# Patient Record
Sex: MALE | Race: WHITE | NOT HISPANIC OR LATINO | Employment: OTHER | URBAN - METROPOLITAN AREA
[De-identification: names, ages, dates, MRNs, and addresses within clinical notes are randomized per-mention and may not be internally consistent; named-entity substitution may affect disease eponyms.]

---

## 2023-01-25 ENCOUNTER — HOSPITAL ENCOUNTER (EMERGENCY)
Dept: DATA CONVERSION | Facility: HOSPITAL | Age: 83
Discharge: HOME | End: 2023-01-25
Attending: EMERGENCY MEDICINE

## 2023-01-25 DIAGNOSIS — S61.411A LACERATION WITHOUT FOREIGN BODY OF RIGHT HAND, INITIAL ENCOUNTER: ICD-10-CM

## 2023-01-25 DIAGNOSIS — I48.91 UNSPECIFIED ATRIAL FIBRILLATION (MULTI): ICD-10-CM

## 2023-01-25 DIAGNOSIS — Z88.0 ALLERGY STATUS TO PENICILLIN: ICD-10-CM

## 2023-01-25 DIAGNOSIS — Z95.0 PRESENCE OF CARDIAC PACEMAKER: ICD-10-CM

## 2023-01-25 DIAGNOSIS — S61.401A UNSPECIFIED OPEN WOUND OF RIGHT HAND, INITIAL ENCOUNTER: ICD-10-CM

## 2023-01-25 DIAGNOSIS — M19.90 UNSPECIFIED OSTEOARTHRITIS, UNSPECIFIED SITE: ICD-10-CM

## 2023-01-25 DIAGNOSIS — E11.9 TYPE 2 DIABETES MELLITUS WITHOUT COMPLICATIONS (MULTI): ICD-10-CM

## 2023-01-25 DIAGNOSIS — Z79.84 LONG TERM (CURRENT) USE OF ORAL HYPOGLYCEMIC DRUGS: ICD-10-CM

## 2023-01-25 DIAGNOSIS — S00.83XA CONTUSION OF OTHER PART OF HEAD, INITIAL ENCOUNTER: ICD-10-CM

## 2023-01-25 DIAGNOSIS — I10 ESSENTIAL (PRIMARY) HYPERTENSION: ICD-10-CM

## 2023-01-25 DIAGNOSIS — Z79.899 OTHER LONG TERM (CURRENT) DRUG THERAPY: ICD-10-CM

## 2023-01-25 DIAGNOSIS — Z79.01 LONG TERM (CURRENT) USE OF ANTICOAGULANTS: ICD-10-CM

## 2023-01-25 DIAGNOSIS — S09.90XA UNSPECIFIED INJURY OF HEAD, INITIAL ENCOUNTER: ICD-10-CM

## 2023-01-25 DIAGNOSIS — S00.31XA ABRASION OF NOSE, INITIAL ENCOUNTER: ICD-10-CM

## 2023-01-25 DIAGNOSIS — M54.2 CERVICALGIA: ICD-10-CM
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
## 2023-01-25 DIAGNOSIS — Z79.4 LONG TERM (CURRENT) USE OF INSULIN (MULTI): ICD-10-CM

## 2023-01-25 DIAGNOSIS — Z79.82 LONG TERM (CURRENT) USE OF ASPIRIN: ICD-10-CM

## 2023-03-24 LAB
ANION GAP IN SER/PLAS: 17 MMOL/L (ref 10–20)
CALCIUM (MG/DL) IN SER/PLAS: 8.8 MG/DL (ref 8.6–10.3)
CARBON DIOXIDE, TOTAL (MMOL/L) IN SER/PLAS: 22 MMOL/L (ref 21–32)
CHLORIDE (MMOL/L) IN SER/PLAS: 102 MMOL/L (ref 98–107)
CREATININE (MG/DL) IN SER/PLAS: 1.03 MG/DL (ref 0.5–1.3)
GFR MALE: 72 ML/MIN/1.73M2
GLUCOSE (MG/DL) IN SER/PLAS: 326 MG/DL (ref 74–99)
NATRIURETIC PEPTIDE B (PG/ML) IN SER/PLAS: 203 PG/ML (ref 0–99)
POTASSIUM (MMOL/L) IN SER/PLAS: 4 MMOL/L (ref 3.5–5.3)
SODIUM (MMOL/L) IN SER/PLAS: 137 MMOL/L (ref 136–145)
UREA NITROGEN (MG/DL) IN SER/PLAS: 26 MG/DL (ref 6–23)

## 2023-04-04 LAB
ERYTHROCYTE DISTRIBUTION WIDTH (RATIO) BY AUTOMATED COUNT: 12.8 % (ref 11.5–14.5)
ERYTHROCYTE MEAN CORPUSCULAR HEMOGLOBIN CONCENTRATION (G/DL) BY AUTOMATED: 32.7 G/DL (ref 32–36)
ERYTHROCYTE MEAN CORPUSCULAR VOLUME (FL) BY AUTOMATED COUNT: 102 FL (ref 80–100)
ERYTHROCYTES (10*6/UL) IN BLOOD BY AUTOMATED COUNT: 3.91 X10E12/L (ref 4.5–5.9)
HEMATOCRIT (%) IN BLOOD BY AUTOMATED COUNT: 39.7 % (ref 41–52)
HEMOGLOBIN (G/DL) IN BLOOD: 13 G/DL (ref 13.5–17.5)
INR IN PPP BY COAGULATION ASSAY: 1.3 (ref 0.9–1.1)
LEUKOCYTES (10*3/UL) IN BLOOD BY AUTOMATED COUNT: 12 X10E9/L (ref 4.4–11.3)
NRBC (PER 100 WBCS) BY AUTOMATED COUNT: 0 /100 WBC (ref 0–0)
PLATELETS (10*3/UL) IN BLOOD AUTOMATED COUNT: 257 X10E9/L (ref 150–450)
PROTHROMBIN TIME (PT) IN PPP BY COAGULATION ASSAY: 14.9 SEC (ref 9.8–13.4)

## 2023-04-11 ENCOUNTER — HOSPITAL ENCOUNTER (OUTPATIENT)
Dept: DATA CONVERSION | Facility: HOSPITAL | Age: 83
End: 2023-04-11
Attending: INTERNAL MEDICINE | Admitting: INTERNAL MEDICINE
Payer: MEDICARE

## 2023-04-11 DIAGNOSIS — Z88.0 ALLERGY STATUS TO PENICILLIN: ICD-10-CM

## 2023-04-11 DIAGNOSIS — R29.898 OTHER SYMPTOMS AND SIGNS INVOLVING THE MUSCULOSKELETAL SYSTEM: ICD-10-CM

## 2023-04-11 DIAGNOSIS — M54.16 RADICULOPATHY, LUMBAR REGION: ICD-10-CM

## 2023-05-23 LAB
ANION GAP IN SER/PLAS: 14 MMOL/L (ref 10–20)
CALCIUM (MG/DL) IN SER/PLAS: 8.8 MG/DL (ref 8.6–10.3)
CARBON DIOXIDE, TOTAL (MMOL/L) IN SER/PLAS: 26 MMOL/L (ref 21–32)
CHLORIDE (MMOL/L) IN SER/PLAS: 106 MMOL/L (ref 98–107)
CREATININE (MG/DL) IN SER/PLAS: 1.11 MG/DL (ref 0.5–1.3)
GFR MALE: 66 ML/MIN/1.73M2
GLUCOSE (MG/DL) IN SER/PLAS: 218 MG/DL (ref 74–99)
POTASSIUM (MMOL/L) IN SER/PLAS: 3.9 MMOL/L (ref 3.5–5.3)
SODIUM (MMOL/L) IN SER/PLAS: 142 MMOL/L (ref 136–145)
UREA NITROGEN (MG/DL) IN SER/PLAS: 22 MG/DL (ref 6–23)

## 2023-05-31 ENCOUNTER — HOSPITAL ENCOUNTER (OUTPATIENT)
Dept: DATA CONVERSION | Facility: HOSPITAL | Age: 83
End: 2023-05-31
Attending: ANESTHESIOLOGY | Admitting: ANESTHESIOLOGY
Payer: MEDICARE

## 2023-05-31 DIAGNOSIS — Z88.0 ALLERGY STATUS TO PENICILLIN: ICD-10-CM

## 2023-05-31 DIAGNOSIS — M54.16 RADICULOPATHY, LUMBAR REGION: ICD-10-CM

## 2023-05-31 DIAGNOSIS — M48.062 SPINAL STENOSIS, LUMBAR REGION WITH NEUROGENIC CLAUDICATION: ICD-10-CM

## 2023-06-20 LAB
ANION GAP IN SER/PLAS: 13 MMOL/L (ref 10–20)
CALCIUM (MG/DL) IN SER/PLAS: 8.4 MG/DL (ref 8.6–10.3)
CARBON DIOXIDE, TOTAL (MMOL/L) IN SER/PLAS: 27 MMOL/L (ref 21–32)
CHLORIDE (MMOL/L) IN SER/PLAS: 102 MMOL/L (ref 98–107)
CREATININE (MG/DL) IN SER/PLAS: 1.18 MG/DL (ref 0.5–1.3)
GFR MALE: 61 ML/MIN/1.73M2
GLUCOSE (MG/DL) IN SER/PLAS: 320 MG/DL (ref 74–99)
POTASSIUM (MMOL/L) IN SER/PLAS: 4 MMOL/L (ref 3.5–5.3)
SODIUM (MMOL/L) IN SER/PLAS: 138 MMOL/L (ref 136–145)
UREA NITROGEN (MG/DL) IN SER/PLAS: 34 MG/DL (ref 6–23)

## 2023-08-28 ENCOUNTER — OFFICE VISIT (OUTPATIENT)
Dept: PRIMARY CARE | Facility: CLINIC | Age: 83
End: 2023-08-28
Payer: MEDICARE

## 2023-08-28 VITALS
WEIGHT: 212 LBS | DIASTOLIC BLOOD PRESSURE: 70 MMHG | HEIGHT: 68 IN | OXYGEN SATURATION: 98 % | BODY MASS INDEX: 32.13 KG/M2 | SYSTOLIC BLOOD PRESSURE: 114 MMHG | HEART RATE: 88 BPM

## 2023-08-28 DIAGNOSIS — D64.9 ANEMIA, UNSPECIFIED TYPE: ICD-10-CM

## 2023-08-28 DIAGNOSIS — E78.5 HYPERLIPIDEMIA, UNSPECIFIED HYPERLIPIDEMIA TYPE: ICD-10-CM

## 2023-08-28 DIAGNOSIS — Z00.00 ROUTINE GENERAL MEDICAL EXAMINATION AT HEALTH CARE FACILITY: Primary | ICD-10-CM

## 2023-08-28 DIAGNOSIS — Z79.4 TYPE 2 DIABETES MELLITUS WITHOUT COMPLICATION, WITH LONG-TERM CURRENT USE OF INSULIN (MULTI): ICD-10-CM

## 2023-08-28 DIAGNOSIS — E11.9 TYPE 2 DIABETES MELLITUS WITHOUT COMPLICATION, WITH LONG-TERM CURRENT USE OF INSULIN (MULTI): ICD-10-CM

## 2023-08-28 DIAGNOSIS — I48.0 PAROXYSMAL ATRIAL FIBRILLATION (MULTI): ICD-10-CM

## 2023-08-28 LAB
ALANINE AMINOTRANSFERASE (SGPT) (U/L) IN SER/PLAS: 14 U/L (ref 10–52)
ALBUMIN (G/DL) IN SER/PLAS: 4.1 G/DL (ref 3.4–5)
ALKALINE PHOSPHATASE (U/L) IN SER/PLAS: 90 U/L (ref 33–136)
ANION GAP IN SER/PLAS: 12 MMOL/L (ref 10–20)
ASPARTATE AMINOTRANSFERASE (SGOT) (U/L) IN SER/PLAS: 17 U/L (ref 9–39)
BASOPHILS (10*3/UL) IN BLOOD BY AUTOMATED COUNT: 0.08 X10E9/L (ref 0–0.1)
BASOPHILS/100 LEUKOCYTES IN BLOOD BY AUTOMATED COUNT: 0.8 % (ref 0–2)
BILIRUBIN TOTAL (MG/DL) IN SER/PLAS: 0.9 MG/DL (ref 0–1.2)
CALCIUM (MG/DL) IN SER/PLAS: 9.3 MG/DL (ref 8.6–10.6)
CARBON DIOXIDE, TOTAL (MMOL/L) IN SER/PLAS: 28 MMOL/L (ref 21–32)
CHLORIDE (MMOL/L) IN SER/PLAS: 103 MMOL/L (ref 98–107)
CHOLESTEROL (MG/DL) IN SER/PLAS: 114 MG/DL (ref 0–199)
CHOLESTEROL IN HDL (MG/DL) IN SER/PLAS: 39.8 MG/DL
CHOLESTEROL/HDL RATIO: 2.9
CREATININE (MG/DL) IN SER/PLAS: 1.1 MG/DL (ref 0.5–1.3)
EOSINOPHILS (10*3/UL) IN BLOOD BY AUTOMATED COUNT: 0.13 X10E9/L (ref 0–0.4)
EOSINOPHILS/100 LEUKOCYTES IN BLOOD BY AUTOMATED COUNT: 1.3 % (ref 0–6)
ERYTHROCYTE DISTRIBUTION WIDTH (RATIO) BY AUTOMATED COUNT: 13.2 % (ref 11.5–14.5)
ERYTHROCYTE MEAN CORPUSCULAR HEMOGLOBIN CONCENTRATION (G/DL) BY AUTOMATED: 33.2 G/DL (ref 32–36)
ERYTHROCYTE MEAN CORPUSCULAR VOLUME (FL) BY AUTOMATED COUNT: 106 FL (ref 80–100)
ERYTHROCYTES (10*6/UL) IN BLOOD BY AUTOMATED COUNT: 3.45 X10E12/L (ref 4.5–5.9)
GFR MALE: 67 ML/MIN/1.73M2
GLUCOSE (MG/DL) IN SER/PLAS: 236 MG/DL (ref 74–99)
HEMATOCRIT (%) IN BLOOD BY AUTOMATED COUNT: 36.4 % (ref 41–52)
HEMOGLOBIN (G/DL) IN BLOOD: 12.1 G/DL (ref 13.5–17.5)
IMMATURE GRANULOCYTES/100 LEUKOCYTES IN BLOOD BY AUTOMATED COUNT: 0.4 % (ref 0–0.9)
LDL: 43 MG/DL (ref 0–99)
LEUKOCYTES (10*3/UL) IN BLOOD BY AUTOMATED COUNT: 10 X10E9/L (ref 4.4–11.3)
LYMPHOCYTES (10*3/UL) IN BLOOD BY AUTOMATED COUNT: 1.68 X10E9/L (ref 0.8–3)
LYMPHOCYTES/100 LEUKOCYTES IN BLOOD BY AUTOMATED COUNT: 16.7 % (ref 13–44)
MONOCYTES (10*3/UL) IN BLOOD BY AUTOMATED COUNT: 0.62 X10E9/L (ref 0.05–0.8)
MONOCYTES/100 LEUKOCYTES IN BLOOD BY AUTOMATED COUNT: 6.2 % (ref 2–10)
NEUTROPHILS (10*3/UL) IN BLOOD BY AUTOMATED COUNT: 7.49 X10E9/L (ref 1.6–5.5)
NEUTROPHILS/100 LEUKOCYTES IN BLOOD BY AUTOMATED COUNT: 74.6 % (ref 40–80)
NRBC (PER 100 WBCS) BY AUTOMATED COUNT: 0 /100 WBC (ref 0–0)
PLATELETS (10*3/UL) IN BLOOD AUTOMATED COUNT: 185 X10E9/L (ref 150–450)
POTASSIUM (MMOL/L) IN SER/PLAS: 3.9 MMOL/L (ref 3.5–5.3)
PROTEIN TOTAL: 6.6 G/DL (ref 6.4–8.2)
SODIUM (MMOL/L) IN SER/PLAS: 139 MMOL/L (ref 136–145)
TRIGLYCERIDE (MG/DL) IN SER/PLAS: 157 MG/DL (ref 0–149)
UREA NITROGEN (MG/DL) IN SER/PLAS: 26 MG/DL (ref 6–23)
VLDL: 31 MG/DL (ref 0–40)

## 2023-08-28 PROCEDURE — 1125F AMNT PAIN NOTED PAIN PRSNT: CPT | Performed by: STUDENT IN AN ORGANIZED HEALTH CARE EDUCATION/TRAINING PROGRAM

## 2023-08-28 PROCEDURE — 85025 COMPLETE CBC W/AUTO DIFF WBC: CPT

## 2023-08-28 PROCEDURE — 84443 ASSAY THYROID STIM HORMONE: CPT

## 2023-08-28 PROCEDURE — 84153 ASSAY OF PSA TOTAL: CPT

## 2023-08-28 PROCEDURE — G0439 PPPS, SUBSEQ VISIT: HCPCS | Performed by: STUDENT IN AN ORGANIZED HEALTH CARE EDUCATION/TRAINING PROGRAM

## 2023-08-28 PROCEDURE — 83036 HEMOGLOBIN GLYCOSYLATED A1C: CPT

## 2023-08-28 PROCEDURE — 1170F FXNL STATUS ASSESSED: CPT | Performed by: STUDENT IN AN ORGANIZED HEALTH CARE EDUCATION/TRAINING PROGRAM

## 2023-08-28 PROCEDURE — 80061 LIPID PANEL: CPT

## 2023-08-28 PROCEDURE — 1159F MED LIST DOCD IN RCRD: CPT | Performed by: STUDENT IN AN ORGANIZED HEALTH CARE EDUCATION/TRAINING PROGRAM

## 2023-08-28 PROCEDURE — 99213 OFFICE O/P EST LOW 20 MIN: CPT | Performed by: STUDENT IN AN ORGANIZED HEALTH CARE EDUCATION/TRAINING PROGRAM

## 2023-08-28 PROCEDURE — 80053 COMPREHEN METABOLIC PANEL: CPT

## 2023-08-28 RX ORDER — LANOLIN ALCOHOL/MO/W.PET/CERES
1 CREAM (GRAM) TOPICAL DAILY
COMMUNITY

## 2023-08-28 RX ORDER — GLIPIZIDE 2.5 MG/1
2.5 TABLET, EXTENDED RELEASE ORAL 3 TIMES DAILY
COMMUNITY
Start: 2013-05-22 | End: 2023-12-04 | Stop reason: SDUPTHER

## 2023-08-28 RX ORDER — RABEPRAZOLE SODIUM 20 MG/1
20 TABLET, DELAYED RELEASE ORAL DAILY
COMMUNITY
Start: 2011-09-14 | End: 2023-09-19 | Stop reason: SDUPTHER

## 2023-08-28 RX ORDER — GABAPENTIN 100 MG/1
100 CAPSULE ORAL NIGHTLY
COMMUNITY
Start: 2023-04-20 | End: 2024-05-08 | Stop reason: ALTCHOICE

## 2023-08-28 RX ORDER — FOSINOPIRL SODIUM 10 MG/1
1 TABLET ORAL DAILY
COMMUNITY
End: 2023-11-29 | Stop reason: SDUPTHER

## 2023-08-28 RX ORDER — DEXTROMETHORPHAN HYDROBROMIDE, GUAIFENESIN 5; 100 MG/5ML; MG/5ML
650 LIQUID ORAL 4 TIMES DAILY PRN
COMMUNITY
End: 2023-10-18 | Stop reason: WASHOUT

## 2023-08-28 RX ORDER — ACETAMINOPHEN 500 MG
500 TABLET ORAL 2 TIMES DAILY PRN
COMMUNITY
Start: 2016-10-06 | End: 2023-10-31 | Stop reason: ENTERED-IN-ERROR

## 2023-08-28 RX ORDER — FUROSEMIDE 20 MG/1
20 TABLET ORAL DAILY
COMMUNITY
Start: 2019-02-19 | End: 2023-10-18

## 2023-08-28 RX ORDER — MULTIVITAMIN
1 TABLET ORAL DAILY
COMMUNITY
Start: 2011-09-14

## 2023-08-28 RX ORDER — ROSUVASTATIN CALCIUM 5 MG/1
1 TABLET, COATED ORAL DAILY
COMMUNITY
Start: 2020-06-19 | End: 2023-09-19 | Stop reason: SDUPTHER

## 2023-08-28 RX ORDER — INSULIN ASPART 100 [IU]/ML
INJECTION, SOLUTION INTRAVENOUS; SUBCUTANEOUS
COMMUNITY
Start: 2023-05-23 | End: 2023-10-18 | Stop reason: WASHOUT

## 2023-08-28 RX ORDER — METOPROLOL TARTRATE 25 MG/1
1 TABLET, FILM COATED ORAL 2 TIMES DAILY
COMMUNITY
Start: 2020-02-06 | End: 2024-05-29 | Stop reason: SDUPTHER

## 2023-08-28 RX ORDER — INSULIN DEGLUDEC 100 U/ML
26 INJECTION, SOLUTION SUBCUTANEOUS DAILY
COMMUNITY
Start: 2020-10-09 | End: 2023-09-19 | Stop reason: SDUPTHER

## 2023-08-28 RX ORDER — TORSEMIDE 20 MG/1
20 TABLET ORAL 2 TIMES DAILY
COMMUNITY
Start: 2023-06-16 | End: 2024-03-28 | Stop reason: SDUPTHER

## 2023-08-28 ASSESSMENT — ENCOUNTER SYMPTOMS
OCCASIONAL FEELINGS OF UNSTEADINESS: 0
DEPRESSION: 0
LOSS OF SENSATION IN FEET: 0

## 2023-08-28 ASSESSMENT — ACTIVITIES OF DAILY LIVING (ADL)
GROCERY_SHOPPING: INDEPENDENT
BATHING: INDEPENDENT
MANAGING_FINANCES: INDEPENDENT
DOING_HOUSEWORK: NEEDS ASSISTANCE
TAKING_MEDICATION: INDEPENDENT
DRESSING: INDEPENDENT

## 2023-08-28 ASSESSMENT — PATIENT HEALTH QUESTIONNAIRE - PHQ9
2. FEELING DOWN, DEPRESSED OR HOPELESS: NOT AT ALL
1. LITTLE INTEREST OR PLEASURE IN DOING THINGS: NOT AT ALL
SUM OF ALL RESPONSES TO PHQ9 QUESTIONS 1 AND 2: 0

## 2023-08-28 NOTE — PROGRESS NOTES
"Subjective   Reason for Visit: Dragan Calvin is an 82 y.o. male here for a Medicare Wellness visit.          Reviewed all medications by prescribing practitioner or clinical pharmacist (such as prescriptions, OTCs, herbal therapies and supplements) and documented in the medical record.    HPI    Patient Care Team:  Emmanuel Brown DO as PCP - General  Lucas Zee MD as PCP - [facility] Attributed Provider     Review of Systems    Objective   Vitals:  /70 (BP Location: Right arm, Patient Position: Sitting)   Pulse 88   Ht 1.727 m (5' 8\")   Wt 96.2 kg (212 lb)   SpO2 98%   BMI 32.23 kg/m²       Physical Exam    Assessment/Plan   Problem List Items Addressed This Visit    None         "

## 2023-08-28 NOTE — PROGRESS NOTES
"Subjective   Reason for Visit: Dragan Calvin is an 82 y.o. male here for a Medicare Wellness visit.          Reviewed all medications by prescribing practitioner or clinical pharmacist (such as prescriptions, OTCs, herbal therapies and supplements) and documented in the medical record.    HPI    DM: on insulin and meds, checks daily , readings in low 100s, wants off insulin. Only takes about 4 units at night for meals    A fib: on eliquis sees lyndsay Funez, unable to feel it when he is in afib    Hld: statin thearpy stable, no muscle aches    Patient Care Team:  Emmanuel Brown DO as PCP - General  Lucas Zee MD as PCP - [facility] Attributed Provider     Review of Systems   All other systems reviewed and are negative.      Objective   Vitals:  /70 (BP Location: Right arm, Patient Position: Sitting)   Pulse 88   Ht 1.727 m (5' 8\")   Wt 96.2 kg (212 lb)   SpO2 98%   BMI 32.23 kg/m²       Physical Exam  Constitutional:       Appearance: Normal appearance.   HENT:      Head: Normocephalic and atraumatic.      Right Ear: Tympanic membrane and ear canal normal.      Left Ear: Tympanic membrane and ear canal normal.      Mouth/Throat:      Mouth: Mucous membranes are moist.      Pharynx: Oropharynx is clear.   Eyes:      Extraocular Movements: Extraocular movements intact.      Conjunctiva/sclera: Conjunctivae normal.      Pupils: Pupils are equal, round, and reactive to light.   Cardiovascular:      Rate and Rhythm: Normal rate and regular rhythm.      Pulses: Normal pulses.      Heart sounds: Normal heart sounds.   Pulmonary:      Effort: Pulmonary effort is normal.      Breath sounds: Normal breath sounds.   Abdominal:      General: Abdomen is flat. Bowel sounds are normal.      Palpations: Abdomen is soft.   Musculoskeletal:         General: Normal range of motion.      Cervical back: Normal range of motion and neck supple.   Skin:     General: Skin is warm and dry.      Capillary Refill: Capillary " refill takes 2 to 3 seconds.   Neurological:      General: No focal deficit present.      Mental Status: He is alert and oriented to person, place, and time. Mental status is at baseline.   Psychiatric:         Mood and Affect: Mood normal.         Behavior: Behavior normal.         Thought Content: Thought content normal.         Judgment: Judgment normal.         Assessment/Plan   Problem List Items Addressed This Visit    None  Visit Diagnoses       Routine general medical examination at health care facility    -  Primary    Relevant Orders    1 Year Follow Up In Primary Care - Wellness Exam            1. Routine general medical examination at health care facility  - 1 Year Follow Up In Primary Care - Wellness Exam; Future  - CBC and Auto Differential  - Comprehensive Metabolic Panel  - Lipid Panel  - TSH with reflex to Free T4 if abnormal  - Prostate Specific Antigen, Screen  - Hemoglobin A1C    2. Type 2 diabetes mellitus without complication, with long-term current use of insulin (CMS/[facility])  Check  A1c  Stop novolog  - continue other meds  - follow up 3 months    3. Hyperlipidemia, unspecified hyperlipidemia type  Stable on statin    4. Paroxysmal atrial fibrillation (CMS/[facility])  Stable on ac  Hr controlled    5. Anemia, unspecified type    - CBC and Auto Differential

## 2023-08-29 LAB
ESTIMATED AVERAGE GLUCOSE FOR HBA1C: 160 MG/DL
HEMOGLOBIN A1C/HEMOGLOBIN TOTAL IN BLOOD: 7.2 %
PROSTATE SPECIFIC ANTIGEN,SCREEN: 3.02 NG/ML (ref 0–4)
THYROTROPIN (MIU/L) IN SER/PLAS BY DETECTION LIMIT <= 0.05 MIU/L: 1.1 MIU/L (ref 0.44–3.98)

## 2023-09-05 PROBLEM — M54.42 CHRONIC LEFT-SIDED LOW BACK PAIN WITH LEFT-SIDED SCIATICA: Status: ACTIVE | Noted: 2018-06-11

## 2023-09-05 PROBLEM — B02.29 POSTHERPETIC NEURALGIA: Status: ACTIVE | Noted: 2023-09-05

## 2023-09-05 PROBLEM — R60.0 LEG EDEMA: Status: ACTIVE | Noted: 2023-09-05

## 2023-09-05 PROBLEM — G89.29 CHRONIC LEFT-SIDED LOW BACK PAIN WITH LEFT-SIDED SCIATICA: Status: ACTIVE | Noted: 2018-06-11

## 2023-09-05 PROBLEM — M15.9 GENERALIZED OA: Status: ACTIVE | Noted: 2017-08-25

## 2023-09-05 PROBLEM — I25.10 CAD (CORONARY ARTERY DISEASE): Status: ACTIVE | Noted: 2017-08-23

## 2023-09-05 PROBLEM — Z95.0 PRESENCE OF CARDIAC PACEMAKER: Status: ACTIVE | Noted: 2017-08-25

## 2023-09-05 PROBLEM — I10 HYPERTENSION, BENIGN: Status: ACTIVE | Noted: 2017-08-23

## 2023-09-05 PROBLEM — M47.816 FACET DEGENERATION OF LUMBAR REGION: Status: ACTIVE | Noted: 2023-09-05

## 2023-09-05 PROBLEM — K21.9 GASTROESOPHAGEAL REFLUX DISEASE: Status: ACTIVE | Noted: 2017-08-25

## 2023-09-07 VITALS — HEIGHT: 69 IN | BODY MASS INDEX: 32.65 KG/M2 | WEIGHT: 220.46 LBS

## 2023-09-17 PROBLEM — I49.5 SICK SINUS SYNDROME (MULTI): Status: ACTIVE | Noted: 2023-09-17

## 2023-09-17 PROBLEM — E78.00 HYPERCHOLESTEROLEMIA: Status: ACTIVE | Noted: 2023-09-17

## 2023-09-17 PROBLEM — I82.409 DEEP VEIN THROMBOSIS (DVT) OF LOWER EXTREMITY (MULTI): Status: ACTIVE | Noted: 2023-09-17

## 2023-09-17 PROBLEM — M46.1 BILATERAL SACROILIITIS (CMS-HCC): Status: ACTIVE | Noted: 2023-09-17

## 2023-09-19 DIAGNOSIS — E11.9 TYPE 2 DIABETES MELLITUS WITHOUT COMPLICATION, WITH LONG-TERM CURRENT USE OF INSULIN (MULTI): ICD-10-CM

## 2023-09-19 DIAGNOSIS — K21.9 GASTROESOPHAGEAL REFLUX DISEASE WITHOUT ESOPHAGITIS: ICD-10-CM

## 2023-09-19 DIAGNOSIS — Z79.4 TYPE 2 DIABETES MELLITUS WITHOUT COMPLICATION, WITH LONG-TERM CURRENT USE OF INSULIN (MULTI): Primary | ICD-10-CM

## 2023-09-19 DIAGNOSIS — Z79.4 TYPE 2 DIABETES MELLITUS WITHOUT COMPLICATION, WITH LONG-TERM CURRENT USE OF INSULIN (MULTI): ICD-10-CM

## 2023-09-19 DIAGNOSIS — E11.9 TYPE 2 DIABETES MELLITUS WITHOUT COMPLICATION, WITH LONG-TERM CURRENT USE OF INSULIN (MULTI): Primary | ICD-10-CM

## 2023-09-19 DIAGNOSIS — E78.5 HYPERLIPIDEMIA, UNSPECIFIED HYPERLIPIDEMIA TYPE: ICD-10-CM

## 2023-09-19 RX ORDER — PEN NEEDLE, DIABETIC 30 GX3/16"
1 NEEDLE, DISPOSABLE MISCELLANEOUS 4 TIMES DAILY
Qty: 100 EACH | Refills: 3 | Status: SHIPPED | OUTPATIENT
Start: 2023-09-19 | End: 2024-04-09 | Stop reason: SDUPTHER

## 2023-09-19 RX ORDER — PEN NEEDLE, DIABETIC 30 GX3/16"
NEEDLE, DISPOSABLE MISCELLANEOUS
COMMUNITY
End: 2023-09-19 | Stop reason: SDUPTHER

## 2023-09-19 RX ORDER — RABEPRAZOLE SODIUM 20 MG/1
20 TABLET, DELAYED RELEASE ORAL DAILY
Qty: 90 TABLET | Refills: 1 | Status: SHIPPED | OUTPATIENT
Start: 2023-09-19 | End: 2024-01-10 | Stop reason: SDUPTHER

## 2023-09-19 RX ORDER — INSULIN DEGLUDEC 100 U/ML
26 INJECTION, SOLUTION SUBCUTANEOUS DAILY
Qty: 3 ML | Refills: 6 | Status: SHIPPED | OUTPATIENT
Start: 2023-09-19 | End: 2023-09-20

## 2023-09-19 RX ORDER — ROSUVASTATIN CALCIUM 5 MG/1
5 TABLET, COATED ORAL DAILY
Qty: 90 TABLET | Refills: 1 | Status: SHIPPED | OUTPATIENT
Start: 2023-09-19 | End: 2024-01-10 | Stop reason: SDUPTHER

## 2023-09-20 RX ORDER — INSULIN DEGLUDEC 100 U/ML
26 INJECTION, SOLUTION SUBCUTANEOUS DAILY
Qty: 3 ML | Refills: 6 | Status: SHIPPED | OUTPATIENT
Start: 2023-09-20 | End: 2023-10-18 | Stop reason: WASHOUT

## 2023-09-27 ENCOUNTER — TRANSCRIBE ORDERS (OUTPATIENT)
Dept: PAIN MEDICINE | Facility: CLINIC | Age: 83
End: 2023-09-27
Payer: MEDICARE

## 2023-09-27 DIAGNOSIS — M54.16 LUMBAR RADICULOPATHY: ICD-10-CM

## 2023-09-27 DIAGNOSIS — M48.062 SPINAL STENOSIS, LUMBAR REGION WITH NEUROGENIC CLAUDICATION: ICD-10-CM

## 2023-09-27 DIAGNOSIS — M43.06 LUMBAR SPONDYLOLYSIS: ICD-10-CM

## 2023-10-05 ENCOUNTER — TRANSCRIBE ORDERS (OUTPATIENT)
Dept: PAIN MEDICINE | Facility: CLINIC | Age: 83
End: 2023-10-05
Payer: MEDICARE

## 2023-10-05 DIAGNOSIS — M54.16 LUMBAR RADICULOPATHY: ICD-10-CM

## 2023-10-06 ENCOUNTER — ANCILLARY PROCEDURE (OUTPATIENT)
Dept: RADIOLOGY | Facility: CLINIC | Age: 83
End: 2023-10-06
Payer: MEDICARE

## 2023-10-06 ENCOUNTER — TRANSCRIBE ORDERS (OUTPATIENT)
Dept: PAIN MEDICINE | Facility: CLINIC | Age: 83
End: 2023-10-06

## 2023-10-06 ENCOUNTER — OFFICE VISIT (OUTPATIENT)
Dept: PAIN MEDICINE | Facility: CLINIC | Age: 83
End: 2023-10-06
Payer: MEDICARE

## 2023-10-06 VITALS — TEMPERATURE: 96.1 F | HEART RATE: 74 BPM | OXYGEN SATURATION: 97 % | RESPIRATION RATE: 18 BRPM

## 2023-10-06 DIAGNOSIS — M54.16 LUMBAR RADICULOPATHY: ICD-10-CM

## 2023-10-06 DIAGNOSIS — M48.062 SPINAL STENOSIS, LUMBAR REGION WITH NEUROGENIC CLAUDICATION: ICD-10-CM

## 2023-10-06 PROCEDURE — 1125F AMNT PAIN NOTED PAIN PRSNT: CPT | Performed by: ANESTHESIOLOGY

## 2023-10-06 PROCEDURE — 1160F RVW MEDS BY RX/DR IN RCRD: CPT | Performed by: ANESTHESIOLOGY

## 2023-10-06 PROCEDURE — 62323 NJX INTERLAMINAR LMBR/SAC: CPT | Performed by: ANESTHESIOLOGY

## 2023-10-06 PROCEDURE — 77003 FLUOROGUIDE FOR SPINE INJECT: CPT

## 2023-10-06 PROCEDURE — 1159F MED LIST DOCD IN RCRD: CPT | Performed by: ANESTHESIOLOGY

## 2023-10-06 PROCEDURE — 2500000005 HC RX 250 GENERAL PHARMACY W/O HCPCS

## 2023-10-06 PROCEDURE — 1036F TOBACCO NON-USER: CPT | Performed by: ANESTHESIOLOGY

## 2023-10-06 RX ORDER — METOLAZONE 2.5 MG/1
2.5 TABLET ORAL 2 TIMES WEEKLY
COMMUNITY
Start: 2023-09-25 | End: 2024-05-08 | Stop reason: ALTCHOICE

## 2023-10-06 RX ORDER — LIDOCAINE HYDROCHLORIDE 10 MG/ML
INJECTION, SOLUTION EPIDURAL; INFILTRATION; INTRACAUDAL; PERINEURAL
Status: COMPLETED
Start: 2023-10-06 | End: 2023-10-06

## 2023-10-06 RX ORDER — SODIUM CHLORIDE 9 MG/ML
INJECTION, SOLUTION INTRAMUSCULAR; INTRAVENOUS; SUBCUTANEOUS
Status: COMPLETED
Start: 2023-10-06 | End: 2023-10-06

## 2023-10-06 RX ORDER — DILTIAZEM HYDROCHLORIDE 240 MG/1
240 CAPSULE, COATED, EXTENDED RELEASE ORAL 2 TIMES DAILY
COMMUNITY
Start: 2023-10-02 | End: 2024-03-20 | Stop reason: SDUPTHER

## 2023-10-06 RX ORDER — METHYLPREDNISOLONE ACETATE 40 MG/ML
INJECTION, SUSPENSION INTRA-ARTICULAR; INTRALESIONAL; INTRAMUSCULAR; SOFT TISSUE
Status: COMPLETED
Start: 2023-10-06 | End: 2023-10-06

## 2023-10-06 RX ORDER — ROPIVACAINE HYDROCHLORIDE 5 MG/ML
INJECTION, SOLUTION EPIDURAL; INFILTRATION; PERINEURAL
Status: COMPLETED
Start: 2023-10-06 | End: 2023-10-06

## 2023-10-06 RX ADMIN — METHYLPREDNISOLONE ACETATE 40 MG: 40 INJECTION, SUSPENSION INTRA-ARTICULAR; INTRALESIONAL; INTRAMUSCULAR; INTRASYNOVIAL; SOFT TISSUE at 09:40

## 2023-10-06 RX ADMIN — ROPIVACAINE HYDROCHLORIDE 100 MG: 5 INJECTION, SOLUTION EPIDURAL; INFILTRATION; PERINEURAL at 09:38

## 2023-10-06 RX ADMIN — SODIUM CHLORIDE 10 ML: 9 INJECTION, SOLUTION INTRAMUSCULAR; INTRAVENOUS; SUBCUTANEOUS at 09:39

## 2023-10-06 RX ADMIN — LIDOCAINE HYDROCHLORIDE 50 MG: 10 INJECTION, SOLUTION EPIDURAL; INFILTRATION; INTRACAUDAL; PERINEURAL at 09:39

## 2023-10-06 ASSESSMENT — COLUMBIA-SUICIDE SEVERITY RATING SCALE - C-SSRS: 1. IN THE PAST MONTH, HAVE YOU WISHED YOU WERE DEAD OR WISHED YOU COULD GO TO SLEEP AND NOT WAKE UP?: NO

## 2023-10-06 ASSESSMENT — PAIN SCALES - GENERAL: PAINLEVEL_OUTOF10: 4

## 2023-10-06 ASSESSMENT — ENCOUNTER SYMPTOMS
SHORTNESS OF BREATH: 0
FATIGUE: 0
OCCASIONAL FEELINGS OF UNSTEADINESS: 1
DEPRESSION: 0
LOSS OF SENSATION IN FEET: 1

## 2023-10-06 ASSESSMENT — PATIENT HEALTH QUESTIONNAIRE - PHQ9
SUM OF ALL RESPONSES TO PHQ9 QUESTIONS 1 AND 2: 0
2. FEELING DOWN, DEPRESSED OR HOPELESS: NOT AT ALL
1. LITTLE INTEREST OR PLEASURE IN DOING THINGS: NOT AT ALL

## 2023-10-06 ASSESSMENT — PAIN - FUNCTIONAL ASSESSMENT: PAIN_FUNCTIONAL_ASSESSMENT: 0-10

## 2023-10-06 NOTE — H&P
History Of Present Illness  Dragan Calvin is a 83 y.o. male presenting with neurogenic claudicatory low back pain.     Past Medical History  Past Medical History:   Diagnosis Date    History of falling     History of fall    Overweight     Overweight    Personal history of other diseases of the digestive system     History of small bowel obstruction    Personal history of other infectious and parasitic diseases     History of herpes zoster    Personal history of other specified conditions     History of bradycardia    Personal history of other specified conditions     History of edema    Personal history of peptic ulcer disease     History of peptic ulcer    Personal history of pneumonia (recurrent)     History of pneumonia    Personal history of urinary calculi     History of kidney stones       Surgical History  Past Surgical History:   Procedure Laterality Date    APPENDECTOMY  07/25/2018    Appendectomy    CARDIAC PACEMAKER PLACEMENT  04/05/2018    Pacemaker Placement    CHOLECYSTECTOMY  04/05/2018    Cholecystectomy        Social History  He reports that he has never smoked. He has never used smokeless tobacco. He reports that he does not drink alcohol and does not use drugs.    Family History  Family History   Problem Relation Name Age of Onset    Coronary artery disease Father      Heart failure Father      Diabetes Father          Allergies  Patient has no known allergies.    Review of Systems   Constitutional:  Negative for fatigue.   Respiratory:  Negative for shortness of breath.    Cardiovascular:  Negative for chest pain.        Physical Exam  Cardiovascular:      Rate and Rhythm: Normal rate.   Pulmonary:      Effort: Pulmonary effort is normal.   Neurological:      Mental Status: He is alert.   Psychiatric:         Mood and Affect: Mood normal.          Last Recorded Vitals  Pulse 86, temperature 35.6 °C (96.1 °F), resp. rate 18, SpO2 95 %.     Assessment/Plan   Problem List Items Addressed This  Visit    None  Visit Diagnoses         Codes    Lumbar radiculopathy     M54.16    Spinal stenosis, lumbar region with neurogenic claudication     M48.062        Continue with planned L4-5 KESHAV       Chuck Gonzalez MD

## 2023-10-06 NOTE — PROGRESS NOTES
Epidural    Date/Time: 10/6/2023 9:21 AM    Performed by: Chuck Gonzalez MD  Authorized by: Chuck Gonzalez MD    Consent:     Consent obtained:  Written    Consent given by:  Patient    Risks, benefits, and alternatives were discussed: yes      Risks discussed:  Bleeding, infection, pain and nerve damage  Universal protocol:     Procedure explained and questions answered to patient or proxy's satisfaction: yes      Relevant documents present and verified: yes      Test results available: yes      Imaging studies available: yes      Required blood products, implants, devices, and special equipment available: yes      Site/side marked: yes      Immediately prior to procedure, a time out was called: yes      Patient identity confirmed:  Arm band and verbally with patient  Pre-procedure details:     Skin preparation:  Chlorhexidine  Sedation:     Sedation type:  None  Anesthesia:     Anesthesia method:  Local infiltration  Post-procedure details:     Procedure completion:  Tolerated      Lumbar Epidural Steroid Injection Procedure Note      Procedure: The risks and benefits of treatment options and alternatives were discussed with the patient, and consent was obtained for a Lumbar epidural steroid injection. He wishes to proceed. He was placed in a prone position. Area overlying the L4-5space was cleaned with ChloraPrep solution and draped using standard sterile precautions. Skin was anesthetized with 1% lidocaine. A 3.5 inch 20 G Touhy needle was advanced with AP, lateral, oblique fluoroscopy to the L4-5epidural space using loss-of-resistance technique. No paresthesias were induced. 2 ml of Omnipaque was injected demonstrating spread of the dye  in the epidural space. No intravascular spread was noticed. After negative aspiration for blood and CSF, a solution containing Depomedrol 40mg, 1 mL of 0.5% ropivacaine and 3 ml of normal saline was injected without inducing paresthesia or pain. Patient was transferred to  recovery in stable condition and subsequently discharged home.

## 2023-10-06 NOTE — PROGRESS NOTES
Patient signed electronic consent  or paper consent yes.  In room: 0941  Patient placed Prone position.  Time out : 0945  Prep and drapped 0945  Procedure start:0948  Patient tolerating procedure well .  Procedure end: 0959  Debriefing :0959  Band aid applied .  Out of room: 1002  OR Staff: DR Chuck Gonzalez  Monitor RN: Aristeo Marie  Circulator : Zuly Butterfield  X-Ray Tech: Alis Robert

## 2023-10-17 PROBLEM — Z95.0 PRESENCE OF CARDIAC PACEMAKER: Chronic | Status: ACTIVE | Noted: 2017-08-25

## 2023-10-17 PROBLEM — M54.42 CHRONIC LEFT-SIDED LOW BACK PAIN WITH LEFT-SIDED SCIATICA: Status: RESOLVED | Noted: 2018-06-11 | Resolved: 2023-10-17

## 2023-10-17 PROBLEM — M15.9 GENERALIZED OA: Status: RESOLVED | Noted: 2017-08-25 | Resolved: 2023-10-17

## 2023-10-17 PROBLEM — I48.0 PAROXYSMAL ATRIAL FIBRILLATION (MULTI): Chronic | Status: ACTIVE | Noted: 2023-08-28

## 2023-10-17 PROBLEM — E78.5 HYPERLIPIDEMIA: Chronic | Status: ACTIVE | Noted: 2023-08-28

## 2023-10-17 PROBLEM — Z00.00 ROUTINE GENERAL MEDICAL EXAMINATION AT HEALTH CARE FACILITY: Status: RESOLVED | Noted: 2023-08-28 | Resolved: 2023-10-17

## 2023-10-17 PROBLEM — I82.409 DEEP VEIN THROMBOSIS (DVT) OF LOWER EXTREMITY (MULTI): Chronic | Status: ACTIVE | Noted: 2023-09-17

## 2023-10-17 PROBLEM — G89.29 CHRONIC LEFT-SIDED LOW BACK PAIN WITH LEFT-SIDED SCIATICA: Status: RESOLVED | Noted: 2018-06-11 | Resolved: 2023-10-17

## 2023-10-17 PROBLEM — I48.0 PAROXYSMAL ATRIAL FIBRILLATION (MULTI): Chronic | Status: RESOLVED | Noted: 2023-08-28 | Resolved: 2023-10-17

## 2023-10-17 PROBLEM — E78.00 HYPERCHOLESTEROLEMIA: Status: RESOLVED | Noted: 2023-09-17 | Resolved: 2023-10-17

## 2023-10-17 PROBLEM — I48.21 PERMANENT ATRIAL FIBRILLATION (MULTI): Chronic | Status: ACTIVE | Noted: 2023-10-17

## 2023-10-17 PROBLEM — R60.0 LEG EDEMA: Chronic | Status: ACTIVE | Noted: 2023-09-05

## 2023-10-17 PROBLEM — I10 HYPERTENSION, BENIGN: Chronic | Status: ACTIVE | Noted: 2017-08-23

## 2023-10-17 PROBLEM — I49.5 SICK SINUS SYNDROME (MULTI): Chronic | Status: ACTIVE | Noted: 2023-09-17

## 2023-10-18 ENCOUNTER — OFFICE VISIT (OUTPATIENT)
Dept: CARDIOLOGY | Facility: CLINIC | Age: 83
End: 2023-10-18
Payer: MEDICARE

## 2023-10-18 VITALS
OXYGEN SATURATION: 97 % | BODY MASS INDEX: 31.47 KG/M2 | HEART RATE: 89 BPM | DIASTOLIC BLOOD PRESSURE: 68 MMHG | WEIGHT: 207 LBS | SYSTOLIC BLOOD PRESSURE: 110 MMHG

## 2023-10-18 DIAGNOSIS — E78.2 MIXED HYPERLIPIDEMIA: Primary | Chronic | ICD-10-CM

## 2023-10-18 DIAGNOSIS — I82.5Z9 CHRONIC DEEP VEIN THROMBOSIS (DVT) OF DISTAL VEIN OF LOWER EXTREMITY, UNSPECIFIED LATERALITY (MULTI): Chronic | ICD-10-CM

## 2023-10-18 DIAGNOSIS — I10 HYPERTENSION, BENIGN: Chronic | ICD-10-CM

## 2023-10-18 DIAGNOSIS — R60.0 LEG EDEMA: Chronic | ICD-10-CM

## 2023-10-18 DIAGNOSIS — I48.21 PERMANENT ATRIAL FIBRILLATION (MULTI): Chronic | ICD-10-CM

## 2023-10-18 DIAGNOSIS — Z95.0 PRESENCE OF CARDIAC PACEMAKER: Chronic | ICD-10-CM

## 2023-10-18 PROCEDURE — 3078F DIAST BP <80 MM HG: CPT | Performed by: INTERNAL MEDICINE

## 2023-10-18 PROCEDURE — 1159F MED LIST DOCD IN RCRD: CPT | Performed by: INTERNAL MEDICINE

## 2023-10-18 PROCEDURE — 3074F SYST BP LT 130 MM HG: CPT | Performed by: INTERNAL MEDICINE

## 2023-10-18 PROCEDURE — 99214 OFFICE O/P EST MOD 30 MIN: CPT | Performed by: INTERNAL MEDICINE

## 2023-10-18 PROCEDURE — 1125F AMNT PAIN NOTED PAIN PRSNT: CPT | Performed by: INTERNAL MEDICINE

## 2023-10-18 PROCEDURE — 1160F RVW MEDS BY RX/DR IN RCRD: CPT | Performed by: INTERNAL MEDICINE

## 2023-10-18 PROCEDURE — 1036F TOBACCO NON-USER: CPT | Performed by: INTERNAL MEDICINE

## 2023-10-18 NOTE — PROGRESS NOTES
Referred by No ref. provider found    HPI I am seeing Carols in follow-up.  Feeling better.  Lower extremity edema improved with metolazone.  Shared to decrease metolazone to 2.5 weekly.  He had no shortness of breath.  A lot of this is because he has to sleep in a recliner due to back issues and has dependent edema.    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 Zee follows    Overweight     Overweight    Paroxysmal atrial fibrillation (CMS/[facility]) 08/28/2023    PAF. On Eliquis. 7 second pauses therefore PPM placed. Freq Pafib on PPM. Chads 2 vasc 4    Personal history of other diseases of the digestive system     History of small bowel obstruction    Personal history of other infectious and parasitic diseases     History of herpes zoster    Personal history of other specified conditions     History of bradycardia    Personal history of other specified conditions     History of edema    Personal history of peptic ulcer disease     History of peptic ulcer    Personal history of pneumonia (recurrent)     History of pneumonia    Personal history of urinary calculi     History of kidney stones    Presence of cardiac pacemaker 08/25/2017    10/24/2016: Med[employer] Advisa MRI  model #[id number]    Sick sinus syndrome (CMS/[facility]) 09/17/2023    s/p PPM 10/2016             Past Surgical History:  He has a past surgical history that includes Cardiac pacemaker placement (04/05/2018); Cholecystectomy (04/05/2018); and Appendectomy (07/25/2018).      Social History:  He reports that he has never smoked. He has never used smokeless tobacco. He reports that he does not drink alcohol and does not use drugs.    Family History:  Family History   Problem Relation Name Age of  "Onset    Coronary artery disease Father      Heart failure Father      Diabetes Father          Allergies:  Patient has no known allergies.    Outpatient Medications:  Current Outpatient Medications   Medication Instructions    acetaminophen (TYLENOL ARTHRITIS PAIN) 650 mg, oral, 4 times daily PRN    acetaminophen (TYLENOL) 500 mg, oral, 2 times daily PRN    apixaban (ELIQUIS) 5 mg, oral, 2 times daily, Stopped for pain injection per DR. Gonzalez,  will restart tomorrow     cyanocobalamin (Vitamin B-12) 1,000 mcg tablet 1 tablet, oral, Daily    dilTIAZem CD (CARDIZEM CD) 240 mg, oral, Daily    fosinopril (Monopril) 10 mg tablet 1 tablet, oral, Daily    furosemide (LASIX) 20 mg, oral, Daily    gabapentin (NEURONTIN) 100 mg, oral, Daily    glipiZIDE XL (GLUCOTROL XL) 2.5 mg, oral, Daily    metOLazone (ZAROXOLYN) 2.5 mg, oral, Daily    metoprolol tartrate (Lopressor) 25 mg tablet 1 tablet, oral, 2 times daily    multivitamin tablet 1 tablet, oral, Daily    NovoLOG FlexPen U-100 Insulin 100 unit/mL (3 mL) pen 4 units before supper or as directed    pen needle, diabetic (BD Ultra-Fine Tami Pen Needle) 32 gauge x 5/32\" needle 1 Needle, miscellaneous, 4 times daily    RABEprazole (ACIPHEX) 20 mg, oral, Daily    rosuvastatin (CRESTOR) 5 mg, oral, Daily    torsemide (DEMADEX) 20 mg, oral, 2 times daily    Tresiba FlexTouch U-100 26 Units, subcutaneous, Daily        Last Recorded Vitals:  There were no vitals filed for this visit.    Physical Exam    Physical  Patient is alert and oriented x3.  HEENT is unremarkable mucous members are moist  Neck no JVP no bruits upstrokes are full no thyromegaly  Lungs are clear bilaterally.  No wheezing crackles or rales  Heart irregular rhythm normal S1-S2 there is no S3 no murmurs are heard.  Abdomen is soft vessels are positive nontender nondistended no organomegaly no pulsatile masses  Extremities have1+ hard edema  Distal pulses present palpable.  Neuro is grossly nonfocal  Skin has no " kristel     Last Labs:  CBC -  Lab Results   Component Value Date    WBC 10.0 08/28/2023    HGB 12.1 (L) 08/28/2023    HCT 36.4 (L) 08/28/2023     (H) 08/28/2023     08/28/2023       CMP -  Lab Results   Component Value Date    CALCIUM 9.3 08/28/2023    PROT 6.6 08/28/2023    ALBUMIN 4.1 08/28/2023    AST 17 08/28/2023    ALT 14 08/28/2023    ALKPHOS 90 08/28/2023    BILITOT 0.9 08/28/2023       LIPID PANEL -   Lab Results   Component Value Date    CHOL 114 08/28/2023    HDL 39.8 (A) 08/28/2023    CHHDL 2.9 08/28/2023    VLDL 31 08/28/2023    TRIG 157 (H) 08/28/2023    NHDL 142 10/24/2019       RENAL FUNCTION PANEL -   Lab Results   Component Value Date    K 3.9 08/28/2023       Lab Results   Component Value Date     (H) 03/24/2023    HGBA1C 7.2 (A) 08/28/2023     Procedure  ECHO [03/09/2022]: Est EF 50%. RVSP mildly elev (37.3 mmHg). Mod TR.      ECHO [01/30/2016]: Nrml LVF w/ est EF 60%, nrml pattern LV diastolic filling, concentric LVH, mild LVH, mildly elev PAP.     PACER INSERT (10/24/2016): [employer] Advisa MRI  model #[id number]     EVENT [03/30/2015]: SR, rate 90. One ep/of A-fib documented on 4/10/15. The vent response was between 120 and 130 beats per minute. No ev/of high-grade AV block. Isolated PVCs w/no VT. No arrhythmia symptoms documented.     EX NST [02/12/2014]: 4 min 28 sec (6.3 METs) ... Normal, EF 56%          Assessment/Plan   1. Atrial fibrillation. CHADS vasc 4. Perm. Con't Eliquis, dilt, BB.     2. Sick sinus syndrome. He had a 7-second pause. He has as Medtronic pacemaker followed by Dr. Ramicone.     3. Lower extremity edema.  This started approximately 6 months ago when he started sleeping in a recliner due to his spinal stenosis.  Minimal resolved with Lasix and torsemide.  When I started metolazone he had a very brisk diuresis.  When he saw Jarrett the metolazone was decreased to 2.5 twice weekly.  He still has mild lower extremity DIA.  I will change it so that he  takes metolazone 5 mg 1 day out of the week and 2.5 mg the other day.      3.  Renal function is stable.      4. Hypertension well-controlled.     5. Hyperlipidemia. LDL 43 HDL 40. This will be followed by his primary care doctor.     6. Previous DVT. He has an IVC filter in place.     RTC 6 months    Benoit Carlson MD     Instructions and follow up

## 2023-10-18 NOTE — PATIENT INSTRUCTIONS
1. Atrial fibrillation. CHADS vasc 4. Perm. Con't Eliquis, makenna, BB.     2. Sick sinus syndrome. He had a 7-second pause. He has as Medtronic pacemaker followed by Dr. Ramicone.     3. Lower extremity edema.  This started approximately 6 months ago when he started sleeping in a recliner due to his spinal stenosis.  Minimal resolved with Lasix and torsemide.  When I started metolazone he had a very brisk diuresis.  When he saw Jarrett the metolazone was decreased to 2.5 twice weekly.  He still has mild lower extremity DIA.  I will change it so that he takes metolazone 5 mg 1 day out of the week and 2.5 mg the other day.      3.  Renal function is stable.      4. Hypertension well-controlled.     5. Hyperlipidemia. LDL 43 HDL 40. This will be followed by his primary care doctor.     6. Previous DVT. He has an IVC filter in place.

## 2023-10-19 ENCOUNTER — TELEPHONE (OUTPATIENT)
Dept: PAIN MEDICINE | Facility: CLINIC | Age: 83
End: 2023-10-19
Payer: MEDICARE

## 2023-10-19 DIAGNOSIS — M54.16 LUMBAR RADICULOPATHY: Primary | ICD-10-CM

## 2023-10-31 ENCOUNTER — HOSPITAL ENCOUNTER (INPATIENT)
Facility: HOSPITAL | Age: 83
LOS: 2 days | Discharge: HOME | DRG: 389 | End: 2023-11-02
Attending: STUDENT IN AN ORGANIZED HEALTH CARE EDUCATION/TRAINING PROGRAM | Admitting: SURGERY
Payer: MEDICARE

## 2023-10-31 ENCOUNTER — APPOINTMENT (OUTPATIENT)
Dept: RADIOLOGY | Facility: HOSPITAL | Age: 83
DRG: 389 | End: 2023-10-31
Payer: MEDICARE

## 2023-10-31 ENCOUNTER — HOSPITAL ENCOUNTER (OUTPATIENT)
Dept: CARDIOLOGY | Facility: HOSPITAL | Age: 83
Discharge: HOME | DRG: 389 | End: 2023-10-31
Payer: MEDICARE

## 2023-10-31 DIAGNOSIS — K56.609 SBO (SMALL BOWEL OBSTRUCTION) (MULTI): Primary | ICD-10-CM

## 2023-10-31 DIAGNOSIS — I24.9 ACS (ACUTE CORONARY SYNDROME) (MULTI): ICD-10-CM

## 2023-10-31 LAB
ALBUMIN SERPL BCP-MCNC: 4.2 G/DL (ref 3.4–5)
ALP SERPL-CCNC: 99 U/L (ref 33–136)
ALT SERPL W P-5'-P-CCNC: 17 U/L (ref 10–52)
ANION GAP SERPL CALC-SCNC: 16 MMOL/L (ref 10–20)
APPEARANCE UR: ABNORMAL
AST SERPL W P-5'-P-CCNC: 18 U/L (ref 9–39)
BASOPHILS # BLD AUTO: 0.03 X10*3/UL (ref 0–0.1)
BASOPHILS NFR BLD AUTO: 0.2 %
BILIRUB SERPL-MCNC: 1.4 MG/DL (ref 0–1.2)
BILIRUB UR STRIP.AUTO-MCNC: NEGATIVE MG/DL
BUN SERPL-MCNC: 43 MG/DL (ref 6–23)
CALCIUM SERPL-MCNC: 9.8 MG/DL (ref 8.6–10.3)
CHLORIDE SERPL-SCNC: 96 MMOL/L (ref 98–107)
CO2 SERPL-SCNC: 29 MMOL/L (ref 21–32)
COLOR UR: YELLOW
CREAT SERPL-MCNC: 1.53 MG/DL (ref 0.5–1.3)
EOSINOPHIL # BLD AUTO: 0.04 X10*3/UL (ref 0–0.4)
EOSINOPHIL NFR BLD AUTO: 0.3 %
ERYTHROCYTE [DISTWIDTH] IN BLOOD BY AUTOMATED COUNT: 12.5 % (ref 11.5–14.5)
GFR SERPL CREATININE-BSD FRML MDRD: 45 ML/MIN/1.73M*2
GLUCOSE BLD MANUAL STRIP-MCNC: 106 MG/DL (ref 74–99)
GLUCOSE BLD MANUAL STRIP-MCNC: 99 MG/DL (ref 74–99)
GLUCOSE SERPL-MCNC: 211 MG/DL (ref 74–99)
GLUCOSE UR STRIP.AUTO-MCNC: NEGATIVE MG/DL
HCT VFR BLD AUTO: 39.4 % (ref 41–52)
HGB BLD-MCNC: 13.5 G/DL (ref 13.5–17.5)
IMM GRANULOCYTES # BLD AUTO: 0.1 X10*3/UL (ref 0–0.5)
IMM GRANULOCYTES NFR BLD AUTO: 0.7 % (ref 0–0.9)
KETONES UR STRIP.AUTO-MCNC: NEGATIVE MG/DL
LEUKOCYTE ESTERASE UR QL STRIP.AUTO: NEGATIVE
LIPASE SERPL-CCNC: 44 U/L (ref 9–82)
LYMPHOCYTES # BLD AUTO: 1.31 X10*3/UL (ref 0.8–3)
LYMPHOCYTES NFR BLD AUTO: 9.6 %
MCH RBC QN AUTO: 32.8 PG (ref 26–34)
MCHC RBC AUTO-ENTMCNC: 34.3 G/DL (ref 32–36)
MCV RBC AUTO: 96 FL (ref 80–100)
MONOCYTES # BLD AUTO: 0.8 X10*3/UL (ref 0.05–0.8)
MONOCYTES NFR BLD AUTO: 5.8 %
NEUTROPHILS # BLD AUTO: 11.42 X10*3/UL (ref 1.6–5.5)
NEUTROPHILS NFR BLD AUTO: 83.4 %
NITRITE UR QL STRIP.AUTO: NEGATIVE
NRBC BLD-RTO: 0 /100 WBCS (ref 0–0)
PH UR STRIP.AUTO: 5 [PH]
PLATELET # BLD AUTO: 178 X10*3/UL (ref 150–450)
PMV BLD AUTO: 9.2 FL (ref 7.5–11.5)
POTASSIUM SERPL-SCNC: 3.5 MMOL/L (ref 3.5–5.3)
PROT SERPL-MCNC: 7.5 G/DL (ref 6.4–8.2)
PROT UR STRIP.AUTO-MCNC: NEGATIVE MG/DL
RBC # BLD AUTO: 4.12 X10*6/UL (ref 4.5–5.9)
RBC # UR STRIP.AUTO: NEGATIVE /UL
SODIUM SERPL-SCNC: 137 MMOL/L (ref 136–145)
SP GR UR STRIP.AUTO: 1.01
UROBILINOGEN UR STRIP.AUTO-MCNC: <2 MG/DL
WBC # BLD AUTO: 13.7 X10*3/UL (ref 4.4–11.3)

## 2023-10-31 PROCEDURE — 93005 ELECTROCARDIOGRAM TRACING: CPT

## 2023-10-31 PROCEDURE — 96374 THER/PROPH/DIAG INJ IV PUSH: CPT | Mod: 59

## 2023-10-31 PROCEDURE — 0D9670Z DRAINAGE OF STOMACH WITH DRAINAGE DEVICE, VIA NATURAL OR ARTIFICIAL OPENING: ICD-10-PCS | Performed by: SURGERY

## 2023-10-31 PROCEDURE — 74021 RADEX ABDOMEN 3+ VIEWS: CPT

## 2023-10-31 PROCEDURE — 2500000005 HC RX 250 GENERAL PHARMACY W/O HCPCS

## 2023-10-31 PROCEDURE — 74177 CT ABD & PELVIS W/CONTRAST: CPT | Performed by: RADIOLOGY

## 2023-10-31 PROCEDURE — 36415 COLL VENOUS BLD VENIPUNCTURE: CPT | Performed by: STUDENT IN AN ORGANIZED HEALTH CARE EDUCATION/TRAINING PROGRAM

## 2023-10-31 PROCEDURE — 81003 URINALYSIS AUTO W/O SCOPE: CPT | Performed by: STUDENT IN AN ORGANIZED HEALTH CARE EDUCATION/TRAINING PROGRAM

## 2023-10-31 PROCEDURE — 2500000001 HC RX 250 WO HCPCS SELF ADMINISTERED DRUGS (ALT 637 FOR MEDICARE OP): Performed by: STUDENT IN AN ORGANIZED HEALTH CARE EDUCATION/TRAINING PROGRAM

## 2023-10-31 PROCEDURE — 99285 EMERGENCY DEPT VISIT HI MDM: CPT | Mod: 25 | Performed by: STUDENT IN AN ORGANIZED HEALTH CARE EDUCATION/TRAINING PROGRAM

## 2023-10-31 PROCEDURE — 74018 RADEX ABDOMEN 1 VIEW: CPT | Performed by: RADIOLOGY

## 2023-10-31 PROCEDURE — C9113 INJ PANTOPRAZOLE SODIUM, VIA: HCPCS

## 2023-10-31 PROCEDURE — 2550000001 HC RX 255 CONTRASTS: Performed by: STUDENT IN AN ORGANIZED HEALTH CARE EDUCATION/TRAINING PROGRAM

## 2023-10-31 PROCEDURE — 82947 ASSAY GLUCOSE BLOOD QUANT: CPT

## 2023-10-31 PROCEDURE — 2500000004 HC RX 250 GENERAL PHARMACY W/ HCPCS (ALT 636 FOR OP/ED)

## 2023-10-31 PROCEDURE — 83690 ASSAY OF LIPASE: CPT | Performed by: STUDENT IN AN ORGANIZED HEALTH CARE EDUCATION/TRAINING PROGRAM

## 2023-10-31 PROCEDURE — 2500000004 HC RX 250 GENERAL PHARMACY W/ HCPCS (ALT 636 FOR OP/ED): Performed by: SURGERY

## 2023-10-31 PROCEDURE — 99222 1ST HOSP IP/OBS MODERATE 55: CPT | Performed by: SURGERY

## 2023-10-31 PROCEDURE — 74018 RADEX ABDOMEN 1 VIEW: CPT

## 2023-10-31 PROCEDURE — 2500000004 HC RX 250 GENERAL PHARMACY W/ HCPCS (ALT 636 FOR OP/ED): Performed by: STUDENT IN AN ORGANIZED HEALTH CARE EDUCATION/TRAINING PROGRAM

## 2023-10-31 PROCEDURE — 99222 1ST HOSP IP/OBS MODERATE 55: CPT

## 2023-10-31 PROCEDURE — 85025 COMPLETE CBC W/AUTO DIFF WBC: CPT | Performed by: STUDENT IN AN ORGANIZED HEALTH CARE EDUCATION/TRAINING PROGRAM

## 2023-10-31 PROCEDURE — 74177 CT ABD & PELVIS W/CONTRAST: CPT | Mod: ME

## 2023-10-31 PROCEDURE — 80053 COMPREHEN METABOLIC PANEL: CPT | Performed by: STUDENT IN AN ORGANIZED HEALTH CARE EDUCATION/TRAINING PROGRAM

## 2023-10-31 PROCEDURE — 1100000001 HC PRIVATE ROOM DAILY

## 2023-10-31 PROCEDURE — 96372 THER/PROPH/DIAG INJ SC/IM: CPT

## 2023-10-31 RX ORDER — PANTOPRAZOLE SODIUM 40 MG/10ML
40 INJECTION, POWDER, LYOPHILIZED, FOR SOLUTION INTRAVENOUS DAILY
Status: DISCONTINUED | OUTPATIENT
Start: 2023-10-31 | End: 2023-11-02 | Stop reason: HOSPADM

## 2023-10-31 RX ORDER — LISINOPRIL 10 MG/1
10 TABLET ORAL DAILY
Status: DISCONTINUED | OUTPATIENT
Start: 2023-10-31 | End: 2023-11-02 | Stop reason: HOSPADM

## 2023-10-31 RX ORDER — METOPROLOL TARTRATE 1 MG/ML
5 INJECTION, SOLUTION INTRAVENOUS EVERY 6 HOURS
Status: DISCONTINUED | OUTPATIENT
Start: 2023-10-31 | End: 2023-11-02 | Stop reason: HOSPADM

## 2023-10-31 RX ORDER — METOLAZONE 2.5 MG/1
5 TABLET ORAL
COMMUNITY
End: 2023-12-26 | Stop reason: ENTERED-IN-ERROR

## 2023-10-31 RX ORDER — HYDRALAZINE HYDROCHLORIDE 20 MG/ML
5 INJECTION INTRAMUSCULAR; INTRAVENOUS EVERY 6 HOURS PRN
Status: DISCONTINUED | OUTPATIENT
Start: 2023-10-31 | End: 2023-11-02 | Stop reason: HOSPADM

## 2023-10-31 RX ORDER — ONDANSETRON HYDROCHLORIDE 2 MG/ML
4 INJECTION, SOLUTION INTRAVENOUS EVERY 8 HOURS PRN
Status: DISCONTINUED | OUTPATIENT
Start: 2023-10-31 | End: 2023-11-02 | Stop reason: HOSPADM

## 2023-10-31 RX ORDER — SODIUM CHLORIDE, SODIUM LACTATE, POTASSIUM CHLORIDE, CALCIUM CHLORIDE 600; 310; 30; 20 MG/100ML; MG/100ML; MG/100ML; MG/100ML
75 INJECTION, SOLUTION INTRAVENOUS CONTINUOUS
Status: DISCONTINUED | OUTPATIENT
Start: 2023-10-31 | End: 2023-10-31

## 2023-10-31 RX ORDER — DILTIAZEM HYDROCHLORIDE 240 MG/1
240 CAPSULE, COATED, EXTENDED RELEASE ORAL 2 TIMES DAILY
Status: DISCONTINUED | OUTPATIENT
Start: 2023-10-31 | End: 2023-11-02 | Stop reason: HOSPADM

## 2023-10-31 RX ORDER — DEXTROSE, SODIUM CHLORIDE, SODIUM LACTATE, POTASSIUM CHLORIDE, AND CALCIUM CHLORIDE 5; .6; .31; .03; .02 G/100ML; G/100ML; G/100ML; G/100ML; G/100ML
75 INJECTION, SOLUTION INTRAVENOUS CONTINUOUS
Status: DISCONTINUED | OUTPATIENT
Start: 2023-10-31 | End: 2023-10-31

## 2023-10-31 RX ORDER — ONDANSETRON HYDROCHLORIDE 2 MG/ML
4 INJECTION, SOLUTION INTRAVENOUS ONCE
Status: COMPLETED | OUTPATIENT
Start: 2023-10-31 | End: 2023-10-31

## 2023-10-31 RX ORDER — METOLAZONE 2.5 MG/1
2.5 TABLET ORAL DAILY
Status: DISCONTINUED | OUTPATIENT
Start: 2023-10-31 | End: 2023-11-02 | Stop reason: HOSPADM

## 2023-10-31 RX ORDER — INSULIN LISPRO 100 [IU]/ML
0-10 INJECTION, SOLUTION INTRAVENOUS; SUBCUTANEOUS EVERY 4 HOURS
Status: DISCONTINUED | OUTPATIENT
Start: 2023-10-31 | End: 2023-11-02 | Stop reason: HOSPADM

## 2023-10-31 RX ORDER — MORPHINE SULFATE 4 MG/ML
2 INJECTION, SOLUTION INTRAMUSCULAR; INTRAVENOUS EVERY 4 HOURS PRN
Status: DISCONTINUED | OUTPATIENT
Start: 2023-10-31 | End: 2023-11-02 | Stop reason: HOSPADM

## 2023-10-31 RX ORDER — GABAPENTIN 100 MG/1
100 CAPSULE ORAL DAILY
Status: DISCONTINUED | OUTPATIENT
Start: 2023-10-31 | End: 2023-11-02 | Stop reason: HOSPADM

## 2023-10-31 RX ORDER — MORPHINE SULFATE 2 MG/ML
1 INJECTION, SOLUTION INTRAMUSCULAR; INTRAVENOUS EVERY 4 HOURS PRN
Status: DISCONTINUED | OUTPATIENT
Start: 2023-10-31 | End: 2023-11-02 | Stop reason: HOSPADM

## 2023-10-31 RX ORDER — DEXTROMETHORPHAN HYDROBROMIDE, GUAIFENESIN 5; 100 MG/5ML; MG/5ML
650 LIQUID ORAL EVERY 8 HOURS PRN
COMMUNITY

## 2023-10-31 RX ORDER — TORSEMIDE 20 MG/1
20 TABLET ORAL 2 TIMES DAILY
Status: DISCONTINUED | OUTPATIENT
Start: 2023-10-31 | End: 2023-11-02 | Stop reason: HOSPADM

## 2023-10-31 RX ORDER — DEXTROSE, SODIUM CHLORIDE, SODIUM LACTATE, POTASSIUM CHLORIDE, AND CALCIUM CHLORIDE 5; .6; .31; .03; .02 G/100ML; G/100ML; G/100ML; G/100ML; G/100ML
50 INJECTION, SOLUTION INTRAVENOUS CONTINUOUS
Status: DISCONTINUED | OUTPATIENT
Start: 2023-10-31 | End: 2023-11-02 | Stop reason: HOSPADM

## 2023-10-31 RX ORDER — DEXTROSE 50 % IN WATER (D50W) INTRAVENOUS SYRINGE
25
Status: DISCONTINUED | OUTPATIENT
Start: 2023-10-31 | End: 2023-11-02 | Stop reason: HOSPADM

## 2023-10-31 RX ORDER — ROSUVASTATIN CALCIUM 10 MG/1
5 TABLET, COATED ORAL DAILY
Status: DISCONTINUED | OUTPATIENT
Start: 2023-10-31 | End: 2023-11-02 | Stop reason: HOSPADM

## 2023-10-31 RX ORDER — DEXTROSE MONOHYDRATE 100 MG/ML
0.3 INJECTION, SOLUTION INTRAVENOUS ONCE AS NEEDED
Status: DISCONTINUED | OUTPATIENT
Start: 2023-10-31 | End: 2023-11-02 | Stop reason: HOSPADM

## 2023-10-31 RX ORDER — ACETAMINOPHEN 325 MG/1
975 TABLET ORAL ONCE
Status: COMPLETED | OUTPATIENT
Start: 2023-10-31 | End: 2023-10-31

## 2023-10-31 RX ORDER — DICYCLOMINE HYDROCHLORIDE 10 MG/1
10 CAPSULE ORAL ONCE
Status: COMPLETED | OUTPATIENT
Start: 2023-10-31 | End: 2023-10-31

## 2023-10-31 RX ORDER — METOPROLOL TARTRATE 25 MG/1
25 TABLET, FILM COATED ORAL 2 TIMES DAILY
Status: DISCONTINUED | OUTPATIENT
Start: 2023-10-31 | End: 2023-11-02 | Stop reason: HOSPADM

## 2023-10-31 RX ORDER — HEPARIN SODIUM 5000 [USP'U]/ML
5000 INJECTION, SOLUTION INTRAVENOUS; SUBCUTANEOUS EVERY 8 HOURS
Status: DISCONTINUED | OUTPATIENT
Start: 2023-10-31 | End: 2023-11-02 | Stop reason: HOSPADM

## 2023-10-31 RX ADMIN — PANTOPRAZOLE SODIUM 40 MG: 40 INJECTION, POWDER, FOR SOLUTION INTRAVENOUS at 16:27

## 2023-10-31 RX ADMIN — IOHEXOL 75 ML: 350 INJECTION, SOLUTION INTRAVENOUS at 05:20

## 2023-10-31 RX ADMIN — METOPROLOL TARTRATE 5 MG: 5 INJECTION INTRAVENOUS at 18:24

## 2023-10-31 RX ADMIN — HEPARIN SODIUM 5000 UNITS: 5000 INJECTION INTRAVENOUS; SUBCUTANEOUS at 16:31

## 2023-10-31 RX ADMIN — ACETAMINOPHEN 975 MG: 325 TABLET ORAL at 04:38

## 2023-10-31 RX ADMIN — SODIUM CHLORIDE, SODIUM LACTATE, POTASSIUM CHLORIDE, CALCIUM CHLORIDE AND DEXTROSE MONOHYDRATE 100 ML/HR: 5; 600; 310; 30; 20 INJECTION, SOLUTION INTRAVENOUS at 16:28

## 2023-10-31 RX ADMIN — ONDANSETRON 4 MG: 2 INJECTION INTRAMUSCULAR; INTRAVENOUS at 04:38

## 2023-10-31 RX ADMIN — DICYCLOMINE HYDROCHLORIDE 10 MG: 10 CAPSULE ORAL at 04:38

## 2023-10-31 RX ADMIN — SODIUM CHLORIDE, SODIUM LACTATE, POTASSIUM CHLORIDE, CALCIUM CHLORIDE AND DEXTROSE MONOHYDRATE 75 ML/HR: 5; 600; 310; 30; 20 INJECTION, SOLUTION INTRAVENOUS at 09:14

## 2023-10-31 SDOH — SOCIAL STABILITY: SOCIAL INSECURITY: ABUSE: ADULT

## 2023-10-31 SDOH — SOCIAL STABILITY: SOCIAL INSECURITY: ARE YOU OR HAVE YOU BEEN THREATENED OR ABUSED PHYSICALLY, EMOTIONALLY, OR SEXUALLY BY ANYONE?: NO

## 2023-10-31 SDOH — SOCIAL STABILITY: SOCIAL INSECURITY: HAS ANYONE EVER THREATENED TO HURT YOUR FAMILY OR YOUR PETS?: NO

## 2023-10-31 SDOH — SOCIAL STABILITY: SOCIAL INSECURITY: DO YOU FEEL UNSAFE GOING BACK TO THE PLACE WHERE YOU ARE LIVING?: NO

## 2023-10-31 SDOH — SOCIAL STABILITY: SOCIAL INSECURITY: DO YOU FEEL ANYONE HAS EXPLOITED OR TAKEN ADVANTAGE OF YOU FINANCIALLY OR OF YOUR PERSONAL PROPERTY?: NO

## 2023-10-31 SDOH — SOCIAL STABILITY: SOCIAL INSECURITY: DOES ANYONE TRY TO KEEP YOU FROM HAVING/CONTACTING OTHER FRIENDS OR DOING THINGS OUTSIDE YOUR HOME?: NO

## 2023-10-31 SDOH — HEALTH STABILITY: MENTAL HEALTH: HOW OFTEN DO YOU HAVE 6 OR MORE DRINKS ON ONE OCCASION?: NEVER

## 2023-10-31 SDOH — HEALTH STABILITY: MENTAL HEALTH: HOW OFTEN DO YOU HAVE A DRINK CONTAINING ALCOHOL?: NEVER

## 2023-10-31 SDOH — SOCIAL STABILITY: SOCIAL INSECURITY: WERE YOU ABLE TO COMPLETE ALL THE BEHAVIORAL HEALTH SCREENINGS?: YES

## 2023-10-31 SDOH — SOCIAL STABILITY: SOCIAL INSECURITY: ARE THERE ANY APPARENT SIGNS OF INJURIES/BEHAVIORS THAT COULD BE RELATED TO ABUSE/NEGLECT?: NO

## 2023-10-31 SDOH — SOCIAL STABILITY: SOCIAL INSECURITY: HAVE YOU HAD THOUGHTS OF HARMING ANYONE ELSE?: NO

## 2023-10-31 SDOH — ECONOMIC STABILITY: INCOME INSECURITY: IN THE PAST 12 MONTHS, HAS THE ELECTRIC, GAS, OIL, OR WATER COMPANY THREATENED TO SHUT OFF SERVICE IN YOUR HOME?: NO

## 2023-10-31 SDOH — HEALTH STABILITY: MENTAL HEALTH: HOW MANY STANDARD DRINKS CONTAINING ALCOHOL DO YOU HAVE ON A TYPICAL DAY?: PATIENT DOES NOT DRINK

## 2023-10-31 ASSESSMENT — PATIENT HEALTH QUESTIONNAIRE - PHQ9
SUM OF ALL RESPONSES TO PHQ9 QUESTIONS 1 & 2: 0
1. LITTLE INTEREST OR PLEASURE IN DOING THINGS: NOT AT ALL
1. LITTLE INTEREST OR PLEASURE IN DOING THINGS: NOT AT ALL
2. FEELING DOWN, DEPRESSED OR HOPELESS: NOT AT ALL
2. FEELING DOWN, DEPRESSED OR HOPELESS: NOT AT ALL
SUM OF ALL RESPONSES TO PHQ9 QUESTIONS 1 & 2: 0

## 2023-10-31 ASSESSMENT — COLUMBIA-SUICIDE SEVERITY RATING SCALE - C-SSRS
2. HAVE YOU ACTUALLY HAD ANY THOUGHTS OF KILLING YOURSELF?: NO
2. HAVE YOU ACTUALLY HAD ANY THOUGHTS OF KILLING YOURSELF?: NO
6. HAVE YOU EVER DONE ANYTHING, STARTED TO DO ANYTHING, OR PREPARED TO DO ANYTHING TO END YOUR LIFE?: NO
1. IN THE PAST MONTH, HAVE YOU WISHED YOU WERE DEAD OR WISHED YOU COULD GO TO SLEEP AND NOT WAKE UP?: NO
6. HAVE YOU EVER DONE ANYTHING, STARTED TO DO ANYTHING, OR PREPARED TO DO ANYTHING TO END YOUR LIFE?: NO
1. IN THE PAST MONTH, HAVE YOU WISHED YOU WERE DEAD OR WISHED YOU COULD GO TO SLEEP AND NOT WAKE UP?: NO

## 2023-10-31 ASSESSMENT — ACTIVITIES OF DAILY LIVING (ADL)
HEARING - LEFT EAR: HEARING AID
LACK_OF_TRANSPORTATION: NO
ASSISTIVE_DEVICE: WALKER
BATHING: NEEDS ASSISTANCE
WALKS IN HOME: NEEDS ASSISTANCE
LACK_OF_TRANSPORTATION: NO
ADEQUATE_TO_COMPLETE_ADL: YES
FEEDING YOURSELF: INDEPENDENT
HEARING - RIGHT EAR: HEARING AID
PATIENT'S MEMORY ADEQUATE TO SAFELY COMPLETE DAILY ACTIVITIES?: YES
DRESSING YOURSELF: NEEDS ASSISTANCE
GROOMING: INDEPENDENT
JUDGMENT_ADEQUATE_SAFELY_COMPLETE_DAILY_ACTIVITIES: YES
TOILETING: NEEDS ASSISTANCE

## 2023-10-31 ASSESSMENT — COGNITIVE AND FUNCTIONAL STATUS - GENERAL
CLIMB 3 TO 5 STEPS WITH RAILING: A LOT
DAILY ACTIVITIY SCORE: 21
PATIENT BASELINE BEDBOUND: NO
MOVING TO AND FROM BED TO CHAIR: A LITTLE
STANDING UP FROM CHAIR USING ARMS: A LITTLE
MOBILITY SCORE: 17
WALKING IN HOSPITAL ROOM: A LITTLE
DRESSING REGULAR LOWER BODY CLOTHING: A LITTLE
TOILETING: A LITTLE
HELP NEEDED FOR BATHING: A LITTLE
TURNING FROM BACK TO SIDE WHILE IN FLAT BAD: A LITTLE
MOVING FROM LYING ON BACK TO SITTING ON SIDE OF FLAT BED WITH BEDRAILS: A LITTLE

## 2023-10-31 ASSESSMENT — LIFESTYLE VARIABLES
HOW OFTEN DO YOU HAVE A DRINK CONTAINING ALCOHOL: NEVER
EVER FELT BAD OR GUILTY ABOUT YOUR DRINKING: NO
PRESCIPTION_ABUSE_PAST_12_MONTHS: NO
HOW OFTEN DO YOU HAVE 6 OR MORE DRINKS ON ONE OCCASION: NEVER
AUDIT-C TOTAL SCORE: 0
HOW OFTEN DO YOU HAVE A DRINK CONTAINING ALCOHOL: NEVER
HOW MANY STANDARD DRINKS CONTAINING ALCOHOL DO YOU HAVE ON A TYPICAL DAY: PATIENT DOES NOT DRINK
HOW OFTEN DO YOU HAVE 6 OR MORE DRINKS ON ONE OCCASION: NEVER
SUBSTANCE_ABUSE_PAST_12_MONTHS: NO
EVER HAD A DRINK FIRST THING IN THE MORNING TO STEADY YOUR NERVES TO GET RID OF A HANGOVER: NO
REASON UNABLE TO ASSESS: NO
AUDIT-C TOTAL SCORE: 0
AUDIT-C TOTAL SCORE: 0
SKIP TO QUESTIONS 9-10: 1
AUDIT-C TOTAL SCORE: 0
AUDIT-C TOTAL SCORE: 0
HOW MANY STANDARD DRINKS CONTAINING ALCOHOL DO YOU HAVE ON A TYPICAL DAY: PATIENT DOES NOT DRINK
SKIP TO QUESTIONS 9-10: 1
HAVE PEOPLE ANNOYED YOU BY CRITICIZING YOUR DRINKING: NO
HAVE YOU EVER FELT YOU SHOULD CUT DOWN ON YOUR DRINKING: NO
SKIP TO QUESTIONS 9-10: 1

## 2023-10-31 ASSESSMENT — PAIN SCALES - GENERAL
PAINLEVEL_OUTOF10: 7
PAINLEVEL_OUTOF10: 0 - NO PAIN
PAINLEVEL_OUTOF10: 0 - NO PAIN

## 2023-10-31 ASSESSMENT — PAIN - FUNCTIONAL ASSESSMENT: PAIN_FUNCTIONAL_ASSESSMENT: 0-10

## 2023-10-31 ASSESSMENT — PAIN DESCRIPTION - DESCRIPTORS: DESCRIPTORS: BURNING

## 2023-10-31 ASSESSMENT — PAIN DESCRIPTION - LOCATION: LOCATION: ABDOMEN

## 2023-10-31 NOTE — CARE PLAN
Problem: Fall/Injury  Goal: Not fall by end of shift  Outcome: Progressing  Goal: Be free from injury by end of the shift  Outcome: Progressing  Goal: Verbalize understanding of personal risk factors for fall in the hospital  Outcome: Progressing  Goal: Verbalize understanding of risk factor reduction measures to prevent injury from fall in the home  Outcome: Progressing  Goal: Use assistive devices by end of the shift  Outcome: Progressing  Goal: Pace activities to prevent fatigue by end of the shift  Outcome: Progressing     Problem: Skin  Goal: Participates in plan/prevention/treatment measures  Outcome: Progressing  Goal: Prevent/manage excess moisture  Outcome: Progressing  Goal: Prevent/minimize sheer/friction injuries  Outcome: Progressing  Goal: Promote/optimize nutrition  Outcome: Progressing     Problem: Respiratory  Goal: Patent airway maintained this shift  Outcome: Progressing

## 2023-10-31 NOTE — PROGRESS NOTES
Pharmacy Medication History Review    Dragan Calvin is a 83 y.o. male admitted for SBO (small bowel obstruction) (CMS/[facility]). Pharmacy reviewed the patient's [id number] medications and allergies for accuracy.    The list below reflectives the updated PTA list. Please review each medication in order reconciliation for additional clarification and justification.  (Not in a hospital admission)       The list below reflectives the updated allergy list. Please review each documented allergy for additional clarification and justification.  Allergies  Reviewed by Mary Doshi CPhT on 10/31/2023   No Known Allergies         Below are additional concerns with the patient's PTA list.    See PTA med list    Mary Doshi CPhT

## 2023-10-31 NOTE — ED PROVIDER NOTES
HPI   Chief Complaint   Patient presents with    Abdominal Pain     Patient states he has been having a burning sensation across his abdomen for 2 days        This is an 83-year-old male with past medical history of hypertension, hyperlipidemia, type 2 diabetes, atrial fibrillation on Eliquis, anemia, GERD presenting to the emergency department for abdominal pain.  Patient states his symptoms started on Sunday 2 days ago shortly after eating food while watching football.  Pain is located to the bilateral lower abdomen and he describes it as sharp in nature.  Since that time he has had several loose stools as well as passed a large amount of gas as well as belching.  States the symptoms have improved and become more dull, but they were keeping him up overnight which prompted him to come to the emergency department.  He denies any urinary symptoms.  He otherwise has been in his normal state of health.  He denies any headaches, chest pain, shortness of breath, nausea/vomiting, fever/chills, back pain, lower extremity pain.  He endorses chronic lower extremity edema and states it is currently improved compared to his baseline.        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 Zee follows    Hypertension, benign 08/23/2017    Leg edema 09/05/2023    Overweight     Overweight    Paroxysmal atrial fibrillation (CMS/[facility]) 08/28/2023    PAF. On Eliquis. 7 second pauses therefore PPM placed. Freq Pafib on PPM. Chads 2 vasc 4    Permanent atrial fibrillation (CMS/[facility]) 10/17/2023    On Eliquis. 7 second pauses therefore PPM placed. Freq Pafib on PPM. Chads 2 vasc 4     Personal history of other diseases of the digestive system     History of small bowel obstruction    Personal history of other infectious and parasitic diseases     History of herpes zoster    Personal history of other specified conditions     History of bradycardia    Personal history of other specified conditions     History of edema    Personal history of peptic ulcer disease     History of peptic ulcer    Personal history of pneumonia (recurrent)     History of pneumonia    Personal history of urinary calculi     History of kidney stones    Presence of cardiac pacemaker 08/25/2017    10/24/2016: Medtronic Advisa MRI DR model #A2DR01    Sick sinus syndrome (CMS/[facility]) 09/17/2023    s/p PPM 10/2016     Past Surgical History:   Procedure Laterality Date    APPENDECTOMY  07/25/2018    Appendectomy    CARDIAC PACEMAKER PLACEMENT  04/05/2018    Pacemaker Placement    CHOLECYSTECTOMY  04/05/2018    Cholecystectomy     Family History   Problem Relation Name Age of Onset    Coronary artery disease Father      Heart failure Father      Diabetes Father       Social History     Tobacco Use    Smoking status: Never    Smokeless tobacco: Never   Substance Use Topics    Alcohol use: Never    Drug use: Never       Physical Exam   ED Triage Vitals [10/31/23 0152]   Temp Heart Rate Resp BP   36.3 °C (97.3 °F) 97 18 121/59      SpO2 Temp Source Heart Rate Source Patient Position   95 % Skin Monitor Sitting      BP Location FiO2 (%)     Left arm --       Physical Exam  GEN: well appearing, no acute distress  CVS/CHEST: Irregular rate, irregular rhythm, no murmurs/gallops/rubs  PULM: CTAB b/l no wheezes, crackles, or rhonchi   GI: mild lower abdominal ttp, ND, no masses or organomegaly, soft, no guarding  BACK: no CVA tenderness  EXT: trace LE edema  NEURO: no focal deficits, no facial asymmetry, moving all extremities  PSYCH: AAOx3 answers questions appropriately    ED Course & MDM   ED Course as of 11/03/23 1154   Tue Oct 31, 2023    0518 EKG as interpreted by me: Atrial fibrillation at 100 bpm, normal axis, QTc 459, baseline artifact without significant ST elevations or depressions, [DE]   0647 Lab work with an elevated creatinine but otherwise largely unremarkable.  CT imaging concerning for small bowel obstruction.  NG tube to be placed in the emergency department.  I discussed this case with surgery on-call Dr. Carter and patient will be admitted to their service. [DE]      ED Course User Index  [DE] Skyler Morton MD         Diagnoses as of 11/03/23 1154   SBO (small bowel obstruction) (CMS/[facility])       Medical Decision Making  This is an 83-year-old male with past medical history of hypertension, hyperlipidemia, type 2 diabetes, atrial fibrillation on Eliquis, anemia, GERD presenting to the emergency department for abdominal pain.  Patient stable upon presentation to the emergency department, no acute distress and vitals are unremarkable.  On exam he is very well-appearing.  He does have mild bilateral lower quadrant abdominal tenderness palpation without guarding.  Abdomen is nonsurgical.  Initial concerns are for gastroenteritis versus diverticulitis.  Patient has had an appendectomy and no concern for appendicitis.  SBO considered less likely as he has still been passing stool though it is loose.  We will obtain a urinalysis for UTI evaluation though patient denies any urinary symptoms.  Low suspicion for pancreatitis.  No concern for mesenteric ischemia.  Patient treated in the emergency department with Tylenol, Bentyl, Zofran.    Procedure  Procedures     Skyler Morton MD  11/03/23 3378

## 2023-10-31 NOTE — CONSULTS
History Of Present Illness   The patient is an 83-year-old male with a 2-day history of cramping abdominal pain and bloating who came to the emergency room last night for evaluation.  He had no nausea or vomiting.  He has been passing gas and having some liquid bowel movements.  He was treated for a small bowel obstruction 5 years ago and it resolved with nonoperative treatment.  He has no abdominal pain at this time.    Past medical history:  Atrial fibrillation on Eliquis which he last took last night.  Coronary artery disease  DVT about 10 years ago  IVC filter placement  Hypertension  Pacemaker placement  Diabetes mellitus  Laparoscopic cholecystectomy  Open appendectomy     Past Medical History  He has a past medical history of Deep vein thrombosis (DVT) of lower extremity (CMS/[facility]) (09/17/2023), History of falling, Hyperlipidemia (08/28/2023), Hypertension, benign (08/23/2017), Leg edema (09/05/2023), Overweight, Paroxysmal atrial fibrillation (CMS/[facility]) (08/28/2023), Permanent atrial fibrillation (CMS/[facility]) (10/17/2023), Personal history of other diseases of the digestive system, Personal history of other infectious and parasitic diseases, Personal history of other specified conditions, Personal history of other specified conditions, Personal history of peptic ulcer disease, Personal history of pneumonia (recurrent), Personal history of urinary calculi, Presence of cardiac pacemaker (08/25/2017), and Sick sinus syndrome (CMS/[facility]) (09/17/2023).    Surgical History  He has a past surgical history that includes Cardiac pacemaker placement (04/05/2018); Cholecystectomy (04/05/2018); and Appendectomy (07/25/2018).     Social History  He reports that he has never smoked. He has never used smokeless tobacco. He reports that he does not drink alcohol and does not use drugs.    Family History  Family History   Problem Relation Name Age of Onset    Coronary artery disease Father      Heart failure Father      Diabetes  "Father          Allergies  Patient has no known allergies.        Physical Exam  Constitutional: Well-developed, well-nourished, alert and oriented, no acute distress  Skin: Warm and dry, no lesions, no rashes, no jaundice  HEENT: Normocephalic, atraumatic, EOMI, no scleral icterus, eyes have no redness or swelling or discharge, external inspection of ears and nose is normal, mucous membranes moist.  Nasogastric tube in place.  Neck: Soft, nontender, no mass or adenopathy  Cardiac: Regular rate and rhythm, no murmur  Chest: Patent airway, clear to auscultation, normal breath sounds with good chest expansion, no wheezes or rales or rhonchi noted, thorax symmetric  Abdomen: Possible mild distention, positive bowel sounds, soft, mild periumbilical tenderness, the rest of the abdomen is nontender, no mass  Rectal: Not performed  Extremities: No injury, no calf tenderness.  Mild bilateral lower extremity edema  Lymphatic: No cervical adenopathy  Musculoskeletal: Range of motion intact, no joint swelling, normal strength  Neurological: Alert and oriented x3, intact sensory and motor function, no obvious focal neurologic abnormalities  Psychological: Appropriate mood and behavior    Last Recorded Vitals  Blood pressure 97/56, pulse 68, temperature 36.3 °C (97.3 °F), temperature source Skin, resp. rate 16, height 1.75 m (5' 8.9\"), weight 93.9 kg (207 lb), SpO2 93 %.    Relevant Results  Labs:  WBC 13.7, hemoglobin 13.5, BUN 43, creatinine 1.53, glucose 211    I reviewed the KUB report and images and CT abdomen/pelvis report and images.    XR abdomen 3+ views  Result Date: 10/31/2023  NG tube as above, likely in the proximal stomach.   MACRO: None.   Signed by: Lulu Hernandez 10/31/2023 9:51 AM Dictation workstation:   [id number]    CT abdomen pelvis w IV contrast  Result Date: 10/31/2023   Dilated loops of proximal to mid small bowel with a transition point in the lower pelvis and decompressed distal small bowel compatible " with small bowel obstruction.   Scattered colonic diverticulosis without CT evidence of acute diverticulitis   Nonobstructing stones in the kidneys bilaterally measuring up to 3 mm on the right and 5 mm on the left.   Reticular opacities along the periphery of the lungs bilaterally. Findings may represent atelectasis or could represent a component of interstitial lung disease.   MACRO: None.   Signed by: Evan Finkelstein 10/31/2023 6:08 AM Dictation workstation:   [id number]         Assessment/Plan   Principal Problem:    SBO (small bowel obstruction) (CMS/HCC)      83-year-old male with small bowel obstruction.  Recommend nasogastric decompression, n.p.o., IV hydration.  No gastric contents aspirated from nasogastric tube.  I repositioned the nasogastric tube following which I was able to aspirate gastric fluid.    Repeat KUB to confirm position.  Repeat labs and KUB in a.m.  Mild elevation of BUN and creatinine likely secondary to dehydration.  Continue IV hydration.  Hold Eliquis.  Cardiology consult.  DVT prophylaxis with SCDs and subcu heparin.  Diabetes mellitus.  Sliding scale insulin.         Barrett Carter MD

## 2023-10-31 NOTE — H&P
History Of Present Illness  Dragan Calvin is a 83 y.o. male with PMH significant for CAD, permanent Afib (on eliquis), DVT s/p IVC filter, HLD, HTN, SSS s/p implanted pacemaker, PUD, GERD, DMT2, peripheral edema, prior SBO(s) who presented to [facility] ED on 10/31 for abd pain x2 days. Pt states that he began to have burning lower abd pain Sunday evening. He thought that it was just gas pains, so he ignored it initially. Pain has progressively worsened to the point where he couldn't sleep last night, so he presented to the ED. He denies N/V. He is still passing gas and reports liquid Bms yesterday and today. His wife states his abd looks bloated. Pt reportedly had an SBO about 5 yrs ago that resolved with bowel rest and NGT decompression.    ED course: HDS and afebrile. Labs significant for mild leukocytosis (WBC 13.7), and impaired kidney function (Cre 1.53, baseline ~1.1). CT A/P demonstrated dilated loops of small bowel with transition point in the lower pelvis.     PSHx: laparoscopic appendectomy, laparoscopic cholecystectomy, pacemaker placement     Past Medical History  Past Medical History:   Diagnosis Date    Deep vein thrombosis (DVT) of lower extremity (CMS/HCC) 09/17/2023    s/p IVC filter and has post-phlebotic syndrome    History of falling     History of fall    Hyperlipidemia 08/28/2023    Dr. Zee follows    Hypertension, benign 08/23/2017    Leg edema 09/05/2023    Overweight     Overweight    Paroxysmal atrial fibrillation (CMS/HCC) 08/28/2023    PAF. On Eliquis. 7 second pauses therefore PPM placed. Freq Pafib on PPM. Chads 2 vasc 4    Permanent atrial fibrillation (CMS/HCC) 10/17/2023    On Eliquis. 7 second pauses therefore PPM placed. Freq Pafib on PPM. Chads 2 vasc 4    Personal history of other diseases of the digestive system     History of small bowel obstruction    Personal history of other infectious and parasitic diseases     History of herpes zoster    Personal history of other  specified conditions     History of bradycardia    Personal history of other specified conditions     History of edema    Personal history of peptic ulcer disease     History of peptic ulcer    Personal history of pneumonia (recurrent)     History of pneumonia    Personal history of urinary calculi     History of kidney stones    Presence of cardiac pacemaker 08/25/2017    10/24/2016: [employer] Advisa MRI DR model #[id number]    Sick sinus syndrome (CMS/[facility]) 09/17/2023    s/p PPM 10/2016       Surgical History  Past Surgical History:   Procedure Laterality Date    APPENDECTOMY  07/25/2018    Appendectomy    CARDIAC PACEMAKER PLACEMENT  04/05/2018    Pacemaker Placement    CHOLECYSTECTOMY  04/05/2018    Cholecystectomy        Social History  He reports that he has never smoked. He has never used smokeless tobacco. He reports that he does not drink alcohol and does not use drugs.    Family History  Family History   Problem Relation Name Age of Onset    Coronary artery disease Father      Heart failure Father      Diabetes Father          Allergies  Patient has no known allergies.     Physical Exam  Constitutional:       General: He is not in acute distress.     Appearance: Normal appearance. He is not ill-appearing.   HENT:      Mouth/Throat:      Mouth: Mucous membranes are moist.   Cardiovascular:      Rate and Rhythm: Rhythm irregular.      Heart sounds: No murmur heard.  Pulmonary:      Effort: Pulmonary effort is normal. No respiratory distress.      Breath sounds: Normal breath sounds.   Abdominal:      General: Bowel sounds are decreased. There is distension.      Palpations: Abdomen is soft.      Tenderness: There is abdominal tenderness (RUQ). There is no guarding or rebound.   Musculoskeletal:         General: Swelling (bilat 2+ lower leg edema, chronic) present. Normal range of motion.   Skin:     General: Skin is warm and dry.      Coloration: Skin is not jaundiced or pale.      Comments: Lower leg skin is  "atrophic and shiny, consistent with PVD.   Neurological:      General: No focal deficit present.      Mental Status: He is alert and oriented to person, place, and time.          Last Recorded Vitals  Blood pressure 130/86, pulse 88, temperature 36.3 °C (97.3 °F), temperature source Skin, resp. rate 16, height 1.75 m (5' 8.9\"), weight 93.9 kg (207 lb), SpO2 99 %.    Relevant Results  Results for orders placed or performed during the hospital encounter of 10/31/23 (from the past 24 hour(s))   CBC and Auto Differential   Result Value Ref Range    WBC 13.7 (H) 4.4 - 11.3 x10*3/uL    nRBC 0.0 0.0 - 0.0 /100 WBCs    RBC 4.12 (L) 4.50 - 5.90 x10*6/uL    Hemoglobin 13.5 13.5 - 17.5 g/dL    Hematocrit 39.4 (L) 41.0 - 52.0 %    MCV 96 80 - 100 fL    MCH 32.8 26.0 - 34.0 pg    MCHC 34.3 32.0 - 36.0 g/dL    RDW 12.5 11.5 - 14.5 %    Platelets 178 150 - 450 x10*3/uL    MPV 9.2 7.5 - 11.5 fL    Neutrophils % 83.4 40.0 - 80.0 %    Immature Granulocytes %, Automated 0.7 0.0 - 0.9 %    Lymphocytes % 9.6 13.0 - 44.0 %    Monocytes % 5.8 2.0 - 10.0 %    Eosinophils % 0.3 0.0 - 6.0 %    Basophils % 0.2 0.0 - 2.0 %    Neutrophils Absolute 11.42 (H) 1.60 - 5.50 x10*3/uL    Immature Granulocytes Absolute, Automated 0.10 0.00 - 0.50 x10*3/uL    Lymphocytes Absolute 1.31 0.80 - 3.00 x10*3/uL    Monocytes Absolute 0.80 0.05 - 0.80 x10*3/uL    Eosinophils Absolute 0.04 0.00 - 0.40 x10*3/uL    Basophils Absolute 0.03 0.00 - 0.10 x10*3/uL   Comprehensive metabolic panel   Result Value Ref Range    Glucose 211 (H) 74 - 99 mg/dL    Sodium 137 136 - 145 mmol/L    Potassium 3.5 3.5 - 5.3 mmol/L    Chloride 96 (L) 98 - 107 mmol/L    Bicarbonate 29 21 - 32 mmol/L    Anion Gap 16 10 - 20 mmol/L    Urea Nitrogen 43 (H) 6 - 23 mg/dL    Creatinine 1.53 (H) 0.50 - 1.30 mg/dL    eGFR 45 (L) >60 mL/min/1.73m*2    Calcium 9.8 8.6 - 10.3 mg/dL    Albumin 4.2 3.4 - 5.0 g/dL    Alkaline Phosphatase 99 33 - 136 U/L    Total Protein 7.5 6.4 - 8.2 g/dL    AST 18 " 9 - 39 U/L    Bilirubin, Total 1.4 (H) 0.0 - 1.2 mg/dL    ALT 17 10 - 52 U/L   Lipase   Result Value Ref Range    Lipase 44 9 - 82 U/L   Urinalysis with Reflex Microscopic and Culture   Result Value Ref Range    Color, Urine Yellow Straw, Yellow    Appearance, Urine Hazy (N) Clear    Specific Gravity, Urine 1.015 1.005 - 1.035    pH, Urine 5.0 5.0, 5.5, 6.0, 6.5, 7.0, 7.5, 8.0    Protein, Urine NEGATIVE NEGATIVE mg/dL    Glucose, Urine NEGATIVE NEGATIVE mg/dL    Blood, Urine NEGATIVE NEGATIVE    Ketones, Urine NEGATIVE NEGATIVE mg/dL    Bilirubin, Urine NEGATIVE NEGATIVE    Urobilinogen, Urine <2.0 <2.0 mg/dL    Nitrite, Urine NEGATIVE NEGATIVE    Leukocyte Esterase, Urine NEGATIVE NEGATIVE     CT abdomen pelvis w IV contrast 10/31/2023    Narrative  Interpreted By:  Finkelstein, Evan,  STUDY:  CT ABDOMEN PELVIS W IV CONTRAST;  10/31/2023 5:19 am    INDICATION:  Signs/Symptoms:abdominal pain lower.    COMPARISON:  CT abdomen pelvis 05/07/2017    ACCESSION NUMBER(S):  [id number]    ORDERING CLINICIAN:  TATYANA SABILLON    TECHNIQUE:  Axial CT images of the abdomen and pelvis with coronal and sagittal  reconstructed images obtained after intravenous administration of 75  mL Omnipaque 350    FINDINGS:  LOWER CHEST: Reticular opacities along the periphery of the lungs  bilaterally.    ABDOMEN:    LIVER: Normal attenuation and contour.  BILE DUCTS: Normal caliber.  GALLBLADDER: Surgically absent.  PORTAL VEIN: Patent  SPLEEN: Unremarkable.  PANCREAS: Fatty atrophy of the pancreas. No peripancreatic  inflammatory stranding. ADRENALS: Unremarkable.  KIDNEYS, URETERS and URINARY BLADDER: Symmetric renal enhancement. No  hydronephrosis or perinephric fluid collection. Nonobstructing stones  in the right kidney measuring up to 3 mm. Nonobstructing stone in the  left kidney measures up 5 mm. 2.8 cm hypodensity in the superior pole  of the right kidney measures simple fluid attenuation and is most  compatible with a simple cyst  bladder is within normal limits  REPRODUCTIVE ORGANS: No pelvic masses.    ABDOMINAL WALL: Fat containing right inguinal hernia.  PERITONEUM: No ascites, free air or fluid collection.    BOWEL: The stomach is distended with fluid. No gastric wall  thickening.. A duodenal diverticulum is present. Dilated loops of  proximal to mid small bowel with a transition point in the lower  pelvis. The distal small bowel is decompressed. There are scattered  colonic diverticula without focal pericolonic inflammatory stranding.  The appendix is not definitively visualized, without focal pericecal  inflammatory stranding.    VESSELS: Mild aortoiliac calcifications. No aortic aneurysm. An  infrarenal IVC filter is present. RETROPERITONEUM: No pathologically  enlarged retroperitoneal lymph nodes.    BONES: No acute osseous abnormality.    Impression  Dilated loops of proximal to mid small bowel with a transition point  in the lower pelvis and decompressed distal small bowel compatible  with small bowel obstruction.    Scattered colonic diverticulosis without CT evidence of acute  diverticulitis    Nonobstructing stones in the kidneys bilaterally measuring up to 3 mm  on the right and 5 mm on the left.    Reticular opacities along the periphery of the lungs bilaterally.  Findings may represent atelectasis or could represent a component of  interstitial lung disease.    MACRO:  None.    Signed by: Evan Finkelstein 10/31/2023 6:08 AM  Dictation workstation:   [id number]       Assessment/Plan   Principal Problem:    SBO (small bowel obstruction) (CMS/HCC)      Dragan Calvin is a 83 y.o. male with PMH significant for CAD (s/p stents), permanent Afib (on eliquis), DVT s/p IVC filter, HLD, HTN, SSS s/p implanted pacemaker, PUD, GERD, DMT2, peripheral edema, prior SBO(s) who presented to [facility] ED on 10/31 for abd pain.     Assessment and Plan:    #SBO  - NGT to LIWS  - NPO except ice chips while NGT connected to suction  - I&Os  -  mIVF w/ D5LR @75 ml/hr    > monitor for signs of fluid overload  - IV analgesia/antiemetics  - AM labs and KUB    #YESICA  - Baseline Cre ~1.1  - IVF resuscitation  - Anticipate worsening before improvement d/t contrast administration  - Daily BMP    #Leukocytosis, likely reactive  - No s/s of infection  - No need for abx at this time  - Trend CBC daily    Chronic conditions:  #CAD  #Afib, permanent  #SSS, s/p pacemaker  #HTN  #HLD  #Peripheral edema  - Hold home meds while NPO  - IV metoprolol 5 mg Q6 while NPO  - PRN hydralazine for SBP >170  - Consult pt's cardiologist, Dr. Carlson    #DMT2  - Hold home PO meds  - SSI while inpatient    #GERD  #PUD  - IV protonix daily while NPO    #H/o DVT, s/p IVC filter  - Hold home eliquis  - ppx with SQH and SCDs - strict adherence necessary    PT/OT    Dispo: admit to RNF for continued care.    Patient will be seen and discussed with attending surgeon, Dr. Carter.    I spent 60 minutes in the professional and overall care of this patient.      Iris Mittal PA-C

## 2023-10-31 NOTE — H&P
History Of Present Illness   The patient is an 83-year-old male with a 2-day history of cramping abdominal pain and bloating who came to the emergency room last night for evaluation.  He had no nausea or vomiting.  He has been passing gas and having some liquid bowel movements.  He was treated for a small bowel obstruction 5 years ago and it resolved with nonoperative treatment.  He has no abdominal pain at this time.    Past medical history:  Atrial fibrillation on Eliquis which he last took last night.  Coronary artery disease  DVT about 10 years ago  IVC filter placement  Hypertension  Pacemaker placement  Diabetes mellitus  Laparoscopic cholecystectomy  Open appendectomy     Past Medical History  He has a past medical history of Deep vein thrombosis (DVT) of lower extremity (CMS/[facility]) (09/17/2023), History of falling, Hyperlipidemia (08/28/2023), Hypertension, benign (08/23/2017), Leg edema (09/05/2023), Overweight, Paroxysmal atrial fibrillation (CMS/[facility]) (08/28/2023), Permanent atrial fibrillation (CMS/[facility]) (10/17/2023), Personal history of other diseases of the digestive system, Personal history of other infectious and parasitic diseases, Personal history of other specified conditions, Personal history of other specified conditions, Personal history of peptic ulcer disease, Personal history of pneumonia (recurrent), Personal history of urinary calculi, Presence of cardiac pacemaker (08/25/2017), and Sick sinus syndrome (CMS/[facility]) (09/17/2023).    Surgical History  He has a past surgical history that includes Cardiac pacemaker placement (04/05/2018); Cholecystectomy (04/05/2018); and Appendectomy (07/25/2018).     Social History  He reports that he has never smoked. He has never used smokeless tobacco. He reports that he does not drink alcohol and does not use drugs.    Family History  Family History   Problem Relation Name Age of Onset    Coronary artery disease Father      Heart failure Father      Diabetes  "Father          Allergies  Patient has no known allergies.        Physical Exam  Constitutional: Well-developed, well-nourished, alert and oriented, no acute distress  Skin: Warm and dry, no lesions, no rashes, no jaundice  HEENT: Normocephalic, atraumatic, EOMI, no scleral icterus, eyes have no redness or swelling or discharge, external inspection of ears and nose is normal, mucous membranes moist.  Nasogastric tube in place.  Neck: Soft, nontender, no mass or adenopathy  Cardiac: Regular rate and rhythm, no murmur  Chest: Patent airway, clear to auscultation, normal breath sounds with good chest expansion, no wheezes or rales or rhonchi noted, thorax symmetric  Abdomen: Possible mild distention, positive bowel sounds, soft, mild periumbilical tenderness, the rest of the abdomen is nontender, no mass  Rectal: Not performed  Extremities: No injury, no calf tenderness.  Mild bilateral lower extremity edema  Lymphatic: No cervical adenopathy  Musculoskeletal: Range of motion intact, no joint swelling, normal strength  Neurological: Alert and oriented x3, intact sensory and motor function, no obvious focal neurologic abnormalities  Psychological: Appropriate mood and behavior    Last Recorded Vitals  Blood pressure 97/56, pulse 68, temperature 36.3 °C (97.3 °F), temperature source Skin, resp. rate 16, height 1.75 m (5' 8.9\"), weight 93.9 kg (207 lb), SpO2 93 %.    Relevant Results  Labs:  WBC 13.7, hemoglobin 13.5, BUN 43, creatinine 1.53, glucose 211    I reviewed the KUB report and images and CT abdomen/pelvis report and images.    XR abdomen 3+ views  Result Date: 10/31/2023  NG tube as above, likely in the proximal stomach.   MACRO: None.   Signed by: Lulu Hernandez 10/31/2023 9:51 AM Dictation workstation:   [id number]    CT abdomen pelvis w IV contrast  Result Date: 10/31/2023   Dilated loops of proximal to mid small bowel with a transition point in the lower pelvis and decompressed distal small bowel compatible " with small bowel obstruction.   Scattered colonic diverticulosis without CT evidence of acute diverticulitis   Nonobstructing stones in the kidneys bilaterally measuring up to 3 mm on the right and 5 mm on the left.   Reticular opacities along the periphery of the lungs bilaterally. Findings may represent atelectasis or could represent a component of interstitial lung disease.   MACRO: None.   Signed by: Evan Finkelstein 10/31/2023 6:08 AM Dictation workstation:   [id number]         Assessment/Plan   Principal Problem:    SBO (small bowel obstruction) (CMS/HCC)      83-year-old male with small bowel obstruction.  Recommend nasogastric decompression, n.p.o., IV hydration.  No gastric contents aspirated from nasogastric tube.  I repositioned the nasogastric tube following which I was able to aspirate gastric fluid.    Repeat KUB to confirm position.  Repeat labs and KUB in a.m.  Mild elevation of BUN and creatinine likely secondary to dehydration.  Continue IV hydration.  Hold Eliquis.  Cardiology consult.  DVT prophylaxis with SCDs and subcu heparin.  Diabetes mellitus.  Sliding scale insulin.         Barrett Carter MD

## 2023-11-01 ENCOUNTER — APPOINTMENT (OUTPATIENT)
Dept: RADIOLOGY | Facility: HOSPITAL | Age: 83
DRG: 389 | End: 2023-11-01
Payer: MEDICARE

## 2023-11-01 LAB
ANION GAP SERPL CALC-SCNC: 15 MMOL/L (ref 10–20)
BUN SERPL-MCNC: 26 MG/DL (ref 6–23)
CALCIUM SERPL-MCNC: 8.7 MG/DL (ref 8.6–10.3)
CHLORIDE SERPL-SCNC: 103 MMOL/L (ref 98–107)
CO2 SERPL-SCNC: 27 MMOL/L (ref 21–32)
CREAT SERPL-MCNC: 1.05 MG/DL (ref 0.5–1.3)
ERYTHROCYTE [DISTWIDTH] IN BLOOD BY AUTOMATED COUNT: 12.4 % (ref 11.5–14.5)
GFR SERPL CREATININE-BSD FRML MDRD: 70 ML/MIN/1.73M*2
GLUCOSE BLD MANUAL STRIP-MCNC: 117 MG/DL (ref 74–99)
GLUCOSE BLD MANUAL STRIP-MCNC: 118 MG/DL (ref 74–99)
GLUCOSE BLD MANUAL STRIP-MCNC: 125 MG/DL (ref 74–99)
GLUCOSE BLD MANUAL STRIP-MCNC: 132 MG/DL (ref 74–99)
GLUCOSE BLD MANUAL STRIP-MCNC: 135 MG/DL (ref 74–99)
GLUCOSE BLD MANUAL STRIP-MCNC: 149 MG/DL (ref 74–99)
GLUCOSE SERPL-MCNC: 127 MG/DL (ref 74–99)
HCT VFR BLD AUTO: 38.8 % (ref 41–52)
HGB BLD-MCNC: 13.1 G/DL (ref 13.5–17.5)
MCH RBC QN AUTO: 33 PG (ref 26–34)
MCHC RBC AUTO-ENTMCNC: 33.8 G/DL (ref 32–36)
MCV RBC AUTO: 98 FL (ref 80–100)
NRBC BLD-RTO: 0 /100 WBCS (ref 0–0)
PLATELET # BLD AUTO: 171 X10*3/UL (ref 150–450)
PMV BLD AUTO: 9.5 FL (ref 7.5–11.5)
POTASSIUM SERPL-SCNC: 3.7 MMOL/L (ref 3.5–5.3)
RBC # BLD AUTO: 3.97 X10*6/UL (ref 4.5–5.9)
SODIUM SERPL-SCNC: 141 MMOL/L (ref 136–145)
WBC # BLD AUTO: 9.7 X10*3/UL (ref 4.4–11.3)

## 2023-11-01 PROCEDURE — C9113 INJ PANTOPRAZOLE SODIUM, VIA: HCPCS

## 2023-11-01 PROCEDURE — 2500000004 HC RX 250 GENERAL PHARMACY W/ HCPCS (ALT 636 FOR OP/ED)

## 2023-11-01 PROCEDURE — 74018 RADEX ABDOMEN 1 VIEW: CPT | Performed by: RADIOLOGY

## 2023-11-01 PROCEDURE — 82947 ASSAY GLUCOSE BLOOD QUANT: CPT

## 2023-11-01 PROCEDURE — 1100000001 HC PRIVATE ROOM DAILY

## 2023-11-01 PROCEDURE — 97161 PT EVAL LOW COMPLEX 20 MIN: CPT | Mod: GP

## 2023-11-01 PROCEDURE — 74018 RADEX ABDOMEN 1 VIEW: CPT

## 2023-11-01 PROCEDURE — 99223 1ST HOSP IP/OBS HIGH 75: CPT | Performed by: INTERNAL MEDICINE

## 2023-11-01 PROCEDURE — 36415 COLL VENOUS BLD VENIPUNCTURE: CPT

## 2023-11-01 PROCEDURE — 99232 SBSQ HOSP IP/OBS MODERATE 35: CPT | Performed by: SURGERY

## 2023-11-01 PROCEDURE — 2500000005 HC RX 250 GENERAL PHARMACY W/O HCPCS: Performed by: STUDENT IN AN ORGANIZED HEALTH CARE EDUCATION/TRAINING PROGRAM

## 2023-11-01 PROCEDURE — 96372 THER/PROPH/DIAG INJ SC/IM: CPT

## 2023-11-01 PROCEDURE — 99231 SBSQ HOSP IP/OBS SF/LOW 25: CPT | Performed by: NURSE PRACTITIONER

## 2023-11-01 PROCEDURE — 82374 ASSAY BLOOD CARBON DIOXIDE: CPT

## 2023-11-01 PROCEDURE — 97165 OT EVAL LOW COMPLEX 30 MIN: CPT | Mod: GO

## 2023-11-01 PROCEDURE — 85027 COMPLETE CBC AUTOMATED: CPT

## 2023-11-01 RX ADMIN — HEPARIN SODIUM 5000 UNITS: 5000 INJECTION INTRAVENOUS; SUBCUTANEOUS at 09:00

## 2023-11-01 RX ADMIN — HEPARIN SODIUM 5000 UNITS: 5000 INJECTION INTRAVENOUS; SUBCUTANEOUS at 02:38

## 2023-11-01 RX ADMIN — METOPROLOL TARTRATE 5 MG: 5 INJECTION INTRAVENOUS at 06:00

## 2023-11-01 RX ADMIN — HEPARIN SODIUM 5000 UNITS: 5000 INJECTION INTRAVENOUS; SUBCUTANEOUS at 17:00

## 2023-11-01 RX ADMIN — METOPROLOL TARTRATE 5 MG: 5 INJECTION INTRAVENOUS at 12:18

## 2023-11-01 RX ADMIN — METOPROLOL TARTRATE 5 MG: 5 INJECTION INTRAVENOUS at 00:28

## 2023-11-01 RX ADMIN — PANTOPRAZOLE SODIUM 40 MG: 40 INJECTION, POWDER, FOR SOLUTION INTRAVENOUS at 09:00

## 2023-11-01 ASSESSMENT — COGNITIVE AND FUNCTIONAL STATUS - GENERAL
MOVING FROM LYING ON BACK TO SITTING ON SIDE OF FLAT BED WITH BEDRAILS: A LITTLE
DRESSING REGULAR LOWER BODY CLOTHING: A LITTLE
DAILY ACTIVITIY SCORE: 21
CLIMB 3 TO 5 STEPS WITH RAILING: A LITTLE
MOVING TO AND FROM BED TO CHAIR: A LITTLE
MOBILITY SCORE: 18
MOVING FROM LYING ON BACK TO SITTING ON SIDE OF FLAT BED WITH BEDRAILS: A LITTLE
MOVING FROM LYING ON BACK TO SITTING ON SIDE OF FLAT BED WITH BEDRAILS: A LITTLE
CLIMB 3 TO 5 STEPS WITH RAILING: A LITTLE
TOILETING: A LITTLE
HELP NEEDED FOR BATHING: A LITTLE
MOVING TO AND FROM BED TO CHAIR: A LITTLE
MOBILITY SCORE: 18
DRESSING REGULAR LOWER BODY CLOTHING: A LITTLE
MOBILITY SCORE: 18
DAILY ACTIVITIY SCORE: 20
STANDING UP FROM CHAIR USING ARMS: A LITTLE
DRESSING REGULAR UPPER BODY CLOTHING: A LITTLE
WALKING IN HOSPITAL ROOM: A LITTLE
HELP NEEDED FOR BATHING: A LITTLE
STANDING UP FROM CHAIR USING ARMS: A LITTLE
TOILETING: A LITTLE
DRESSING REGULAR LOWER BODY CLOTHING: A LITTLE
DAILY ACTIVITIY SCORE: 20
MOVING TO AND FROM BED TO CHAIR: A LITTLE
TURNING FROM BACK TO SIDE WHILE IN FLAT BAD: A LITTLE
STANDING UP FROM CHAIR USING ARMS: A LITTLE
DRESSING REGULAR UPPER BODY CLOTHING: A LITTLE
TURNING FROM BACK TO SIDE WHILE IN FLAT BAD: A LITTLE
WALKING IN HOSPITAL ROOM: A LITTLE
WALKING IN HOSPITAL ROOM: A LITTLE
CLIMB 3 TO 5 STEPS WITH RAILING: A LITTLE
HELP NEEDED FOR BATHING: A LITTLE
TURNING FROM BACK TO SIDE WHILE IN FLAT BAD: A LITTLE
TOILETING: A LITTLE

## 2023-11-01 ASSESSMENT — PAIN SCALES - GENERAL: PAINLEVEL_OUTOF10: 0 - NO PAIN

## 2023-11-01 NOTE — PROGRESS NOTES
"Dragan Calvin is a 83 y.o. male on day 1 of admission presenting with SBO (small bowel obstruction) (CMS/HCC).    Subjective   Patient reports several BM's last night. +flatus. Denies nausea or vomiting. Denies abdominal pain.       Objective     Physical Exam  Constitutional:       Appearance: Normal appearance. He is normal weight.   HENT:      Head: Normocephalic and atraumatic.      Mouth/Throat:      Mouth: Mucous membranes are moist.   Cardiovascular:      Rate and Rhythm: Normal rate and regular rhythm.   Pulmonary:      Effort: Pulmonary effort is normal.      Breath sounds: Normal breath sounds.   Abdominal:      General: Abdomen is flat. Bowel sounds are normal.      Palpations: Abdomen is soft.      Tenderness: There is no abdominal tenderness.      Comments: NG to LIWS   Skin:     General: Skin is warm and dry.   Neurological:      General: No focal deficit present.      Mental Status: He is alert and oriented to person, place, and time.   Psychiatric:         Mood and Affect: Mood normal.         Last Recorded Vitals  Blood pressure 101/62, pulse 88, temperature 36.1 °C (97 °F), resp. rate 16, height 1.75 m (5' 8.9\"), weight 93.9 kg (207 lb), SpO2 96 %.  Intake/Output last 3 Shifts:  I/O last 3 completed shifts:  In: 1805 (19.2 mL/kg) [I.V.:1805 (19.2 mL/kg)]  Out: 830 (8.8 mL/kg) [Urine:700 (0.2 mL/kg/hr); Emesis/NG output:130]  Weight: 93.9 kg     Relevant Results              Results for orders placed or performed during the hospital encounter of 10/31/23 (from the past 24 hour(s))   POCT GLUCOSE   Result Value Ref Range    POCT Glucose 106 (H) 74 - 99 mg/dL   POCT GLUCOSE   Result Value Ref Range    POCT Glucose 99 74 - 99 mg/dL   POCT GLUCOSE   Result Value Ref Range    POCT Glucose 117 (H) 74 - 99 mg/dL   POCT GLUCOSE   Result Value Ref Range    POCT Glucose 132 (H) 74 - 99 mg/dL   CBC   Result Value Ref Range    WBC 9.7 4.4 - 11.3 x10*3/uL    nRBC 0.0 0.0 - 0.0 /100 WBCs    RBC 3.97 (L) 4.50 - " 5.90 x10*6/uL    Hemoglobin 13.1 (L) 13.5 - 17.5 g/dL    Hematocrit 38.8 (L) 41.0 - 52.0 %    MCV 98 80 - 100 fL    MCH 33.0 26.0 - 34.0 pg    MCHC 33.8 32.0 - 36.0 g/dL    RDW 12.4 11.5 - 14.5 %    Platelets 171 150 - 450 x10*3/uL    MPV 9.5 7.5 - 11.5 fL   Basic metabolic panel   Result Value Ref Range    Glucose 127 (H) 74 - 99 mg/dL    Sodium 141 136 - 145 mmol/L    Potassium 3.7 3.5 - 5.3 mmol/L    Chloride 103 98 - 107 mmol/L    Bicarbonate 27 21 - 32 mmol/L    Anion Gap 15 10 - 20 mmol/L    Urea Nitrogen 26 (H) 6 - 23 mg/dL    Creatinine 1.05 0.50 - 1.30 mg/dL    eGFR 70 >60 mL/min/1.73m*2    Calcium 8.7 8.6 - 10.3 mg/dL   POCT GLUCOSE   Result Value Ref Range    POCT Glucose 135 (H) 74 - 99 mg/dL   POCT GLUCOSE   Result Value Ref Range    POCT Glucose 125 (H) 74 - 99 mg/dL     XR abdomen 1 view    Result Date: 11/1/2023  Interpreted By:  Wilfred Cooney, STUDY: XR ABDOMEN 1 VIEW;  11/1/2023 7:33 am   INDICATION: Confirm proper placement of NG tube.   COMPARISON: 10/31/2023   ACCESSION NUMBER(S): [id number]   ORDERING CLINICIAN: DEBORA CHO   FINDINGS: Nonobstructive bowel gas pattern. Limited evaluation of pneumoperitoneum on supine imaging, however no gross evidence of free air is noted.   There is blunting at the left costophrenic angle probably due to atelectasis and possible with a small effusion.   An NG catheter is in satisfactory position, with the tip extending to the distal antrum. This is similar to the prior exam.       1.  Satisfactory positioning of an NG catheter.   MACRO: None   Signed by: Wilfred Cooney 11/1/2023 8:30 AM Dictation workstation:   [id number]    XR abdomen 1 view    Result Date: 10/31/2023  Interpreted By:  Sonny Miranda, STUDY: XR ABDOMEN 1 VIEW; 10/31/2023 12:08 pm   INDICATION: Signs/Symptoms:NGT repositioned.   COMPARISON: None.   ACCESSION NUMBER(S): [id number]   ORDERING CLINICIAN: DEBORA CHO   FINDINGS: A single AP radiograph of the abdomen was  obtained. An enteric tube is present, with the tip overlying the distal stomach. There is a nonobstructive bowel gas pattern present. Free intraperitoneal air and air-fluid levels cannot be excluded without upright or decubitus images.       Enteric tube placement, as above.   MACRO: None   Signed by: Sonny Miranda 10/31/2023 12:35 PM Dictation workstation:   [id number]    XR abdomen 3+ views    Result Date: 10/31/2023  Interpreted By:  Lulu Hernandez, STUDY: XR ABDOMEN 3+ VIEWS;  10/31/2023 9:28 am   INDICATION: Signs/Symptoms:NG placement.   COMPARISON: None.   ACCESSION NUMBER(S): [id number]   ORDERING CLINICIAN: BRIANNE BASS   FINDINGS: A single view of the lower chest and upper abdomen obtained.   Tip of NG tube coiled over the medial left upper quadrant likely in the proximal stomach. Partially included pacer wires and left chest wall pacer port. Distended gas-filled loops of bowel in the visualized upper abdomen. Minimal streaky density in the medial right lung base.       NG tube as above, likely in the proximal stomach.   MACRO: None.   Signed by: Lulu Hernandez 10/31/2023 9:51 AM Dictation workstation:   [id number]    CT abdomen pelvis w IV contrast    Result Date: 10/31/2023  Interpreted By:  Finkelstein, Evan, STUDY: CT ABDOMEN PELVIS W IV CONTRAST;  10/31/2023 5:19 am   INDICATION: Signs/Symptoms:abdominal pain lower.   COMPARISON: CT abdomen pelvis 05/07/2017   ACCESSION NUMBER(S): [id number]   ORDERING CLINICIAN: TATYANA SABILLON   TECHNIQUE: Axial CT images of the abdomen and pelvis with coronal and sagittal reconstructed images obtained after intravenous administration of 75 mL Omnipaque 350   FINDINGS: LOWER CHEST: Reticular opacities along the periphery of the lungs bilaterally.   ABDOMEN:   LIVER: Normal attenuation and contour. BILE DUCTS: Normal caliber. GALLBLADDER: Surgically absent. PORTAL VEIN: Patent SPLEEN: Unremarkable. PANCREAS: Fatty atrophy of the pancreas. No peripancreatic inflammatory  stranding. ADRENALS: Unremarkable. KIDNEYS, URETERS and URINARY BLADDER: Symmetric renal enhancement. No hydronephrosis or perinephric fluid collection. Nonobstructing stones in the right kidney measuring up to 3 mm. Nonobstructing stone in the left kidney measures up 5 mm. 2.8 cm hypodensity in the superior pole of the right kidney measures simple fluid attenuation and is most compatible with a simple cyst bladder is within normal limits REPRODUCTIVE ORGANS: No pelvic masses.   ABDOMINAL WALL: Fat containing right inguinal hernia. PERITONEUM: No ascites, free air or fluid collection.   BOWEL: The stomach is distended with fluid. No gastric wall thickening.. A duodenal diverticulum is present. Dilated loops of proximal to mid small bowel with a transition point in the lower pelvis. The distal small bowel is decompressed. There are scattered colonic diverticula without focal pericolonic inflammatory stranding. The appendix is not definitively visualized, without focal pericecal inflammatory stranding.   VESSELS: Mild aortoiliac calcifications. No aortic aneurysm. An infrarenal IVC filter is present. RETROPERITONEUM: No pathologically enlarged retroperitoneal lymph nodes.   BONES: No acute osseous abnormality.       Dilated loops of proximal to mid small bowel with a transition point in the lower pelvis and decompressed distal small bowel compatible with small bowel obstruction.   Scattered colonic diverticulosis without CT evidence of acute diverticulitis   Nonobstructing stones in the kidneys bilaterally measuring up to 3 mm on the right and 5 mm on the left.   Reticular opacities along the periphery of the lungs bilaterally. Findings may represent atelectasis or could represent a component of interstitial lung disease.   MACRO: None.   Signed by: Evan Finkelstein 10/31/2023 6:08 AM Dictation workstation:   [id number]                    Assessment/Plan   Principal Problem:    SBO (small bowel obstruction)  (CMS/[facility])    Dragan Greenc is a 83 y.o. male with PMH significant for CAD (s/p stents), permanent Afib (on eliquis), DVT s/p IVC filter, HLD, HTN, SSS s/p implanted pacemaker, PUD, GERD, DMT2, peripheral edema, prior SBO(s) who presented to [facility] ED on 10/31 for abd pain.      Assessment and Plan:     #SBO, resolving  - Remove NG tube  - Start CLD  - I&Os  - mIVF w/ D5LR @75 ml/hr, until tolerating diet    > monitor for signs of fluid overload  - IV analgesia/antiemetics      #YESICA, resolved  - Baseline Cre ~1.1  - IVF resuscitation     #Leukocytosis, likely reactive, resolved  - No s/s of infection  - No need for abx at this time  - Trend CBC      Chronic conditions:  #CAD  #Afib, permanent  #SSS, s/p pacemaker  #HTN  #HLD  #Peripheral edema  - Hold home meds while NPO  - IV metoprolol 5 mg Q6 while NPO  - PRN hydralazine for SBP >170  - Consult pt's cardiologist, Dr. Carlson     -> IV metoprolol while NPO     #DMT2  - Hold home PO meds  - SSI while inpatient     #GERD  #PUD  - IV protonix daily while NPO     #H/o DVT, s/p IVC filter  - Hold home eliquis  - ppx with SQH and SCDs - strict adherence necessary     PT/OT     Dispo: Continue care on RNF. Awaiting PT/OT eval. Anticipate patient will be medically ready for DC in the next 1-2 days.       Patient seen and discussed with fadi Zamora as above    I spent 15 minutes in the professional and overall care of this patient.      JUDY Velasquez-CNP    I saw and evaluated the patient.  I personally obtained the key and critical portions of the history and physical exam I was physically present for key and critical portions performed by the advanced practitioner.  I reviewed the advanced practitioner's documentation and discussed the patient with the advanced practitioner.  I agree with the advanced practitioner's medical decision making as documented in the advanced practitioner's note.  Comments/Additional Findings: I have personally seen and evaluated  the patient in the presence of the advanced practitioner I reviewed the imaging and clinical findings in the above documentation.  The abdomen is soft and nontender.  Impression is resolving small bowel obstruction.  Plan as above to remove NG start liquids

## 2023-11-01 NOTE — CONSULTS
Inpatient consult to Cardiology  Consult performed by: Rupesh Pena DO  Consult ordered by: Iris Mittal PA-C  Reason for consult: atrial fibrillation        History Of Present Illness:    Dragan Calvin is a 83 y.o. male presenting with history of paroxysmal atrial fibrillation on Eliquis, permanent pacemaker for sick sinus syndrome, hypertension, heart failure preserved ejection fraction . who was admitted with abdominal pain and found to have a small bowel obstruction and is now n.p.o. patient denies any cardiac symptoms such as chest pain or shortness of breath or palpitations.  He has history of lower extremity edema however this is improved.  He continues to have some abdominal pain.     Last Recorded Vitals:  Vitals:    10/31/23 1501 10/31/23 2100 11/01/23 0032 11/01/23 0454   BP: 111/56 125/64 106/59 112/70   BP Location:       Patient Position:       Pulse: 75 82 97 105   Resp:       Temp: 36.2 °C (97.2 °F) 35.8 °C (96.4 °F) 36.6 °C (97.9 °F) 36.5 °C (97.7 °F)   TempSrc:       SpO2: 97% 98% 90% 90%   Weight:       Height:           Last Labs:  CBC - 11/1/2023:  7:15 AM  9.7 13.1 171    38.8      CMP - 11/1/2023:  7:15 AM  8.7 7.5 18 --- 1.4   _ 4.2 17 99      PTT - No results in last year.  1.3   14.9 _     BNP   Date/Time Value Ref Range Status   03/24/2023 08:25  (H) 0 - 99 pg/mL Final     Comment:     .  <100 pg/mL - Heart failure unlikely  100-299 pg/mL - Intermediate probability of acute heart  .               failure exacerbation. Correlate with clinical  .               context and patient history.    >=300 pg/mL - Heart Failure likely. Correlate with clinical  .               context and patient history.  BNP testing is performed using different testing   methodology at [facility] than at other   [facility] hospitals. Direct result comparisons should   only be made within the same method.       Hemoglobin A1C   Date/Time Value Ref Range Status   08/28/2023 10:30 AM  7.2 (A) % Final     Comment:          Diagnosis of Diabetes-Adults   Non-Diabetic: < or = 5.6%   Increased risk for developing diabetes: 5.7-6.4%   Diagnostic of diabetes: > or = 6.5%  .       Monitoring of Diabetes                Age (y)     Therapeutic Goal (%)   Adults:          >18           <7.0   Pediatrics:    13-18           <7.5                   7-12           <8.0                   0- 6            7.5-8.5   American Diabetes Association. Diabetes Care 33(S1), Jan 2010.   01/09/2023 01:47 PM 9.0 (A) % Final     Comment:          Diagnosis of Diabetes-Adults   Non-Diabetic: < or = 5.6%   Increased risk for developing diabetes: 5.7-6.4%   Diagnostic of diabetes: > or = 6.5%  .       Monitoring of Diabetes                Age (y)     Therapeutic Goal (%)   Adults:          >18           <7.0   Pediatrics:    13-18           <7.5                   7-12           <8.0                   0- 6            7.5-8.5   American Diabetes Association. Diabetes Care 33(S1), Jan 2010.       VLDL   Date/Time Value Ref Range Status   08/28/2023 10:30 AM 31 0 - 40 mg/dL Final   01/09/2023 01:47 PM 28 0 - 40 mg/dL Final   07/07/2022 01:11 PM 34 0 - 40 mg/dL Final      Last I/O:  I/O last 3 completed shifts:  In: 1805 (19.2 mL/kg) [I.V.:1805 (19.2 mL/kg)]  Out: 830 (8.8 mL/kg) [Urine:700 (0.2 mL/kg/hr); Emesis/NG output:130]  Weight: 93.9 kg     Past Cardiology Tests (Last 3 Years):  EKG:  No results found for this or any previous visit from the past 1095 days.    Echo:  No results found for this or any previous visit from the past 1095 days.    Ejection Fractions:    50% per 3/9/2022 echo  Cath:  No results found for this or any previous visit from the past 1095 days.    Stress Test:  No results found for this or any previous visit from the past 1095 days.    Cardiac Imaging:  No results found for this or any previous visit from the past 1095 days.      Past Medical History:  He has a past medical history of Deep vein  thrombosis (DVT) of lower extremity (CMS/[facility]) (09/17/2023), History of falling, Hyperlipidemia (08/28/2023), Hypertension, benign (08/23/2017), Leg edema (09/05/2023), Overweight, Paroxysmal atrial fibrillation (CMS/[facility]) (08/28/2023), Permanent atrial fibrillation (CMS/[facility]) (10/17/2023), Personal history of other diseases of the digestive system, Personal history of other infectious and parasitic diseases, Personal history of other specified conditions, Personal history of other specified conditions, Personal history of peptic ulcer disease, Personal history of pneumonia (recurrent), Personal history of urinary calculi, Presence of cardiac pacemaker (08/25/2017), and Sick sinus syndrome (CMS/[facility]) (09/17/2023).    Past Surgical History:  He has a past surgical history that includes Cardiac pacemaker placement (04/05/2018); Cholecystectomy (04/05/2018); and Appendectomy (07/25/2018).      Social History:  He reports that he has never smoked. He has never used smokeless tobacco. He reports that he does not drink alcohol and does not use drugs.    Family History:  Family History   Problem Relation Name Age of Onset    Coronary artery disease Father      Heart failure Father      Diabetes Father          Allergies:  Patient has no known allergies.    Inpatient Medications:  Scheduled medications   Medication Dose Route Frequency    [Held by provider] apixaban  5 mg oral BID    [Held by provider] dilTIAZem CD  240 mg oral BID    [Held by provider] gabapentin  100 mg oral Daily    heparin (porcine)  5,000 Units subcutaneous q8h    insulin lispro  0-10 Units subcutaneous q4h    [Held by provider] lisinopril  10 mg oral Daily    [Held by provider] metOLazone  2.5 mg oral Daily    metoprolol  5 mg intravenous q6h    [Held by provider] metoprolol tartrate  25 mg oral BID    pantoprazole  40 mg intravenous Daily    [Held by provider] rosuvastatin  5 mg oral Daily    [Held by provider] torsemide  20 mg oral BID     PRN  "medications   Medication    dextrose 10 % in water (D10W)    dextrose    glucagon    hydrALAZINE    morphine    morphine    ondansetron     Continuous Medications   Medication Dose Last Rate    dextrose 5 % and lactated Ringer's  100 mL/hr 100 mL/hr (11/01/23 0442)     Outpatient Medications:  Current Outpatient Medications   Medication Instructions    acetaminophen (TYLENOL 8 HOUR) 650 mg, oral, Every 8 hours PRN, Do not crush, chew, or split.    apixaban (ELIQUIS) 5 mg, oral, 2 times daily    cyanocobalamin (Vitamin B-12) 1,000 mcg tablet 1 tablet, oral, Daily    dilTIAZem CD (CARDIZEM CD) 240 mg, oral, 2 times daily    fosinopril (Monopril) 10 mg tablet 1 tablet, oral, Daily    gabapentin (NEURONTIN) 100 mg, oral, Daily    glipiZIDE XL (GLUCOTROL XL) 2.5 mg, oral, 3 times daily    metOLazone (ZAROXOLYN) 2.5 mg, oral, Weekly, On Wednesday    metOLazone (ZAROXOLYN) 5 mg, oral, Weekly, On Sunday    metoprolol tartrate (Lopressor) 25 mg tablet 1 tablet, oral, 2 times daily    multivitamin tablet 1 tablet, oral, Daily    pen needle, diabetic (BD Ultra-Fine Tami Pen Needle) 32 gauge x 5/32\" needle 1 Needle, miscellaneous, 4 times daily    RABEprazole (ACIPHEX) 20 mg, oral, Daily    rosuvastatin (CRESTOR) 5 mg, oral, Daily    torsemide (DEMADEX) 20 mg, oral, 2 times daily       Physical Exam:   PHYSICAL EXAM:    Constitutional/General:  Alert and oriented x3, well appearing, nontoxic, and in NAD  Neck:  No increased JVP,no carotid bruits.  Respiratory:  Lungs clear to auscultation bilaterally, no wheezes, rales, or rhonchi, not in respiratory distress  Cardiovascular:    Heart is irregular irregular rhythm with borderline rate control, no murmurs, gallops, or rubs, PMI nondisplaced, 2+ distal pulses  Chest:  No chest wall tenderness  GI:  Abdomen soft,  nondistended,  mild tenderness to palpation with diminished bowel sounds, no organomegaly, no palpable masses, no rebound, guarding, or rigidity. NG is in " place  Musculoskeletal:  Moves all extremities x4  Extremities:  trace peripheral edema  Integument: Skin warm and dry, no rashes  Neurologic:  No focal deficits  Psychiatric:  Normal affect    Vital Signs, Nursing Notes, Allergies, and Medications reviewed by me.     Assessment/Plan    1.  Paroxysmal atrial fibrillation   2.  Sick sinus syndrome with history of pacemaker implantation   3.  Small bowel obstruction   4.  Hypertension    5. Heart failure with preserved ejection  fraction  -Dragan Calvin is a 83 y.o. male presenting with history of paroxysmal atrial fibrillation on Eliquis, permanent pacemaker for sick sinus syndrome, hypertension, heart failure preserved ejection fraction . who was admitted with abdominal pain and found to have a small bowel obstruction   -Patient currently in A-fib with borderline rate control, as he is n.p.o. we will start IV metoprolol for rate control.  -Anticoagulation been placed on hold by surgery  - No current signs of congestive heart failure  -We will continue to monitor with  Peripheral IV 10/31/23 20 G Left Antecubital (Active)   Site Assessment Clean;Dry;Intact 11/01/23 0000   Dressing Status Clean;Dry 11/01/23 0000   Number of days: 1       NG/OG/Feeding Tube Left nostril (Active)   Output (mL) 50 mL 11/01/23 0600   Number of days: 1       Code Status:  Full Code            Rupesh Pena DO

## 2023-11-01 NOTE — CARE PLAN
The patient's goals for the shift include  rest & comfort    The clinical goals for the shift include pt will tolerate NG tube well

## 2023-11-01 NOTE — PROGRESS NOTES
Nutrition Progress Note  Dietitian consult received for NG, SBO from surgery NP. This RD reached out to clarify consult and nutrition needs. MST score = 0, and pt has been NPO <5 days. Per surgery NP no acute nutrition needs at this time and no need for assessment/consult. Will rescreen pt per policy. Please reconsult RD if nutrition needs do arise.

## 2023-11-01 NOTE — PROGRESS NOTES
Occupational Therapy    Evaluation    Patient Name: Dragan Calvin  MRN: [id number]  Today's Date: 11/1/2023  Time Calculation  Start Time: 1440  Stop Time: 1455  Time Calculation (min): 15 min        Assessment:        Plan:  Treatment Interventions: ADL retraining, Functional transfer training, UE strengthening/ROM, Compensatory technique education  OT Frequency: 3 times per week  OT Discharge Recommendations: Low intensity level of continued care (pt. had scheduled outpatient PT for BLE weakness, per spouse pt. has had 3 falls in the past 3 months)  Treatment Interventions: ADL retraining, Functional transfer training, UE strengthening/ROM, Compensatory technique education    Subjective   Current Problem:  1. SBO (small bowel obstruction) (CMS/HCC)          General:  General  Reason for Referral: impaired adl  Past Medical History Relevant to Rehab: pt. admitted with abd pain x 2 days, sbo and anil, ng tube, pmh:  a fib, dm, dvt, cad, gerd, htn, hld, pacemaker  Family/Caregiver Present: Yes  Caregiver Feedback: spouse supportive  Prior to Session Communication: Bedside nurse  Patient Position Received: Bed, 2 rail up, Alarm on  Precautions:  Precautions Comment: falls, remote pacemaker  Vital Signs:     Pain:  Pain Assessment  Pain Assessment:  (no pain complaints)    Objective   Cognition:  Overall Cognitive Status: Within Functional Limits           Home Living:  Home Living Comments: pt. lives with spouse, 1 floor home, laundry in the basement (spouse does), 3 step entry with rails, tub/shower with chair/gb/RTS, owns rollator and st. cane and wh. walker  Prior Function:  Prior Function Comments: pt. independent with dressing/bathing/toileting, use of wh. walker for ambulation, spouse completes iadl's primarily  IADL History:     ADL:  ADL Comments: pt. able to don/dof socks seated eob with some effort, sba overall.  provided pt. education re:  use of sock aide, reacher, dressing stick, spouse and pt.  receptive  Activity Tolerance:     Bed Mobility/Transfers: Bed Mobility  Bed Mobility:  (supervision supine <> sit)   and Transfers  Transfer:  (sit<> stand from eob required supervision, mobility x over 100 ft via wh. walker required sba, good endurance, no sob)      Strength:  Strength Comments: RUE 4-/5 overall, LUE 4/5    Outcome Measures:[facility] Daily Activity  Putting on and taking off regular lower body clothing: A little  Bathing (including washing, rinsing, drying): A little  Putting on and taking off regular upper body clothing: A little  Toileting, which includes using toilet, bedpan or urinal: A little  Taking care of personal grooming such as brushing teeth: None  Eating Meals: None  Daily Activity - Total Score: 20        Education Documentation  Body Mechanics, taught by Razia Wang OT at 11/1/2023  3:43 PM.  Learner: Family, Patient  Readiness: Acceptance  Method: Explanation  Response: Verbalizes Understanding    ADL Training, taught by Razia Wang OT at 11/1/2023  3:43 PM.  Learner: Family, Patient  Readiness: Acceptance  Method: Explanation  Response: Verbalizes Understanding    Education Comments  No comments found.        OP EDUCATION:       Goals:  Encounter Problems       Encounter Problems (Active)       OT Goals       increase bue ther ex/activity x 7-10 minutes and increase standing tolerance x 3-5 minutes with supervision to promote greater activity tolerance for assist with adl.   (Progressing)       Start:  11/01/23    Expected End:  11/08/23            Increase functional mobility and  functional transfers to supervision for bed/chair/toilet/shower with dme prn   (Progressing)       Start:  11/01/23    Expected End:  11/08/23            Increase lb dressing to supervision with dme prn  (Progressing)       Start:  11/01/23    Expected End:  11/08/23            Increase lb bathing to supervision with dme prn  (Progressing)       Start:  11/01/23    Expected End:  11/08/23             Increase toileting to supervision with dme prn  (Progressing)       Start:  11/01/23    Expected End:  11/08/23

## 2023-11-01 NOTE — PROGRESS NOTES
Physical Therapy    Physical Therapy Evaluation    Patient Name: Dragan Calvin  MRN: [id number]  Today's Date: 11/1/2023   Time Calculation  Start Time: 1439  Stop Time: 1455  Time Calculation (min): 16 min    Assessment/Plan   PT Assessment  PT Assessment Results: Decreased strength, Decreased endurance, Impaired balance, Decreased mobility  End of Session Communication: Bedside nurse  End of Session Patient Position:  (Supine in bed with all needs in reach, no complaints noted, and wife present)  IP OR SWING BED PT PLAN  Inpatient or Swing Bed: Inpatient  PT Plan  Treatment/Interventions: Bed mobility, Transfer training, Gait training, Stair training  PT Plan: Skilled PT  PT Frequency: 3 times per week  PT Discharge Recommendations:  (Outpatient physical therapy)  PT - OK to Discharge: Yes (to next level of care)    Subjective     Current Problem:  Patient Active Problem List   Diagnosis    Hyperlipidemia    Type 2 diabetes mellitus without complication, with long-term current use of insulin (CMS/HCC)    CAD (coronary artery disease)    Facet degeneration of lumbar region    Gastroesophageal reflux disease    Hypertension, benign    Leg edema    Postherpetic neuralgia    Presence of cardiac pacemaker    Bilateral sacroiliitis (CMS/HCC)    Deep vein thrombosis (DVT) of lower extremity (CMS/HCC)    Sick sinus syndrome (CMS/HCC)    Squamous cell cancer of skin of right cheek    Permanent atrial fibrillation (CMS/HCC)    SBO (small bowel obstruction) (CMS/HCC)       General Visit Information:  General  Reason for Referral: PT Eval and Treat  Referred By: Iris Mittal PA-C  Family/Caregiver Present: Yes  Caregiver Feedback: supportive spouse  Prior to Session Communication: Bedside nurse  Patient Position Received:  (Supine in bed and agreeable to PT)    Home Living:  Home Living  Type of Home:  (Pt lives with his wife in a house with 1st floor set up and 3 GIANCARLO with HR. There is a basement with laundry and  bathroom with a tub, seat, grab bars, and high toilet.)    Prior Level of Function:  Prior Function Per Pt/Caregiver Report  Level of [place]: Independent with ADLs and functional transfers (Pt amb with RW and his wife assists with IADLs and does laundry, (+) driving)    Precautions:  Precautions  Precautions Comment: Fall precautions    Objective     Pain:  Pain Assessment  Pain Assessment:  (0/10)    Cognition:  Cognition  Overall Cognitive Status: Within Functional Limits    General Assessments:  Sensation  Light Touch: No apparent deficits  Strength  Strength Comments: B LE ROM WFL, R LE strength WFL, L LE strength grossly 4-/5  Dynamic Standing Balance  Dynamic Standing-Comments: Fair- with RW    Functional Assessments:     Bed Mobility  Bed Mobility:  (supine <> sitting: SUP)  Transfers  Transfer:  (STS from EOB: SUP)  Ambulation/Gait Training  Ambulation/Gait Training Performed:  (Pt was able to amb 100' x 2 using RW with SBA demonstrating impaired gait mechanics with L LE in ER with trendelenburg gait noted.)    Outcome Measures:  [facility] Basic Mobility  Turning from your back to your side while in a flat bed without using bedrails: A little  Moving from lying on your back to sitting on the side of a flat bed without using bedrails: A little  Moving to and from bed to chair (including a wheelchair): A little  Standing up from a chair using your arms (e.g. wheelchair or bedside chair): A little  To walk in hospital room: A little  Climbing 3-5 steps with railing: A little  Basic Mobility - Total Score: 18       Goals:  Encounter Problems       Encounter Problems (Active)       PT Problem       STG - Pt will transition supine <> sitting with mod I  (Progressing)       Start:  11/01/23    Expected End:  11/15/23            STG - Pt will transfer STS with mod I  (Progressing)       Start:  11/01/23    Expected End:  11/15/23            STG - Pt will amb 100' using RW with SUP  (Progressing)       Start:   11/01/23    Expected End:  11/15/23            STG -  Pt will navigate 4 stairs using HR with SBA  (Progressing)       Start:  11/01/23    Expected End:  11/15/23                 Education Documentation  Precautions, taught by Philomena Enriquez PT at 11/1/2023  3:51 PM.  Learner: Patient  Readiness: Acceptance  Method: Explanation  Response: Verbalizes Understanding    Mobility Training, taught by Philomena Enriquez PT at 11/1/2023  3:51 PM.  Learner: Patient  Readiness: Acceptance  Method: Explanation  Response: Verbalizes Understanding    Education Comments  No comments found.

## 2023-11-02 VITALS
WEIGHT: 207 LBS | TEMPERATURE: 97.4 F | SYSTOLIC BLOOD PRESSURE: 110 MMHG | OXYGEN SATURATION: 98 % | HEIGHT: 69 IN | HEART RATE: 92 BPM | RESPIRATION RATE: 18 BRPM | BODY MASS INDEX: 30.66 KG/M2 | DIASTOLIC BLOOD PRESSURE: 71 MMHG

## 2023-11-02 LAB
GLUCOSE BLD MANUAL STRIP-MCNC: 102 MG/DL (ref 74–99)
GLUCOSE BLD MANUAL STRIP-MCNC: 103 MG/DL (ref 74–99)
GLUCOSE BLD MANUAL STRIP-MCNC: 114 MG/DL (ref 74–99)
GLUCOSE BLD MANUAL STRIP-MCNC: 119 MG/DL (ref 74–99)
GLUCOSE BLD MANUAL STRIP-MCNC: 181 MG/DL (ref 74–99)

## 2023-11-02 PROCEDURE — C9113 INJ PANTOPRAZOLE SODIUM, VIA: HCPCS

## 2023-11-02 PROCEDURE — 99232 SBSQ HOSP IP/OBS MODERATE 35: CPT | Performed by: NURSE PRACTITIONER

## 2023-11-02 PROCEDURE — 2500000002 HC RX 250 W HCPCS SELF ADMINISTERED DRUGS (ALT 637 FOR MEDICARE OP, ALT 636 FOR OP/ED)

## 2023-11-02 PROCEDURE — 82947 ASSAY GLUCOSE BLOOD QUANT: CPT

## 2023-11-02 PROCEDURE — 97110 THERAPEUTIC EXERCISES: CPT | Mod: CQ,GP

## 2023-11-02 PROCEDURE — 2500000005 HC RX 250 GENERAL PHARMACY W/O HCPCS: Performed by: STUDENT IN AN ORGANIZED HEALTH CARE EDUCATION/TRAINING PROGRAM

## 2023-11-02 PROCEDURE — 97116 GAIT TRAINING THERAPY: CPT | Mod: CQ,GP

## 2023-11-02 PROCEDURE — 99232 SBSQ HOSP IP/OBS MODERATE 35: CPT | Performed by: INTERNAL MEDICINE

## 2023-11-02 PROCEDURE — 96372 THER/PROPH/DIAG INJ SC/IM: CPT

## 2023-11-02 PROCEDURE — 2500000004 HC RX 250 GENERAL PHARMACY W/ HCPCS (ALT 636 FOR OP/ED)

## 2023-11-02 RX ADMIN — METOPROLOL TARTRATE 5 MG: 5 INJECTION INTRAVENOUS at 11:32

## 2023-11-02 RX ADMIN — METOPROLOL TARTRATE 5 MG: 5 INJECTION INTRAVENOUS at 01:28

## 2023-11-02 RX ADMIN — METOPROLOL TARTRATE 5 MG: 5 INJECTION INTRAVENOUS at 17:37

## 2023-11-02 RX ADMIN — INSULIN LISPRO 2 UNITS: 100 INJECTION, SOLUTION INTRAVENOUS; SUBCUTANEOUS at 12:25

## 2023-11-02 RX ADMIN — HEPARIN SODIUM 5000 UNITS: 5000 INJECTION INTRAVENOUS; SUBCUTANEOUS at 08:39

## 2023-11-02 RX ADMIN — METOPROLOL TARTRATE 5 MG: 5 INJECTION INTRAVENOUS at 06:13

## 2023-11-02 RX ADMIN — HEPARIN SODIUM 5000 UNITS: 5000 INJECTION INTRAVENOUS; SUBCUTANEOUS at 01:28

## 2023-11-02 RX ADMIN — PANTOPRAZOLE SODIUM 40 MG: 40 INJECTION, POWDER, FOR SOLUTION INTRAVENOUS at 08:39

## 2023-11-02 ASSESSMENT — PAIN - FUNCTIONAL ASSESSMENT
PAIN_FUNCTIONAL_ASSESSMENT: 0-10

## 2023-11-02 ASSESSMENT — COGNITIVE AND FUNCTIONAL STATUS - GENERAL
MOBILITY SCORE: 19
STANDING UP FROM CHAIR USING ARMS: A LITTLE
WALKING IN HOSPITAL ROOM: A LITTLE
STANDING UP FROM CHAIR USING ARMS: A LITTLE
MOVING TO AND FROM BED TO CHAIR: A LITTLE
MOVING TO AND FROM BED TO CHAIR: A LITTLE
MOBILITY SCORE: 19
WALKING IN HOSPITAL ROOM: A LITTLE
CLIMB 3 TO 5 STEPS WITH RAILING: A LITTLE
CLIMB 3 TO 5 STEPS WITH RAILING: A LITTLE
MOVING FROM LYING ON BACK TO SITTING ON SIDE OF FLAT BED WITH BEDRAILS: A LITTLE
MOVING FROM LYING ON BACK TO SITTING ON SIDE OF FLAT BED WITH BEDRAILS: A LITTLE

## 2023-11-02 ASSESSMENT — PAIN SCALES - GENERAL
PAINLEVEL_OUTOF10: 0 - NO PAIN
PAINLEVEL_OUTOF10: 0 - NO PAIN

## 2023-11-02 NOTE — HOSPITAL COURSE
The patient is an 83-year-old male with a 2-day history of cramping abdominal pain and bloating who came to the emergency room last night for evaluation.  He had no nausea or vomiting.  He has been passing gas and having some liquid bowel movements.  He was treated for a small bowel obstruction 5 years ago and it resolved with nonoperative treatment.  He has no abdominal pain at this time. Admitted for small bowel obstruction. Obstruction resolved with conservative management including NG tube decompression and bowel rest. At the time of discharge, patient's pain was controlled with oral analgesia, patient was urinating, having BMs, sleeping, and eating well. Patient was discharged home with scripts and follow up appointments. Discharge plan was discussed with the patient and all of the patient's questions were answered.

## 2023-11-02 NOTE — PROGRESS NOTES
"Dragan Calvin is a 83 y.o. male on day 2 of admission presenting with SBO (small bowel obstruction) (CMS/HCC).    Subjective   Patient reports tolerating CLD. Denies nausea or vomiting. Denies bloating. +flatus and BM.        Objective     Physical Exam  Constitutional:       Appearance: Normal appearance. He is normal weight.   HENT:      Head: Normocephalic and atraumatic.      Mouth/Throat:      Mouth: Mucous membranes are moist.   Cardiovascular:      Rate and Rhythm: Normal rate and regular rhythm.   Pulmonary:      Effort: Pulmonary effort is normal.   Abdominal:      General: Abdomen is flat. Bowel sounds are normal.      Palpations: Abdomen is soft.      Tenderness: There is no abdominal tenderness.   Skin:     General: Skin is warm and dry.   Neurological:      General: No focal deficit present.      Mental Status: He is alert and oriented to person, place, and time.   Psychiatric:         Mood and Affect: Mood normal.         Last Recorded Vitals  Blood pressure 113/65, pulse 99, temperature 35.9 °C (96.6 °F), resp. rate 18, height 1.75 m (5' 8.9\"), weight 93.9 kg (207 lb), SpO2 96 %.  Intake/Output last 3 Shifts:  I/O last 3 completed shifts:  In: 1805 (19.2 mL/kg) [I.V.:1805 (19.2 mL/kg)]  Out: 830 (8.8 mL/kg) [Urine:700 (0.2 mL/kg/hr); Emesis/NG output:130]  Weight: 93.9 kg     Relevant Results              Results for orders placed or performed during the hospital encounter of 10/31/23 (from the past 24 hour(s))   POCT GLUCOSE   Result Value Ref Range    POCT Glucose 125 (H) 74 - 99 mg/dL   POCT GLUCOSE   Result Value Ref Range    POCT Glucose 118 (H) 74 - 99 mg/dL   POCT GLUCOSE   Result Value Ref Range    POCT Glucose 149 (H) 74 - 99 mg/dL   POCT GLUCOSE   Result Value Ref Range    POCT Glucose 102 (H) 74 - 99 mg/dL   POCT GLUCOSE   Result Value Ref Range    POCT Glucose 103 (H) 74 - 99 mg/dL   POCT GLUCOSE   Result Value Ref Range    POCT Glucose 114 (H) 74 - 99 mg/dL                "       Assessment/Plan   Principal Problem:    SBO (small bowel obstruction) (CMS/HCC)    Dragan Calvin is a 83 y.o. male with PMH significant for CAD (s/p stents), permanent Afib (on eliquis), DVT s/p IVC filter, HLD, HTN, SSS s/p implanted pacemaker, PUD, GERD, DMT2, peripheral edema, prior SBO(s) who presented to [facility] ED on 10/31 for abd pain.      Assessment and Plan:     #SBO, resolving  - Advance to FLD  - I&Os  - mIVF w/ D5LR @75 ml/hr, until tolerating diet    > monitor for signs of fluid overload  - IV analgesia/antiemetics      #YESICA, resolved  - Baseline Cre ~1.1  - IVF resuscitation     #Leukocytosis, likely reactive, resolved  - No s/s of infection  - No need for abx at this time  - Trend CBC      Chronic conditions:  #CAD  #Afib, permanent  #SSS, s/p pacemaker  #HTN  #HLD  #Peripheral edema  - PRN hydralazine for SBP >170  - Consult pt's cardiologist, Dr. Carlson     -> IV metoprolol while NPO  - will defer reinitiating PO meds to cardiology     #DMT2  - Hold home PO meds  - SSI while inpatient     #GERD  #PUD  - IV protonix daily while NPO     #H/o DVT, s/p IVC filter  - Hold home eliquis  - ppx with SQH and SCDs - strict adherence necessary     PT/OT     Dispo: Continue care on RNF. PT/OT rec outpt PT/OT at DC. Anticipate patient will be medically ready for DC later today or tomorrow    Plan not finalized until discussed with Dr. Ram       I spent 15 minutes in the professional and overall care of this patient.      Stephanie Paniagua, APRN-CNP

## 2023-11-02 NOTE — PROGRESS NOTES
Subjective Data:  Patient feels better  His NG has been removed and he is tolerating liquids    Overnight Events:    None reported  Objective Data:  Last Recorded Vitals:  Vitals:    11/02/23 0116 11/02/23 0512 11/02/23 0900 11/02/23 1131   BP: 116/63 113/65 116/63 119/66   BP Location: Right arm  Left arm    Patient Position:   Sitting    Pulse: (!) 119 99 98 90   Resp:  18 18    Temp:  35.9 °C (96.6 °F) 36.5 °C (97.7 °F)    TempSrc:   Temporal    SpO2:  96% 95% 97%   Weight:       Height:           Last Labs:  CBC - 11/1/2023:  7:15 AM  9.7 13.1 171    38.8      CMP - 11/1/2023:  7:15 AM  8.7 7.5 18 --- 1.4   _ 4.2 17 99      PTT - No results in last year.  1.3   14.9 _     BNP   Date/Time Value Ref Range Status   03/24/2023 08:25  0 - 99 pg/mL Final     Comment:     .  <100 pg/mL - Heart failure unlikely  100-299 pg/mL - Intermediate probability of acute heart  .               failure exacerbation. Correlate with clinical  .               context and patient history.    >=300 pg/mL - Heart Failure likely. Correlate with clinical  .               context and patient history.  BNP testing is performed using different testing   methodology at [facility] than at other   [facility]. Direct result comparisons should   only be made within the same method.       HGBA1C   Date/Time Value Ref Range Status   08/28/2023 10:30 AM 7.2 % Final     Comment:          Diagnosis of Diabetes-Adults   Non-Diabetic: < or = 5.6%   Increased risk for developing diabetes: 5.7-6.4%   Diagnostic of diabetes: > or = 6.5%  .       Monitoring of Diabetes                Age (y)     Therapeutic Goal (%)   Adults:          >18           <7.0   Pediatrics:    13-18           <7.5                   7-12           <8.0                   0- 6            7.5-8.5   American Diabetes Association. Diabetes Care 33(S1), Jan 2010.   01/09/2023 01:47 PM 9.0 % Final     Comment:          Diagnosis of Diabetes-Adults   Non-Diabetic:  "< or = 5.6%   Increased risk for developing diabetes: 5.7-6.4%   Diagnostic of diabetes: > or = 6.5%  .       Monitoring of Diabetes                Age (y)     Therapeutic Goal (%)   Adults:          >18           <7.0   Pediatrics:    13-18           <7.5                   7-12           <8.0                   0- 6            7.5-8.5   American Diabetes Association. Diabetes Care 33(S1), Jan 2010.       VLDL   Date/Time Value Ref Range Status   08/28/2023 10:30 AM 31 0 - 40 mg/dL Final   01/09/2023 01:47 PM 28 0 - 40 mg/dL Final   07/07/2022 01:11 PM 34 0 - 40 mg/dL Final      Last I/O:  I/O last 3 completed shifts:  In: 1805 (19.2 mL/kg) [I.V.:1805 (19.2 mL/kg)]  Out: 830 (8.8 mL/kg) [Urine:700 (0.2 mL/kg/hr); Emesis/NG output:130]  Weight: 93.9 kg     Past Cardiology Tests (Last 3 Years):  EKG:  ECG 12 Lead 11/1/2023  Atrial fibrillation  Echo:  No results found for this or any previous visit from the past 1095 days.    Ejection Fractions:  No results found for: \"EF\"  Cath:  No results found for this or any previous visit from the past 1095 days.    Stress Test:  No results found for this or any previous visit from the past 1095 days.    Cardiac Imaging:  No results found for this or any previous visit from the past 1095 days.      Inpatient Medications:  Scheduled medications   Medication Dose Route Frequency    [Held by provider] apixaban  5 mg oral BID    [Held by provider] dilTIAZem CD  240 mg oral BID    [Held by provider] gabapentin  100 mg oral Daily    heparin (porcine)  5,000 Units subcutaneous q8h    insulin lispro  0-10 Units subcutaneous q4h    [Held by provider] lisinopril  10 mg oral Daily    [Held by provider] metOLazone  2.5 mg oral Daily    metoprolol  5 mg intravenous q6h    [Held by provider] metoprolol tartrate  25 mg oral BID    pantoprazole  40 mg intravenous Daily    [Held by provider] rosuvastatin  5 mg oral Daily    [Held by provider] torsemide  20 mg oral BID     PRN medications "   Medication    dextrose 10 % in water (D10W)    dextrose    glucagon    hydrALAZINE    morphine    morphine    ondansetron     Continuous Medications   Medication Dose Last Rate    dextrose 5 % and lactated Ringer's  50 mL/hr Stopped (11/01/23 1742)       Physical Exam:  Constitutional/General:  Alert and oriented x3, well appearing, nontoxic, and in NAD  Neck:  No increased JVP,no carotid bruits.  Respiratory:  Lungs clear to auscultation bilaterally, no wheezes, rales, or rhonchi, not in respiratory distress  Cardiovascular:    Heart is irregular irregular rhythm with borderline rate control, no murmurs, gallops, or rubs, PMI nondisplaced, 2+ distal pulses  Chest:  No chest wall tenderness  GI:  Abdomen soft,  nondistended,  mild tenderness to palpation with diminished bowel sounds, no organomegaly, no palpable masses, no rebound, guarding, or rigidity.   Musculoskeletal:  Moves all extremities x4  Extremities:  trace peripheral edema  Integument: Skin warm and dry, no rashes  Neurologic:  No focal deficits  Psychiatric:  Normal affect        Assessment/Plan   1.  Paroxysmal atrial fibrillation   2.  Sick sinus syndrome with history of pacemaker implantation   3.  Small bowel obstruction   4.  Hypertension    5. Heart failure with preserved ejection  fraction  -Dragan Calvin is a 83 y.o. male presenting with history of paroxysmal atrial fibrillation on Eliquis, permanent pacemaker for sick sinus syndrome, hypertension, heart failure preserved ejection fraction . who was admitted with abdominal pain and found to have a small bowel obstruction   -Because patient was n.p.o. he was placed on IV metoprolol for rate control with his A-fib .Anticoagulation been placed on hold by surgery.  Resume p.o. meds as his GI status improves  - No current signs of congestive heart failure  -We will continue to monitor with  Peripheral IV 10/31/23 20 G Left Antecubital (Active)   Site Assessment Clean;Dry;Intact 11/02/23 0900    Dressing Status Dry;Clean 11/02/23 0900   Number of days: 2       Code Status:  Full Code    I      Rupesh Pena DO

## 2023-11-02 NOTE — DISCHARGE SUMMARY
Discharge Diagnosis  SBO (small bowel obstruction) (CMS/[facility])    Issues Requiring Follow-Up  SBO    Test Results Pending At Discharge  Pending Labs       Order Current Status    Extra Urine Gray Tube Collected (10/31/23 5708)    Urinalysis with Reflex Microscopic and Culture In process            Hospital Course  The patient is an 83-year-old male with a 2-day history of cramping abdominal pain and bloating who came to the emergency room last night for evaluation.  He had no nausea or vomiting.  He has been passing gas and having some liquid bowel movements.  He was treated for a small bowel obstruction 5 years ago and it resolved with nonoperative treatment.  He has no abdominal pain at this time. Admitted for small bowel obstruction. Obstruction resolved with conservative management including NG tube decompression and bowel rest. At the time of discharge, patient's pain was controlled with oral analgesia, patient was urinating, having BMs, sleeping, and eating well. Patient was discharged home with scripts and follow up appointments. Discharge plan was discussed with the patient and all of the patient's questions were answered.      Pertinent Physical Exam At Time of Discharge  Physical Exam  Constitutional:       Appearance: Normal appearance. He is normal weight.   HENT:      Head: Normocephalic and atraumatic.      Mouth/Throat:      Mouth: Mucous membranes are moist.   Cardiovascular:      Rate and Rhythm: Normal rate and regular rhythm.   Pulmonary:      Effort: Pulmonary effort is normal.      Breath sounds: Normal breath sounds.   Abdominal:      General: Abdomen is flat. Bowel sounds are normal.      Palpations: Abdomen is soft.      Tenderness: There is no abdominal tenderness.   Skin:     General: Skin is warm and dry.   Neurological:      General: No focal deficit present.      Mental Status: He is alert and oriented to person, place, and time.   Psychiatric:         Mood and Affect: Mood normal.  "        Home Medications     Medication List      CONTINUE taking these medications     acetaminophen 650 mg ER tablet; Commonly known as: Tylenol 8 HOUR   cyanocobalamin 1,000 mcg tablet; Commonly known as: Vitamin B-12   dilTIAZem  mg 24 hr capsule; Commonly known as: Cardizem CD   Eliquis 5 mg tablet; Generic drug: apixaban   fosinopril 10 mg tablet; Commonly known as: Monopril   gabapentin 100 mg capsule; Commonly known as: Neurontin   glipiZIDE XL 2.5 mg 24 hr tablet; Commonly known as: Glucotrol XL   * metOLazone 2.5 mg tablet; Commonly known as: Zaroxolyn   * metOLazone 2.5 mg tablet; Commonly known as: Zaroxolyn   metoprolol tartrate 25 mg tablet; Commonly known as: Lopressor   multivitamin tablet   pen needle, diabetic 32 gauge x 5/32\" needle; Commonly known as: BD   Ultra-Fine Tami Pen Needle; 1 Needle 4 times a day.   RABEprazole EC tablet; Commonly known as: Aciphex; Take 1 tablet (20 mg)   by mouth once daily.   rosuvastatin 5 mg tablet; Commonly known as: Crestor; Take 1 tablet (5   mg) by mouth once daily.   torsemide 20 mg tablet; Commonly known as: Demadex  * This list has 2 medication(s) that are the same as other medications   prescribed for you. Read the directions carefully, and ask your doctor or   other care provider to review them with you.       Outpatient Follow-Up  Future Appointments   Date Time Provider Department Center   11/16/2023 10:00 AM Chuck Gonzalez MD [email or web address]   11/29/2023  9:15 AM Emmanuel Brown DO [email or web address]   5/8/2024  9:30 AM Benoit Carlson MD [id number] [place]       Stephanie Paniagua, APRN-CNP  "

## 2023-11-02 NOTE — PROGRESS NOTES
Physical Therapy    Physical Therapy Treatment    Patient Name: Dragan Calvin  MRN: [id number]  Today's Date: 11/2/2023  Time Calculation  Start Time: 0840  Stop Time: 0905  Time Calculation (min): 25 min       Assessment/Plan   PT Assessment  PT Assessment Results: Decreased strength, Decreased endurance, Impaired balance, Decreased mobility  End of Session Communication: Bedside nurse  End of Session Patient Position:  (Supine in bed with all needs in reach, no complaints noted, and wife present)     PT Plan  Treatment/Interventions: Bed mobility, Transfer training, Gait training, Stair training  PT Plan: Skilled PT  PT Frequency: 3 times per week  PT Discharge Recommendations:  (Outpatient physical therapy)  PT - OK to Discharge: Yes (to next level of care)      General Visit Information:      General  Prior to Session Communication: Bedside nurse  Patient Position Received: Bed, 2 rail up, Alarm on  General Comment: Patient agreeable to therapy.    Subjective   Precautions:  Precautions  Precautions Comment: Fall precautions  Vital Signs:       Objective   Pain:  Pain Assessment  Pain Assessment: 0-10  Pain Score: 0 - No pain  Cognition:     Postural Control:     Extremity/Trunk Assessments:                    Activity Tolerance:     Treatments:  Therapeutic Exercise  Therapeutic Exercise Performed: Yes (Seated march, LAQ, HR/TR. supine heel slides, AP x20 B.)                             Bed Mobility  Bed Mobility: Yes (Supine to sit, mod I. Sit to supine, mod I.)    Ambulation/Gait Training  Ambulation/Gait Training Performed: Yes (Patient ambulated with 2ww and gait belt, with CGAx1, 100'.)  Transfers  Transfer: Yes (Sit to stand and stand to sit, with CGAx1, with gait belt and 2ww.)                     Outcome Measures:             [facility] Basic Mobility  Turning from your back to your side while in a flat bed without using bedrails: A little  Moving from lying on your back to sitting on the side of a flat bed  without using bedrails: None  Moving to and from bed to chair (including a wheelchair): A little  Standing up from a chair using your arms (e.g. wheelchair or bedside chair): A little  To walk in hospital room: A little  Climbing 3-5 steps with railing: A little  Basic Mobility - Total Score: 19    Education Documentation  Precautions, taught by Ramon Alexander PTA at 11/2/2023  9:49 AM.  Learner: Patient  Readiness: Acceptance  Method: Explanation  Response: Verbalizes Understanding    Mobility Training, taught by Ramon Alexander PTA at 11/2/2023  9:49 AM.  Learner: Patient  Readiness: Acceptance  Method: Explanation  Response: Verbalizes Understanding    Body Mechanics, taught by Ramon Alexander PTA at 11/2/2023  9:49 AM.  Learner: Patient  Readiness: Acceptance  Method: Explanation  Response: Verbalizes Understanding    ADL Training, taught by Ramon Alexander PTA at 11/2/2023  9:49 AM.  Learner: Patient  Readiness: Acceptance  Method: Explanation  Response: Verbalizes Understanding    Education Comments  No comments found.        OP EDUCATION:       Encounter Problems       Encounter Problems (Active)       PT Problem       STG - Pt will transition supine <> sitting with mod I  (Met)       Start:  11/01/23    Expected End:  11/15/23    Resolved:  11/02/23         STG - Pt will transfer STS with mod I  (Progressing)       Start:  11/01/23    Expected End:  11/15/23            STG - Pt will amb 100' using RW with SUP  (Progressing)       Start:  11/01/23    Expected End:  11/15/23            STG -  Pt will navigate 4 stairs using HR with SBA  (Progressing)       Start:  11/01/23    Expected End:  11/15/23

## 2023-11-15 DIAGNOSIS — Z79.4 TYPE 2 DIABETES MELLITUS WITHOUT COMPLICATION, WITH LONG-TERM CURRENT USE OF INSULIN (MULTI): Primary | ICD-10-CM

## 2023-11-15 DIAGNOSIS — E11.9 TYPE 2 DIABETES MELLITUS WITHOUT COMPLICATION, WITH LONG-TERM CURRENT USE OF INSULIN (MULTI): Primary | ICD-10-CM

## 2023-11-16 ENCOUNTER — OFFICE VISIT (OUTPATIENT)
Dept: PAIN MEDICINE | Facility: CLINIC | Age: 83
End: 2023-11-16
Payer: MEDICARE

## 2023-11-16 VITALS
RESPIRATION RATE: 18 BRPM | BODY MASS INDEX: 30.31 KG/M2 | HEART RATE: 93 BPM | TEMPERATURE: 97.2 F | WEIGHT: 200 LBS | HEIGHT: 68 IN | SYSTOLIC BLOOD PRESSURE: 84 MMHG | DIASTOLIC BLOOD PRESSURE: 56 MMHG

## 2023-11-16 DIAGNOSIS — M48.062 SPINAL STENOSIS OF LUMBAR REGION WITH NEUROGENIC CLAUDICATION: Primary | ICD-10-CM

## 2023-11-16 PROCEDURE — 99213 OFFICE O/P EST LOW 20 MIN: CPT | Performed by: ANESTHESIOLOGY

## 2023-11-16 PROCEDURE — 3074F SYST BP LT 130 MM HG: CPT | Performed by: ANESTHESIOLOGY

## 2023-11-16 PROCEDURE — 1036F TOBACCO NON-USER: CPT | Performed by: ANESTHESIOLOGY

## 2023-11-16 PROCEDURE — 1159F MED LIST DOCD IN RCRD: CPT | Performed by: ANESTHESIOLOGY

## 2023-11-16 PROCEDURE — 3078F DIAST BP <80 MM HG: CPT | Performed by: ANESTHESIOLOGY

## 2023-11-16 PROCEDURE — 1111F DSCHRG MED/CURRENT MED MERGE: CPT | Performed by: ANESTHESIOLOGY

## 2023-11-16 PROCEDURE — 1160F RVW MEDS BY RX/DR IN RCRD: CPT | Performed by: ANESTHESIOLOGY

## 2023-11-16 PROCEDURE — 99213 OFFICE O/P EST LOW 20 MIN: CPT | Mod: PN | Performed by: ANESTHESIOLOGY

## 2023-11-16 PROCEDURE — 1125F AMNT PAIN NOTED PAIN PRSNT: CPT | Performed by: ANESTHESIOLOGY

## 2023-11-16 ASSESSMENT — ENCOUNTER SYMPTOMS
OCCASIONAL FEELINGS OF UNSTEADINESS: 1
FEVER: 0
DEPRESSION: 1
SHORTNESS OF BREATH: 0
LOSS OF SENSATION IN FEET: 1
BACK PAIN: 1

## 2023-11-16 ASSESSMENT — PAIN DESCRIPTION - DESCRIPTORS: DESCRIPTORS: NUMBNESS

## 2023-11-16 ASSESSMENT — COLUMBIA-SUICIDE SEVERITY RATING SCALE - C-SSRS
1. IN THE PAST MONTH, HAVE YOU WISHED YOU WERE DEAD OR WISHED YOU COULD GO TO SLEEP AND NOT WAKE UP?: NO
6. HAVE YOU EVER DONE ANYTHING, STARTED TO DO ANYTHING, OR PREPARED TO DO ANYTHING TO END YOUR LIFE?: NO
2. HAVE YOU ACTUALLY HAD ANY THOUGHTS OF KILLING YOURSELF?: NO

## 2023-11-16 ASSESSMENT — PATIENT HEALTH QUESTIONNAIRE - PHQ9
2. FEELING DOWN, DEPRESSED OR HOPELESS: NOT AT ALL
SUM OF ALL RESPONSES TO PHQ9 QUESTIONS 1 AND 2: 0
1. LITTLE INTEREST OR PLEASURE IN DOING THINGS: NOT AT ALL

## 2023-11-16 ASSESSMENT — PAIN SCALES - GENERAL
PAINLEVEL: 5
PAINLEVEL_OUTOF10: 5 - MODERATE PAIN

## 2023-11-16 ASSESSMENT — PAIN - FUNCTIONAL ASSESSMENT: PAIN_FUNCTIONAL_ASSESSMENT: 0-10

## 2023-11-16 NOTE — PROGRESS NOTES
"Chief Complain  Follow-up (From epidural on 10/6 with 80% relief in the back and 30-40% in the legs. Currently only take tylenol as needed)       History Of Present Illness  Dragan Calvin is a 83 y.o. male here for low back pain radiating to left thigh . The patient rates the pain at 3  on a scale from 0-10.  The patient describes pain as \"just hurts\".  The pain is worsened by standing and walking and is alleviated by sitting.  Since the last visit the pain has stayed the same.  The patient denies any fever, chills, weight loss, bladder/bowel incontinence.     Previous Procedures:  L4-5 epidural steroid injection on 10/6/2023: 80% pain relief as far as back pain is concerned 30% relief in the left leg pain.    Past Medical History  He has a past medical history of Deep vein thrombosis (DVT) of lower extremity (CMS/Formerly [facility]) (09/17/2023), History of falling, Hyperlipidemia (08/28/2023), Hypertension, benign (08/23/2017), Leg edema (09/05/2023), Overweight, Paroxysmal atrial fibrillation (CMS/Formerly [facility]) (08/28/2023), Permanent atrial fibrillation (CMS/Formerly [facility]) (10/17/2023), Personal history of other diseases of the digestive system, Personal history of other infectious and parasitic diseases, Personal history of other specified conditions, Personal history of other specified conditions, Personal history of peptic ulcer disease, Personal history of pneumonia (recurrent), Personal history of urinary calculi, Presence of cardiac pacemaker (08/25/2017), and Sick sinus syndrome (CMS/Formerly [facility]) (09/17/2023).    Surgical History  He has a past surgical history that includes Cardiac pacemaker placement (04/05/2018); Cholecystectomy (04/05/2018); and Appendectomy (07/25/2018).     Social History  He reports that he has never smoked. He has never used smokeless tobacco. He reports that he does not drink alcohol and does not use drugs.    Family History  Family History   Problem Relation Name Age of Onset    Coronary artery disease Father      " "Heart failure Father      Diabetes Father          Allergies  Patient has no known allergies.    Review of Systems  Review of Systems   Constitutional:  Negative for fever.   Respiratory:  Negative for shortness of breath.    Cardiovascular:  Negative for chest pain.   Musculoskeletal:  Positive for back pain.   Psychiatric/Behavioral:  Negative for suicidal ideas.         Physical Exam  Physical Exam  HENT:      Head: Normocephalic and atraumatic.   Eyes:      Extraocular Movements: Extraocular movements intact.      Pupils: Pupils are equal, round, and reactive to light.   Pulmonary:      Effort: Pulmonary effort is normal.   Musculoskeletal:      Cervical back: Neck supple.   Neurological:      Mental Status: He is alert.   Psychiatric:         Mood and Affect: Mood normal.           Last Recorded Vitals  Blood pressure 84/56, pulse 93, temperature 36.2 °C (97.2 °F), resp. rate 18, height 1.727 m (5' 8\"), weight 90.7 kg (200 lb).       Assessment/Plan     Dragan Calvin is a 83 y.o. male here for follow-up of back pain radiating to left lower extremity to the posterior lateral aspect of the thigh.  He had significant spinal canal stenosis at L3-4, 4 5 in addition to spinal stenosis also have peripheral neuropathy.  He is s/p lumbar epidural steroid injection x2 which has improved significantly his low back pain, currently having very little issues with his back most of his pain is in the left thigh and the posterolateral aspect. Previously have discussed surgical options he has not shown any interest in, he is probably not a good candidate either due to his comorbidities. I would refer him for physical therapy for strengthening and balance training.  Repeat of epidural steroid injection as needed.      Chuck Gonzalez MD  "

## 2023-11-22 ENCOUNTER — EVALUATION (OUTPATIENT)
Dept: PHYSICAL THERAPY | Facility: CLINIC | Age: 83
End: 2023-11-22
Payer: MEDICARE

## 2023-11-22 ENCOUNTER — OFFICE VISIT (OUTPATIENT)
Dept: SURGERY | Facility: CLINIC | Age: 83
End: 2023-11-22
Payer: MEDICARE

## 2023-11-22 VITALS
WEIGHT: 211.2 LBS | RESPIRATION RATE: 16 BRPM | TEMPERATURE: 97.2 F | HEART RATE: 101 BPM | SYSTOLIC BLOOD PRESSURE: 97 MMHG | OXYGEN SATURATION: 95 % | BODY MASS INDEX: 32.01 KG/M2 | DIASTOLIC BLOOD PRESSURE: 60 MMHG | HEIGHT: 68 IN

## 2023-11-22 DIAGNOSIS — M48.062 SPINAL STENOSIS OF LUMBAR REGION WITH NEUROGENIC CLAUDICATION: ICD-10-CM

## 2023-11-22 DIAGNOSIS — Z87.19 HISTORY OF SMALL BOWEL OBSTRUCTION: Primary | ICD-10-CM

## 2023-11-22 DIAGNOSIS — M48.062 SPINAL STENOSIS, LUMBAR REGION, WITH NEUROGENIC CLAUDICATION: Primary | ICD-10-CM

## 2023-11-22 PROCEDURE — 99212 OFFICE O/P EST SF 10 MIN: CPT | Performed by: SURGERY

## 2023-11-22 PROCEDURE — 1160F RVW MEDS BY RX/DR IN RCRD: CPT | Performed by: SURGERY

## 2023-11-22 PROCEDURE — 1111F DSCHRG MED/CURRENT MED MERGE: CPT | Performed by: SURGERY

## 2023-11-22 PROCEDURE — 1036F TOBACCO NON-USER: CPT | Performed by: SURGERY

## 2023-11-22 PROCEDURE — 3078F DIAST BP <80 MM HG: CPT | Performed by: SURGERY

## 2023-11-22 PROCEDURE — 1159F MED LIST DOCD IN RCRD: CPT | Performed by: SURGERY

## 2023-11-22 PROCEDURE — 1125F AMNT PAIN NOTED PAIN PRSNT: CPT | Performed by: SURGERY

## 2023-11-22 PROCEDURE — 97161 PT EVAL LOW COMPLEX 20 MIN: CPT | Mod: GP

## 2023-11-22 PROCEDURE — 3074F SYST BP LT 130 MM HG: CPT | Performed by: SURGERY

## 2023-11-22 ASSESSMENT — ENCOUNTER SYMPTOMS
DEPRESSION: 0
OCCASIONAL FEELINGS OF UNSTEADINESS: 1
LOSS OF SENSATION IN FEET: 1

## 2023-11-22 ASSESSMENT — BALANCE ASSESSMENTS
SITTING WITH BACK UNSUPPORTED BUT FEET SUPPORTED ON FLOOR OR ON A STOOL: ABLE TO SIT SAFELY AND SECURELY FOR 2 MINUTES
REACHING FORWARD WITH OUTSTRETCHED ARM WHILE STANDING: CAN REACH FORWARD 5 CM (2 INCHES)
PLACE ALTERNATE FOOT ON STEP OR STOOL WHILE STANDING UNSUPPORTED: NEEDS ASSISTANCE TO KEEP FROM FALLING/UNABLE TO TRY
STANDING UNSUPPORTED WITH EYES CLOSED: ABLE TO STAND 10 SECONDS WITH SUPERVISION
TRANSFERS: NEEDS ONE PERSON TO ASSIST
STANDING UNSUPPORTED: ABLE TO STAND 2 MINUTES WITH SUPERVISION
STANDING ON ONE LEG: UNABLE TO TRY NEEDS ASSIST TO PREVENT FALL
STANDING UNSUPPORTED WITH FEET TOGETHER: NEEDS HELP TO ATTAIN POSITION BUT ABLE TO STAND 15 SECONDS FEET TOGETHER
TURN 360 DEGREES: NEEDS ASSISTANCE WHILE TURNING
PLACE ALTERNATE FOOT ON STEP OR STOOL WHILE STANDING UNSUPPORTED: NEEDS SUPERVISION WHEN TURNING
PICK UP OBJECT FROM THE FLOOR FROM A STANDING POSITION: ABLE TO PICK UP SLIPPER BUT NEEDS SUPERVISION
STANDING TO SITTING: CONTROLS DESCENT BY USING HANDS
LONG VERSION TOTAL SCORE (MAX 56): 24
STANDING UNSUPPORTED ONE FOOT IN FRONT: LOSES BALANCE WHILE STEPPING OR STANDING
STANDING TO SITTING: ABLE TO STAND INDEPENDENTLY USING HANDS

## 2023-11-22 NOTE — PROGRESS NOTES
" Physical Therapy Evaluation    Patient Name Dragan Calvin  MRN: [id number]  Today's Date: 11/22/23  Time Calculation  Start Time: 1325  Stop Time: 1430  Time Calculation (min): 65 min    Assessment:  The pt presents with chronic L gluteal and LE pain and a history of falls.  The pt has limited ROM in the spine and decreased flexibility in the back and hips/LE.  He has core weakness and weakness in the L > R LE, most notably in the B dorsiflexors and great toe extensors.  The pt ambulates with B foot drop and requires the WW for support and safety with ambulation.  The pt has balance limitations due to weakness and instability.  The pt was advised to discuss the weakness in the dorsiflexors with his doctor and that AFOs may be helpful with this.  The pt is an excellent candidate for skilled therapy to address the above listed limitations and to improve tolerance for functional activities, and improved safety with transfers, ambulation and stair negotiation.      Impression:  Skilled PT services will aid in advancing functional status and attaining therapy goals.    Problem List:  -activity limitations  -Functional limitations  -Pain 5-6  -decreased  ROM  -decreased strength   -decreased flexibility  -posture awareness/ body mechanics  -decreased knowledge of HEP  -balance  -gait  -stairs negotiation    Goals:  STG: Pt (I) with initial HEP; By week 3; Goal     LTG Goals: 11/22/23  By week: 8-12+    Pain: Decrease back pain to 3/10 or < ; Goal     Posture: pt to demonstrate postural and body mechanics awareness ; Goal     Gait:  Pt to ambulate (I) with appropriate assistive device on even surfaces,  shorter community distances, without limitation from pain and with improved heel to toe gait as able, TUG = 20 seconds or < with WW ;  Goal     Stairs: Pt to tolerate step ups x 5 with L LE with B UE support; Goal        Balance: Donaldson = 37/56 or > ; SLS = 10\" with 2 finger support ; Tandum = 10 seconds with 2 finger support "  ; Goal     ROM: Increase lumbar ROM to WFL without limitation from pain. ;  Goal     Strength: Increase core and B LE strength to 4/5 or > grossly for improved tolerance for functional activities. ; Goal     Flexibility: Improve flexibility of B LEs to WFL  ; Goal     Transfers: STSx5 18 seconds or < ; Goal     HEP: Pt to be (I) with HEP  ; Goal     Outcome Measures: LEFS 45/80 or > Goal      Rehab Potential: good    Plan:  Evaluation, Re-evaluation, Therapeutic Exercise, Therapeutic Activity; Neuromuscular reeducation; Postural Education; Body Mechanics; Home Exercise Program; Manual Techniques; Moist Hot Pack; Cold Pack; Gait/ Stair/ Balance Training; Aquatics PRN        1-2 x week  until goals met or maximum rehab potential met  Pt is currently scheduled for 6 weeks    Plan of care was designed with input and agreement by the patient        Therapy Diagnoses:   1. Spinal stenosis, lumbar region, with neurogenic claudication  Follow Up In Physical Therapy      2. Spinal stenosis of lumbar region with neurogenic claudication  Referral to Physical Therapy          General:  Reason for visit: back and L leg pain   Referred by: Dr Carlos Patiño MD appt:  as needed  Preferred Name:  Carlos  Script:  Eval and treat  Onset Date:  11/22/21    Insurance:  - medicare  (119.61 used)  -authorization required: no  -Certification dates: 11/22/23 to 2/20/24      Next MD appointment: PRN      Subjective:    Current Episode of Functional Impairment and/or Pain : He has a history of back pain, but most of the pain now is in the L gluteal region, down the lateral thigh, does not go past the knee. Denies numbness or tingling other than neuropathy in feet. The pain has worsened over the last two years. CATscan: Multilevel degenerative changes of the lumbar spine, fully  detailed above, worst at L4-L5 where there is severe central canal  stenosis.    Reports weakness with weight bearing on the L  Injections 2 x recently- only helped  "slightly with back pain, did not effect leg pain    Pain       Pain assessment 0-10  Pain score: 5-6  Pain location: L buttock, thigh    Exacerbating Factors: supine, S/L, standing > 20 mins, walking 20 mins with WW, full weight bearing, sit to stand transfers  Relieving Factors: sitting, gabapentin, heat    Current Medical management:     PMHx: H/o skin cancer face and shoulder, A-fib; Diabetes; Neuropathy, OA **PACEMAKER** Sx: appendectomy, gall bladder removal     Medications for pain: gabapentin     Diagnostic Tests: CATscan    Functional Limitations:  Functional Limitations: Sleeping, Walking, Stair negotiation, and Standing    Home Living Situation: lives with spouse; 3 entry steps with grab bars, then all one level    Prior Level of Function: has been using WW for years, drives    Patient Goal For Therapy: Decrease pain and improve quality of life and be able to travel and get out more    Occupation: Retired    Hobbies: visiting family, yard work,     Precautions:   Pacemaker    Fall risk: moderate    Objective:      Pain:           Back pain average 5-6/10    Posture:           The pt had rounded shoulders, forward head and decreased lumbar lordosis. The pt has decreased postural and body mechanics awareness.    Gait:            The pt is (I) with a WW on even surfaces, shorter distances.  He walks in a forward flexed posture,  TUG #1 31.7 seconds, #2 25.4 seconds    Stairs:           The pt is (I) with step to step with 2 railings    Balance:           Donaldson/56; SLS unable, Tandum unable    ROM:           Lumbar flexion 50%                        extension n/a %                        SB R n/a\", L n/a \" fingers from floor                         Rotation R n/a % , L n/a %     Strength:           Abdominals 3/5          Back extensors 3/5          The pt was able to toe and heel walk -- no          Hip flexors R/L 4/4-          Hip abductors R/L 4-/3+          Hip adductors R/L 4+/4          Hip extensors " "R/L 3/3          Quads R/L 4+/4-          Hams R/L 4+/4          DF R/L 2/1          PF R/L 4/4-          Great toe extensors R/L 2/1    Flexibility:           Hamstrings R mod, L mod-max;          Gluteals R min , L mod restriction          Piriformis R min-mod , L mod restriction    Transfers:            Sit to stand needs armrests, STS x 5 with armrests 25\"          Supine to sit n/a, pt reports pain in supine and extreme difficulty getting up, requiring assistance     Palpation:           Denies Tenderness with palpation    Outcome Measurement:           LEFS 31/80     Ortho:  Outcome Measures:  Other Measures  Lower Extremity Funtional Score (LEFS): 31/80       Treatment:  Seated lumbar flexion: 3\" x 10 **    Education: Pt educated on dx, POC, anatomy, posture, ther ex technique and HEP  Preferred learning:  pictures, demonstration.  Demonstrated good understanding.     Access Code: [email or web address]  URL: [email or web address]  Date: 11/22/2023  Prepared by: Brea Lomax    Exercises  - Seated Lumbar Flexion Stretch  - 1-2 x daily - 7 x weekly - 1-2 sets - 10 reps     "

## 2023-11-22 NOTE — LETTER
December 27, 2023    Chuck Gonzalez MD  [address] [facility] [address]    Patient: Dragan Calvin   YOB: 1940   Date of Visit: 11/22/2023       Dear Chuck Gonzalez Md  [address] [place] [address]    The attached plan of care is being sent to you because your patient’s medical reimbursement requires that you certify the plan of care. Your signature is required to allow uninterrupted insurance coverage.      You may indicate your approval by signing below and faxing this form back to us at Dept Fax: [phone].    Please call Dept: [phone] with any questions or concerns.    Thank you for this referral,        Brea Alvarado, PT  [facility] [address] [facility]  [address] [facility] [address]    Payer: Payor: MEDICARE / Plan: MEDICARE PART A AND B / Product Type: *No Product type* /                                                                         Date:     Dear Brea Alvarado, PT,     Re: Mr. Dragan Calvin, MRN:[id number]    I certify that I have reviewed the attached plan of care and it is medically necessary for Mr. Dragan Calvin (1940) who is under my care.          ______________________________________                    _________________  Provider name and credentials                                           Date and time                                                                                      The following plan is in draft form.  Please refer to the current version for the most up-to-date information.                Plan of Care 11/22/23   Effective from: 11/22/2023  Effective to: 2/20/2024    Draft  Plan ID: [id number]               Participants as of 12/27/2023      Name Type Comments Contact Info    Chuck Gonzalez MD Referring Provider  [phone]    Brea Alvarado, PT Physical Therapist  [phone]          Last Plan Note       Author: Barry Mejia PT Status: Signed Last edited: 12/27/2023 11:30  AM           Physical Therapy  Physical Therapy Progress Note    Patient Name Dragan CABRERA [facility]   MRN: [id number]  Today's Date: 12/27/23       Insurance:    (per information provided by  pre-cert team)  Visit #: 7  Approval required:  N  Approval/certification dates:  11/22/23 to 2/20/24  Therapy Diagnoses:   1. Spinal stenosis, lumbar region, with neurogenic claudication  Follow Up In Physical Therapy            General:  Reason for visit: back and L. Leg pain   Referred by: Dr. Carlos Patiño MD appt:  as needed  Preferred Name:  Carlos  Script:  Eval and Treat  Onset Date:  1//22/21  Assessment:  Patient tolerated treatment: well, modified program second to reporting last night fall.  Patient's wife pleasantly surprised about patient's good participation.  Patient needs continued work on general condition ,mobility, gait and strength/skilled PT for: gradual progression in  exercise and   to address remaining functional, objective and subjective deficits to allow them to return to prior /optimal level of function with ADLs.  Patient is progressing with goals: yes  Skilled care:  ex. modification    Plan:         Continue to progress per poc:     Subjective:   Patient reports:  Patient reports he lost his balance yesterday while vacuming, his leg gave out and fell on his right knee which is a good knee,  went to ER.  No fracture, no bruise.  Patient stated he wants to take it easy today.  Last session hamstring stretches made him very sore.    ER REPORT SUMMARY   XR knee 4+ views bilateral  Per my view, no obvious plateau fracture.  There are findings consistent with degenerative disease. [AB]   CT head W O contrast trauma protocol  Per my view, there is no obvious intracranial bleed based on axial images.  Remainder of study populating.     Have you fallen since last visit:  YES    Pecautions: moderate to high fall risk  Pacemaker   PMHx: H/o skin cancer face and shoulder, A-fib; Diabetes; Neuropathy, OA **PACEMAKER**      "       Sx: appendectomy, gall bladder removal  Medications for pain: gabapentin   Diagnostic Tests: CATscan  pain:  5/10  Location/Type of pain:  L. LE and lumbar     HEP compliance/understanding:  Y  Pain:  5/10  Location/Type of pain:  LB     HEP compliance/understanding:  yes       Objective:   Objective Measurements:    Function:   patient is able to sleep at night , less discomfort in the thigh    Gait: forward posture, drags feet with walking on wheel walker   :    Strength: improved with sit to stand with lower table, able to perform bridge  Flexibility:  ham stretch in supine at 75%    Treatment:   **= HEP  NV= Next visit  np= not performed  nb= non-billable  G= group HEP= discharged to [facility]      Therapeutic Exercise:       minutes    Nustep L3 x 8'  Seated hamstring stretch on 8\" box with green strap and manual assist **  Seated gastroc stretch with strap: 15\" x 3 each **  Supine  hamstring stretch: 15\" x 3 each  Supine SKC 10c  @  seated B HR/TR 10 x 2 -NP    Seated isometric hip adduction 5\" x 10  Seated BTB hip abduction 2 x 10 NP  Standing parallel bars DLS ex/flex     Seated DLS lateral pulls green 10x  Seated LAQ  4#   15 x 2  Ham sets: 5\" x 10 each **  Seated ham curl  blue  15x 2   Seated march 4# each ankle 20x-      Seated ball roll in flexion blue 10x for/side NP  Supine LTR 10x NP  Supine bridge 5x NP  Hooklying arm lift then leg ,and arm and leg each 10x NP  Sit to stand  24\" bed 10x2 with hands -                           Education:  ex progression with POC  HEP Progression:  hooklying arm/leg lift       "

## 2023-11-22 NOTE — LETTER
November 22, 2023    Chuck oGnzalez MD  [address] [facility] [address]    Patient: Dragan Calvin   YOB: 1940   Date of Visit: 11/22/2023       Dear Chuck Gonzalez Md  [address] [place] [address]    The attached plan of care is being sent to you because your patient’s medical reimbursement requires that you certify the plan of care. Your signature is required to allow uninterrupted insurance coverage.      You may indicate your approval by signing below and faxing this form back to us at Dept Fax: [phone].    Please call Dept: [phone] with any questions or concerns.    Thank you for this referral,        Brea Alvarado, PT  [facility] [address] [facility]  [address] [facility] [id number]    Payer: Payor: MEDICARE / Plan: MEDICARE PART A AND B / Product Type: *No Product type* /                                                                         Date:     Dear Brea Alvarado, PT,     Re: Mr. Dragan Calvin, MRN:[id number]    I certify that I have reviewed the attached plan of care and it is medically necessary for Mr. Dragan Calvin (1940) who is under my care.          ______________________________________                    _________________  Provider name and credentials                                           Date and time                                                                                      The following plan is in draft form.  Please refer to the current version for the most up-to-date information.                Plan of Care 11/22/23   Effective from: 11/22/2023  Effective to: 2/20/2024    Draft  Plan ID: [id number]               Participants as of 11/22/2023      Name Type Comments Contact Info    Chuck Gonzalez MD Referring Provider  [phone]    Brea Alvarado, PT Physical Therapist  [phone]          Last Plan Note       Author: Brea Alvarado, PT Status: Sign when Signing Visit Last edited:  "11/22/2023  1:15 PM          Physical Therapy Evaluation    Patient Name Dragan Calvin  MRN: [id number]  Today's Date: 11/22/23  Time Calculation  Start Time: 1325  Stop Time: 1430  Time Calculation (min): 65 min    Assessment:  The pt presents with chronic L gluteal and LE pain and a history of falls.  The pt has limited ROM in the spine and decreased flexibility in the back and hips/LE.  He has core weakness and weakness in the L > R LE, most notably in the B dorsiflexors and great toe extensors.  The pt ambulates with B foot drop and requires the WW for support and safety with ambulation.  The pt has balance limitations due to weakness and instability.  The pt was advised to discuss the weakness in the dorsiflexors with his doctor and that AFOs may be helpful with this.  The pt is an excellent candidate for skilled therapy to address the above listed limitations and to improve tolerance for functional activities, and improved safety with transfers, ambulation and stair negotiation.      Impression:  Skilled PT services will aid in advancing functional status and attaining therapy goals.    Problem List:  -activity limitations  -Functional limitations  -Pain 5-6  -decreased  ROM  -decreased strength   -decreased flexibility  -posture awareness/ body mechanics  -decreased knowledge of HEP  -balance  -gait  -stairs negotiation    Goals:  STG: Pt (I) with initial HEP; By week 3; Goal     LTG Goals: 11/22/23  By week: 8-12+    Pain: Decrease back pain to 3/10 or < ; Goal     Posture: pt to demonstrate postural and body mechanics awareness ; Goal     Gait:  Pt to ambulate (I) with appropriate assistive device on even surfaces,  shorter community distances, without limitation from pain and with improved heel to toe gait as able, TUG = 20 seconds or < with WW ;  Goal     Stairs: Pt to tolerate step ups x 5 with L LE with B UE support; Goal        Balance: Donaldson = 37/56 or > ; SLS = 10\" with 2 finger support ; Tandum = 10 " seconds with 2 finger support  ; Goal     ROM: Increase lumbar ROM to WFL without limitation from pain. ;  Goal     Strength: Increase core and B LE strength to 4/5 or > grossly for improved tolerance for functional activities. ; Goal     Flexibility: Improve flexibility of B LEs to WFL  ; Goal     Transfers: STSx5 18 seconds or < ; Goal     HEP: Pt to be (I) with HEP  ; Goal     Outcome Measures: LEFS 45/80 or > Goal      Rehab Potential: good    Plan:  Evaluation, Re-evaluation, Therapeutic Exercise, Therapeutic Activity; Neuromuscular reeducation; Postural Education; Body Mechanics; Home Exercise Program; Manual Techniques; Moist Hot Pack; Cold Pack; Gait/ Stair/ Balance Training; Aquatics PRN        1-2 x week  until goals met or maximum rehab potential met  Pt is currently scheduled for 6 weeks    Plan of care was designed with input and agreement by the patient        Therapy Diagnoses:   1. Spinal stenosis, lumbar region, with neurogenic claudication  Follow Up In Physical Therapy      2. Spinal stenosis of lumbar region with neurogenic claudication  Referral to Physical Therapy          General:  Reason for visit: back and L leg pain   Referred by: Dr Carlos Patiño MD appt:  as needed  Preferred Name:  Carlos  Script:  Eval and treat  Onset Date:  11/22/21    Insurance:  - medicare  (119.61 used)  -authorization required: no  -Certification dates: 11/22/23 to 2/20/24      Next MD appointment: PRN      Subjective:    Current Episode of Functional Impairment and/or Pain : He has a history of back pain, but most of the pain now is in the L gluteal region, down the lateral thigh, does not go past the knee. Denies numbness or tingling other than neuropathy in feet. The pain has worsened over the last two years. CATscan: Multilevel degenerative changes of the lumbar spine, fully  detailed above, worst at L4-L5 where there is severe central canal  stenosis.    Reports weakness with weight bearing on the L  Injections 2  "x recently- only helped slightly with back pain, did not effect leg pain    Pain       Pain assessment 0-10  Pain score: 5-6  Pain location: L buttock, thigh    Exacerbating Factors: supine, S/L, standing > 20 mins, walking 20 mins with WW, full weight bearing, sit to stand transfers  Relieving Factors: sitting, gabapentin, heat    Current Medical management:     PMHx: H/o skin cancer face and shoulder, A-fib; Diabetes; Neuropathy, OA **PACEMAKER** Sx: appendectomy, gall bladder removal     Medications for pain: gabapentin     Diagnostic Tests: CATscan    Functional Limitations:  Functional Limitations: Sleeping, Walking, Stair negotiation, and Standing    Home Living Situation: lives with spouse; 3 entry steps with grab bars, then all one level    Prior Level of Function: has been using WW for years, drives    Patient Goal For Therapy: Decrease pain and improve quality of life and be able to travel and get out more    Occupation: Retired    Hobbies: visiting family, yard work,     Precautions:   Pacemaker    Fall risk: moderate    Objective:      Pain:           Back pain average 5-6/10    Posture:           The pt had rounded shoulders, forward head and decreased lumbar lordosis. The pt has decreased postural and body mechanics awareness.    Gait:            The pt is (I) with a WW on even surfaces, shorter distances.  He walks in a forward flexed posture,  TUG #1 31.7 seconds, #2 25.4 seconds    Stairs:           The pt is (I) with step to step with 2 railings    Balance:           Donaldson/56; SLS unable, Tandum unable    ROM:           Lumbar flexion 50%                        extension n/a %                        SB R n/a\", L n/a \" fingers from floor                         Rotation R n/a % , L n/a %     Strength:           Abdominals 3/5          Back extensors 3/5          The pt was able to toe and heel walk -- no          Hip flexors R/L 4/4-          Hip abductors R/L 4-/3+          Hip adductors R/L " "4+/4          Hip extensors R/L 3/3          Quads R/L 4+/4-          Hams R/L 4+/4          DF R/L 2/1          PF R/L 4/4-          Great toe extensors R/L 2/1    Flexibility:           Hamstrings R mod, L mod-max;          Gluteals R min , L mod restriction          Piriformis R min-mod , L mod restriction    Transfers:            Sit to stand needs armrests, STS x 5 with armrests 25\"          Supine to sit n/a, pt reports pain in supine and extreme difficulty getting up, requiring assistance     Palpation:           Denies Tenderness with palpation    Outcome Measurement:           LEFS 31/80     Ortho:  Outcome Measures:  Other Measures  Lower Extremity Funtional Score (LEFS): 31/80       Treatment:  Seated lumbar flexion: 3\" x 10 **    Education: Pt educated on dx, POC, anatomy, posture, ther ex technique and HEP  Preferred learning:  pictures, demonstration.  Demonstrated good understanding.     Access Code: [email or web address]  URL: [email or web address]  Date: 11/22/2023  Prepared by: Brea Lomax    Exercises  - Seated Lumbar Flexion Stretch  - 1-2 x daily - 7 x weekly - 1-2 sets - 10 reps          "

## 2023-11-22 NOTE — LETTER
November 22, 2023    Chuck Gonzalez MD  [address] [facility] [address]    Patient: Dragan Calvin   YOB: 1940   Date of Visit: 11/22/2023       Dear Chuck Gonzalez Md  [address] [place] [address]    The attached plan of care is being sent to you because your patient’s medical reimbursement requires that you certify the plan of care. Your signature is required to allow uninterrupted insurance coverage.      You may indicate your approval by signing below and faxing this form back to us at Dept Fax: [phone].    Please call Dept: [phone] with any questions or concerns.    Thank you for this referral,        Brea Alvarado, PT  [facility] [address] [facility]  [address] [facility] [address]    Payer: Payor: MEDICARE / Plan: MEDICARE PART A AND B / Product Type: *No Product type* /                                                                         Date:     Dear Brea Alvarado, PT,     Re: Mr. Dragan Calvin, MRN:[id number]    I certify that I have reviewed the attached plan of care and it is medically necessary for Mr. Dragan Calvin (1940) who is under my care.          ______________________________________                    _________________  Provider name and credentials                                           Date and time                                                                                      The following plan is in draft form.  Please refer to the current version for the most up-to-date information.                Plan of Care 11/22/23   Effective from: 11/22/2023  Effective to: 2/20/2024    Draft  Plan ID: [id number]               Participants as of 11/22/2023      Name Type Comments Contact Info    Chuck Gonzalez MD Referring Provider  [phone]    Brea Alvarado, PT Physical Therapist  [phone]          Last Plan Note       Author: Brea Alvarado PT Status: Signed Last edited: 11/22/2023  1:15  "PM          Physical Therapy Evaluation    Patient Name Dragan Calvin  MRN: [id number]  Today's Date: 11/22/23  Time Calculation  Start Time: 1325  Stop Time: 1430  Time Calculation (min): 65 min    Assessment:  The pt presents with chronic L gluteal and LE pain and a history of falls.  The pt has limited ROM in the spine and decreased flexibility in the back and hips/LE.  He has core weakness and weakness in the L > R LE, most notably in the B dorsiflexors and great toe extensors.  The pt ambulates with B foot drop and requires the WW for support and safety with ambulation.  The pt has balance limitations due to weakness and instability.  The pt was advised to discuss the weakness in the dorsiflexors with his doctor and that AFOs may be helpful with this.  The pt is an excellent candidate for skilled therapy to address the above listed limitations and to improve tolerance for functional activities, and improved safety with transfers, ambulation and stair negotiation.      Impression:  Skilled PT services will aid in advancing functional status and attaining therapy goals.    Problem List:  -activity limitations  -Functional limitations  -Pain 5-6  -decreased  ROM  -decreased strength   -decreased flexibility  -posture awareness/ body mechanics  -decreased knowledge of HEP  -balance  -gait  -stairs negotiation    Goals:  STG: Pt (I) with initial HEP; By week 3; Goal     LTG Goals: 11/22/23  By week: 8-12+    Pain: Decrease back pain to 3/10 or < ; Goal     Posture: pt to demonstrate postural and body mechanics awareness ; Goal     Gait:  Pt to ambulate (I) with appropriate assistive device on even surfaces,  shorter community distances, without limitation from pain and with improved heel to toe gait as able, TUG = 20 seconds or < with WW ;  Goal     Stairs: Pt to tolerate step ups x 5 with L LE with B UE support; Goal        Balance: Donaldson = 37/56 or > ; SLS = 10\" with 2 finger support ; Tandum = 10 seconds with 2 " finger support  ; Goal     ROM: Increase lumbar ROM to WFL without limitation from pain. ;  Goal     Strength: Increase core and B LE strength to 4/5 or > grossly for improved tolerance for functional activities. ; Goal     Flexibility: Improve flexibility of B LEs to WFL  ; Goal     Transfers: STSx5 18 seconds or < ; Goal     HEP: Pt to be (I) with HEP  ; Goal     Outcome Measures: LEFS 45/80 or > Goal      Rehab Potential: good    Plan:  Evaluation, Re-evaluation, Therapeutic Exercise, Therapeutic Activity; Neuromuscular reeducation; Postural Education; Body Mechanics; Home Exercise Program; Manual Techniques; Moist Hot Pack; Cold Pack; Gait/ Stair/ Balance Training; Aquatics PRN        1-2 x week  until goals met or maximum rehab potential met  Pt is currently scheduled for 6 weeks    Plan of care was designed with input and agreement by the patient        Therapy Diagnoses:   1. Spinal stenosis, lumbar region, with neurogenic claudication  Follow Up In Physical Therapy      2. Spinal stenosis of lumbar region with neurogenic claudication  Referral to Physical Therapy          General:  Reason for visit: back and L leg pain   Referred by: Dr Carlos Patiño MD appt:  as needed  Preferred Name:  Carlos  Script:  Eval and treat  Onset Date:  11/22/21    Insurance:  - medicare  (119.61 used)  -authorization required: no  -Certification dates: 11/22/23 to 2/20/24      Next MD appointment: PRN      Subjective:    Current Episode of Functional Impairment and/or Pain : He has a history of back pain, but most of the pain now is in the L gluteal region, down the lateral thigh, does not go past the knee. Denies numbness or tingling other than neuropathy in feet. The pain has worsened over the last two years. CATscan: Multilevel degenerative changes of the lumbar spine, fully  detailed above, worst at L4-L5 where there is severe central canal  stenosis.    Reports weakness with weight bearing on the L  Injections 2 x recently-  "only helped slightly with back pain, did not effect leg pain    Pain       Pain assessment 0-10  Pain score: 5-6  Pain location: L buttock, thigh    Exacerbating Factors: supine, S/L, standing > 20 mins, walking 20 mins with WW, full weight bearing, sit to stand transfers  Relieving Factors: sitting, gabapentin, heat    Current Medical management:     PMHx: H/o skin cancer face and shoulder, A-fib; Diabetes; Neuropathy, OA **PACEMAKER** Sx: appendectomy, gall bladder removal     Medications for pain: gabapentin     Diagnostic Tests: CATscan    Functional Limitations:  Functional Limitations: Sleeping, Walking, Stair negotiation, and Standing    Home Living Situation: lives with spouse; 3 entry steps with grab bars, then all one level    Prior Level of Function: has been using WW for years, drives    Patient Goal For Therapy: Decrease pain and improve quality of life and be able to travel and get out more    Occupation: Retired    Hobbies: visiting family, yard work,     Precautions:   Pacemaker    Fall risk: moderate    Objective:      Pain:           Back pain average 5-6/10    Posture:           The pt had rounded shoulders, forward head and decreased lumbar lordosis. The pt has decreased postural and body mechanics awareness.    Gait:            The pt is (I) with a WW on even surfaces, shorter distances.  He walks in a forward flexed posture,  TUG #1 31.7 seconds, #2 25.4 seconds    Stairs:           The pt is (I) with step to step with 2 railings    Balance:           Donaldson/56; SLS unable, Tandum unable    ROM:           Lumbar flexion 50%                        extension n/a %                        SB R n/a\", L n/a \" fingers from floor                         Rotation R n/a % , L n/a %     Strength:           Abdominals 3/5          Back extensors 3/5          The pt was able to toe and heel walk -- no          Hip flexors R/L 4/4-          Hip abductors R/L 4-/3+          Hip adductors R/L 4+/4          " "Hip extensors R/L 3/3          Quads R/L 4+/4-          Hams R/L 4+/4          DF R/L 2/1          PF R/L 4/4-          Great toe extensors R/L 2/1    Flexibility:           Hamstrings R mod, L mod-max;          Gluteals R min , L mod restriction          Piriformis R min-mod , L mod restriction    Transfers:            Sit to stand needs armrests, STS x 5 with armrests 25\"          Supine to sit n/a, pt reports pain in supine and extreme difficulty getting up, requiring assistance     Palpation:           Denies Tenderness with palpation    Outcome Measurement:           LEFS 31/80     Ortho:  Outcome Measures:  Other Measures  Lower Extremity Funtional Score (LEFS): 31/80       Treatment:  Seated lumbar flexion: 3\" x 10 **    Education: Pt educated on dx, POC, anatomy, posture, ther ex technique and HEP  Preferred learning:  pictures, demonstration.  Demonstrated good understanding.     Access Code: [email or web address]  URL: [email or web address]  Date: 11/22/2023  Prepared by: Brea Lomax    Exercises  - Seated Lumbar Flexion Stretch  - 1-2 x daily - 7 x weekly - 1-2 sets - 10 reps          "

## 2023-11-22 NOTE — PROGRESS NOTES
History Of Present Illness  HPI   The patient is an 83-year-old male who was admitted to the hospital on October 31, 2023 with a small bowel obstruction.  His obstruction resolved with nonoperative treatment and he was discharged home on November 2, 2023.  He was treated for a previous small bowel obstruction 5 years ago and it also resolved with nonoperative treatment.  He has no abdominal pain.  Tolerating regular diet without nausea or vomiting.  He is having normal bowel movements.    Past medical history:   Atrial fibrillation on Eliquis  Coronary artery disease  DVT 10 years ago  IVC filter placement  Hypertension  Pacemaker placement  Diabetes mellitus  Laparoscopic cholecystectomy  Open appendectomy    Past Medical History  He has a past medical history of Deep vein thrombosis (DVT) of lower extremity (CMS/HCC) (09/17/2023), History of falling, Hyperlipidemia (08/28/2023), Hypertension, benign (08/23/2017), Leg edema (09/05/2023), Overweight, Paroxysmal atrial fibrillation (CMS/HCC) (08/28/2023), Permanent atrial fibrillation (CMS/[facility]) (10/17/2023), Personal history of other diseases of the digestive system, Personal history of other infectious and parasitic diseases, Personal history of other specified conditions, Personal history of other specified conditions, Personal history of peptic ulcer disease, Personal history of pneumonia (recurrent), Personal history of urinary calculi, Presence of cardiac pacemaker (08/25/2017), and Sick sinus syndrome (CMS/[facility]) (09/17/2023).    Surgical History  He has a past surgical history that includes Cardiac pacemaker placement (04/05/2018); Cholecystectomy (04/05/2018); and Appendectomy (07/25/2018).     Allergies  Patient has no known allergies.    Social History  He reports that he has never smoked. He has never used smokeless tobacco. He reports that he does not drink alcohol and does not use drugs.    Family History  Family History   Problem Relation Name Age of  "Onset    Coronary artery disease Father      Heart failure Father      Diabetes Father         Last Recorded Vitals  Blood pressure 97/60, pulse 101, temperature 36.2 °C (97.2 °F), temperature source Temporal, resp. rate 16, height 1.727 m (5' 8\"), weight 95.8 kg (211 lb 3.2 oz), SpO2 95 %.    Physical Exam  Well-developed, well-nourished, no acute distress, alert and oriented  Abdomen: Nondistended, soft, nontender       Assessment/Plan   Diagnoses and all orders for this visit:  History of small bowel obstruction      Obstruction resolved.  Follow-up as needed.         Barrett Carter MD  "

## 2023-11-29 ENCOUNTER — OFFICE VISIT (OUTPATIENT)
Dept: PRIMARY CARE | Facility: CLINIC | Age: 83
End: 2023-11-29
Payer: MEDICARE

## 2023-11-29 VITALS
WEIGHT: 210 LBS | DIASTOLIC BLOOD PRESSURE: 58 MMHG | BODY MASS INDEX: 31.93 KG/M2 | HEART RATE: 77 BPM | OXYGEN SATURATION: 98 % | SYSTOLIC BLOOD PRESSURE: 100 MMHG

## 2023-11-29 DIAGNOSIS — E11.9 TYPE 2 DIABETES MELLITUS WITHOUT COMPLICATION, WITH LONG-TERM CURRENT USE OF INSULIN (MULTI): ICD-10-CM

## 2023-11-29 DIAGNOSIS — I10 HYPERTENSION, UNSPECIFIED TYPE: Primary | ICD-10-CM

## 2023-11-29 DIAGNOSIS — Z79.4 TYPE 2 DIABETES MELLITUS WITHOUT COMPLICATION, WITH LONG-TERM CURRENT USE OF INSULIN (MULTI): ICD-10-CM

## 2023-11-29 LAB — HBA1C MFR BLD: 8.5 % (ref 4.2–6.5)

## 2023-11-29 PROCEDURE — 1159F MED LIST DOCD IN RCRD: CPT | Performed by: STUDENT IN AN ORGANIZED HEALTH CARE EDUCATION/TRAINING PROGRAM

## 2023-11-29 PROCEDURE — 99213 OFFICE O/P EST LOW 20 MIN: CPT | Performed by: STUDENT IN AN ORGANIZED HEALTH CARE EDUCATION/TRAINING PROGRAM

## 2023-11-29 PROCEDURE — 1111F DSCHRG MED/CURRENT MED MERGE: CPT | Performed by: STUDENT IN AN ORGANIZED HEALTH CARE EDUCATION/TRAINING PROGRAM

## 2023-11-29 PROCEDURE — 3074F SYST BP LT 130 MM HG: CPT | Performed by: STUDENT IN AN ORGANIZED HEALTH CARE EDUCATION/TRAINING PROGRAM

## 2023-11-29 PROCEDURE — 1160F RVW MEDS BY RX/DR IN RCRD: CPT | Performed by: STUDENT IN AN ORGANIZED HEALTH CARE EDUCATION/TRAINING PROGRAM

## 2023-11-29 PROCEDURE — 3078F DIAST BP <80 MM HG: CPT | Performed by: STUDENT IN AN ORGANIZED HEALTH CARE EDUCATION/TRAINING PROGRAM

## 2023-11-29 PROCEDURE — 83036 HEMOGLOBIN GLYCOSYLATED A1C: CPT | Mod: CLIA WAIVED TEST | Performed by: STUDENT IN AN ORGANIZED HEALTH CARE EDUCATION/TRAINING PROGRAM

## 2023-11-29 PROCEDURE — 1036F TOBACCO NON-USER: CPT | Performed by: STUDENT IN AN ORGANIZED HEALTH CARE EDUCATION/TRAINING PROGRAM

## 2023-11-29 PROCEDURE — 1125F AMNT PAIN NOTED PAIN PRSNT: CPT | Performed by: STUDENT IN AN ORGANIZED HEALTH CARE EDUCATION/TRAINING PROGRAM

## 2023-11-29 RX ORDER — FOSINOPIRL SODIUM 10 MG/1
10 TABLET ORAL DAILY
Qty: 90 TABLET | Refills: 1 | Status: SHIPPED | OUTPATIENT
Start: 2023-11-29

## 2023-11-29 ASSESSMENT — PATIENT HEALTH QUESTIONNAIRE - PHQ9
SUM OF ALL RESPONSES TO PHQ9 QUESTIONS 1 AND 2: 0
1. LITTLE INTEREST OR PLEASURE IN DOING THINGS: NOT AT ALL
2. FEELING DOWN, DEPRESSED OR HOPELESS: NOT AT ALL

## 2023-11-29 NOTE — PROGRESS NOTES
Subjective   Patient ID: Dragan Calvin is a 83 y.o. male who presents for Follow-up (Diabetes/a1c).    HPI       DM: on insulin and meds, checks daily , readings in low 100s, wants off insulin. Only takes about 4 units at night for meals I 26 units of tresiba a day and glipizide tid     A fib: on eliquis sees lyndsay Funez, unable to feel it when he is in afib    SBO: was in hospital for SBO seen dr. Emma umana, hospital reviewed     Hld: statin thearpy stable, no muscle aches  Review of Systems   All other systems reviewed and are negative.      Objective   /58 (BP Location: Left arm, Patient Position: Sitting, BP Cuff Size: Large adult)   Pulse 77   Wt 95.3 kg (210 lb)   SpO2 98%   BMI 31.93 kg/m²     Physical Exam  Constitutional:       Appearance: Normal appearance.   HENT:      Head: Normocephalic and atraumatic.      Right Ear: Tympanic membrane and ear canal normal.      Left Ear: Tympanic membrane and ear canal normal.      Mouth/Throat:      Mouth: Mucous membranes are moist.      Pharynx: Oropharynx is clear.   Eyes:      Extraocular Movements: Extraocular movements intact.      Conjunctiva/sclera: Conjunctivae normal.      Pupils: Pupils are equal, round, and reactive to light.   Cardiovascular:      Rate and Rhythm: Normal rate and regular rhythm.      Pulses: Normal pulses.      Heart sounds: Normal heart sounds.   Pulmonary:      Effort: Pulmonary effort is normal.      Breath sounds: Normal breath sounds.   Abdominal:      General: Abdomen is flat. Bowel sounds are normal.      Palpations: Abdomen is soft.   Musculoskeletal:         General: Normal range of motion.      Cervical back: Normal range of motion and neck supple.   Skin:     General: Skin is warm and dry.      Capillary Refill: Capillary refill takes 2 to 3 seconds.   Neurological:      General: No focal deficit present.      Mental Status: He is alert and oriented to person, place, and time. Mental status is at baseline.    Psychiatric:         Mood and Affect: Mood normal.         Behavior: Behavior normal.         Thought Content: Thought content normal.         Judgment: Judgment normal.         Assessment/Plan   1. Type 2 diabetes mellitus without complication, with long-term current use of insulin (CMS/[facility])  8.5 today, morning sugars are in 80s, states he feels well  - glipizide TID  - focus on diet, will monitor for next 3 months  - POCT Glycosylated Hemoglobin (HGB A1C) docked device    2. Hypertension, unspecified type  - stable refill  - fosinopril (Monopril) 10 mg tablet; Take 1 tablet (10 mg) by mouth once daily.  Dispense: 90 tablet; Refill: 1      Follow up in 3 months

## 2023-11-30 ENCOUNTER — TREATMENT (OUTPATIENT)
Dept: PHYSICAL THERAPY | Facility: CLINIC | Age: 83
End: 2023-11-30
Payer: MEDICARE

## 2023-11-30 DIAGNOSIS — M48.062 SPINAL STENOSIS, LUMBAR REGION, WITH NEUROGENIC CLAUDICATION: ICD-10-CM

## 2023-11-30 PROCEDURE — 97110 THERAPEUTIC EXERCISES: CPT | Mod: GP,CQ

## 2023-11-30 NOTE — PROGRESS NOTES
Physical Therapy  Physical Therapy Progress Note       Patient Name Dragan Calvin   MRN: [id number]  Today's Date: 11/30/23  Time Calculation  Start Time: 0100  Stop Time: 0145  Time Calculation (min): 45 min    Insurance:    (per information provided by  pre-cert team)  Visit #: 2  Approval required:  N  Approval/certification dates:  11/22/23 to 2/20/24    Therapy Diagnoses:   1. Spinal stenosis, lumbar region, with neurogenic claudication  Follow Up In Physical Therapy          General:  Reason for visit: back and L. Leg pain   Referred by: Dr. Carlos Patiño MD appt:  as needed  Preferred Name:  Carlos  Script:  Eval and Treat  Onset Date:  1//22/21      Assessment:  Patient tolerated initial exercises session, alternating between seated and standing exercises.  Required Min A to lift feet onto Nustep machine pedals and with seated hamstring stretch.  T/c required at hips to avoid pelvic rocking and trunk flexion in opposing direction of LE with WB ex.  Challenged with performing mini marches.  T/c for posture to avoid trunk rocking with seated rows.  Highly motivated to participate in therapy.  Fatigued at end of session.   Patient needs continued work on/skilled PT to address remaining functional, objective and subjective deficits to allow them to return to prior /optimal level of function with ADLs.  Patient is progressing with goals: flexibility, mobility, strength, endurance  Skilled care:  Therapeutic Exercise progression, Therapeutic Activity, Patient education    Plan:    Continue to progress per poc:   NV add seated LAQ's and hamstring curls    Subjective:   Patient reports he constantly feels as if his legs will buckle.  Difficulty walking reported.  Spouse present who states her  walks with LE's externally rotated.  Patient states he cannot get out of a bed and sleeps in a recliner as a result.      Have you fallen since last visit:  NO    Precautions: moderate fall risk  Pacemaker   PMHx: H/o skin  "cancer face and shoulder, A-fib; Diabetes; Neuropathy, OA **PACEMAKER**            Sx: appendectomy, gall bladder removal  Medications for pain: gabapentin   Diagnostic Tests: CATscan  Pain:  6/10  Location/Type of pain:  L. LE and lumbar    HEP compliance/understanding:  Yes    Objective:   Objective Measurements:    Transfers:  required max cueing for proximity of ww with STS transfers.  V/c for correct hand placement prior to sitting and standing.  Cues for barricading self between transfer surface and ww.    Posture:  Forward flexed over ww and constantly looking at floor.  Heavy reliance on UE support with WB ex.     Treatment:   **= HEP  NV= Next visit  np= not performed  nb= non-billable  G= group HEP= discharged to HEP      Therapeutic Exercise:     35 minutes  Nustep L1 x 5'  Seated hamstring stretch on 8\" box with green strap and manual assist  @ kandi bar HR/TR2 x 10  @ kandi bar mini marching 2 x 10  @ kandi bar hip abduction x 10 marianne  @ kandi bar hip extension x 10 marianne  Seated isometric hip adduction 5\" x 10  Seated GTB hip abduction 2 x 10  Seated GTB 3 level rows H/M/L 3 x 10 each    Therapeutic Activity:  5 minutes  Reviewed proper STS transfer techniques with resepct to correct hand placement and proximity of ww.      Education:  T/c required for avoiding lateral trunk flexion.  Min A required for feet placement on Nustep and on 8\" box for stretching.    HEP Progression:  Reviewed      "

## 2023-12-04 DIAGNOSIS — E11.9 TYPE 2 DIABETES MELLITUS WITHOUT COMPLICATION, WITHOUT LONG-TERM CURRENT USE OF INSULIN (MULTI): Primary | ICD-10-CM

## 2023-12-04 RX ORDER — GLIPIZIDE 2.5 MG/1
2.5 TABLET, EXTENDED RELEASE ORAL 3 TIMES DAILY
Qty: 270 TABLET | Refills: 1 | Status: SHIPPED | OUTPATIENT
Start: 2023-12-04

## 2023-12-05 ENCOUNTER — TREATMENT (OUTPATIENT)
Dept: PHYSICAL THERAPY | Facility: CLINIC | Age: 83
End: 2023-12-05
Payer: MEDICARE

## 2023-12-05 DIAGNOSIS — M48.062 SPINAL STENOSIS OF LUMBAR REGION WITH NEUROGENIC CLAUDICATION: Primary | ICD-10-CM

## 2023-12-05 DIAGNOSIS — M48.062 SPINAL STENOSIS, LUMBAR REGION, WITH NEUROGENIC CLAUDICATION: ICD-10-CM

## 2023-12-05 PROCEDURE — 97110 THERAPEUTIC EXERCISES: CPT | Mod: GP,CQ

## 2023-12-05 NOTE — PROGRESS NOTES
Physical Therapy  Physical Therapy Progress Note    Patient Name Dragan Calvin   MRN: [id number]  Today's Date: 12/05/23  Time Calculation  Start Time: 0315  Stop Time: 0400  Time Calculation (min): 45 min    Insurance:    (per information provided by  pre-cert team)  Visit #: 3  Approval required:  N  Approval/certification dates:  11/22/23 to 2/20/24  Therapy Diagnoses:   1. Spinal stenosis of lumbar region with neurogenic claudication        2. Spinal stenosis, lumbar region, with neurogenic claudication  Follow Up In Physical Therapy          General:  Reason for visit: back and L. Leg pain   Referred by: Dr. Carlos Patiño MD appt:  as needed  Preferred Name:  Carlos  Script:  Eval and Treat  Onset Date:  1//22/21  Assessment:  Patient tolerated treatment: well, progressed with DLS in seated, progressed with sit to stand with no hand support and chair elevated.  Patient needs continued work on gait,strength,balance and spinal flexion /skilled PT for: progression ijn  exercise and  to address remaining functional, objective and subjective deficits to allow them to return to prior /optimal level of function with ADLs.  Patient is progressing with goals: yes  Skilled care:  ex progression, body mechanics with sit to stand    Plan:         Continue to progress per poc:   NV add SL balance    Subjective:   Patient reports:  he is doing well with his home program    Have you fallen since last visit:  no    Precautions:      Precautions: moderate fall risk  Pacemaker   PMHx: H/o skin cancer face and shoulder, A-fib; Diabetes; Neuropathy, OA **PACEMAKER**            Sx: appendectomy, gall bladder removal  Medications for pain: gabapentin   Diagnostic Tests: CATscan  pain:  5/10  Location/Type of pain:  L. LE and lumbar     HEP compliance/understanding:  Y      Objective Measurements:    Function:   sit to stand with no hand support and chair elevated  Posture: forward flexed posture  Gait: wheel walker   Balance:   "requires hand support when not supervised   Flexibility:  seated flexion half way to floor     Treatment:   **= HEP  NV= Next visit  np= not performed  nb= non-billable  G= group HEP= discharged to HEP      Therapeutic Exercise:     45 minutes    Nustep L1 x 7'  Seated hamstring stretch on 8\" box with green strap and manual assist  @  seated r HR/TR2 x 10(unable to perform standing)  @ kandi bar mini marching 2 x 10  @ kandi bar hip abduction x 10 marianne  @ kandi bar hip extension x 10 marianne  Seated isometric hip adduction 5\" x 10  Seated GTB hip abduction 2 x 10  Seated GTB 3 level rows H/M/L 3 x 10 each  Seated DLS ext green 15x  Seated DLS lateral pulls green 10x   Seated LAQ  4#   15x 2  Seated ham curl green 15x 2   Seated march 4# each ankle 20x    Seated ball roll in flexion blue 10x for/side   Sit to stand 2 airex on chair 5x2 no hands           Education:  ex progression  of POC  HEP Progression:  seated flexion ,DLS seated and issued green band for home     "

## 2023-12-07 ENCOUNTER — TREATMENT (OUTPATIENT)
Dept: PHYSICAL THERAPY | Facility: CLINIC | Age: 83
End: 2023-12-07
Payer: MEDICARE

## 2023-12-07 DIAGNOSIS — M48.062 SPINAL STENOSIS, LUMBAR REGION, WITH NEUROGENIC CLAUDICATION: ICD-10-CM

## 2023-12-07 PROCEDURE — 97110 THERAPEUTIC EXERCISES: CPT | Mod: GP

## 2023-12-07 NOTE — PROGRESS NOTES
Physical Therapy  Physical Therapy Progress Note    Patient Name Dragan Calvin   MRN: [id number]  Today's Date: 12/07/23  Time Calculation  Start Time: 1512  Stop Time: 1601  Time Calculation (min): 49 min    Insurance:    (per information provided by  pre-cert team)  Visit #: 4  Approval required:  N  Approval/certification dates:  11/22/23 to 2/20/24  Therapy Diagnoses:   1. Spinal stenosis, lumbar region, with neurogenic claudication  Follow Up In Physical Therapy          General:  Reason for visit: back and L. Leg pain   Referred by: Dr. Carlos Patiño MD appt:  as needed  Preferred Name:  Carlos  Script:  Eval and Treat  Onset Date:  1//22/21  Assessment:  Patient tolerated treatment: well, progressed with squats.  Instructed patient with KTC exercises, LTR to help with the tightness.  Patient needs continued work on gait,strength,balance and spinal flexion /skilled PT for: progression ijn  exercise and  to address remaining functional, objective and subjective deficits to allow them to return to prior /optimal level of function with ADLs.  Patient is progressing with goals: yes  Skilled care:  ex progression, body mechanics with sit to stand    Plan:         Continue to progress per poc:   NV add SL balance    Subjective:   Patient reports:  he is doing well with his home program, his wife accompanied patient.   Patient complained of stiffness when he gets up in the morning.    Have you fallen since last visit:  no    Precautions: moderate fall risk  Pacemaker   PMHx: H/o skin cancer face and shoulder, A-fib; Diabetes; Neuropathy, OA **PACEMAKER**            Sx: appendectomy, gall bladder removal  Medications for pain: gabapentin   Diagnostic Tests: CATscan  pain:  5/10  Location/Type of pain:  L. LE and lumbar     HEP compliance/understanding:  Y      Objective Measurements:    Function:   sit to stand with no hand support and chair elevated  Posture: forward flexed posture  Gait: wheel walker   Balance:   "requires hand support when not supervised   Flexibility:  seated flexion half way to floor     Treatment:   **= HEP  NV= Next visit  np= not performed  nb= non-billable  G= group HEP= discharged to HEP      Therapeutic Exercise:     49 minutes    Nustep L1 x 7'  Seated hamstring stretch on 8\" box with green strap and manual assist  @  seated r HR/TR2 x 10(unable to perform standing)  @ kandi bar mini marching 2 x 10  @ kandi bar hip abduction x 10 marianne  @ kandi bar hip extension x 10 marianne  Seated isometric hip adduction 5\" x 10  Seated BTB hip abduction 2 x 10  Seated GTB 3 level rows H/M/L 3 x 10 each  Seated DLS ext green 15x  Seated DLS lateral pulls green 10x   Seated LAQ  4#   15x 2  Seated ham curl green 15x 2   Seated march 4# each ankle 20x- Unable to perform on the left today.    Seated ball roll in flexion blue 10x for/side   Seated ball roll side to side 10**  Sit to stand 2 airex on chair 5x2 no hands -NP    Instructed patient HEP to be performed when he wakes up in his recliner, single KTC, double KTC and partial LRT to decrease tightness.  Partial squats with UE support**    Education:  ex progression  of POC  HEP Progression:  seated flexion ,DLS seated and issued green band for home   "

## 2023-12-11 ENCOUNTER — APPOINTMENT (OUTPATIENT)
Dept: PHYSICAL THERAPY | Facility: CLINIC | Age: 83
End: 2023-12-11
Payer: MEDICARE

## 2023-12-13 ENCOUNTER — TREATMENT (OUTPATIENT)
Dept: PHYSICAL THERAPY | Facility: CLINIC | Age: 83
End: 2023-12-13
Payer: MEDICARE

## 2023-12-13 DIAGNOSIS — M48.062 SPINAL STENOSIS, LUMBAR REGION, WITH NEUROGENIC CLAUDICATION: ICD-10-CM

## 2023-12-13 PROCEDURE — 97110 THERAPEUTIC EXERCISES: CPT | Mod: GP

## 2023-12-13 NOTE — PROGRESS NOTES
Physical Therapy  Physical Therapy Progress Note    Patient Name Dragan Calvin   MRN: [id number]  Today's Date: 12/13/23  Time Calculation  Start Time: 1000  Stop Time: 1045  Time Calculation (min): 45 min    Insurance:    (per information provided by  pre-cert team)  Visit #: 5  Approval required:  N  Approval/certification dates:  11/22/23 to 2/20/24  Therapy Diagnoses:   1. Spinal stenosis, lumbar region, with neurogenic claudication  Follow Up In Physical Therapy          General:  Reason for visit: back and L. Leg pain   Referred by: Dr. Carlos Patiño MD appt:  as needed  Preferred Name:  Carlos  Script:  Eval and Treat  Onset Date:  1//22/21  Assessment:  Patient tolerated treatment: well.  The pt continues to respond best to seated and standing exercises. The pt demonstrated signs or improved LE strength.  He continues to have difficulty with dorsiflexion strength during ambulation and required cuing for heel to toe gait.     Patient needs continued work on gait,strength,balance and spinal flexion /skilled PT for: progression in  exercise and  to address remaining functional, objective and subjective deficits to allow them to return to prior /optimal level of function with ADLs.  Patient is progressing with goals: yes  Skilled care:  ex progression, NMR, pt education    Plan:         Continue to progress per poc:   NV add standing balance and core/LE strengthening exercises as tolerated.    Subjective:   Patient reports:  he had increased pain on Monday, 7/10, today it feels a little better, at 5/10.  The pain is in the posterior thigh.     Have you fallen since last visit:  no    Precautions: moderate fall risk  Pacemaker   PMHx: H/o skin cancer face and shoulder, A-fib; Diabetes; Neuropathy, OA **PACEMAKER**            Sx: appendectomy, gall bladder removal  Medications for pain: gabapentin   Diagnostic Tests: CATscan  pain:  5/10  Location/Type of pain:  L. LE and lumbar     HEP compliance/understanding:   "Y      Objective Measurements:    Strength: quad strength increased to 4/5    Treatment:   **= HEP  NV= Next visit  np= not performed  nb= non-billable  G= group HEP= discharged to HEP      Therapeutic Exercise:     45 minutes    Nustep L3 x 8'  Seated hamstring stretch on 8\" box with green strap and manual assist **  Seated gastroc stretch with strap: 15\" x 3 each **  Standing hamstring stretch: 15\" x 3 each  @  seated B HR/TR 10 x 2 **  @ kandi bar mini marching 2 x 10 NP  @ kandi bar hip abduction x 10 marianne NP  @ kandi bar hip extension x 10 marianne NP  Seated isometric hip adduction 5\" x 10  Seated BTB hip abduction 2 x 10 NP  Seated GTB 3 level rows H/M/L 3 x 10 each NP  Seated DLS ext green 15x NP  Seated DLS lateral pulls green 10x NP  Seated LAQ  4#   10 x 2  Ham sets: 5\" x 10 each **  Seated ham curl green 15x 2 NP  Seated march 4# each ankle 20x- NP    Seated ball roll in flexion blue 10x for/side NP  Seated ball roll side to side 10** NP  Sit to stand 2 airex on chair 5x2 no hands -    Gait with WW focus on heel to toe 120 ft    Education:  ex progression  of POC  HEP Progression:    Access Code: [email or web address]  URL: [email or web address]  Date: 12/13/2023  Prepared by: Brea Lomax    Exercises  - Seated Hamstring Stretch  - 1-2 x daily - 7 x weekly - 3 sets - 20-30 hold  - Seated Gastroc Stretch with Strap  - 1-2 x daily - 7 x weekly - 3 sets - 20-30 hold  - Seated Ankle Dorsiflexion AROM  - 1-2 x daily - 7 x weekly - 1-2 sets - 10 reps  - Seated Heel Raise  - 1-2 x daily - 7 x weekly - 1-2 sets - 10 reps - 5 hold  - Seated Long Arc Quad with Hip Adduction  - 1-2 sets - 10 reps - 2 hold  - Seated Hamstring Set  - 1-2 sets - 10 reps - 5 hold  - Seated Hip Adduction Squeeze with Ball  - 1-2 sets - 10 reps - 5 hold  - Sit to Stand with Counter Support  - 1 x daily - 7 x weekly - 1-2 sets - 5-10 reps  "

## 2023-12-19 ENCOUNTER — TREATMENT (OUTPATIENT)
Dept: PHYSICAL THERAPY | Facility: CLINIC | Age: 83
End: 2023-12-19
Payer: MEDICARE

## 2023-12-19 DIAGNOSIS — M48.062 SPINAL STENOSIS OF LUMBAR REGION WITH NEUROGENIC CLAUDICATION: Primary | ICD-10-CM

## 2023-12-19 DIAGNOSIS — I49.5 SICK SINUS SYNDROME (MULTI): Chronic | ICD-10-CM

## 2023-12-19 DIAGNOSIS — M48.062 SPINAL STENOSIS, LUMBAR REGION, WITH NEUROGENIC CLAUDICATION: ICD-10-CM

## 2023-12-19 PROCEDURE — 97110 THERAPEUTIC EXERCISES: CPT | Mod: GP,CQ

## 2023-12-19 NOTE — PROGRESS NOTES
Physical Therapy  Physical Therapy Progress Note    Patient Name Dragan Calvin   MRN: [id number]  Today's Date: 12/19/23  Time Calculation  Start Time: 1045  Stop Time: 1130  Time Calculation (min): 45 min    Insurance:    (per information provided by  pre-cert team)  Visit #: 6  Approval required:  N  Approval/certification dates:  11/22/23 to 2/20/24  Therapy Diagnoses:   1. Spinal stenosis of lumbar region with neurogenic claudication        2. Spinal stenosis, lumbar region, with neurogenic claudication  Follow Up In Physical Therapy          General:  Reason for visit: back and L. Leg pain   Referred by: Dr. Carlos Patiño MD appt:  as needed  Preferred Name:  Carlos  Script:  Eval and Treat  Onset Date:  1//22/21  Assessment:  Patient tolerated treatment: well, addressed function in transfers from sit to supine and supine to sit. Required minimal assist and cueing in correct mechanics  Patient needs continued work on strength, stability and mobility with transfers skilled PT for: gradual progression in ex. And  to address remaining functional, objective and subjective deficits to allow them to return to prior /optimal level of function with ADLs.  Patient is progressing with goals: yes   Skilled care:   progression in ex and education in correct transfers    Plan:         Continue to progress per poc:   NV add walking at kandi bar with single hand support     Subjective:   Patient reports:  he is doing well with his HEP and feeling stronger in the legs    Have you fallen since last visit:  no    Precautions: moderate fall risk  Pacemaker   PMHx: H/o skin cancer face and shoulder, A-fib; Diabetes; Neuropathy, OA **PACEMAKER**            Sx: appendectomy, gall bladder removal  Medications for pain: gabapentin   Diagnostic Tests: CATscan  pain:  5/10  Location/Type of pain:  L. LE and lumbar     HEP compliance/understanding:  Y  Pain:  5/10  Location/Type of pain:  LB    HEP compliance/understanding:   "yes    Objective:   Objective Measurements:    Function:   able to walk in bathroom without device.sit to stand off 24 '' box no hands and good control  Posture: forward posture . Transferred from sit to supine with light contact guard .  Gait: continues with wheel walker , but was able to ambulate with less pressure from hand support   Balance: patient did well with standing DLS but supervised for safety and advised seated at home     Strength:increase Tband with DLS  Flexibility:  great range at hip on right then left with seated flexion and supine NTC  Treatment:   **= HEP  NV= Next visit  np= not performed  nb= non-billable  G= group HEP= discharged to HEP      Therapeutic Exercise:     45 minutes    Nustep L3 x 8'  Seated hamstring stretch on 8\" box with green strap and manual assist **  Seated gastroc stretch with strap: 15\" x 3 each **  Standing hamstring stretch: 15\" x 3 each  @  seated B HR/TR 10 x 2 **  @ per bar mini marching 2 x 10   @ per bar hip abduction x 10 marianne   @ per bar hip extension x 10 marianne   Pre barr step thru 30sec each  Seated isometric hip adduction 5\" x 10  Seated BTB hip abduction 2 x 10 NP  Standing parallel bars DLS ex/flex rows  blue 15x each  Seated DLS lateral pulls green 10x NP  Seated LAQ  4#   15 x 2  Ham sets: 5\" x 10 each **  Seated ham curl  blue  15x 2   Seated march 4# each ankle 20x- NP     Seated ball roll in flexion blue 10x for/side NP  Seated ball roll side to side 10** NP  Sit to stand  24\"\" box on chair 10x2 no hands -     Gait with WW focus on heel to toe 120 ft        Education:  instruction in transfers  HEP Progression:  step thru   "

## 2023-12-21 ENCOUNTER — TREATMENT (OUTPATIENT)
Dept: PHYSICAL THERAPY | Facility: CLINIC | Age: 83
End: 2023-12-21
Payer: MEDICARE

## 2023-12-21 DIAGNOSIS — M48.062 SPINAL STENOSIS OF LUMBAR REGION WITH NEUROGENIC CLAUDICATION: Primary | ICD-10-CM

## 2023-12-21 DIAGNOSIS — M48.062 SPINAL STENOSIS, LUMBAR REGION, WITH NEUROGENIC CLAUDICATION: ICD-10-CM

## 2023-12-21 PROCEDURE — 97110 THERAPEUTIC EXERCISES: CPT | Mod: GP,CQ

## 2023-12-21 NOTE — PROGRESS NOTES
Physical Therapy  Physical Therapy Progress Note    Patient Name Dragan Calvin   MRN: [id number]  Today's Date: 12/21/23  Time Calculation  Start Time: 1130  Stop Time: 1210  Time Calculation (min): 40 min    Insurance:    (per information provided by  pre-cert team)  Visit #: 7  Approval required:  N  Approval/certification dates:  11/22/23 to 2/20/24  Therapy Diagnoses:   1. Spinal stenosis of lumbar region with neurogenic claudication        2. Spinal stenosis, lumbar region, with neurogenic claudication  Follow Up In Physical Therapy            General:  Reason for visit: back and L. Leg pain   Referred by: Dr. Carlos Patiño MD appt:  as needed  Preferred Name:  Carlos  Script:  Eval and Treat  Onset Date:  1//22/21  Assessment:  Patient tolerated treatment: well, modified program second to reporting muscle fatigue from closed chain last session.  Patient needs continued work on general condition ,mobility, gait and strength/skilled PT for: gradual progression in  exercise and   to address remaining functional, objective and subjective deficits to allow them to return to prior /optimal level of function with ADLs.  Patient is progressing with goals: yes  Skilled care:  ex. modification    Plan:         Continue to progress per poc:   NV check response to added ex.    Subjective:   Patient reports:  he had greater pain in the left thigh yesterday. He feels better when he is stretched and warmed up    Have you fallen since last visit:  no      Have you fallen since last visit:  no     Precautions: moderate fall risk  Pacemaker   PMHx: H/o skin cancer face and shoulder, A-fib; Diabetes; Neuropathy, OA **PACEMAKER**            Sx: appendectomy, gall bladder removal  Medications for pain: gabapentin   Diagnostic Tests: CATscan  pain:  5/10  Location/Type of pain:  L. LE and lumbar     HEP compliance/understanding:  Y  Pain:  5/10  Location/Type of pain:  LB     HEP compliance/understanding:  yes       Objective:  "  Objective Measurements:    Function:   patient is able to sleep at night , less discomfort in the thigh    Gait: forward posture, drags feet with walking on wheel walker   :    Strength: improved with sit to stand with lower table, able to perform bridge  Flexibility:  ham stretch in supine at 75%    Treatment:   **= HEP  NV= Next visit  np= not performed  nb= non-billable  G= group HEP= discharged to HEP      Therapeutic Exercise:     40 minutes    Nustep L3 x 8'  Seated hamstring stretch on 8\" box with green strap and manual assist **  Seated gastroc stretch with strap: 15\" x 3 each **  Supine  hamstring stretch: 15\" x 3 each  Supine SKC 10c  @  seated B HR/TR 10 x 2 **    Seated isometric hip adduction 5\" x 10  Seated BTB hip abduction 2 x 10 NP  Standing parallel bars DLS ex/flex     Seated DLS lateral pulls green 10x  Seated LAQ  4#   15 x 2  Ham sets: 5\" x 10 each **  Seated ham curl  blue  15x 2   Seated march 4# each ankle 20x-      Seated ball roll in flexion blue 10x for/side NP  Supine LTR 10x  Supine bridge 5x   Hooklying arm lift then leg ,and arm and leg each 10x  Sit to stand  20\"\" box r 10x2 no hands -                           Education:  ex progression with POC  HEP Progression:  hooklying arm/leg lift  "

## 2023-12-22 LAB
Q ONSET: 226 MS
QRS COUNT: 17 BEATS
QRS DURATION: 92 MS
QT INTERVAL: 356 MS
QTC CALCULATION(BAZETT): 459 MS
QTC FREDERICIA: 422 MS
R AXIS: 62 DEGREES
T AXIS: 61 DEGREES
T OFFSET: 404 MS
VENTRICULAR RATE: 100 BPM

## 2023-12-26 ENCOUNTER — APPOINTMENT (OUTPATIENT)
Dept: RADIOLOGY | Facility: HOSPITAL | Age: 83
End: 2023-12-26
Payer: MEDICARE

## 2023-12-26 ENCOUNTER — HOSPITAL ENCOUNTER (EMERGENCY)
Facility: HOSPITAL | Age: 83
Discharge: HOME | End: 2023-12-26
Attending: STUDENT IN AN ORGANIZED HEALTH CARE EDUCATION/TRAINING PROGRAM
Payer: MEDICARE

## 2023-12-26 VITALS
RESPIRATION RATE: 18 BRPM | DIASTOLIC BLOOD PRESSURE: 69 MMHG | HEART RATE: 70 BPM | OXYGEN SATURATION: 98 % | WEIGHT: 212 LBS | BODY MASS INDEX: 32.13 KG/M2 | HEIGHT: 68 IN | SYSTOLIC BLOOD PRESSURE: 130 MMHG | TEMPERATURE: 96.8 F
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
## 2023-12-26 DIAGNOSIS — S00.03XA HEMATOMA OF OCCIPITAL REGION OF SCALP: ICD-10-CM

## 2023-12-26 PROCEDURE — 99285 EMERGENCY DEPT VISIT HI MDM: CPT | Performed by: STUDENT IN AN ORGANIZED HEALTH CARE EDUCATION/TRAINING PROGRAM

## 2023-12-26 PROCEDURE — 2500000004 HC RX 250 GENERAL PHARMACY W/ HCPCS (ALT 636 FOR OP/ED): Performed by: STUDENT IN AN ORGANIZED HEALTH CARE EDUCATION/TRAINING PROGRAM

## 2023-12-26 PROCEDURE — 73564 X-RAY EXAM KNEE 4 OR MORE: CPT | Mod: BILATERAL PROCEDURE | Performed by: RADIOLOGY

## 2023-12-26 PROCEDURE — 90715 TDAP VACCINE 7 YRS/> IM: CPT | Performed by: STUDENT IN AN ORGANIZED HEALTH CARE EDUCATION/TRAINING PROGRAM

## 2023-12-26 PROCEDURE — 90471 IMMUNIZATION ADMIN: CPT | Performed by: STUDENT IN AN ORGANIZED HEALTH CARE EDUCATION/TRAINING PROGRAM

## 2023-12-26 PROCEDURE — 70450 CT HEAD/BRAIN W/O DYE: CPT | Performed by: RADIOLOGY

## 2023-12-26 PROCEDURE — 73564 X-RAY EXAM KNEE 4 OR MORE: CPT | Mod: 50,FR

## 2023-12-26 PROCEDURE — 70450 CT HEAD/BRAIN W/O DYE: CPT

## 2023-12-26 PROCEDURE — G0390 TRAUMA RESPONS W/HOSP CRITI: HCPCS

## 2023-12-26 PROCEDURE — 72125 CT NECK SPINE W/O DYE: CPT | Performed by: RADIOLOGY

## 2023-12-26 PROCEDURE — 99285 EMERGENCY DEPT VISIT HI MDM: CPT

## 2023-12-26 PROCEDURE — 72125 CT NECK SPINE W/O DYE: CPT

## 2023-12-26 RX ADMIN — TETANUS TOXOID, REDUCED DIPHTHERIA TOXOID AND ACELLULAR PERTUSSIS VACCINE, ADSORBED 0.5 ML: 5; 2.5; 8; 8; 2.5 SUSPENSION INTRAMUSCULAR at 14:36

## 2023-12-26 ASSESSMENT — PAIN DESCRIPTION - PAIN TYPE: TYPE: ACUTE PAIN

## 2023-12-26 ASSESSMENT — PAIN - FUNCTIONAL ASSESSMENT: PAIN_FUNCTIONAL_ASSESSMENT: 0-10

## 2023-12-26 ASSESSMENT — COLUMBIA-SUICIDE SEVERITY RATING SCALE - C-SSRS
6. HAVE YOU EVER DONE ANYTHING, STARTED TO DO ANYTHING, OR PREPARED TO DO ANYTHING TO END YOUR LIFE?: NO
2. HAVE YOU ACTUALLY HAD ANY THOUGHTS OF KILLING YOURSELF?: NO
1. IN THE PAST MONTH, HAVE YOU WISHED YOU WERE DEAD OR WISHED YOU COULD GO TO SLEEP AND NOT WAKE UP?: NO

## 2023-12-26 ASSESSMENT — PAIN SCALES - GENERAL: PAINLEVEL_OUTOF10: 5 - MODERATE PAIN

## 2023-12-26 ASSESSMENT — PAIN DESCRIPTION - FREQUENCY: FREQUENCY: CONSTANT/CONTINUOUS

## 2023-12-26 ASSESSMENT — PAIN DESCRIPTION - DESCRIPTORS: DESCRIPTORS: THROBBING

## 2023-12-26 ASSESSMENT — PAIN DESCRIPTION - LOCATION: LOCATION: HEAD

## 2023-12-26 NOTE — ED PROVIDER NOTES
HPI   Chief Complaint   Patient presents with    Fall     Pt comes to ED with c/o a fall. Pt states he fell and hit head. Pt denies LOC. Pt positives for blood thinners. Pt states he take Eliquois.       Presents from home after mechanical fall with head trauma while on Eliquis.  Patient states that he normally uses a walker for assistance with ambulation.  States that he was attempting to vacuum when his leg gave out, he fell backwards, did strike his head.  No loss conscious.  Complains of pain to the back of his head, where he struck a TV stand, no other complaints.  Denies vision change, chest pain, shortness of breath, nausea, vomiting, and new focal numbness/weakness.  Does note a history of spinal stenosis and is following with physical therapy; no new bowel/bladder incontinence or saddle paresthesia.      History provided by:  Patient   used: No                        Last Coma Scale Score: 15                  Patient History   Past Medical History:   Diagnosis Date    Deep vein thrombosis (DVT) of lower extremity (CMS/[facility]) 09/17/2023    s/p IVC filter and has post-phlebotic syndrome    History of falling     History of fall    Hyperlipidemia 08/28/2023    Dr. Zee follows    Hypertension, benign 08/23/2017    Leg edema 09/05/2023    Overweight     Overweight    Paroxysmal atrial fibrillation (CMS/HCC) 08/28/2023    PAF. On Eliquis. 7 second pauses therefore PPM placed. Freq Pafib on PPM. Chads 2 vasc 4    Permanent atrial fibrillation (CMS/HCC) 10/17/2023    On Eliquis. 7 second pauses therefore PPM placed. Freq Pafib on PPM. Chads 2 vasc 4    Personal history of other diseases of the digestive system     History of small bowel obstruction    Personal history of other infectious and parasitic diseases     History of herpes zoster    Personal history of other specified conditions     History of bradycardia    Personal history of other specified conditions     History of edema     Personal history of peptic ulcer disease     History of peptic ulcer    Personal history of pneumonia (recurrent)     History of pneumonia    Personal history of urinary calculi     History of kidney stones    Presence of cardiac pacemaker 08/25/2017    10/24/2016: [employer] Advisa MRI DR model #A2DR01    Sick sinus syndrome (CMS/HCC) 09/17/2023    s/p PPM 10/2016     Past Surgical History:   Procedure Laterality Date    APPENDECTOMY  07/25/2018    Appendectomy    CARDIAC PACEMAKER PLACEMENT  04/05/2018    Pacemaker Placement    CHOLECYSTECTOMY  04/05/2018    Cholecystectomy     Family History   Problem Relation Name Age of Onset    Coronary artery disease Father      Heart failure Father      Diabetes Father       Social History     Tobacco Use    Smoking status: Never    Smokeless tobacco: Never   Substance Use Topics    Alcohol use: Never    Drug use: Never       Physical Exam   ED Triage Vitals [12/26/23 1410]   Temp Heart Rate Resp BP   36 °C (96.8 °F) 83 20 112/78      SpO2 Temp src Heart Rate Source Patient Position   98 % -- -- --      BP Location FiO2 (%)     -- --       Physical Exam  Vitals and nursing note reviewed.   HENT:      Head:      Comments: No raccoon eyes.  No Boyer sign.  Approximately 1.5 cm diameter hematoma to the left aspect of the occiput without break in the skin.     Mouth/Throat:      Mouth: Mucous membranes are moist.      Comments: No malocclusion.  No trismus.  Eyes:      Extraocular Movements: Extraocular movements intact.      Conjunctiva/sclera: Conjunctivae normal.      Pupils: Pupils are equal, round, and reactive to light.      Comments: No proptosis.  No pain with extraocular movements.   Neck:      Comments: No C-spine tenderness to palpation.  Cardiovascular:      Rate and Rhythm: Normal rate.      Pulses: Normal pulses.      Comments: Pitting bilateral lower extremity edema.  Pulmonary:      Effort: Pulmonary effort is normal.      Breath sounds: Normal breath sounds.    Abdominal:      Palpations: Abdomen is soft.      Tenderness: There is no abdominal tenderness.   Musculoskeletal:         General: No deformity.      Cervical back: Normal range of motion.      Comments: No spinal tenderness to palpation.  The pelvis is stable.  Bilateral upper extremities: No obvious deformities.  There is a skin tear over the dorsal aspect of the webspace between digits 1 and 2 of the left hand that is not amendable to fixation.  There is no bony tenderness palpation.  Full active range of motion.  2+ radial pulse brisk cap refill all digits.  Sensation intact to light touch in all dermatomal distributions.  Bilateral lower extremities: No obvious deformities.  No bony tenderness to palpation.  Baseline range of motion per patient and wife at bedside.  Neurovascularly intact.  No clonus at the ankles.  No hyperreflexia at the patella or Achilles bilaterally.   Skin:     General: Skin is warm and dry.   Neurological:      Mental Status: He is alert.      Comments: Moving all extremities.  GCS 15.         ED Course & MDM   ED Course as of 12/26/23 1600   Tue Dec 26, 2023   1427 CT head W O contrast trauma protocol  Per my view, there is no obvious intracranial bleed based on axial images.  Remainder of study populating. [AB]   1549 XR knee 4+ views bilateral  Per my view, no obvious plateau fracture.  There are findings consistent with degenerative disease. [AB]      ED Course User Index  [AB] Reji Borden MD         Diagnoses as of 12/26/23 1600   Fall, initial encounter   Hematoma of occipital region of scalp       Medical Decision Making  Presents waiting room as a HIA.    Well-appearing.  Hemodynamically stable.  Primary survey intact.    No acute traumatic findings on imaging.    Able to ambulate with his baseline walker.    Discharged with return precautions.  Patient and wife at bedside feel comfortable with this plan.    Amount and/or Complexity of Data Reviewed  Independent  Historian: spouse  Radiology: ordered and independent interpretation performed. Decision-making details documented in ED Course.        Procedure  Critical Care    Performed by: Reji Borden MD  Authorized by: Reji Borden MD    Critical care provider statement:     Critical care time (minutes):  15    Critical care time was exclusive of:  Separately billable procedures and treating other patients    Critical care was necessary to treat or prevent imminent or life-threatening deterioration of the following conditions:  Trauma    Critical care was time spent personally by me on the following activities:  Development of treatment plan with patient or surrogate, obtaining history from patient or surrogate, ordering and review of radiographic studies, pulse oximetry and re-evaluation of patient's condition    I assumed direction of critical care for this patient from another provider in my specialty: no      Care discussed with comment:  Trauma discharged to home       Reji Borden MD  12/26/23 1600

## 2023-12-26 NOTE — PROGRESS NOTES
Pharmacy Medication History Review    Dragan Calvin is a 83 y.o. male admitted for No Principal Problem: There is no principal problem currently on the Problem List. Please update the Problem List and refresh.. Pharmacy reviewed the patient's [id number] medications and allergies for accuracy.    The list below reflectives the updated PTA list. Please review each medication in order reconciliation for additional clarification and justification.  (Not in a hospital admission)       The list below reflectives the updated allergy list. Please review each documented allergy for additional clarification and justification.  Allergies  Reviewed by Mary Doshi CPhT on 12/26/2023   No Known Allergies         Below are additional concerns with the patient's PTA list.    See PTA med list    Mary Doshi CPhT

## 2023-12-26 NOTE — ED TRIAGE NOTES
83-year-old gentleman with history of A-fib tripped hitting the back of his head no loss of consciousness he is on Eliquis denies any chest pain or abdominal pain he denies any visual acuity changes, notes some mild tenderness to the back of his head, small hematoma.  Denies any upper/lower extremity weakness numbness or paralysis he is also complaining of some tenderness to the knees bilaterally        General: Vitals noted, no distress. Afebrile. Alert and oriented  x 3.     EENT: TMs clear. Posterior oropharynx unremarkable. No meningismus. No LAD.     Cardiac: Regular, rate, rhythm, no murmurs rubs or gallops.     Pulmonary: Lungs clear bilaterally with good aeration. No adventitious breath sounds. No wheezes rales or rhonchi.     Abdomen: Soft, nonsurgical. Nontender. No peritoneal signs. Normoactive bowel sounds. No pulsatile masses.     Extremities: No peripheral edema. Negative Homans bilaterally, no cords.    Skin: No rash. Intact.     Neuro: No focal neurologic deficits, NIH score of 0. Cranial nerves normal as tested from II through XII.

## 2023-12-27 ENCOUNTER — TREATMENT (OUTPATIENT)
Dept: PHYSICAL THERAPY | Facility: CLINIC | Age: 83
End: 2023-12-27
Payer: MEDICARE

## 2023-12-27 DIAGNOSIS — M48.062 SPINAL STENOSIS, LUMBAR REGION, WITH NEUROGENIC CLAUDICATION: ICD-10-CM

## 2023-12-27 PROCEDURE — 97110 THERAPEUTIC EXERCISES: CPT | Mod: GP

## 2023-12-27 NOTE — PROGRESS NOTES
Physical Therapy  Physical Therapy Progress Note    Patient Name Dragan Calvin   MRN: [id number]  Today's Date: 12/27/23       Insurance:    (per information provided by  pre-cert team)  Visit #: 7  Approval required:  N  Approval/certification dates:  11/22/23 to 2/20/24  Therapy Diagnoses:   1. Spinal stenosis, lumbar region, with neurogenic claudication  Follow Up In Physical Therapy            General:  Reason for visit: back and L. Leg pain   Referred by: Dr. Carlos Patiño MD appt:  as needed  Preferred Name:  Carlos  Script:  Eval and Treat  Onset Date:  1//22/21  Assessment:  Patient tolerated treatment: well, modified program second to reporting last night fall.  Patient's wife pleasantly surprised about patient's good participation.  Patient needs continued work on general condition ,mobility, gait and strength/skilled PT for: gradual progression in  exercise and   to address remaining functional, objective and subjective deficits to allow them to return to prior /optimal level of function with ADLs.  Patient is progressing with goals: yes  Skilled care:  ex. modification    Plan:         Continue to progress per poc:     Subjective:   Patient reports:  Patient reports he lost his balance yesterday while vacuming, his leg gave out and fell on his right knee which is a good knee,  went to ER.  No fracture, no bruise.  Patient stated he wants to take it easy today.  Last session hamstring stretches made him very sore.    ER REPORT SUMMARY   XR knee 4+ views bilateral  Per my view, no obvious plateau fracture.  There are findings consistent with degenerative disease. [AB]   CT head W O contrast trauma protocol  Per my view, there is no obvious intracranial bleed based on axial images.  Remainder of study populating.     Have you fallen since last visit:  YES    Pecautions: moderate to high fall risk  Pacemaker   PMHx: H/o skin cancer face and shoulder, A-fib; Diabetes; Neuropathy, OA **PACEMAKER**            Sx:  "appendectomy, gall bladder removal  Medications for pain: gabapentin   Diagnostic Tests: CATscan  pain:  5/10  Location/Type of pain:  L. LE and lumbar     HEP compliance/understanding:  Y  Pain:  5/10  Location/Type of pain:  LB     HEP compliance/understanding:  yes       Objective:   Objective Measurements:    Function:   patient is able to sleep at night , less discomfort in the thigh    Gait: forward posture, drags feet with walking on wheel walker   :    Strength: improved with sit to stand with lower table, able to perform bridge  Flexibility:  ham stretch in supine at 75%    Treatment:   **= HEP  NV= Next visit  np= not performed  nb= non-billable  G= group HEP= discharged to [facility]      Therapeutic Exercise:       minutes    Nustep L3 x 8'  Seated hamstring stretch on 8\" box with green strap and manual assist **  Seated gastroc stretch with strap: 15\" x 3 each **  Supine  hamstring stretch: 15\" x 3 each  Supine SKC 10c  @  seated B HR/TR 10 x 2 -NP    Seated isometric hip adduction 5\" x 10  Seated BTB hip abduction 2 x 10 NP  Standing parallel bars DLS ex/flex     Seated DLS lateral pulls green 10x  Seated LAQ  4#   15 x 2  Ham sets: 5\" x 10 each **  Seated ham curl  blue  15x 2   Seated march 4# each ankle 20x-      Seated ball roll in flexion blue 10x for/side NP  Supine LTR 10x NP  Supine bridge 5x NP  Hooklying arm lift then leg ,and arm and leg each 10x NP  Sit to stand  24\" bed 10x2 with hands -                           Education:  ex progression with POC  HEP Progression:  hooklying arm/leg lift  "

## 2023-12-29 ENCOUNTER — TREATMENT (OUTPATIENT)
Dept: PHYSICAL THERAPY | Facility: CLINIC | Age: 83
End: 2023-12-29
Payer: MEDICARE

## 2023-12-29 DIAGNOSIS — M48.062 SPINAL STENOSIS OF LUMBAR REGION WITH NEUROGENIC CLAUDICATION: Primary | ICD-10-CM

## 2023-12-29 DIAGNOSIS — M48.062 SPINAL STENOSIS, LUMBAR REGION, WITH NEUROGENIC CLAUDICATION: ICD-10-CM

## 2023-12-29 PROCEDURE — 97110 THERAPEUTIC EXERCISES: CPT | Mod: GP,CQ

## 2023-12-29 NOTE — PROGRESS NOTES
Physical Therapy  Physical Therapy Progress Note    Patient Name Dragan Calvin   MRN: [id number]  Today's Date: 12/29/23  Time Calculation  Start Time: 0915  Stop Time: 1000  Time Calculation (min): 45 min    Insurance:    per information provided by  pre-cert team)  Visit #: 8  Approval required:  N  Approval/certification dates:  11/22/23 to 2/20/24  Therapy Diagnoses:   1. Spinal stenosis of lumbar region with neurogenic claudication        2. Spinal stenosis, lumbar region, with neurogenic claudication  Follow Up In Physical Therapy          General:  Reason for visit: back and L. Leg pain   Referred by: Dr. Carlos Patiño MD appt:  as needed  Preferred Name:  Carlos  Script:  Eval and Treat  Onset Date:  1//22/21  Assessment:  Patient tolerated treatment: fair, patient was able to tolerate standing closed chain exercise today with contact guard for most. Improved function getting out of chair  Patient needs continued work on/ balance, strength  and skilled PT for: progression in gradual exercise program and  to address remaining functional, objective and subjective deficits to allow them to return to prior /optimal level of function with ADLs.  Patient is progressing with goals: yes  Skilled care:  ex progression     Plan:         Continue to progress per poc:   NV add DLS lateral pulls in standing if able    Subjective:   Patient reports:  some discomfort in the knees. He spent 45 min doing his home program yesterday.    Have you fallen since last visit:  yes, he had to go to the ER because he hit his head. Reports he was cleared and able to return to therapy. States only his knees hurt now.  Patient tripped when cleaning house         Pecautions: moderate to high fall risk  Pacemaker   PMHx: H/o skin cancer face and shoulder, A-fib; Diabetes; Neuropathy, OA **PACEMAKER**            Sx: appendectomy, gall bladder removal  Medications for pain: gabapentin   Diagnostic Tests: CATscan  pain:  5/10  Location/Type of  "pain:  L. LE and lumbar  Pain:  3/10  Location/Type of pain:  both knees    HEP compliance/understanding:  yes    Objective:   Objective Measurements:    Function:   sit to stand with less effort and improved mechanics   Posture: forward posture on walker, cueing to improve stance  Gait: wheel walker  tolerates only small stores , unable to tolerate larger stores  and uses scooter    Treatment:   **= HEP  NV= Next visit  np= not performed  nb= non-billable  G= group HEP= discharged to [facility]      Therapeutic Exercise:     45 minutes   L3 x 8'  Standing DLS blue 15x   Standing rows blue 20x  Step ups 4'' 15 x each bilt hand support   Standing hip abd 15x each  Standing side stepping 3 sets    Seated isometric hip adduction 5\" x 10  Seated BTB hip abduction 2 x 10   Standing parallel bars DLS ex/flex       Seated LAQ  4#   15 x 2  Ham sets: 5\" x 10 each **  Seated ham curl  blue  15x 2   Seated march 4# each ankle 20x-      Seated ball roll in flexion blue 10x for/side     Sit to stand  24\" bed 10x2 with hands -                                 S        Education:  instruction in correct ex performance  HEP Progression:  n/a  "

## 2024-01-03 ENCOUNTER — TREATMENT (OUTPATIENT)
Dept: PHYSICAL THERAPY | Facility: CLINIC | Age: 84
End: 2024-01-03
Payer: MEDICARE

## 2024-01-03 DIAGNOSIS — M48.062 SPINAL STENOSIS, LUMBAR REGION, WITH NEUROGENIC CLAUDICATION: Primary | ICD-10-CM

## 2024-01-03 PROCEDURE — 97110 THERAPEUTIC EXERCISES: CPT | Mod: GP

## 2024-01-03 PROCEDURE — 97112 NEUROMUSCULAR REEDUCATION: CPT | Mod: GP

## 2024-01-03 ASSESSMENT — BALANCE ASSESSMENTS
STANDING UNSUPPORTED WITH FEET TOGETHER: ABLE TO PLACE FEET TOGETHER INDEPENDENTLY BUT UNABLE TO HOLD FOR 30 SECONDS
STANDING TO SITTING: CONTROLS DESCENT BY USING HANDS
STANDING UNSUPPORTED: ABLE TO STAND SAFELY FOR 2 MINUTES
PICK UP OBJECT FROM THE FLOOR FROM A STANDING POSITION: UNABLE TO PICK UP BUT REACHES 2-5 CM (1-2 INCHES) FROM SLIPPER AND KEEPS BALANCE INDEPENDENTLY
STANDING TO SITTING: ABLE TO STAND INDEPENDENTLY USING HANDS
REACHING FORWARD WITH OUTSTRETCHED ARM WHILE STANDING: CAN REACH FORWARD 12 CM (5 INCHES)
STANDING UNSUPPORTED WITH EYES CLOSED: ABLE TO STAND 10 SECONDS SAFELY
TURN 360 DEGREES: NEEDS ASSISTANCE WHILE TURNING
SITTING WITH BACK UNSUPPORTED BUT FEET SUPPORTED ON FLOOR OR ON A STOOL: ABLE TO SIT SAFELY AND SECURELY FOR 2 MINUTES
PLACE ALTERNATE FOOT ON STEP OR STOOL WHILE STANDING UNSUPPORTED: NEEDS ASSISTANCE TO KEEP FROM FALLING/UNABLE TO TRY
STANDING UNSUPPORTED ONE FOOT IN FRONT: LOSES BALANCE WHILE STEPPING OR STANDING
TRANSFERS: NEEDS ONE PERSON TO ASSIST
PLACE ALTERNATE FOOT ON STEP OR STOOL WHILE STANDING UNSUPPORTED: TURNS SIDEWAYS ONLY BUT MAINTAINS BALANCE
STANDING ON ONE LEG: UNABLE TO TRY NEEDS ASSIST TO PREVENT FALL
LONG VERSION TOTAL SCORE (MAX 56): 28

## 2024-01-03 NOTE — PROGRESS NOTES
Physical Therapy  Physical Therapy Progress Note    Patient Name Dragan Calvin   MRN: [id number]  Today's Date: 01/03/24  Time Calculation  Start Time: 1045  Stop Time: 1130  Time Calculation (min): 45 min    Insurance:    per information provided by  pre-cert team)  Visit #: 10  Approval required:  N  Approval/certification dates:  11/22/23 to 2/20/24  Therapy Diagnoses:   1. Spinal stenosis, lumbar region, with neurogenic claudication  Follow Up In Physical Therapy          General:  Reason for visit: back and L. Leg pain   Referred by: Dr. Carlos Patiño MD appt:  as needed  Preferred Name:  Cralos  Script:  Eval and Treat  Onset Date:  1//22/21  Assessment:  The pt has made progress towards goals (see objective section) including improved strength, transfers, and improved TUG time.  The pt continues to have weakness in the dorsiflexors, with foot drop.  He was informed that AFOs would be beneficial, but he stated that he would like to think about whether or not he would like to pursue getting those.  The pt would benefit from continuing with PT to improve strength and safety/ (I) with functional activities including transfers, gait and stair negotiation.  Patient is progressing with goals: ROM, strength, transfers, gait, balance, stair negotiation, pain reduction  Skilled care:  therapeutic exercise, NMR, gait training, pt education, re-evaluation    Goals:  STG: Pt (I) with initial HEP; By week 3; Goal MET     LTG Goals: 1/3/24  By week: 8-12+     Pain: Decrease back pain to 3/10 or < ; Goal PROGRESSING     Posture: pt to demonstrate postural and body mechanics awareness ; Goal PROGRESSING     Gait:  Pt to ambulate (I) with appropriate assistive device on even surfaces,  shorter community distances, without limitation from pain and with improved heel to toe gait as able, TUG = 20 seconds or < with WW ;  Goal PROGRESSING     Stairs: Pt to tolerate step ups x 5 with L LE with B UE support; Goal    PROGRESSING    - Labs today  - Try to keep hands warm  - Follow up in 3 months "  Balance: Donaldson = 37/56 or > ; SLS = 10\" with 2 finger support ; Tandum = 10 seconds with 2 finger support  ; Goal PROGRESSING     ROM: Increase lumbar ROM to WFL without limitation from pain. ;  Goal PROGRESSING     Strength: Increase core and B LE strength to 4/5 or > grossly for improved tolerance for functional activities. ; Goal PROGRESSING     Flexibility: Improve flexibility of B LEs to WFL  ; Goal N/A     Transfers: STSx5 18 seconds or < ; Goal PROGRESSING     HEP: Pt to be (I) with HEP  ; Goal PROGRESSING     Outcome Measures: LEFS 45/80 or > Goal DECLINED    Plan:    Continue 1-2x/wk for 3-5 more weeks, then re-evaluate progress    Continue to progress per poc: focus on functional strength and balance, focus on gait, transfers and stair negotiation.       Subjective:   Patient reports:  His L leg pain is 4/10 in the mornings and then it improves as he starts moving around.     Have you fallen since last visit:  no         Pecautions: moderate to high fall risk  Pacemaker   PMHx: H/o skin cancer face and shoulder, A-fib; Diabetes; Neuropathy, OA **PACEMAKER**            Sx: appendectomy, gall bladder removal  Medications for pain: gabapentin   Diagnostic Tests: CATscan  pain:  5/10  Location/Type of pain:  L. LE and lumbar  Pain:  3/10  Location/Type of pain:  both knees    HEP compliance/understanding:  yes    Objective:   Objective Measurements:    Pain:           Back pain average 5-6/10 ; Average max of 4 in morning     Posture:           The pt had rounded shoulders, forward head and decreased lumbar lordosis. The pt has decreased postural and body mechanics awareness. ;  The pt has improved postural awareness in standing and sitting.     Gait:            The pt is (I) with a WW on even surfaces, shorter distances.  He walks in a forward flexed posture,  TUG #1 31.7 seconds, #2 25.4 seconds ; TUG #1 30.5 seconds, #2 27.5 seconds     Stairs:           The pt is (I) with step to step with 2 railings   " "  Balance:           Donaldson/56; SLS unable, Tandum unable ;   Donaldson  ; Tandum unable, SLS unable     ROM:           Lumbar flexion 50% ; 8\" fingers from floor                        extension n/a %                        SB R n/a\", L n/a \" fingers from floor ; R 23.5\", L 22.5\"                        Rotation R n/a % , L n/a %      Strength:           Abdominals 3/5 ; 3+          Back extensors 3/5 ; 3+          The pt was able to toe and heel walk -- no ; no          Hip flexors R/L 4/4- ; 4+/4-          Hip abductors R/L 4-/3+ ; 4/3+          Hip adductors R/L 4+/4 ; 4+/4          Hip extensors R/L 3/3 ; 3+/3+          Quads R/L 4+/4- ; 5-/4          Hams R/L 4+/4 ; 5-/4+          DF R/L 2/1 ; 2/          PF R/L 4/4- ; 4/4-          Great toe extensors R/L 2/1 ; 2/     Flexibility:           Hamstrings R mod, L mod-max; n/a          Gluteals R min , L mod restriction ; n/a          Piriformis R min-mod , L mod restriction ; n/a     Transfers:            Sit to stand needs armrests, STS x 5 with armrests 25\" ; STS x 5 with armrests #1 23.7 seconds, #2 20.5 seconds          Supine to sit n/a, pt reports pain in supine and extreme difficulty getting up, requiring assistance; n/a     Palpation:           Denies Tenderness with palpation     Outcome Measurement:           LEFS  ;     Treatment:   **= HEP  NV= Next visit  np= not performed  nb= non-billable  G= group HEP= discharged to HEP      Therapeutic Exercise:     30 minutes     Re-evaluation  Nu-step L3 x 7' subjective taken   Heel raises: 10 x 2  Standing hip abduction: 10 x 1 each  Standing marching: 10 x 2  Sit to stand: 5 x 3  Step ups: 6\" x 10 each    Neuromuscular Re-education:              15         minutes  Re-evaluation balance activities   Education:    Pt ed on progression towards goals and POC  HEP Progression:  n/a  "

## 2024-01-08 ENCOUNTER — TREATMENT (OUTPATIENT)
Dept: PHYSICAL THERAPY | Facility: CLINIC | Age: 84
End: 2024-01-08
Payer: MEDICARE

## 2024-01-08 DIAGNOSIS — M48.062 SPINAL STENOSIS, LUMBAR REGION, WITH NEUROGENIC CLAUDICATION: ICD-10-CM

## 2024-01-08 PROCEDURE — 97110 THERAPEUTIC EXERCISES: CPT | Mod: GP

## 2024-01-08 PROCEDURE — 97535 SELF CARE MNGMENT TRAINING: CPT | Mod: GP

## 2024-01-08 NOTE — PROGRESS NOTES
Physical Therapy  Physical Therapy Progress Note    Patient Name Dragan Calvin   MRN: [id number]  Today's Date: 01/08/24  Time Calculation  Start Time: 1005  Stop Time: 1100  Time Calculation (min): 55 min    Insurance:    per information provided by  pre-cert team)  Visit #: 11  Approval required:  N  Approval/certification dates:  11/22/23 to 2/20/24  Therapy Diagnoses:   1. Spinal stenosis, lumbar region, with neurogenic claudication  Follow Up In Physical Therapy          General:  Reason for visit: back and L. Leg pain   Referred by: Dr. Carlos Patiño MD appt:  as needed  Preferred Name:  Carlos  Script:  Eval and Treat  Onset Date:  1//22/21  Assessment:  The pt tolerated today's session well.  His hamstring pain was at 5/10 at the end of the session. He reported understanding of the HEP instructions.   Patient is progressing with goals: ROM, strength, transfers, gait, balance, stair negotiation, pain reduction  Skilled care:  therapeutic exercise, NMR, gait training, pt education, re-evaluation      Plan:   Continue to progress per poc: focus on functional strength and balance, focus on gait, transfers and stair negotiation.   Look into AFOs for the patient if the patient would like to pursue this.       Subjective:   Patient reports:  He has had more pain the last few days.  It has gone up to 7/10, at this time it is 5/10.  Discussed AFOs with the patient.  He initially said he was not interested in them, but after further discussion and education on AFOs he said he would like to consider them.  He will follow up next visit on this.     Have you fallen since last visit:  no         Precautions: moderate to high fall risk  Pacemaker   PMHx: H/o skin cancer face and shoulder, A-fib; Diabetes; Neuropathy, OA **PACEMAKER**            Sx: appendectomy, gall bladder removal  Medications for pain: gabapentin   Diagnostic Tests: CATscan  pain:  5/10  Location/Type of pain:  L. Hamstring region      HEP  "compliance/understanding:  yes    Objective:   Objective Measurements:       Treatment:   **= HEP  NV= Next visit  np= not performed  nb= non-billable  G= group HEP= discharged to HEP      Therapeutic Exercise:     40 minutes       Nu-step L3 x 7' subjective taken   Heel raises: 10 x 2   Standing hip abduction: 10 x 2 each  Standing marching: 10 x 2  Standing gastroc stretch: 20\" x 2 each  Seated hamstring stretch: 20\" x 3 each  Seated gastroc stretch with strap: 20\" x 3 each  Seated dorsiflexion: 10 x 2 **  Sit to stand: 5 x 3 with armrests **     Neuromuscular Re-education:             5            minutes  Standing balance with eyes closed: 15 seconds x 3     Patient Education                        10             minutes  Pt education on AFO benefits, diagnosis, anatomy    Education:    Pt ed on progression towards goals and POC  HEP Progression:    Access Code: [email or web address]  URL: [email or web address]  Date: 01/08/2024  Prepared by: Brea Lomax    Exercises  - Seated Ankle Dorsiflexion AROM  - 2-4 x daily - 7 x weekly - 3 sets - 10 reps - 2 hold  - Sit to Stand with Counter Support  - 1-2 x daily - 7 x weekly - 1-3 sets - 5-10 reps  "

## 2024-01-10 DIAGNOSIS — K21.9 GASTROESOPHAGEAL REFLUX DISEASE WITHOUT ESOPHAGITIS: ICD-10-CM

## 2024-01-10 DIAGNOSIS — E78.5 HYPERLIPIDEMIA, UNSPECIFIED HYPERLIPIDEMIA TYPE: ICD-10-CM

## 2024-01-11 RX ORDER — ROSUVASTATIN CALCIUM 5 MG/1
5 TABLET, COATED ORAL DAILY
Qty: 90 TABLET | Refills: 1 | Status: SHIPPED | OUTPATIENT
Start: 2024-01-11 | End: 2024-04-23 | Stop reason: SDUPTHER

## 2024-01-11 RX ORDER — RABEPRAZOLE SODIUM 20 MG/1
20 TABLET, DELAYED RELEASE ORAL DAILY
Qty: 90 TABLET | Refills: 1 | Status: SHIPPED | OUTPATIENT
Start: 2024-01-11

## 2024-01-15 ENCOUNTER — TREATMENT (OUTPATIENT)
Dept: PHYSICAL THERAPY | Facility: CLINIC | Age: 84
End: 2024-01-15
Payer: MEDICARE

## 2024-01-15 DIAGNOSIS — M48.062 SPINAL STENOSIS, LUMBAR REGION, WITH NEUROGENIC CLAUDICATION: ICD-10-CM

## 2024-01-15 PROCEDURE — 97110 THERAPEUTIC EXERCISES: CPT | Mod: GP

## 2024-01-15 PROCEDURE — 97112 NEUROMUSCULAR REEDUCATION: CPT | Mod: GP

## 2024-01-15 NOTE — PROGRESS NOTES
Physical Therapy  Physical Therapy Progress Note    Patient Name Dragan Calvin   MRN: [id number]  Today's Date: 01/15/24  Time Calculation  Start Time: 1000  Stop Time: 1053  Time Calculation (min): 53 min    Insurance:    per information provided by  pre-cert team)  Visit #: 12  Approval required:  N  Approval/certification dates:  11/22/23 to 2/20/24  Therapy Diagnoses:   1. Spinal stenosis, lumbar region, with neurogenic claudication  Follow Up In Physical Therapy          General:  Reason for visit: back and L. Leg pain   Referred by: Dr. Carlos Patiño MD appt:  as needed  Preferred Name:  Carlos  Script:  Eval and Treat  Onset Date:  1//22/21  Assessment:  The pt tolerated today's session well.  He demonstrated improved strength with sit to stand transfers and seated and standing exercises. He is still too weak to step up with the L LE first.  He also has difficulty/weakness with seated and standing marching on the L.   Patient is progressing with goals: ROM, strength, transfers, gait, balance, stair negotiation, pain reduction  Skilled care:  therapeutic exercise, NMR, gait training, pt education      Plan:   Continue to progress per poc: focus on functional strength and balance, focus on gait, transfers and stair negotiation.   Pt to see Dr Gonzalez 1/18/24 concerning AFOs      Subjective:   Patient reports:  his pain is about the same, mainly in the L thigh.  He reports he would like to pursue getting AFO and is planning on making an appointment with Dr Gonzalez to begin that process.     Have you fallen since last visit:  no    Precautions: moderate to high fall risk  Pacemaker   PMHx: H/o skin cancer face and shoulder, A-fib; Diabetes; Neuropathy, OA **PACEMAKER**            Sx: appendectomy, gall bladder removal  Medications for pain: gabapentin   Diagnostic Tests: CATscan  pain:  4/10  Location/Type of pain:  L. Hamstring region    HEP compliance/understanding:  yes    Objective:   Objective Measurements:      "  Treatment:   **= HEP  NV= Next visit  np= not performed  nb= non-billable  G= group HEP= discharged to HEP      Therapeutic Exercise:     45 minutes       Nu-step L3 x 7' subjective taken   Heel raises: 10 x 2   Standing hip abduction: 10 x 2 each  Standing marching: 10 x 2  Standing ham curls 10 x 2 each  Step ups: 6\" x 10 R only  Seated trunk flexion: 2\" x 10 **  Seated hamstring stretch: 20\" x 3 each  Seated gastroc stretch with strap: 20\" x 3 each  Seated dorsiflexion: 10 x 3 **  LAQ: 4# 10 x 2 each  Ham curls: green 10 x 2   Seated hip abduction: green 10 x 2 **  Seated hip add (ball): 5\" x 10    Sit to stand: 10 x 1with armrests **     Neuromuscular Re-education:             5            minutes  Standing balance with eyes closed: 15 seconds x 3   Modified tandum balance: 30\" x 2 each    Patient Education                                    minutes      Education:    Pt ed on progression towards goals and POC  HEP Progression:    Access Code: [email or web address]  URL: [email or web address]  Date: 01/15/2024  Prepared by: Brea Lomax    Exercises  - Seated Lumbar Flexion Stretch  - 3-5 x daily - 7 x weekly - 1-2 sets - 10 reps - 3-5 hold  - Seated Hip Abduction with Resistance  - 1-2 x daily - 7 x weekly - 2-3 sets - 10 reps  "

## 2024-01-17 ENCOUNTER — HOSPITAL ENCOUNTER (OUTPATIENT)
Dept: CARDIOLOGY | Facility: CLINIC | Age: 84
Discharge: HOME | End: 2024-01-17
Payer: MEDICARE

## 2024-01-17 DIAGNOSIS — I49.5 SICK SINUS SYNDROME (MULTI): Chronic | ICD-10-CM

## 2024-01-17 DIAGNOSIS — Z95.0 PRESENCE OF CARDIAC PACEMAKER: ICD-10-CM

## 2024-01-17 PROCEDURE — 93296 REM INTERROG EVL PM/IDS: CPT

## 2024-01-17 PROCEDURE — 93294 REM INTERROG EVL PM/LDLS PM: CPT | Performed by: INTERNAL MEDICINE

## 2024-01-18 ENCOUNTER — OFFICE VISIT (OUTPATIENT)
Dept: PAIN MEDICINE | Facility: CLINIC | Age: 84
End: 2024-01-18
Payer: MEDICARE

## 2024-01-18 VITALS
DIASTOLIC BLOOD PRESSURE: 50 MMHG | BODY MASS INDEX: 32.13 KG/M2 | HEIGHT: 68 IN | SYSTOLIC BLOOD PRESSURE: 94 MMHG | HEART RATE: 86 BPM | WEIGHT: 212 LBS | TEMPERATURE: 96.6 F

## 2024-01-18 DIAGNOSIS — M48.062 SPINAL STENOSIS OF LUMBAR REGION WITH NEUROGENIC CLAUDICATION: Primary | ICD-10-CM

## 2024-01-18 DIAGNOSIS — M21.372 BILATERAL FOOT-DROP: ICD-10-CM

## 2024-01-18 DIAGNOSIS — M21.371 BILATERAL FOOT-DROP: ICD-10-CM

## 2024-01-18 PROCEDURE — 3078F DIAST BP <80 MM HG: CPT | Performed by: ANESTHESIOLOGY

## 2024-01-18 PROCEDURE — 3074F SYST BP LT 130 MM HG: CPT | Performed by: ANESTHESIOLOGY

## 2024-01-18 PROCEDURE — 1036F TOBACCO NON-USER: CPT | Performed by: ANESTHESIOLOGY

## 2024-01-18 PROCEDURE — 99212 OFFICE O/P EST SF 10 MIN: CPT | Performed by: ANESTHESIOLOGY

## 2024-01-18 PROCEDURE — 1160F RVW MEDS BY RX/DR IN RCRD: CPT | Performed by: ANESTHESIOLOGY

## 2024-01-18 PROCEDURE — 1159F MED LIST DOCD IN RCRD: CPT | Performed by: ANESTHESIOLOGY

## 2024-01-18 PROCEDURE — 1125F AMNT PAIN NOTED PAIN PRSNT: CPT | Performed by: ANESTHESIOLOGY

## 2024-01-18 ASSESSMENT — PAIN SCALES - GENERAL
PAINLEVEL_OUTOF10: 5 - MODERATE PAIN
PAINLEVEL: 5

## 2024-01-18 ASSESSMENT — ENCOUNTER SYMPTOMS
DEPRESSION: 0
BACK PAIN: 1
LOSS OF SENSATION IN FEET: 1
SHORTNESS OF BREATH: 0
OCCASIONAL FEELINGS OF UNSTEADINESS: 1
FEVER: 0

## 2024-01-18 ASSESSMENT — LIFESTYLE VARIABLES: TOTAL SCORE: 0

## 2024-01-18 ASSESSMENT — PAIN DESCRIPTION - DESCRIPTORS: DESCRIPTORS: THROBBING

## 2024-01-18 ASSESSMENT — PAIN - FUNCTIONAL ASSESSMENT: PAIN_FUNCTIONAL_ASSESSMENT: 0-10

## 2024-01-18 NOTE — PROGRESS NOTES
Chief Complain  Left Buttock and the thigh pain     History Of Present Illness  Dragan Calvin is a 83 y.o. male here for left buttock and thigh pain. The patient rates the pain at 0  on a scale from 0-10.  The patient describes pain as throbbing.  The pain is worsened by standing and walking and is alleviated by sitting.  Since the last visit the pain has stayed the same.  The patient denies any fever, chills, weight loss, bladder/bowel incontinence.       Past Medical History  He has a past medical history of Deep vein thrombosis (DVT) of lower extremity (CMS/[facility]) (09/17/2023), History of falling, Hyperlipidemia (08/28/2023), Hypertension, benign (08/23/2017), Leg edema (09/05/2023), Overweight, Paroxysmal atrial fibrillation (CMS/[facility]) (08/28/2023), Permanent atrial fibrillation (CMS/[facility]) (10/17/2023), Personal history of other diseases of the digestive system, Personal history of other infectious and parasitic diseases, Personal history of other specified conditions, Personal history of other specified conditions, Personal history of peptic ulcer disease, Personal history of pneumonia (recurrent), Personal history of urinary calculi, Presence of cardiac pacemaker (08/25/2017), and Sick sinus syndrome (CMS/[facility]) (09/17/2023).    Surgical History  He has a past surgical history that includes Cardiac pacemaker placement (04/05/2018); Cholecystectomy (04/05/2018); and Appendectomy (07/25/2018).     Social History  He reports that he has never smoked. He has never used smokeless tobacco. He reports that he does not drink alcohol and does not use drugs.    Family History  Family History   Problem Relation Name Age of Onset    Coronary artery disease Father      Heart failure Father      Diabetes Father          Allergies  Patient has no known allergies.    Review of Systems  Review of Systems   Constitutional:  Negative for fever.   Respiratory:  Negative for shortness of breath.    Cardiovascular:  Negative for chest  "pain.   Musculoskeletal:  Positive for back pain.   Psychiatric/Behavioral:  Negative for suicidal ideas.         Physical Exam  Physical Exam  HENT:      Head: Normocephalic and atraumatic.   Eyes:      Extraocular Movements: Extraocular movements intact.      Pupils: Pupils are equal, round, and reactive to light.   Pulmonary:      Effort: Pulmonary effort is normal.   Musculoskeletal:      Cervical back: Neck supple.   Neurological:      Mental Status: He is alert and oriented to person, place, and time.      Motor: Weakness present.      Comments: 3/5 bilateral ankle dorsiflexion   Psychiatric:         Mood and Affect: Mood normal.           Last Recorded Vitals  Blood pressure 94/50, pulse 86, temperature 35.9 °C (96.6 °F), height 1.727 m (5' 8\"), weight 96.2 kg (212 lb).       Assessment/Plan     Dragan Calvin is a 83 y.o. male here for follow-up of low back left buttock pain left thigh.  He has significant spinal canal stenosis at L3-4, L4-5.  In addition he does have bilateral foot drop worse on the left side.  He has been doing physical therapy, his gait and walking has been hampered by the foot drop.  A prescription for AFO brace was provided to the patient for his bilateral foot drop.  Continue with the physical therapy as previously scheduled.       Chuck Gonzalez MD  "

## 2024-01-22 ENCOUNTER — TREATMENT (OUTPATIENT)
Dept: PHYSICAL THERAPY | Facility: CLINIC | Age: 84
End: 2024-01-22
Payer: MEDICARE

## 2024-01-22 ENCOUNTER — TELEPHONE (OUTPATIENT)
Dept: PAIN MEDICINE | Facility: CLINIC | Age: 84
End: 2024-01-22

## 2024-01-22 DIAGNOSIS — M48.062 SPINAL STENOSIS, LUMBAR REGION, WITH NEUROGENIC CLAUDICATION: ICD-10-CM

## 2024-01-22 PROCEDURE — 97110 THERAPEUTIC EXERCISES: CPT | Mod: GP

## 2024-01-22 NOTE — PROGRESS NOTES
Physical Therapy  Physical Therapy Progress Note    Patient Name Dragan Calvin   MRN: [id number]  Today's Date: 01/22/24  Time Calculation  Start Time: 1000  Stop Time: 1045  Time Calculation (min): 45 min    Insurance:    per information provided by  pre-cert team)  Visit #: 13  Approval required:  N  Approval/certification dates:  11/22/23 to 2/20/24  Therapy Diagnoses:   1. Spinal stenosis, lumbar region, with neurogenic claudication  Follow Up In Physical Therapy          General:  Reason for visit: back and L. Leg pain   Referred by: Dr. Carlos Patiño MD appt:  as needed  Preferred Name:  Carlos  Script:  Eval and Treat  Onset Date:  1//22/21  Assessment:  The pt tolerated today's session well.  He demonstrated improved strength with sit to stand transfers and seated and standing exercises.   Patient is progressing with goals: ROM, strength, transfers, gait, balance, stair negotiation, pain reduction  Skilled care:  therapeutic exercise, NMR, gait training, pt education      Plan:   Continue to progress per poc: focus on functional strength and balance, focus on gait, transfers and stair negotiation.   Follow up with patient concerning AFO      Subjective:   Patient reports:  the pt has an order from Dr Gonzalez for AFOs    Have you fallen since last visit:  no    Precautions: moderate to high fall risk  Pacemaker   PMHx: H/o skin cancer face and shoulder, A-fib; Diabetes; Neuropathy, OA **PACEMAKER**            Sx: appendectomy, gall bladder removal  Medications for pain: gabapentin   Diagnostic Tests: CATscan  pain:  4/10  Location/Type of pain:  L. Hamstring region    HEP compliance/understanding:  yes    Objective:   Objective Measurements:       Treatment:   **= HEP  NV= Next visit  np= not performed  nb= non-billable  G= group HEP= discharged to [facility]      Therapeutic Exercise:     45 minutes       Nu-step L3 x 7' subjective taken   Heel raises: 10 x 2   Standing hip abduction: 10 x 1 each  Side stepping at bar:  "4 laps  Standing marching with dorsiflexion: 10 x 2  Standing ham curls 10 x 2 each NP  Step ups: 6\" x 10 R only NP  Seated trunk flexion: 2\" x 10 **  Seated hamstring stretch: 20\" x 3 each  Seated gastroc stretch with strap: 20\" x 3 each  Seated hip IR/ER with knee straight: 10 x 2 each **  Seated dorsiflexion: 10 x 3 **  LAQ: 4# 10 x 2 each  Ham curls: green 12 x 2   Seated hip abduction: green 10 x 2 ** NP  Seated hip add (ball): 5\" x 10  NP  Sit to stand: 10 x 1with armrests **     Neuromuscular Re-education:                        minutes  Standing balance with eyes closed: 15 seconds x 3 NP  Modified tandum balance: 30\" x 2 each NP    Patient Education                                    minutes      Education:    Pt ed on progression towards goals and POC  HEP Progression:    Access Code: [email or web address]  URL: [email or web address]  Date: 01/22/2024  Prepared by: Brea Lomax    Exercises  - Long Sitting Hip Internal and External Rotation AROM  - 1 x daily - 5-7 x weekly - 1-2 sets - 10 reps  "

## 2024-01-30 ENCOUNTER — TREATMENT (OUTPATIENT)
Dept: PHYSICAL THERAPY | Facility: CLINIC | Age: 84
End: 2024-01-30
Payer: MEDICARE

## 2024-01-30 DIAGNOSIS — M48.062 SPINAL STENOSIS OF LUMBAR REGION WITH NEUROGENIC CLAUDICATION: Primary | ICD-10-CM

## 2024-01-30 DIAGNOSIS — M48.062 SPINAL STENOSIS, LUMBAR REGION, WITH NEUROGENIC CLAUDICATION: ICD-10-CM

## 2024-01-30 PROCEDURE — 97110 THERAPEUTIC EXERCISES: CPT | Mod: GP,CQ

## 2024-01-30 NOTE — PROGRESS NOTES
Physical Therapy  Physical Therapy Progress Note    Patient Name Dragan Calvin   MRN: [id number]  Today's Date: 01/30/24  Time Calculation  Start Time: 0915  Stop Time: 1000  Time Calculation (min): 45 min    Insurance:    per information provided by  pre-cert team)  Visit #: 14  Approval required:  N  Approval/certification dates:  11/22/23 to 2/20/24  Therapy Diagnoses:   1. Spinal stenosis of lumbar region with neurogenic claudication        2. Spinal stenosis, lumbar region, with neurogenic claudication  Follow Up In Physical Therapy          General:  Reason for visit: back and L. Leg pain   Referred by: Dr. Carlos Patiño MD appt:  as needed  Preferred Name:  Carlos  Script:  Eval and Treat  Onset Date:  1//22/21  Assessment:  Patient tolerated treatment: fair. He remains limited with standing tolerance. Advised  possible water exercise to progress in standing with better tolerance.  Patient needs continued work on/ lower ext and core strength skilled PT for: progression in his program to address remaining functional, objective and subjective deficits to allow them to return to prior /optimal level of function with ADLs.  Patient is progressing with goals: slowly with lower ext strength  Skilled care:  ex progression    Plan:         Continue to progress per poc:   NV add increase on step ups    Subjective:   Patient reports:   he does not feel he is getting better with the leg pain    Have you fallen since last visit:  no    :   Precautions: moderate to high fall risk  Pacemaker   PMHx: H/o skin cancer face and shoulder, A-fib; Diabetes; Neuropathy, OA **PACEMAKER**            Sx: appendectomy, gall bladder removal  Medications for pain: gabapentin   Diagnostic Tests: CATscan    Pain:  5/10  Location/Type of pain:  left leg     HEP compliance/understanding:  yes    Objective:   Objective Measurements:    Function:   performing step ups with less effort but still requires both hand support   :    Strength:  "performed step up with both legs with good knee control  Flexibility:  progressed with seated flexion with ball roll out     Treatment:   **= HEP  NV= Next visit  np= not performed  nb= non-billable  G= group HEP= discharged to HEP      Therapeutic Exercise:     45 minutes  Nu-step L3 x 7' subjective taken           Heel raises: 10 x 2 np   ** parallel bars**  Standing hip abduction: 10 x 1 each  Side stepping at bar: 4 laps  Standing marching with dorsiflexion: 10 x 2  Standing ham curls 10 x 2 each   Step ups: 6\" x 15  both    Standing squats  10x     Seated trunk flexion: with blue ball  2\" x 10 **  Seated ball roll out lateral   Seated hamstring stretch: 20\" x 3 each  Seated gastroc stretch with strap: 20\" x 3 each  Seated hip IR/ER with knee straight: 10 x 2 each **  Seated dorsiflexion: 10 x 3 **  LAQ: 4# 15 x 2 each  Ham curls: green 12 x 2   Seated hip abduction: green 10 x 2 ** NP  Seated hip add np  Sit to stand: 10 x 1with armrests **                Education:  ex progression with POC  HEP Progression:  n/a  "

## 2024-02-06 ENCOUNTER — TREATMENT (OUTPATIENT)
Dept: PHYSICAL THERAPY | Facility: CLINIC | Age: 84
End: 2024-02-06
Payer: MEDICARE

## 2024-02-06 DIAGNOSIS — M48.062 SPINAL STENOSIS OF LUMBAR REGION WITH NEUROGENIC CLAUDICATION: Primary | ICD-10-CM

## 2024-02-06 DIAGNOSIS — M48.062 SPINAL STENOSIS, LUMBAR REGION, WITH NEUROGENIC CLAUDICATION: ICD-10-CM

## 2024-02-06 PROCEDURE — 97110 THERAPEUTIC EXERCISES: CPT | Mod: GP,CQ

## 2024-02-06 NOTE — PROGRESS NOTES
Physical Therapy  Physical Therapy Progress Note    Patient Name Dragan Calvin   MRN: [id number]  Today's Date: 02/06/24  Time Calculation  Start Time: 1000  Stop Time: 1045  Time Calculation (min): 45 min    Insurance:    per information provided by  pre-cert team)  Visit #: 15  Approval required:  N  Approval/certification dates:  11/22/23 to 2/20/24  Therapy Diagnoses:   1. Spinal stenosis of lumbar region with neurogenic claudication        2. Spinal stenosis, lumbar region, with neurogenic claudication  Follow Up In Physical Therapy          General:  Reason for visit: back and L. Leg pain   Referred by: Dr. Carlos Patiño MD appt:  as needed  Preferred Name:  Carlos  Script:  Eval and Treat  Onset Date:  1//22/21  Assessment:  Patient tolerated treatment: well, progressed  on equipment for lower ext strength.patient able to perform DF against band    Patient needs continued work on/skilled PT for: gradual progression with exercise and  to address remaining functional, objective and subjective deficits to allow them to return to prior /optimal level of function with ADLs.  Patient is progressing with goals: yes  Skilled care:  ex progression     Plan:         Continue to progress per poc:   NV  increase tband     Subjective:   Patient reports:  he is doing more in the home    Have you fallen since last visit:  no      Precautions: moderate to high fall risk  Pacemaker   PMHx: H/o skin cancer face and shoulder, A-fib; Diabetes; Neuropathy, OA **PACEMAKER**            Sx: appendectomy, gall bladder removal  Medications for pain: gabapentin   Diagnostic Tests: CATscan     Pain:  4/10  Location/Type of pain:  lt thigh    HEP compliance/understanding:  yes    Objective:   Objective Measurements:      Strength: progressed with strength on equipment . Demonstrates less fatigue and able to perform more rept. With step ups       Treatment:   **= HEP  NV= Next visit  np= not performed  nb= non-billable  G= group HEP=  "discharged to HEP      Therapeutic Exercise:     45 minutes  Nu-step L3 x 7' subjective taken     Leg ext 20# 12x3  Ham curl 30# 12x3        Heel raises: 10 x 2 np   ** parallel bars**  Standing hip abduction: 15 x 1 each  Side stepping at bar: 4 laps  Standing marching with dorsiflexion: 10 x 2  Standing ham curls 10 x 2 each   Step ups: 6\" x 15  both    Standing squats  10x      Seated trunk flexion: with blue ball  2\" x 10 **  Seated ball roll out lateral   Seated hamstring stretch: 20\" x 3 each  Seated gastroc stretch with strap: 20\" x 3 each np  Seated hip IR/ER with knee straight: 10 x 2 each **  Seated dorsiflexion: 10 x 3 **  LAQ: 4# 15 x 2 each  np  Ham curls: green 12 x 2   Seated hip abduction: green 10 x 2 ** NP  Seated hip add np  Seated  DLS red lateral pulls and extension   Sit to stand: 10 x 1with armrests **     Seated DF with yellow tband 10x2 **                         Education:  ex progression with POC  HEP Progression: DF with tband  "

## 2024-02-13 ENCOUNTER — TREATMENT (OUTPATIENT)
Dept: PHYSICAL THERAPY | Facility: CLINIC | Age: 84
End: 2024-02-13
Payer: MEDICARE

## 2024-02-13 DIAGNOSIS — M48.062 SPINAL STENOSIS OF LUMBAR REGION WITH NEUROGENIC CLAUDICATION: Primary | ICD-10-CM

## 2024-02-13 DIAGNOSIS — M48.062 SPINAL STENOSIS, LUMBAR REGION, WITH NEUROGENIC CLAUDICATION: ICD-10-CM

## 2024-02-13 PROCEDURE — 97110 THERAPEUTIC EXERCISES: CPT | Mod: GP,CQ

## 2024-02-13 NOTE — PROGRESS NOTES
Physical Therapy  Physical Therapy Progress Note    Patient Name Dragan Calvin   MRN: [id number]  Today's Date: 02/13/24  Time Calculation  Start Time: 1000  Stop Time: 1045  Time Calculation (min): 45 min    Insurance:    per information provided by  pre-cert team)  Visit #: 16  Approval required:  N  Approval/certification dates:  11/22/23 to 2/20/24  Therapy Diagnoses:   1. Spinal stenosis of lumbar region with neurogenic claudication        2. Spinal stenosis, lumbar region, with neurogenic claudication  Follow Up In Physical Therapy          General:  Reason for visit: back and L. Leg pain   Referred by: Dr. Carlos Patiño MD appt:  as needed  Preferred Name:  Carlos  Script:  Eval and Treat  Onset Date:  1//22/21  Assessment:  Patient tolerated treatment:well progressing with lower ext strength on equipment with good tolerance. Instructed in added hip hike exercise to help function getting in and out of the car.  Patient needs continued work on general strength skilled PT for: instruction in correct exercise performance to address remaining functional, objective and subjective deficits to allow them to return to prior /optimal level of function with ADLs.  Patient is progressing with goals: yes  Skilled care:  ex performance     Plan:         Continue to progress per poc:   NV add progression in closed chain as able     Subjective:   Patient reports:  he woke up and the left thigh had some greater pain    Have you fallen since last visit:  no      Precautions: moderate to high fall risk  Pacemaker   PMHx: H/o skin cancer face and shoulder, A-fib; Diabetes; Neuropathy, OA **PACEMAKER**            Sx: appendectomy, gall bladder removal  Medications for pain: gabapentin   Diagnostic Tests: CATscan     Pain:  5/10  Location/Type of pain:  lt thigh     HEP compliance/understanding:  yes    Objective:   Objective Measurements:    Function:   still challenged with getting out of low chair and lifting legs into  "car    Balance: hand support withy hip hikes   Strength: progressed with weights on equipment  Flexibility:  patient able to  hike hip to 70 degree with marching   Treatment:   **= HEP  NV= Next visit  np= not performed  nb= non-billable  G= group HEP= discharged to HEP      Therapeutic Exercise:     45 minutes  p L3 x 7' subjective taken     Leg ext 25# 12x3  Ham curl 35# 12x3          ** parallel bars**  Standing hip abduction: 15 x 1 each  Side stepping at bar: 4 laps  Standing marching with dorsiflexion: 10 x 2  Standing ham curls 10 x 2 each   Step ups: 6\" x 15  both  hand support   Standing squats  10x      Seated trunk flexion: with blue ball  2\" x 10 **  Seated ball roll out lateral  5x  Seated hamstring stretch: 20\" x 3 each  Seated gastroc stretch with strap: 20\" x 3 each np  Seated hip IR/ER with knee straight: 10 x 2 each **  LAQ: 4# 15 x 2 each  np  Ham curls: green 12 x 2   Seated hip abduction: green 10 x 2 ** NP  Standing   DLS red lateral pulls and extension   Standing DLS flex green 15x2  Sit to stand: 10 x no hands table elevated      Seated DF with yellow tband 10x2 **           Education:  ex progression with POC  HEP Progression:  progressed with tbans and issued blue band for HEP  "

## 2024-02-19 DIAGNOSIS — I48.21 PERMANENT ATRIAL FIBRILLATION (MULTI): ICD-10-CM

## 2024-02-21 ENCOUNTER — TREATMENT (OUTPATIENT)
Dept: PHYSICAL THERAPY | Facility: CLINIC | Age: 84
End: 2024-02-21
Payer: MEDICARE

## 2024-02-21 DIAGNOSIS — M48.062 SPINAL STENOSIS, LUMBAR REGION, WITH NEUROGENIC CLAUDICATION: Primary | ICD-10-CM

## 2024-02-21 PROCEDURE — 97110 THERAPEUTIC EXERCISES: CPT | Mod: GP

## 2024-02-21 NOTE — PROGRESS NOTES
Physical Therapy  Physical Therapy Progress Note    Patient Name Dragan Calvin   MRN: [id number]  Today's Date: 02/21/24  Time Calculation  Start Time: 1050  Stop Time: 1135  Time Calculation (min): 45 min    Insurance:    per information provided by  pre-cert team)  Visit #: 17  Approval required:  N  Approval/certification dates:  11/22/23 to 2/20/24  Therapy Diagnoses:   No diagnosis found.      General:  Reason for visit: back and L. Leg pain   Referred by: Dr. Carlos Patiño MD appt:  as needed  Preferred Name:  Carlos  Script:  Eval and Treat  Onset Date:  1//22/21  Assessment:  The pt tolerated today's session well.  He tolerated the gluteal strengthening exercises well.   Patient is progressing with goals: ROM, strength, transfers, gait, balance, stair negotiation, pain reduction  Skilled care:  therapeutic exercise, NMR, gait training, pt education      Plan:   Continue to progress per poc: focus on functional strength and balance, focus on gait, transfers and stair negotiation.   Focus on gluteal and hamstring strengthening.  Gait training with AFOs      Subjective:   Patient reports:  the pt should be getting his AFOs next week. He still has pain in the L hamstring region and begins almost immediately when he stands up    Have you fallen since last visit:  no    Precautions: moderate to high fall risk  Pacemaker   PMHx: H/o skin cancer face and shoulder, A-fib; Diabetes; Neuropathy, OA **PACEMAKER**            Sx: appendectomy, gall bladder removal  Medications for pain: gabapentin   Diagnostic Tests: CATscan  pain:  4/10  Location/Type of pain:  L. Hamstring region    HEP compliance/understanding:  yes    Objective:   Objective Measurements:       Treatment:   **= HEP  NV= Next visit  np= not performed  nb= non-billable  G= group HEP= discharged to [facility]      Therapeutic Exercise:     45 minutes       Nu-step L3 x 7' subjective taken   Heel raises: 10 x 2   Standing hip abduction: 10 x 1 each  Side stepping  "at bar: 4 laps NP  Standing marching with dorsiflexion: 10 x 2 NP  Standing ham curls 10 x 2 each NP  Step ups: 6\" x 10 R only NP  Seated trunk flexion: 2\" x 10 ** NP  Seated hamstring stretch: 20\" x 3 each  Seated gastroc stretch with strap: 20\" x 3 each NP  Seated hip IR/ER with knee straight: 10 x 2 each **  Seated glut sets: 5\" x 10 **  Seated dorsiflexion: 10 x 3 **  LAQ: 4# 10 x 2 each NP  Ham curls: green 12 x 2 NP  Seated hip abduction: green 10 x 2 ** NP  Seated hip add (ball): 5\" x 10  NP  Sit to stand: 10 x 1with armrests **  Standing glut sets: 5\" x 10 **  Standing hip extension with glut sets: 10 x 1 each     Neuromuscular Re-education:                        minutes  Standing balance with eyes closed: 15 seconds x 3 NP  Modified tandum balance: 30\" x 2 each NP    Patient Education                                    minutes      Education:    Pt ed on therapeutic exercise, HEP  HEP Progression:    Access Code: [email or web address]  URL: [email or web address]  Date: 02/21/2024  Prepared by: Brea Lomax    Exercises  - Supine Gluteal Sets  - 1-2 x daily - 7 x weekly - 1-2 sets - 10 reps - 5 hold  - Seated Gluteal Sets  - 1-2 x daily - 7 x weekly - 1-2 sets - 10 reps - 3-5 hold  - Standing Gluteal Sets  - 1-2 x daily - 7 x weekly - 1-2 sets - 10 reps - 3-5 hold  - Supine Hip Internal and External Rotation  - 1-2 x daily - 7 x weekly - 1-2 sets - 10 reps  "

## 2024-02-27 ENCOUNTER — TREATMENT (OUTPATIENT)
Dept: PHYSICAL THERAPY | Facility: CLINIC | Age: 84
End: 2024-02-27
Payer: MEDICARE

## 2024-02-27 DIAGNOSIS — M48.062 SPINAL STENOSIS, LUMBAR REGION, WITH NEUROGENIC CLAUDICATION: ICD-10-CM

## 2024-02-27 DIAGNOSIS — M48.062 SPINAL STENOSIS OF LUMBAR REGION WITH NEUROGENIC CLAUDICATION: Primary | ICD-10-CM

## 2024-02-27 PROCEDURE — 97110 THERAPEUTIC EXERCISES: CPT | Mod: GP,CQ

## 2024-02-27 NOTE — PROGRESS NOTES
Physical Therapy  Physical Therapy Progress Note    Patient Name Dragan Calvin   MRN: [id number]  Today's Date: 02/27/24  Time Calculation  Start Time: 1000  Stop Time: 1045  Time Calculation (min): 45 min    Insurance:    per information provided by  pre-cert team)  Visit #: 18  Approval required:  N  Approval/certification dates:  11/22/23 to 2/20/24  Therapy Diagnoses:   1. Spinal stenosis of lumbar region with neurogenic claudication        2. Spinal stenosis, lumbar region, with neurogenic claudication  Follow Up In Physical Therapy          General:  Reason for visit: back and L. Leg pain   Referred by: Dr. Carlos Patiño MD appt:  as needed  Preferred Name:  Carlos  Script:  Eval and Treat  Onset Date:  1//22/21  Assessment:  Patient tolerated treatment:well, modified to open chain  exercise second to right toe injury. He is being treated for the toes and Doctor is fine with him doing therapy.  Patient needs continued work on general strength and stability /skilled PT for: progression with exercise to address remaining functional, objective and subjective deficits to allow them to return to prior /optimal level of function with ADLs.  Patient is progressing with goals: yes  Skilled care:  modified exercise     Plan:         Continue to progress per poc:   NV add closed chain as able    Subjective:   Patient reports:  he has a toe infection on the right foot. He was seen by the pediatrist .    Have you fallen since last visit:  no      Precautions: moderate to high fall risk  Pacemaker   PMHx: H/o skin cancer face and shoulder, A-fib; Diabetes; Neuropathy, OA **PACEMAKER**            Sx: appendectomy, gall bladder removal  Medications for pain: gabapentin   Diagnostic Tests: CATscan  pain:  4/10  Location/Type of pain:  L. Hamstring region     HEP compliance/understanding:  yes       Objective:   Objective Measurements:    Balance: progressed with core stability in seated to help with balance      Strength:  "progressed with added tband with seated marching to help with hip hike       Treatment:   **= HEP  NV= Next visit  np= not performed  nb= non-billable  G= group HEP= discharged to HEP      Therapeutic Exercise:   45  45 minutes    Nu-step L3 x 7' subjective taken           Heel raises: 10 x 2   Standing hip abduction: 10 x 1 each np  Side stepping at bar: 4 laps NP  Standing marching with dorsiflexion: 10 x 2 NP  Standing ham curls 10 x 2 each NP  Step ups: 6\" x 10 R only NP  Seated trunk flexion: 2\" x 10 ** NP  Seated hamstring stretch: 20\" x 3 each  Seated gastroc stretch with strap: 20\" x 3 each NP  Seated hip IR/ER with knee straight: 10 x 2 each **  Seated dorsiflexion: 10 x 3 **  LAQ: 4# 10 x 2 each NP  Ham curls: green 12 x 2   Seated hip abduction: green 10 x 2 **   Seated hip add (ball): 5\" x 10    Seated marching  blue band around knees 20x   Seated DLS ext green 10x2   Seated row green 15x2   Sit to stand: 10 x 1with armrests **  Standing glut sets: 5\" x 10 **  Standing hip extension with glut sets: 10 x 1 each                  Education:  ex progression with POC  HEP Progression:  n/a  "

## 2024-03-04 ENCOUNTER — TREATMENT (OUTPATIENT)
Dept: PHYSICAL THERAPY | Facility: CLINIC | Age: 84
End: 2024-03-04
Payer: MEDICARE

## 2024-03-04 DIAGNOSIS — M48.062 SPINAL STENOSIS, LUMBAR REGION, WITH NEUROGENIC CLAUDICATION: Primary | ICD-10-CM

## 2024-03-04 PROCEDURE — 97116 GAIT TRAINING THERAPY: CPT | Mod: GP

## 2024-03-04 PROCEDURE — 97110 THERAPEUTIC EXERCISES: CPT | Mod: GP

## 2024-03-04 PROCEDURE — 97112 NEUROMUSCULAR REEDUCATION: CPT | Mod: GP

## 2024-03-04 NOTE — PROGRESS NOTES
"  Physical Therapy  Physical Therapy Progress Note    Patient Name Dragan Calvin   MRN: [id number]  Today's Date: 03/04/24  Time Calculation  Start Time: 1005  Stop Time: 1110  Time Calculation (min): 65 min    Insurance:    per information provided by  pre-cert team)  Visit #: 19  Approval required:  N  Approval/certification dates:  11/22/23 to 2/20/24, Re-cert 2/21/24 to 5/18/24  Therapy Diagnoses:   1. Spinal stenosis, lumbar region, with neurogenic claudication  Follow Up In Physical Therapy            General:  Reason for visit: back and L. Leg pain   Referred by: Dr. Carlos Patiño MD appt:  as needed  Preferred Name:  Carlos  Script:  Eval and Treat  Onset Date:  1//22/21  Re-evaluation: 1/3/24  Assessment:  The pt has made progress with strength, ROM, flexibility and balance.  He has not had a change in the pain level in his L hamstring region.  This was his first visit with the L AFO on.  His gait was slightly improved.  The pt would benefit from additional visits to continue progression towards goals and to work on ambulation with the AFOs.   Patient is progressing with goals: ROM, strength, transfers, gait, balance, stair negotiation, pain reduction  Skilled care:  therapeutic exercise, NMR, gait training, pt education, re-evaluation    Goals:  STG: Pt (I) with initial HEP; By week 3; Goal MET     LTG Goals: 3/4/24  By week: 8-12+     Pain: Decrease back pain to 3/10 or < ; Goal UNCHANGED     Posture: pt to demonstrate postural and body mechanics awareness ; Goal PROGRESSING     Gait:  Pt to ambulate (I) with appropriate assistive device on even surfaces,  shorter community distances, without limitation from pain and with improved heel to toe gait as able, TUG = 20 seconds or < with WW ;  Goal MILD PROGRESSION     Stairs: Pt to tolerate step ups x 5 with L LE with B UE support; Goal    PROGRESSING     Balance: Donaldson = 37/56 or > ; SLS = 10\" with 2 finger support ; Tandum = 10 seconds with 2 finger support  ; " Goal PROGRESSING     ROM: Increase lumbar ROM to WFL without limitation from pain. ;  Goal PROGRESSING     Strength: Increase core and B LE strength to 4/5 or > grossly for improved tolerance for functional activities. ; Goal PROGRESSING     Flexibility: Improve flexibility of B LEs to WFL  ; Goal N/A     Transfers: STSx5 18 seconds or < ; Goal PROGRESSING     HEP: Pt to be (I) with HEP  ; Goal PROGRESSING     Outcome Measures: LEFS 45/80 or > Goal DECLINED    Plan:   Continue PT 1x/wk for 6 weeks, then re-evaluate progress.     Continue to progress per poc: focus on functional strength and balance, focus on gait, transfers and stair negotiation.   Focus on gluteal and hamstring strengthening.  Gait training with AFOs      Subjective:   Patient reports:  the pt arrived wearing his L AFO, he reports so far he is tolerating it well.  He also has an AFO for the R shoe, but he does not feel he needs to wear it all the time. He reports his pain level has not changed since starting therapy, he has immediate pain as soon as he stands up.  He does feel his mobility, strength and balance have improved.     Have you fallen since last visit:  no    Precautions: moderate to high fall risk  Pacemaker   PMHx: H/o skin cancer face and shoulder, A-fib; Diabetes; Neuropathy, OA **PACEMAKER**            Sx: appendectomy, gall bladder removal  Medications for pain: gabapentin   Diagnostic Tests: CATscan  pain:  4/10  Location/Type of pain:  L. Hamstring region    HEP compliance/understanding:  yes    Objective:   Objective Measurements:     Pain:           Back pain average 5-6/10 ; Average max of 4 in morning ; Average 4     Posture:           The pt had rounded shoulders, forward head and decreased lumbar lordosis. The pt has decreased postural and body mechanics awareness. ;  The pt has improved postural awareness in standing and sitting.     Gait:            The pt is (I) with a WW on even surfaces, shorter distances.  He walks in  "a forward flexed posture,  TUG #1 31.7 seconds, #2 25.4 seconds ;   TUG #1 30.5 seconds, #2 27.5 seconds     Stairs:           The pt is (I) with step to step with 2 railings     Balance:           Donaldson; SLS unable, Tandum unable ;   Donaldson  ; Tandum unable, SLS unable;  Donaldson      ROM:           Lumbar flexion 50% ; 8\" fingers from floor ; n/a                        extension n/a % ; na/                        SB R n/a\", L n/a \" fingers from floor ; R 23.5\", L 22.5\" ; n/a                        Rotation R n/a % , L n/a % ; n/a     Strength:           Abdominals 3/5 ; 3+ ; 4-          Back extensors 3/5 ; 3+ ; 4-          The pt was able to toe and heel walk -- no ; no ; no          Hip flexors R/L 4/4- ; 4+/4- ; 4+/4-          Hip abductors R/L 4-/3+ ; 4/3+ ; 4/3+          Hip adductors R/L 4+/4 ; 4+/4 ; 4+/4          Hip extensors R/L 3/3 ; 3+/3+ ; 3+/3+          Quads R/L 4+/4- ; 5-/4 ; 5-/4          Hams R/L 4+/4 ; 5-/4+ ; 5-/4+          DF R/L 2/1 ; 2 ;           PF R/L 4/4- ; 4/4- ; 4/4-          Great toe extensors R/L 2/ ;  ;      Flexibility:           Hamstrings R mod, L mod-max; n/a ; n/a          Gluteals R min , L mod restriction ; n/a ; n/a          Piriformis R min-mod , L mod restriction ; n/a ; n/a     Transfers:            Sit to stand needs armrests, STS x 5 with armrests 25\" ; STS x 5 with armrests #1 23.7 seconds, #2 20.5 seconds ; STS x 5 24.9 seconds          Supine to sit n/a, pt reports pain in supine and extreme difficulty getting up, requiring assistance; n/a ; n/a     Palpation:           Denies Tenderness with palpation ; denies tenderness     Outcome Measurement:           LEFS  ;  ;   Treatment:   **= HEP  NV= Next visit  np= not performed  nb= non-billable  G= group HEP= discharged to [facility]      Therapeutic Exercise:     35 minutes       Re-evaluation  Nu-step L3 x 7' subjective taken   Heel raises: 10 x 2   Standing hip abduction: 10 x 1 " "each  Side stepping at bar: 4 laps NP  Standing marching with dorsiflexion: 10 x 2 NP  Standing ham curls 10 x 2 each NP  Step ups: 6\" x 10 R only NP  Seated trunk flexion: 2\" x 10 ** NP  Seated hamstring stretch: 20\" x 3 each  Seated gastroc stretch with strap: 20\" x 3 each NP  Seated hip IR/ER with knee straight: 10 x 2 each **  Seated glut sets: 5\" x 10 ** NP  Seated dorsiflexion: 10 x 3 **  LAQ: 4# 10 x 2 each   Ham curls: green 12 x 2   Seated hip abduction: green 10 x 2 ** NP  Seated hip add (ball): 5\" x 10  NP  Sit to stand: 5 x 2 with armrests **  Standing glut sets: 5\" x 10 **  Standing hip extension with glut sets: 10 x 1 each     Neuromuscular Re-education:           20             minutes  Re-evaluation balance and transfers  Standing balance with eyes closed: 15 seconds x 3   Modified tandum balance: 30\" x 2 each NP    Patient Education                                    minutes      Gait Training:        10    minutes   Gait with AFO L shoe with WW 100ft x 2, 50ft x 1    Education:    Pt ed on therapeutic exercise, HEP  Pt ed on progression towards goals and POC  HEP Progression:    N/a  "

## 2024-03-04 NOTE — LETTER
March 6, 2024    [place]  [address] [facility]  [facility] [address]    Patient: Dragan Calvin   YOB: 1940   Date of Visit: 3/4/2024       Dear Chuck Gonzalez MD  [address] [place] [address]    The attached plan of care is being sent to you because your patient’s medical reimbursement requires that you certify the plan of care. Your signature is required to allow uninterrupted insurance coverage.      You may indicate your approval by signing below and faxing this form back to us at Dept Fax: [phone].    Please call Dept: [phone] with any questions or concerns.    Thank you for this referral,        Brea Alvarado, PT  [address] [address] [facility]  [address] [facility] [address]    Payer: Payor: MEDICARE / Plan: MEDICARE PART A AND B / Product Type: *No Product type* /                                                                         Date:     Dear Brea Alvarado, PT,     Re: Mr. Dragan Calvin, MRN:[id number]    I certify that I have reviewed the attached plan of care and it is medically necessary for Mr. Dragan Calvin (1940) who is under my care.          ______________________________________                    _________________  Provider name and credentials                                           Date and time                                                                                           Plan of Care 2/21/24   Effective from: 2/21/2024  Effective to: 5/18/2024    Plan ID: [id number]            Participants as of Finalize on 3/6/2024    Name Type Comments Contact Info    Chuck Gonzalez MD Referring Provider  [phone]    Brea Alvarado, PT Physical Therapist  [phone]       Last Plan Note     Author: Brea Alvarado PT Status: Sign when Signing Visit Last edited: 3/4/2024 10:00 AM         Physical Therapy  Physical Therapy Progress Note    Patient Name Dragan Calvin  "  MRN: [id number]  Today's Date: 03/04/24  Time Calculation  Start Time: 1005  Stop Time: 1110  Time Calculation (min): 65 min    Insurance:    per information provided by  pre-cert team)  Visit #: 19  Approval required:  N  Approval/certification dates:  11/22/23 to 2/20/24, Re-cert 2/21/24 to 5/18/24  Therapy Diagnoses:   1. Spinal stenosis, lumbar region, with neurogenic claudication  Follow Up In Physical Therapy            General:  Reason for visit: back and L. Leg pain   Referred by: Dr. Carlos Patiño MD appt:  as needed  Preferred Name:  Carlos  Script:  Eval and Treat  Onset Date:  1//22/21  Re-evaluation: 1/3/24  Assessment:  The pt has made progress with strength, ROM, flexibility and balance.  He has not had a change in the pain level in his L hamstring region.  This was his first visit with the L AFO on.  His gait was slightly improved.  The pt would benefit from additional visits to continue progression towards goals and to work on ambulation with the AFOs.   Patient is progressing with goals: ROM, strength, transfers, gait, balance, stair negotiation, pain reduction  Skilled care:  therapeutic exercise, NMR, gait training, pt education, re-evaluation    Goals:  STG: Pt (I) with initial HEP; By week 3; Goal MET     LTG Goals: 3/4/24  By week: 8-12+     Pain: Decrease back pain to 3/10 or < ; Goal UNCHANGED     Posture: pt to demonstrate postural and body mechanics awareness ; Goal PROGRESSING     Gait:  Pt to ambulate (I) with appropriate assistive device on even surfaces,  shorter community distances, without limitation from pain and with improved heel to toe gait as able, TUG = 20 seconds or < with WW ;  Goal MILD PROGRESSION     Stairs: Pt to tolerate step ups x 5 with L LE with B UE support; Goal    PROGRESSING     Balance: Donaldson = 37/56 or > ; SLS = 10\" with 2 finger support ; Tandum = 10 seconds with 2 finger support  ; Goal PROGRESSING     ROM: Increase lumbar ROM to WFL without limitation from pain. " ;  Goal PROGRESSING     Strength: Increase core and B LE strength to 4/5 or > grossly for improved tolerance for functional activities. ; Goal PROGRESSING     Flexibility: Improve flexibility of B LEs to WFL  ; Goal N/A     Transfers: STSx5 18 seconds or < ; Goal PROGRESSING     HEP: Pt to be (I) with HEP  ; Goal PROGRESSING     Outcome Measures: LEFS 45/80 or > Goal DECLINED    Plan:   Continue PT 1x/wk for 6 weeks, then re-evaluate progress.     Continue to progress per poc: focus on functional strength and balance, focus on gait, transfers and stair negotiation.   Focus on gluteal and hamstring strengthening.  Gait training with AFOs      Subjective:   Patient reports:  the pt arrived wearing his L AFO, he reports so far he is tolerating it well.  He also has an AFO for the R shoe, but he does not feel he needs to wear it all the time. He reports his pain level has not changed since starting therapy, he has immediate pain as soon as he stands up.  He does feel his mobility, strength and balance have improved.     Have you fallen since last visit:  no    Precautions: moderate to high fall risk  Pacemaker   PMHx: H/o skin cancer face and shoulder, A-fib; Diabetes; Neuropathy, OA **PACEMAKER**            Sx: appendectomy, gall bladder removal  Medications for pain: gabapentin   Diagnostic Tests: CATscan  pain:  4/10  Location/Type of pain:  L. Hamstring region    HEP compliance/understanding:  yes    Objective:   Objective Measurements:     Pain:           Back pain average 5-6/10 ; Average max of 4 in morning ; Average 4     Posture:           The pt had rounded shoulders, forward head and decreased lumbar lordosis. The pt has decreased postural and body mechanics awareness. ;  The pt has improved postural awareness in standing and sitting.     Gait:            The pt is (I) with a WW on even surfaces, shorter distances.  He walks in a forward flexed posture,  TUG #1 31.7 seconds, #2 25.4 seconds ;   TUG #1 30.5  "seconds, #2 27.5 seconds     Stairs:           The pt is (I) with step to step with 2 railings     Balance:           Donaldson/56; SLS unable, Tandum unable ;   Donaldson  ; Tandum unable, SLS unable;  Donaldson      ROM:           Lumbar flexion 50% ; 8\" fingers from floor ; n/a                        extension n/a % ; na/                        SB R n/a\", L n/a \" fingers from floor ; R 23.5\", L 22.5\" ; n/a                        Rotation R n/a % , L n/a % ; n/a     Strength:           Abdominals 3/5 ; 3+ ; 4-          Back extensors 3/5 ; 3+ ; 4-          The pt was able to toe and heel walk -- no ; no ; no          Hip flexors R/L 4/4- ; 4+/4- ; 4+/4-          Hip abductors R/L 4-/3+ ; 4/3+ ; 4/3+          Hip adductors R/L 4+/4 ; 4+/4 ; 4+/4          Hip extensors R/L 3/3 ; 3+/3+ ; 3+/3+          Quads R/L 4+/4- ; 5-/4 ; 5-/4          Hams R/L 4+/4 ; 5-/4+ ; 5-/4+          DF R/L 2/ ;  ;           PF R/L 4/4- ; 4/4- ; 4/4-          Great toe extensors R/L 2/ ;  ;      Flexibility:           Hamstrings R mod, L mod-max; n/a ; n/a          Gluteals R min , L mod restriction ; n/a ; n/a          Piriformis R min-mod , L mod restriction ; n/a ; n/a     Transfers:            Sit to stand needs armrests, STS x 5 with armrests 25\" ; STS x 5 with armrests #1 23.7 seconds, #2 20.5 seconds ; STS x 5 24.9 seconds          Supine to sit n/a, pt reports pain in supine and extreme difficulty getting up, requiring assistance; n/a ; n/a     Palpation:           Denies Tenderness with palpation ; denies tenderness     Outcome Measurement:           LEFS 31 ;  ;   Treatment:   **= HEP  NV= Next visit  np= not performed  nb= non-billable  G= group HEP= discharged to [facility]      Therapeutic Exercise:     35 minutes       Re-evaluation  Nu-step L3 x 7' subjective taken   Heel raises: 10 x 2   Standing hip abduction: 10 x 1 each  Side stepping at bar: 4 laps NP  Standing marching with dorsiflexion: 10 x 2 " "NP  Standing ham curls 10 x 2 each NP  Step ups: 6\" x 10 R only NP  Seated trunk flexion: 2\" x 10 ** NP  Seated hamstring stretch: 20\" x 3 each  Seated gastroc stretch with strap: 20\" x 3 each NP  Seated hip IR/ER with knee straight: 10 x 2 each **  Seated glut sets: 5\" x 10 ** NP  Seated dorsiflexion: 10 x 3 **  LAQ: 4# 10 x 2 each   Ham curls: green 12 x 2   Seated hip abduction: green 10 x 2 ** NP  Seated hip add (ball): 5\" x 10  NP  Sit to stand: 5 x 2 with armrests **  Standing glut sets: 5\" x 10 **  Standing hip extension with glut sets: 10 x 1 each     Neuromuscular Re-education:           20             minutes  Re-evaluation balance and transfers  Standing balance with eyes closed: 15 seconds x 3   Modified tandum balance: 30\" x 2 each NP    Patient Education                                    minutes      Gait Training:        10    minutes   Gait with AFO L shoe with WW 100ft x 2, 50ft x 1    Education:    Pt ed on therapeutic exercise, HEP  Pt ed on progression towards goals and POC  HEP Progression:    N/a         Current Participants as of 3/6/2024    Name Type Comments Contact Info    Chuck Gonzalez MD Referring Provider  [phone]    Signature pending    Brea Alvarado, PT Physical Therapist  [phone]    Signature pending      "

## 2024-03-15 ENCOUNTER — OFFICE VISIT (OUTPATIENT)
Dept: PRIMARY CARE | Facility: CLINIC | Age: 84
End: 2024-03-15
Payer: MEDICARE

## 2024-03-15 VITALS
HEART RATE: 85 BPM | OXYGEN SATURATION: 96 % | WEIGHT: 222 LBS | SYSTOLIC BLOOD PRESSURE: 122 MMHG | BODY MASS INDEX: 33.75 KG/M2 | DIASTOLIC BLOOD PRESSURE: 70 MMHG

## 2024-03-15 DIAGNOSIS — Z79.4 TYPE 2 DIABETES MELLITUS WITHOUT COMPLICATION, WITH LONG-TERM CURRENT USE OF INSULIN (MULTI): ICD-10-CM

## 2024-03-15 DIAGNOSIS — I48.21 PERMANENT ATRIAL FIBRILLATION (MULTI): Primary | Chronic | ICD-10-CM

## 2024-03-15 DIAGNOSIS — I49.5 SICK SINUS SYNDROME (MULTI): Chronic | ICD-10-CM

## 2024-03-15 DIAGNOSIS — E11.9 TYPE 2 DIABETES MELLITUS WITHOUT COMPLICATION, WITH LONG-TERM CURRENT USE OF INSULIN (MULTI): ICD-10-CM

## 2024-03-15 DIAGNOSIS — K56.609 SBO (SMALL BOWEL OBSTRUCTION) (MULTI): ICD-10-CM

## 2024-03-15 LAB — HBA1C MFR BLD: 7.9 % (ref 4.2–6.5)

## 2024-03-15 PROCEDURE — 3078F DIAST BP <80 MM HG: CPT | Performed by: STUDENT IN AN ORGANIZED HEALTH CARE EDUCATION/TRAINING PROGRAM

## 2024-03-15 PROCEDURE — 1160F RVW MEDS BY RX/DR IN RCRD: CPT | Performed by: STUDENT IN AN ORGANIZED HEALTH CARE EDUCATION/TRAINING PROGRAM

## 2024-03-15 PROCEDURE — 1036F TOBACCO NON-USER: CPT | Performed by: STUDENT IN AN ORGANIZED HEALTH CARE EDUCATION/TRAINING PROGRAM

## 2024-03-15 PROCEDURE — 99214 OFFICE O/P EST MOD 30 MIN: CPT | Performed by: STUDENT IN AN ORGANIZED HEALTH CARE EDUCATION/TRAINING PROGRAM

## 2024-03-15 PROCEDURE — 3074F SYST BP LT 130 MM HG: CPT | Performed by: STUDENT IN AN ORGANIZED HEALTH CARE EDUCATION/TRAINING PROGRAM

## 2024-03-15 PROCEDURE — 83036 HEMOGLOBIN GLYCOSYLATED A1C: CPT | Mod: CLIA WAIVED TEST | Performed by: STUDENT IN AN ORGANIZED HEALTH CARE EDUCATION/TRAINING PROGRAM

## 2024-03-15 PROCEDURE — 1159F MED LIST DOCD IN RCRD: CPT | Performed by: STUDENT IN AN ORGANIZED HEALTH CARE EDUCATION/TRAINING PROGRAM

## 2024-03-15 NOTE — PROGRESS NOTES
Subjective   Patient ID: Dragan Calvin is a 83 y.o. male who presents for Follow-up (4 months/DM).    HPI       DM: on insulin and meds, checks daily , readings in low 100s, wants off insulin. Only takes about 4 units at night for meals I 26 units of tresiba a day and glipizide tid. Sugars a bit elevated, but is ok  currently     A fib: on eliquis sees lyndsay Miller, unable to feel it when he is in afib     SBO: was in hospital for SBO seen dr. Emma umana, hospital reviewed     Hld: statin thearpy stable, no muscle aches      Review of Systems     Review of Systems   All other systems reviewed and are negative.      Objective   /70 (BP Location: Left arm, Patient Position: Sitting, BP Cuff Size: Small adult)   Pulse 85   Wt 101 kg (222 lb)   SpO2 96%   BMI 33.75 kg/m²     Physical Exam  Constitutional:       Appearance: Normal appearance.   HENT:      Head: Normocephalic and atraumatic.      Right Ear: Tympanic membrane and ear canal normal.      Left Ear: Tympanic membrane and ear canal normal.      Mouth/Throat:      Mouth: Mucous membranes are moist.      Pharynx: Oropharynx is clear.   Eyes:      Extraocular Movements: Extraocular movements intact.      Conjunctiva/sclera: Conjunctivae normal.      Pupils: Pupils are equal, round, and reactive to light.   Cardiovascular:      Rate and Rhythm: Normal rate and regular rhythm.      Pulses: Normal pulses.      Heart sounds: Normal heart sounds.   Pulmonary:      Effort: Pulmonary effort is normal.      Breath sounds: Normal breath sounds.   Abdominal:      General: Abdomen is flat. Bowel sounds are normal.      Palpations: Abdomen is soft.   Musculoskeletal:         General: Normal range of motion.      Cervical back: Normal range of motion and neck supple.   Skin:     General: Skin is warm and dry.      Capillary Refill: Capillary refill takes 2 to 3 seconds.   Neurological:      General: No focal deficit present.      Mental Status: He is alert  and oriented to person, place, and time. Mental status is at baseline.   Psychiatric:         Mood and Affect: Mood normal.         Behavior: Behavior normal.         Thought Content: Thought content normal.         Judgment: Judgment normal.       Assessment/Plan   1. Type 2 diabetes mellitus without complication, with long-term current use of insulin (CMS/HCC)  7.9 on insulin and glipizide  Suggest increase novolog to 6-7 in high meals  - POCT Glycosylated Hemoglobin (HGB A1C) docked device    2. Permanent atrial fibrillation (CMS/HCC)  Stable, no issues   Follows cardiology  On blood thinners    3. Sick sinus syndrome (CMS/HCC)  S/p pacemaker, follows   cardiology    4. SBO (small bowel obstruction) (CMS/HCC)  Stable,  No recurrence

## 2024-03-20 DIAGNOSIS — E78.2 MIXED HYPERLIPIDEMIA: Chronic | ICD-10-CM

## 2024-03-20 RX ORDER — DILTIAZEM HYDROCHLORIDE 240 MG/1
240 CAPSULE, COATED, EXTENDED RELEASE ORAL 2 TIMES DAILY
Qty: 180 CAPSULE | Refills: 3 | Status: SHIPPED | OUTPATIENT
Start: 2024-03-20

## 2024-03-25 ENCOUNTER — TREATMENT (OUTPATIENT)
Dept: PHYSICAL THERAPY | Facility: CLINIC | Age: 84
End: 2024-03-25
Payer: MEDICARE

## 2024-03-25 DIAGNOSIS — M48.062 SPINAL STENOSIS, LUMBAR REGION, WITH NEUROGENIC CLAUDICATION: ICD-10-CM

## 2024-03-25 PROCEDURE — 97112 NEUROMUSCULAR REEDUCATION: CPT | Mod: GP

## 2024-03-25 PROCEDURE — 97110 THERAPEUTIC EXERCISES: CPT | Mod: GP

## 2024-03-27 NOTE — PROGRESS NOTES
Physical Therapy  Physical Therapy Progress Note    Patient Name Dragan Calvin   MRN: [id number]  Today's Date: 03/27/24  Time Calculation  Start Time: 1045  Stop Time: 1130  Time Calculation (min): 45 min    Insurance:    per information provided by  pre-cert team)  Visit #: 20  Approval required:  N  Approval/certification dates:  11/22/23 to 2/20/24, Re-cert 2/21/24 to 5/18/24  Therapy Diagnoses:   1. Spinal stenosis, lumbar region, with neurogenic claudication  Follow Up In Physical Therapy            General:  Reason for visit: back and L. Leg pain   Referred by: Dr. Carlos Patiño MD appt:  as needed  Preferred Name:  Carlos  Script:  Eval and Treat  Onset Date:  1//22/21  Re-evaluation: 1/3/24 , 3/4/24  Assessment:  The pt tolerated the session well. He had increased leg pain with the exercises in standing, but pain was reduced with the seated exercises, especially into trunk flexion.  The pt continues to be very limited in flexibility of the left piriformis/hip IR.  Patient is progressing with goals: ROM, strength, transfers, gait, balance, stair negotiation, pain reduction  Skilled care:  therapeutic exercise, NMR, gait training, pt education,     Plan:   Continue PT 1x/wk for 6 weeks, then re-evaluate progress.     Continue to progress per poc: focus on functional strength and balance, focus on gait, transfers and stair negotiation.   Focus on gluteal and hamstring strengthening.  Address tight piriformis muscle.   Gait training with AFOs      Subjective:   Patient reports:  he went to a friend's house and was unable to get up the stairs.  The stairs were higher than standard stairs and his knees buckled and he landed on his knees.  His friend's son helped get him up.  He has had increased soreness/pain in the left leg since the fall. He reports other than this he did not hurt himself.     Have you fallen since last visit:  yes, see above    Precautions: moderate to high fall risk  Pacemaker   PMHx: H/o skin  "cancer face and shoulder, A-fib; Diabetes; Neuropathy, OA **PACEMAKER**            Sx: appendectomy, gall bladder removal  Medications for pain: gabapentin   Diagnostic Tests: CATscan  pain:  4/10  Location/Type of pain:  L. Hamstring region    HEP compliance/understanding:  yes    Objective:   Objective Measurements:      n/a     Treatment:   **= HEP  NV= Next visit  np= not performed  nb= non-billable  G= group HEP= discharged to HEP      Therapeutic Exercise:   35    minutes       Nu-step L3 x 7' subjective taken   Heel raises: 10 x 2   Standing hip abduction: 10 x 1 each  Side stepping at bar: 4 laps NP  Standing marching with dorsiflexion: 10 x 2 NP  Standing ham curls 10 x 2 each NP  Step ups: 6\" x 10 R only NP  Seated trunk flexion: 2\" x 10 **   Seated hamstring stretch: 20\" x 3 each  Seated gastroc stretch with strap: 20\" x 3 each NP  Seated hip IR/ER with knee straight: 10 x 2 each **  Seated glut sets: 5\" x 10 ** NP  Seated dorsiflexion: 10 x 3 ** NP  LAQ: 3# 10 x 2 each   Ham curls: green 12 x 2   Seated hip abduction: green 10 x 2 ** NP  Seated hip add (ball): 5\" x 10    Sit to stand: 5 x 1 with armrests **  Standing glut sets: 5\" x 10 **  Standing hip extension with glut sets: 10 x 1 each NP    Neuromuscular Re-education:           10             minutes  Standing balance with eyes closed: 15 seconds x 3   Modified tandum balance: 30\" x 2 each     Patient Education                                    minutes      Gait Training:            minutes   Gait with AFO L shoe with WW 100ft x 2, 50ft x 1 NP    Education:    Pt ed on therapeutic exercise, HEP  HEP Progression:    N/a  "

## 2024-03-28 ENCOUNTER — OFFICE VISIT (OUTPATIENT)
Dept: WOUND CARE | Facility: CLINIC | Age: 84
End: 2024-03-28
Payer: MEDICARE

## 2024-03-28 DIAGNOSIS — R60.0 LEG EDEMA: Primary | ICD-10-CM

## 2024-03-28 PROCEDURE — 99213 OFFICE O/P EST LOW 20 MIN: CPT

## 2024-03-28 PROCEDURE — 99213 OFFICE O/P EST LOW 20 MIN: CPT | Performed by: NURSE PRACTITIONER

## 2024-03-28 RX ORDER — TORSEMIDE 20 MG/1
20 TABLET ORAL 2 TIMES DAILY
Qty: 90 TABLET | Refills: 3 | Status: SHIPPED | OUTPATIENT
Start: 2024-03-28 | End: 2024-05-29 | Stop reason: SDUPTHER

## 2024-04-03 ENCOUNTER — APPOINTMENT (OUTPATIENT)
Dept: PHYSICAL THERAPY | Facility: CLINIC | Age: 84
End: 2024-04-03
Payer: MEDICARE

## 2024-04-04 ENCOUNTER — TREATMENT (OUTPATIENT)
Dept: PHYSICAL THERAPY | Facility: CLINIC | Age: 84
End: 2024-04-04
Payer: MEDICARE

## 2024-04-04 DIAGNOSIS — M48.062 SPINAL STENOSIS, LUMBAR REGION, WITH NEUROGENIC CLAUDICATION: ICD-10-CM

## 2024-04-04 DIAGNOSIS — M48.062 SPINAL STENOSIS OF LUMBAR REGION WITH NEUROGENIC CLAUDICATION: Primary | ICD-10-CM

## 2024-04-04 PROCEDURE — 97110 THERAPEUTIC EXERCISES: CPT | Mod: GP,CQ

## 2024-04-04 PROCEDURE — 97112 NEUROMUSCULAR REEDUCATION: CPT | Mod: GP,CQ

## 2024-04-04 NOTE — PROGRESS NOTES
Physical Therapy  Physical Therapy Progress Note    Patient Name Dragan Calvin   MRN: [id number]  Today's Date: 04/04/24  Time Calculation  Start Time: 0145  Stop Time: 0230  Time Calculation (min): 45 min    Insurance:    per information provided by  pre-cert team)  Visit #: 21  Approval required:  N  Approval/certification dates:  11/22/23 to 2/20/24, Re-cert 2/21/24 to 5/18/24  Therapy Diagnoses:   1. Spinal stenosis of lumbar region with neurogenic claudication        2. Spinal stenosis, lumbar region, with neurogenic claudication  Follow Up In Physical Therapy          General:  Reason for visit: back and L. Leg pain   Referred by: Dr. Carlos Patiño MD appt:  as needed  Preferred Name:  Carlos  Script:  Eval and Treat  Onset Date:  1//22/21  Re-evaluation: 1/3/24 , 3/4/24  Assessment:  Patient tolerated treatment: well, Progressed with core stability with Tband and using seated position for safety.  Patient needs continued work on balance  strength /skilled PT for: progression with exercise and  to address remaining functional, objective and subjective deficits to allow them to return to prior /optimal level of function with ADLs.  Patient is progressing with goals: yes  Skilled care:  ex progression          Continue to progress per poc:   NV resume step ups  Subjective:   Patient reports:  he is doing better from several weeks ago     Have you fallen since last visit:  no    precautions: moderate to high fall risk  Pacemaker   PMHx: H/o skin cancer face and shoulder, A-fib; Diabetes; Neuropathy, OA **PACEMAKER**            Sx: appendectomy, gall bladder removal  Medications for pain: gabapentin   Diagnostic Tests: CATscan  pain:  4/10  Location/Type of pain:  L. Hamstring region     Objective Measurements:      Balance: requires hand support with standing exercise    Strength: progressed with core stability with progression with bands      Treatment:   **= HEP  NV= Next visit  np= not performed  nb=  "non-billable  G= group HEP= discharged to HEP      Therapeutic Exercise:     35 minutes    Nu-step L3 x 7' subjective taken           Heel raises: 10 x 2   Standing hip abduction: 10 x 1 each  Side stepping at bar: 4 laps NP  Standing marching with dorsiflexion: 10 x 2 NP  Standing ham curls 10 x 2 each NP  Step ups: 6\" x 10 R only NP  Seated trunk flexion: 2\" x 10 **   Seated hamstring stretch: 20\" x 3 each  Seated gastroc stretch with strap: 20\" x 3 each NP  Seated hip IR/ER with knee straight: 10 x 2 each **  Seated glut sets: 5\" x 10 ** NP  Seated dorsiflexion: 10 x   LAQ: 3# 10 x 2 each   Ham curls: green 12 x 2   Seated hip abduction: belt  10 x 2 **  Seated hip add (ball): 5\" x 10    Sit to stand: 5x 2  no  armrests **  Standing glut sets: 5\" x 10 ** np  Standing hip extension with glut sets: 10 x 1 each     Neuromuscular Re-education:    10 minutes  Standing balance with eyes closed: 15 seconds x 3   Modified tandum balance: 30\" x 2 each np  Seated DLS blue 3 way pull down 15x            S        Education:   ex progression  with POC  HEP Progression:  n//a  "

## 2024-04-09 DIAGNOSIS — Z79.4 TYPE 2 DIABETES MELLITUS WITHOUT COMPLICATION, WITH LONG-TERM CURRENT USE OF INSULIN (MULTI): ICD-10-CM

## 2024-04-09 DIAGNOSIS — E11.9 TYPE 2 DIABETES MELLITUS WITHOUT COMPLICATION, WITH LONG-TERM CURRENT USE OF INSULIN (MULTI): ICD-10-CM

## 2024-04-09 RX ORDER — PEN NEEDLE, DIABETIC 30 GX3/16"
1 NEEDLE, DISPOSABLE MISCELLANEOUS 4 TIMES DAILY
Qty: 100 EACH | Refills: 3 | Status: SHIPPED | OUTPATIENT
Start: 2024-04-09

## 2024-04-09 RX ORDER — LANCETS
EACH MISCELLANEOUS
Qty: 100 EACH | Refills: 3 | Status: SHIPPED | OUTPATIENT
Start: 2024-04-09 | End: 2024-04-23 | Stop reason: SDUPTHER

## 2024-04-10 ENCOUNTER — TREATMENT (OUTPATIENT)
Dept: PHYSICAL THERAPY | Facility: CLINIC | Age: 84
End: 2024-04-10
Payer: MEDICARE

## 2024-04-10 DIAGNOSIS — M48.062 SPINAL STENOSIS OF LUMBAR REGION WITH NEUROGENIC CLAUDICATION: Primary | ICD-10-CM

## 2024-04-10 DIAGNOSIS — M48.062 SPINAL STENOSIS, LUMBAR REGION, WITH NEUROGENIC CLAUDICATION: ICD-10-CM

## 2024-04-10 PROCEDURE — 97112 NEUROMUSCULAR REEDUCATION: CPT | Mod: GP,CQ

## 2024-04-10 PROCEDURE — 97110 THERAPEUTIC EXERCISES: CPT | Mod: GP,CQ

## 2024-04-10 NOTE — PROGRESS NOTES
Physical Therapy  Physical Therapy Progress Note    Patient Name Dragan Calvin   MRN: [id number]  Today's Date: 04/10/24  Time Calculation  Start Time: 1045  Stop Time: 1130  Time Calculation (min): 45 min    Insurance:    per information provided by  pre-cert team)  Visit #: 22  Approval required:  N  Approval/certification dates:  11/22/23 to 2/20/24, Re-cert 2/21/24 to 5/18/24  Therapy Diagnoses:   1. Spinal stenosis of lumbar region with neurogenic claudication        2. Spinal stenosis, lumbar region, with neurogenic claudication  Follow Up In Physical Therapy          General:  Reason for visit: back and L. Leg pain   Referred by: Dr. Carlos Patiño MD appt:  as needed  Preferred Name:  Carlos  Script:  Eval and Treat  Onset Date:  1//22/21  Re-evaluation: 1/3/24 , 3/4/24  Assessment:  Patient tolerated treatment: well, reported he is doing a little more around the home including cutting grass on riding .patient has a good understanding of his home program and feels he has efficient amount of exercise to follow,  Patient needs continued work on/ endurance strength and balance  ,skilled PT for: gradual progression with exercise and  to address remaining functional, objective and subjective deficits to allow them to return to prior /optimal level of function with ADLs.  Patient is progressing with goals: yes  Skilled care:  ex progression     Plan:         Continue to progress per poc:   NV  re check     Subjective:   Patient reports:   his back pain is the same  but finds he is doing more    Have you fallen since last visit:  no      precautions: moderate to high fall risk  Pacemaker   PMHx: H/o skin cancer face and shoulder, A-fib; Diabetes; Neuropathy, OA **PACEMAKER**            Sx: appendectomy, gall bladder removal  Medications for pain: gabapentin   Diagnostic Tests: CATscan  pain:  4/10  Location/Type of pain:  L. Hamstring region  Objective:   Objective Measurements:    Function:   able to do  "riding lawn mower yesterday. Patient able to perform standing calf raise with hand support    Gait: forward posture on walker , slow shuffle gait.advised nose over knees with getting out of the chair  :      Treatment:   **= HEP  NV= Next visit  np= not performed  nb= non-billable  G= group HEP= discharged to HEP      Therapeutic Exercise:     30 minutes  Nu-step L3 x 7' subjective taken     Leg extension 15# 12x2  Ham curl 20# 12x2        Heel raises: 10 x 2   Standing hip abduction: 10 x 1 each  Side stepping at bar: 4 laps NP  Standing marching with dorsiflexion: 10 x 2   Standing ham curls 10 x 2 each NP  Step ups: 6\" x 10 R only NP  Seated trunk flexion: 2\" x 10 **   Seated hamstring stretch: 20\" x 3 each  Seated gastroc stretch with strap: 20\" x 3 each NP  Seated hip IR/ER with knee straight: 10 x 2 each **  Seated glut sets: 5\" x 10 **   Seated dorsiflexion/calf raise : 10 x 2  LAQ: 3# 10 x 2 each  np  Ham curls: green 12 x 2  np  Seated hip abduction: belt  10 x 2 **  Seated hip add (ball): 5\" x 10    Sit to stand: 5x 2  no  armrests **  Standing glut sets: 5\" x 10 ** np  Standing hip extension with glut sets: 10 x 1 each      Neuromuscular Re-education:                         15 minutes  Standing balance with eyes closed: 15 seconds x 3   Modified tandum balance: 30\" x 2 each   Seated DLS blue 3 way pull down 15x                                     S        Education:  ex progression with POC  HEP Progression:  n/a  "

## 2024-04-15 ENCOUNTER — TREATMENT (OUTPATIENT)
Dept: PHYSICAL THERAPY | Facility: CLINIC | Age: 84
End: 2024-04-15
Payer: MEDICARE

## 2024-04-15 DIAGNOSIS — M48.062 SPINAL STENOSIS, LUMBAR REGION, WITH NEUROGENIC CLAUDICATION: ICD-10-CM

## 2024-04-15 PROCEDURE — 97110 THERAPEUTIC EXERCISES: CPT | Mod: GP

## 2024-04-15 PROCEDURE — 97112 NEUROMUSCULAR REEDUCATION: CPT | Mod: GP

## 2024-04-15 NOTE — PROGRESS NOTES
Physical Therapy  Physical Therapy Progress Note    Patient Name Dragan Calvin   MRN: [id number]  Today's Date: 4/15/24  Time Calculation  Start Time: 1005  Stop Time: 1050  Time Calculation (min): 45 min    Insurance:    per information provided by  pre-cert team)  Visit #: 23  Approval required:  N  Approval/certification dates:  11/22/23 to 2/20/24, Re-cert 2/21/24 to 5/18/24  Therapy Diagnoses:   1. Spinal stenosis, lumbar region, with neurogenic claudication  Follow Up In Physical Therapy            General:  Reason for visit: back and L. Leg pain   Referred by: Dr. Carlos Patiño MD appt:  as needed  Preferred Name:  Carlos  Script:  Eval and Treat  Onset Date:  1//22/21  Re-evaluation: 1/3/24 , 3/4/24  Assessment:  The pt tolerated the session well. He continues to tolerate alternating sitting and standing exercises well. He continues to have pain in the posterior L thigh, that increases with prolonged standing.  He does not have pain with palpation in this area.   Patient is progressing with goals: ROM, strength, transfers, gait, balance, stair negotiation, pain reduction  Skilled care:  therapeutic exercise, NMR, gait training, pt education,     Plan:   Continue PT 1x/wk for 6 weeks, then re-evaluate progress.     Continue to progress per poc: focus on functional strength and balance, focus on gait, transfers and stair negotiation.   Focus on gluteal and hamstring strengthening.  Address tight piriformis muscle.   Gait training with AFOs      Subjective:   Patient reports:  his pain level continues to remain around 5/10.  He was able to cut the grass on the tractor.  He reports it is easier to get in and out of the car.     Have you fallen since last visit:  yes, see above    Precautions: moderate to high fall risk  Pacemaker   PMHx: H/o skin cancer face and shoulder, A-fib; Diabetes; Neuropathy, OA **PACEMAKER**            Sx: appendectomy, gall bladder removal  Medications for pain: gabapentin   Diagnostic  "Tests: CATscan    pain:  5/10  Location/Type of pain:  L. Hamstring region    HEP compliance/understanding:  yes    Objective:   Objective Measurements:      n/a     Treatment:   **= HEP  NV= Next visit  np= not performed  nb= non-billable  G= group HEP= discharged to HEP      Therapeutic Exercise:     35 minutes       Nu-step L3 x 10' subjective taken   Heel raises: 10 x 2   Standing hip abduction: 10 x 2 each  Standing glut sets: 5\" x 10 **  Standing ham curls: 10 x 1 each  Side stepping at bar: 4 laps NP  Standing marching with dorsiflexion: 10 x 2 NP  Step ups: 6\" x 10 R only NP  Seated trunk flexion: 2\" x 10 **   Seated hamstring stretch: 20\" x 3 each  Seated gastroc stretch with strap: 20\" x 3 each NP  Seated hip IR/ER with knee straight: 10 x 1 each ** NP  Seated glut sets: 5\" x 10 ** NP  Seated dorsiflexion: 10 x 3 ** NP  LAQ: 3# 10 x 2 each   Ham curls: green 12 x 2   Seated hip abduction: green 10 x 2 ** NP  Seated hip add (ball): 5\" x 10    Sit to stand: 5 x 2 with armrests **      Neuromuscular Re-education:           10             minutes  Standing balance with eyes closed: 15 seconds x 3   Modified tandum balance: 30\" x 2 each   Airex balance 20\" x 3    Patient Education                                    minutes      Gait Training:            minutes   Gait with AFO L shoe with WW 100ft x 2, 50ft x 1 NP    Education:    Pt ed on therapeutic exercise, HEP  HEP Progression:    N/a  "

## 2024-04-18 ENCOUNTER — APPOINTMENT (OUTPATIENT)
Dept: CARDIOLOGY | Facility: CLINIC | Age: 84
End: 2024-04-18
Payer: MEDICARE

## 2024-04-18 PROBLEM — M62.81 MUSCLE WEAKNESS OF EXTREMITY: Status: ACTIVE | Noted: 2024-04-18

## 2024-04-18 PROBLEM — I50.30 HEART FAILURE WITH PRESERVED EJECTION FRACTION (MULTI): Status: ACTIVE | Noted: 2024-04-18

## 2024-04-18 PROBLEM — G62.9 PERIPHERAL NERVE DISEASE: Status: ACTIVE | Noted: 2024-04-18

## 2024-04-18 PROBLEM — E66.9 CLASS 1 OBESITY WITH BODY MASS INDEX (BMI) OF 33.0 TO 33.9 IN ADULT: Status: ACTIVE | Noted: 2024-04-18

## 2024-04-18 PROBLEM — D64.9 ANEMIA: Status: ACTIVE | Noted: 2024-04-18

## 2024-04-18 PROBLEM — M25.569 KNEE PAIN: Status: ACTIVE | Noted: 2024-04-18

## 2024-04-18 PROBLEM — E66.811 CLASS 1 OBESITY WITH BODY MASS INDEX (BMI) OF 33.0 TO 33.9 IN ADULT: Status: ACTIVE | Noted: 2024-04-18

## 2024-04-18 PROBLEM — I07.1 TRICUSPID VALVE INSUFFICIENCY: Status: ACTIVE | Noted: 2024-04-18

## 2024-04-18 PROBLEM — E66.3 OVERWEIGHT: Status: ACTIVE | Noted: 2024-04-18

## 2024-04-22 ENCOUNTER — TREATMENT (OUTPATIENT)
Dept: PHYSICAL THERAPY | Facility: CLINIC | Age: 84
End: 2024-04-22
Payer: MEDICARE

## 2024-04-22 DIAGNOSIS — M48.062 SPINAL STENOSIS, LUMBAR REGION, WITH NEUROGENIC CLAUDICATION: ICD-10-CM

## 2024-04-22 PROCEDURE — 97110 THERAPEUTIC EXERCISES: CPT | Mod: GP

## 2024-04-22 PROCEDURE — 97112 NEUROMUSCULAR REEDUCATION: CPT | Mod: GP

## 2024-04-22 NOTE — PROGRESS NOTES
Physical Therapy  Physical Therapy Progress Note    Patient Name Dragan Calvin   MRN: [id number]  Today's Date: 04/22/24  Time Calculation  Start Time: 1005  Stop Time: 1100  Time Calculation (min): 55 min    Insurance:    per information provided by  pre-cert team)  Visit #: 24  Approval required:  N  Approval/certification dates:  11/22/23 to 2/20/24, Re-cert 2/21/24 to 5/18/24  Therapy Diagnoses:   1. Spinal stenosis, lumbar region, with neurogenic claudication  Follow Up In Physical Therapy            General:  Reason for visit: back and L. Leg pain   Referred by: Dr. Carlos Patiño MD appt:  as needed  Preferred Name:  Carlos  Script:  Eval and Treat  Onset Date:  1//22/21  Re-evaluation: 1/3/24 , 3/4/24  Assessment:  The pt tolerated the session well.  He demonstrated improved balance and required only SBA with the balance exercises.   The patient is progressing with goals: ROM, strength, transfers, gait, balance, stair negotiation, pain reduction  Skilled care:  therapeutic exercise, NMR, gait training, pt education,     Plan:   Continue PT 1x/wk for 6 weeks, then re-evaluate progress.     Re-eval NV      Subjective:   Patient reports:  his pain level continues to remain around 5/10.  He feels he may be walking a little faster.    Have you fallen since last visit:  no    Precautions: moderate to high fall risk  Pacemaker   PMHx: H/o skin cancer face and shoulder, A-fib; Diabetes; Neuropathy, OA **PACEMAKER**            Sx: appendectomy, gall bladder removal  Medications for pain: gabapentin   Diagnostic Tests: CATscan    pain:  5/10  Location/Type of pain:  L. Hamstring region    HEP compliance/understanding:  yes    Objective:   Objective Measurements:      n/a     Treatment:   **= HEP  NV= Next visit  np= not performed  nb= non-billable  G= group HEP= discharged to [facility]      Therapeutic Exercise:     40 minutes       Nu-step L3 x 10' subjective taken   Heel raises: 10 x 2   Standing hip abduction: 10 x 2  "each  Standing glut sets: 5\" x 10 **  Standing ham curls: 10 x 1 each NP  Standing marching with dorsiflexion: 10 x 2   Step ups: 6\" x 10 R only   Seated trunk flexion 3 way: 2\" x 7 each **   Seated hamstring stretch: 20\" x 3 each  Seated gastroc stretch with strap: 20\" x 3 each NP  Seated hip IR/ER with knee straight: 10 x 1 each **   Seated dorsiflexion: 10 x 3 **   LAQ: 3# 10 x 2 each   Ham curls: green 12 x 2 NP  Seated hip add (ball): 5\" x 10  NP  Sit to stand: 5 x 2 with armrests **      Neuromuscular Re-education:           10             minutes  Standing balance with eyes closed: 15 seconds x 3   Modified tandum balance: 30\" x 2 each   Airex balance 20\" x 3    Patient Education                                    minutes      Gait Training:            minutes   Gait with AFO L shoe with WW 100ft x 2, 50ft x 1 NP    Education:    Pt ed on therapeutic exercise, HEP  HEP Progression:    N/a  "

## 2024-04-23 DIAGNOSIS — Z79.4 TYPE 2 DIABETES MELLITUS WITHOUT COMPLICATION, WITH LONG-TERM CURRENT USE OF INSULIN (MULTI): ICD-10-CM

## 2024-04-23 DIAGNOSIS — E11.9 TYPE 2 DIABETES MELLITUS WITHOUT COMPLICATION, WITH LONG-TERM CURRENT USE OF INSULIN (MULTI): ICD-10-CM

## 2024-04-23 DIAGNOSIS — E78.5 HYPERLIPIDEMIA, UNSPECIFIED HYPERLIPIDEMIA TYPE: ICD-10-CM

## 2024-04-23 RX ORDER — LANCETS
EACH MISCELLANEOUS
Qty: 100 EACH | Refills: 3 | Status: SHIPPED | OUTPATIENT
Start: 2024-04-23 | End: 2024-04-25 | Stop reason: SDUPTHER

## 2024-04-23 RX ORDER — ROSUVASTATIN CALCIUM 5 MG/1
5 TABLET, COATED ORAL DAILY
Qty: 90 TABLET | Refills: 1 | Status: SHIPPED | OUTPATIENT
Start: 2024-04-23

## 2024-04-25 DIAGNOSIS — E11.9 TYPE 2 DIABETES MELLITUS WITHOUT COMPLICATION, WITH LONG-TERM CURRENT USE OF INSULIN (MULTI): ICD-10-CM

## 2024-04-25 DIAGNOSIS — Z79.4 TYPE 2 DIABETES MELLITUS WITHOUT COMPLICATION, WITH LONG-TERM CURRENT USE OF INSULIN (MULTI): ICD-10-CM

## 2024-04-25 RX ORDER — LANCETS
EACH MISCELLANEOUS
Qty: 100 EACH | Refills: 3 | Status: SHIPPED | OUTPATIENT
Start: 2024-04-25

## 2024-04-29 ENCOUNTER — TELEPHONE (OUTPATIENT)
Dept: CARDIOLOGY | Facility: CLINIC | Age: 84
End: 2024-04-29
Payer: MEDICARE

## 2024-04-29 ENCOUNTER — TREATMENT (OUTPATIENT)
Dept: PHYSICAL THERAPY | Facility: CLINIC | Age: 84
End: 2024-04-29
Payer: MEDICARE

## 2024-04-29 DIAGNOSIS — M48.062 SPINAL STENOSIS, LUMBAR REGION, WITH NEUROGENIC CLAUDICATION: ICD-10-CM

## 2024-04-29 PROCEDURE — 97112 NEUROMUSCULAR REEDUCATION: CPT | Mod: GP

## 2024-04-29 PROCEDURE — 97110 THERAPEUTIC EXERCISES: CPT | Mod: GP

## 2024-04-29 ASSESSMENT — BALANCE ASSESSMENTS
STANDING UNSUPPORTED: ABLE TO STAND SAFELY FOR 2 MINUTES
TURN 360 DEGREES: NEEDS ASSISTANCE WHILE TURNING
PLACE ALTERNATE FOOT ON STEP OR STOOL WHILE STANDING UNSUPPORTED: TURNS SIDEWAYS ONLY BUT MAINTAINS BALANCE
PLACE ALTERNATE FOOT ON STEP OR STOOL WHILE STANDING UNSUPPORTED: NEEDS ASSISTANCE TO KEEP FROM FALLING/UNABLE TO TRY
STANDING TO SITTING: ABLE TO STAND INDEPENDENTLY USING HANDS
TRANSFERS: NEEDS ONE PERSON TO ASSIST
STANDING UNSUPPORTED WITH FEET TOGETHER: ABLE TO PLACE FEET TOGETHER INDEPENDENTLY AND STAND 1 MINUTE WITH SUPERVISION
STANDING UNSUPPORTED WITH EYES CLOSED: ABLE TO STAND 10 SECONDS SAFELY
STANDING TO SITTING: CONTROLS DESCENT BY USING HANDS
STANDING UNSUPPORTED ONE FOOT IN FRONT: LOSES BALANCE WHILE STEPPING OR STANDING
LONG VERSION TOTAL SCORE (MAX 56): 29
PICK UP OBJECT FROM THE FLOOR FROM A STANDING POSITION: UNABLE TO PICK UP BUT REACHES 2-5 CM (1-2 INCHES) FROM SLIPPER AND KEEPS BALANCE INDEPENDENTLY
SITTING WITH BACK UNSUPPORTED BUT FEET SUPPORTED ON FLOOR OR ON A STOOL: ABLE TO SIT SAFELY AND SECURELY FOR 2 MINUTES
STANDING ON ONE LEG: UNABLE TO TRY NEEDS ASSIST TO PREVENT FALL
REACHING FORWARD WITH OUTSTRETCHED ARM WHILE STANDING: CAN REACH FORWARD 12 CM (5 INCHES)

## 2024-04-29 NOTE — TELEPHONE ENCOUNTER
Pt states he has noticed an increase in his edema the last few weeks. Denies SOB. Pt states he is currently taking Torsemide 20mg BID. Pt states he was prescribed Metolazone twice weekly (5mg & 2.5mg) but ran out of medication 6 months ago and never refilled it. Pt has 6 month follow up on 5/8/24 with Dr. Carlson.

## 2024-04-29 NOTE — PROGRESS NOTES
"  Physical Therapy  Physical Therapy Progress Note    Patient Name Dragan Calvin   MRN: [id number]  Today's Date: 04/29/24  Time Calculation  Start Time: 1315  Stop Time: 1400  Time Calculation (min): 45 min    Insurance:    per information provided by  pre-cert team)  Visit #: 25  Approval required:  N  Approval/certification dates:  11/22/23 to 2/20/24, Re-cert 2/21/24 to 5/18/24  Therapy Diagnoses:   1. Spinal stenosis, lumbar region, with neurogenic claudication  Follow Up In Physical Therapy            General:  Reason for visit: back and L. Leg pain   Referred by: Dr. Carlos Patiño MD appt:  as needed  Preferred Name:  Carlos  Script:  Eval and Treat  Onset Date:  1//22/21  Re-evaluation: 1/3/24 , 3/4/24  Assessment:  The pt has progressed well towards goals and is (I) with the HEP.  He is going to attempt the HEP (I) at this time. He was educated on the importance of being consistent with an exercise routine to maintain and build upon the gains he made in therapy. He is following up with his cardiologist concerning the increased edema in his legs.  He was advised to return to physical therapy in the future if he has any further decline in function or tolerance for ambulation.    Goals:  STG: Pt (I) with initial HEP; By week 3; Goal MET     LTG Goals: 4/29/24  By week: 8-12+     Pain: Decrease back pain to 3/10 or < ; Goal PARTIALLY MET     Posture: pt to demonstrate postural and body mechanics awareness ; Goal PARTIALLY MET     Gait:  Pt to ambulate (I) with appropriate assistive device on even surfaces,  shorter community distances, without limitation from pain and with improved heel to toe gait as able, TUG = 20 seconds or < with WW ;  Goal MILD PROGRESSION, PARTIALLY MET     Stairs: Pt to tolerate step ups x 5 with L LE with B UE support; Goal    MET ON R LE     Balance: Donaldson = 37/56 or > ; SLS = 10\" with 2 finger support ; Tandum = 10 seconds with 2 finger support  ; Goal MILD PROGRESSION, PARTIALLY MET   "   ROM: Increase lumbar ROM to WFL without limitation from pain. ;  Goal PARTIALLY MET     Strength: Increase core and B LE strength to 4/5 or > grossly for improved tolerance for functional activities. ; Goal PARTIALLY MET, NO CHANGE IN DF STRENGTH     Flexibility: Improve flexibility of B LEs to WFL  ; Goal PARTIALLY MET     Transfers: STSx5 18 seconds or < ; Goal PARTIALLY MET     HEP: Pt to be (I) with HEP  ; Goal MET     Outcome Measures: LEFS 45/80 or > Goal PARTIALLY MET       Plan:   Discharge patient from physical therapy, pt to continue (I) with HEP.      Subjective:   Patient reports:  he saw the foot doctor this morning and he felt the patient should contact his cardiologist concerning the edema in his legs. He called this morning and left a message.    The pain in his L posterior thigh is the same.   Have you fallen since last visit:  no    Precautions: moderate to high fall risk  Pacemaker   PMHx: H/o skin cancer face and shoulder, A-fib; Diabetes; Neuropathy, OA **PACEMAKER**            Sx: appendectomy, gall bladder removal  Medications for pain: gabapentin   Diagnostic Tests: CATscan    pain:  5/10  Location/Type of pain:  L. Hamstring region    HEP compliance/understanding:  yes    Objective:   Objective Measurements:     Pain:           Back pain average 5-6/10 ; Average max of 4 in morning ; Average 4 ;   Average 5, in the morning 7     Posture:           The pt had rounded shoulders, forward head and decreased lumbar lordosis. The pt has decreased postural and body mechanics awareness. ;  The pt has improved postural awareness in standing and sitting. ;  Improved postural awareness in sitting and standing     Gait:            The pt is (I) with a WW on even surfaces, shorter distances.  He walks in a forward flexed posture,  TUG #1 31.7 seconds, #2 25.4 seconds ;   TUG #1 30.5 seconds, #2 27.5 seconds ;  TUG #1 35.3 seconds, #2 26.8 seconds     Stairs:           The pt is (I) with step to step  "with 2 railings     Balance:           Donaldson/56; SLS unable, Tandum unable ;   Donaldson  ; Tandum unable, SLS unable;  Donaldson  ;   Donaldson      ROM:           Lumbar flexion 50% ; 8\" fingers from floor ; n/a ; n/a                        extension n/a % ; n/a ; n/a                        SB R n/a\", L n/a \" fingers from floor ; R 23.5\", L 22.5\" ; n/a ;                         Rotation R n/a % , L n/a % ; n/a     Strength:           Abdominals 3/5 ; 3+ ; 4- ; 4-          Back extensors 3/5 ; 3+ ; 4- ; 4-          The pt was able to toe and heel walk -- no ; no ; no; no          Hip flexors R/L 4/4- ; 4+/4- ; 4+/4- ; 4-/4-          Hip abductors R/L 4-/3+ ; 4/3+ ; 4/3+ ; 4-/3+          Hip adductors R/L 4+/4 ; 4+/4 ; 4+/4 ; 4+/4          Hip extensors R/L 3/3 ; 3+/3+ ; 3+/3+ ; 3+/3+          Quads R/L 4+/4- ; 5-/4 ; 5-/4 ; 4+/ 4          Hams R/L 4+/4 ; 5-/4+ ; 5-/4+ ; 5-/5-          DF R/L 2/ ;  ;  ;           PF R/L 4/4- ; 4/4- ; 4/4- ; 4-/4-          Great toe extensors R/L 2/ ;  ;  ;      Flexibility:           Hamstrings R mod, L mod-max; n/a ; n/a          Gluteals R min , L mod restriction ; n/a ; n/a          Piriformis R min-mod , L mod restriction ; n/a ; n/a     Transfers:            Sit to stand needs armrests, STS x 5 with armrests 25\" ; STS x 5 with armrests #1 23.7 seconds, #2 20.5 seconds ; STS x 5 24.9 seconds ; STS x 5 with armrests 21 seconds          Supine to sit n/a, pt reports pain in supine and extreme difficulty getting up, requiring assistance; n/a ; n/a     Palpation:           Denies Tenderness with palpation ; denies tenderness     Outcome Measurement:           LEFS  ;  ;      Treatment:   **= HEP  NV= Next visit  np= not performed  nb= non-billable  G= group HEP= discharged to [facility]      Therapeutic Exercise:     30 minutes       Re-evaluation  Nu-step L4 x 7' subjective taken   Heel raises: 10 x 2   Standing hip abduction: 10 x 2 each  Standing " "glut sets: 5\" x 10 **  Standing ham curls: 10 x 1 each NP  Standing marching with dorsiflexion: 10 x 2   Step ups: 6\" x 5 each  Seated trunk flexion 3 way: 2\" x 7 each **   Sit to stand: 5 x 2 with armrests **      Neuromuscular Re-education:           15            minutes  Re-evaluation balance assessment  Standing balance with eyes closed: 15 seconds x 3   Modified tandum balance: 30\" x 2 each   Airex balance 20\" x 3    Patient Education                                    minutes      Gait Training:            minutes   Gait with AFO L shoe with WW 100ft x 2, 50ft x 1 NP    Education:    Pt ed on therapeutic exercise, HEP  Pt ed on discharge plans, progression towards goals, reviewed HEP  HEP Progression:    reiewed  "

## 2024-05-07 ENCOUNTER — HOSPITAL ENCOUNTER (OUTPATIENT)
Dept: CARDIOLOGY | Facility: CLINIC | Age: 84
Discharge: HOME | End: 2024-05-07
Payer: MEDICARE

## 2024-05-07 DIAGNOSIS — Z95.0 PRESENCE OF CARDIAC PACEMAKER: ICD-10-CM

## 2024-05-07 DIAGNOSIS — I49.5 SICK SINUS SYNDROME (MULTI): ICD-10-CM

## 2024-05-07 PROCEDURE — 93280 PM DEVICE PROGR EVAL DUAL: CPT | Performed by: INTERNAL MEDICINE

## 2024-05-07 PROCEDURE — 93280 PM DEVICE PROGR EVAL DUAL: CPT

## 2024-05-07 NOTE — PROGRESS NOTES
Referred by No ref. provider found    Clara I had last seen Carlos in October, he was admitted in November with a small bowel obstruction.  He still sleeping in a recliner because of his spinal stenosis.  He has significant bilateral lower extremity edema.  He stopped taking the metolazone by himself.  Past Medical History:  Problem List Items Addressed This Visit    None       Past Medical History:   Diagnosis Date    Deep vein thrombosis (DVT) of lower extremity (Multi) 09/17/2023    s/p IVC filter and has post-phlebotic syndrome    History of falling     History of fall    Hyperlipidemia 08/28/2023    Dr. Zee follows    Hypertension, benign 08/23/2017    Leg edema 09/05/2023    Overweight     Overweight    Paroxysmal atrial fibrillation (Multi) 08/28/2023    PAF. On Eliquis. 7 second pauses therefore PPM placed. Freq Pafib on PPM. Chads 2 vasc 4    Permanent atrial fibrillation (Multi) 10/17/2023    On Eliquis. 7 second pauses therefore PPM placed. Freq Pafib on PPM. Chads 2 vasc 4    Personal history of other diseases of the digestive system     History of small bowel obstruction    Personal history of other infectious and parasitic diseases     History of herpes zoster    Personal history of other specified conditions     History of bradycardia    Personal history of other specified conditions     History of edema    Personal history of peptic ulcer disease     History of peptic ulcer    Personal history of pneumonia (recurrent)     History of pneumonia    Personal history of urinary calculi     History of kidney stones    Presence of cardiac pacemaker 08/25/2017    10/24/2016: [employer] Advisa MRI  model #[id number]    Sick sinus syndrome (Multi) 09/17/2023    s/p PPM 10/2016             Past Surgical History:  He has a past surgical history that includes Cardiac pacemaker placement (04/05/2018); Cholecystectomy (04/05/2018); and Appendectomy (07/25/2018).      Social History:  He reports that he has never  "smoked. He has never used smokeless tobacco. He reports that he does not drink alcohol and does not use drugs.    Family History:  Family History   Problem Relation Name Age of Onset    Coronary artery disease Father      Heart failure Father      Diabetes Father          Allergies:  Patient has no known allergies.    Outpatient Medications:  Current Outpatient Medications   Medication Instructions    acetaminophen (TYLENOL 8 HOUR) 650 mg, oral, Every 8 hours PRN, Do not crush, chew, or split.    apixaban (ELIQUIS) 5 mg, oral, 2 times daily    cyanocobalamin (Vitamin B-12) 1,000 mcg tablet 1 tablet, oral, Daily    dilTIAZem CD (CARDIZEM CD) 240 mg, oral, 2 times daily    fosinopril (MONOPRIL) 10 mg, oral, Daily    gabapentin (NEURONTIN) 100 mg, oral, Nightly    glipiZIDE XL (GLUCOTROL XL) 2.5 mg, oral, 3 times daily    lancets misc One touch ultra test strips 33G LANCETS Tests once daily    metOLazone (ZAROXOLYN) 2.5 mg, oral, 2 times weekly, On Sunday and Wednesday    metoprolol tartrate (Lopressor) 25 mg tablet 1 tablet, oral, 2 times daily    multivitamin tablet 1 tablet, oral, Daily    pen needle, diabetic (BD Ultra-Fine Tami Pen Needle) 32 gauge x 5/32\" needle 1 Needle, miscellaneous, 4 times daily    RABEprazole (ACIPHEX) 20 mg, oral, Daily    rosuvastatin (CRESTOR) 5 mg, oral, Daily    torsemide (DEMADEX) 20 mg, oral, 2 times daily        Last Recorded Vitals:  There were no vitals filed for this visit.    Physical Exam    Physical  Patient is alert and oriented x3.  HEENT is unremarkable mucous members are moist  Neck no JVP no bruits upstrokes are full no thyromegaly  Lungs are clear bilaterally.  No wheezing crackles or rales  Heart irregular rhythm normal S1-S2 there is no S3 no murmurs are heard.  Abdomen is soft vessels are positive nontender nondistended no organomegaly no pulsatile masses  Extremities have 3+ hard edema  Distal pulses present palpable.  Neuro is grossly nonfocal  Skin has no rashes "     Last Labs:  CBC -  Lab Results   Component Value Date    WBC 9.7 11/01/2023    HGB 13.1 (L) 11/01/2023    HCT 38.8 (L) 11/01/2023    MCV 98 11/01/2023     11/01/2023       CMP -  Lab Results   Component Value Date    CALCIUM 8.7 11/01/2023    PROT 7.5 10/31/2023    ALBUMIN 4.2 10/31/2023    AST 18 10/31/2023    ALT 17 10/31/2023    ALKPHOS 99 10/31/2023    BILITOT 1.4 (H) 10/31/2023       LIPID PANEL -   Lab Results   Component Value Date    CHOL 114 08/28/2023    HDL 39.8 (A) 08/28/2023    CHHDL 2.9 08/28/2023    VLDL 31 08/28/2023    TRIG 157 (H) 08/28/2023    NHDL 142 10/24/2019       RENAL FUNCTION PANEL -   Lab Results   Component Value Date    K 3.7 11/01/2023       Lab Results   Component Value Date     (H) 03/24/2023    HGBA1C 7.9 (H) 03/15/2024     Procedure  ECHO [03/09/2022]: Est EF 50%. RVSP mildly elev (37.3 mmHg). Mod TR.      ECHO [01/30/2016]: Nrml LVF w/ est EF 60%, nrml pattern LV diastolic filling, concentric LVH, mild LVH, mildly elev PAP.     PACER INSERT (10/24/2016): [employer] Advisa MRI DR model #A2DR01     EVENT [03/30/2015]: SR, rate 90. One ep/of A-fib documented on 4/10/15. The vent response was between 120 and 130 beats per minute. No ev/of high-grade AV block. Isolated PVCs w/no VT. No arrhythmia symptoms documented.     EX NST [02/12/2014]: 4 min 28 sec (6.3 METs) ... Normal, EF 56%          Assessment/Plan   1. Atrial fibrillation. CHADS vasc 4. Perm. Con't Eliquis, dilt, BB.  Rates are controlled     2. Sick sinus syndrome. He had a 7-second pause. He has as Medtronic pacemaker followed by Dr. Ramicone.     3. Lower extremity edema.  This been going on for several months.  Worse over the last 6 months.  He is sleeping in a recliner due to spinal stenosis.  He stopped the metolazone.  Weight is gone up 30 pounds since I had seen him in October 2023.  Restart metolazone 2.5 mg in the morning prior to his torsemide.  2 days have a week he will take 5 mg.  BMP in  approximately 7 to 10 days MOIRA visit 2 weeks     3.  Renal function is stable.  Slight increase in November now normalized.  Need to watch on metolazone    4. Hypertension well-controlled.  Will likely need to stop Farzan a pill as his blood pressure will fall     5. Hyperlipidemia. LDL 43 HDL 40. This will be followed by his primary care doctor.     6. Previous DVT. He has an IVC filter in place.     MOIRA visit 2 weeks blood work 7 to 10 days return to see me 8 weeks  Benoit Carlson MD     Instructions and follow up

## 2024-05-08 ENCOUNTER — OFFICE VISIT (OUTPATIENT)
Dept: CARDIOLOGY | Facility: CLINIC | Age: 84
End: 2024-05-08
Payer: MEDICARE

## 2024-05-08 VITALS
DIASTOLIC BLOOD PRESSURE: 58 MMHG | HEIGHT: 69 IN | WEIGHT: 236 LBS | BODY MASS INDEX: 34.96 KG/M2 | SYSTOLIC BLOOD PRESSURE: 106 MMHG | HEART RATE: 84 BPM

## 2024-05-08 DIAGNOSIS — I10 HYPERTENSION, BENIGN: Chronic | ICD-10-CM

## 2024-05-08 DIAGNOSIS — R06.02 SOB (SHORTNESS OF BREATH): ICD-10-CM

## 2024-05-08 DIAGNOSIS — I49.5 SICK SINUS SYNDROME (MULTI): Chronic | ICD-10-CM

## 2024-05-08 DIAGNOSIS — R60.0 LEG EDEMA: Chronic | ICD-10-CM

## 2024-05-08 DIAGNOSIS — E78.2 MIXED HYPERLIPIDEMIA: Primary | Chronic | ICD-10-CM

## 2024-05-08 PROCEDURE — 3078F DIAST BP <80 MM HG: CPT | Performed by: INTERNAL MEDICINE

## 2024-05-08 PROCEDURE — 1036F TOBACCO NON-USER: CPT | Performed by: INTERNAL MEDICINE

## 2024-05-08 PROCEDURE — 3074F SYST BP LT 130 MM HG: CPT | Performed by: INTERNAL MEDICINE

## 2024-05-08 PROCEDURE — 1160F RVW MEDS BY RX/DR IN RCRD: CPT | Performed by: INTERNAL MEDICINE

## 2024-05-08 PROCEDURE — 1159F MED LIST DOCD IN RCRD: CPT | Performed by: INTERNAL MEDICINE

## 2024-05-08 PROCEDURE — 99215 OFFICE O/P EST HI 40 MIN: CPT | Performed by: INTERNAL MEDICINE

## 2024-05-08 RX ORDER — METOLAZONE 2.5 MG/1
2.5 TABLET ORAL DAILY
Qty: 30 TABLET | Refills: 11 | Status: SHIPPED | OUTPATIENT
Start: 2024-05-08 | End: 2024-05-09 | Stop reason: SDUPTHER

## 2024-05-08 NOTE — PATIENT INSTRUCTIONS
1. Atrial fibrillation. CHADS vasc 4. Perm. Con't Eliquis, dilt, BB.  Rates are controlled     2. Sick sinus syndrome. He had a 7-second pause. He has as [employer] pacemaker followed by Dr. Ramicone.     3. Lower extremity edema.  This been going on for several months.  Worse over the last 6 months.  He is sleeping in a recliner due to spinal stenosis.  He stopped the metolazone.  Weight is gone up 30 pounds since I had seen him in October 2023.  Restart metolazone 2.5 mg in the morning prior to his torsemide.  2 days have a week he will take 5 mg.  BMP in approximately 7 to 10 days MO[facility] visit 2 weeks     3.  Renal function is stable.  Slight increase in November now normalized.  Need to watch on metolazone    4. Hypertension well-controlled.  Will likely need to stop Farzan a pill as his blood pressure will fall     5. Hyperlipidemia. LDL 43 HDL 40. This will be followed by his primary care doctor.     6. Previous DVT. He has an IVC filter in place.     MOIRA visit 2 weeks blood work 7 to 10 days return to see me 8 weeks

## 2024-05-09 DIAGNOSIS — R60.0 LEG EDEMA: Chronic | ICD-10-CM

## 2024-05-09 RX ORDER — METOLAZONE 2.5 MG/1
2.5 TABLET ORAL DAILY
Qty: 30 TABLET | Refills: 11 | Status: SHIPPED | OUTPATIENT
Start: 2024-05-09 | End: 2024-05-10 | Stop reason: SDUPTHER

## 2024-05-10 ENCOUNTER — TELEPHONE (OUTPATIENT)
Dept: CARDIOLOGY | Facility: CLINIC | Age: 84
End: 2024-05-10
Payer: MEDICARE

## 2024-05-10 DIAGNOSIS — R60.0 LEG EDEMA: Chronic | ICD-10-CM

## 2024-05-10 NOTE — TELEPHONE ENCOUNTER
metOLazone (Zaroxolyn) 2.5 mg tablet  Take 1 tablet (2.5 mg) by mouth 2 times a week. On Sunday and Wednesday     [id number] - [place] [address] AT [facility] [address] Phone: [phone]   Fax: [phone]

## 2024-05-11 RX ORDER — METOLAZONE 2.5 MG/1
2.5 TABLET ORAL DAILY
Qty: 30 TABLET | Refills: 11 | Status: SHIPPED | OUTPATIENT
Start: 2024-05-11 | End: 2024-05-13 | Stop reason: SDUPTHER

## 2024-05-13 ENCOUNTER — TELEPHONE (OUTPATIENT)
Dept: CARDIOLOGY | Facility: CLINIC | Age: 84
End: 2024-05-13
Payer: MEDICARE

## 2024-05-13 DIAGNOSIS — R60.0 LEG EDEMA: Chronic | ICD-10-CM

## 2024-05-13 NOTE — TELEPHONE ENCOUNTER
"Pharmacy requesting clarification of Metolazone dosing.    Per OV note 5/8- \"Restart metolazone 2.5 mg in the morning prior to his torsemide. 2 days have a week he will take 5 mg\"      "

## 2024-05-14 RX ORDER — METOLAZONE 2.5 MG/1
TABLET ORAL
Qty: 36 TABLET | Refills: 11 | Status: SHIPPED | OUTPATIENT
Start: 2024-05-14 | End: 2024-05-29 | Stop reason: SDUPTHER

## 2024-05-17 ENCOUNTER — LAB (OUTPATIENT)
Dept: LAB | Facility: LAB | Age: 84
End: 2024-05-17
Payer: MEDICARE

## 2024-05-17 DIAGNOSIS — R60.0 LEG EDEMA: Chronic | ICD-10-CM

## 2024-05-17 DIAGNOSIS — R06.02 SOB (SHORTNESS OF BREATH): ICD-10-CM

## 2024-05-17 LAB
ANION GAP SERPL CALC-SCNC: 15 MMOL/L (ref 10–20)
BNP SERPL-MCNC: 232 PG/ML (ref 0–99)
BUN SERPL-MCNC: 34 MG/DL (ref 6–23)
CALCIUM SERPL-MCNC: 9 MG/DL (ref 8.6–10.3)
CHLORIDE SERPL-SCNC: 102 MMOL/L (ref 98–107)
CO2 SERPL-SCNC: 26 MMOL/L (ref 21–32)
CREAT SERPL-MCNC: 1.37 MG/DL (ref 0.5–1.3)
EGFRCR SERPLBLD CKD-EPI 2021: 51 ML/MIN/1.73M*2
GLUCOSE SERPL-MCNC: 234 MG/DL (ref 74–99)
POTASSIUM SERPL-SCNC: 4.1 MMOL/L (ref 3.5–5.3)
SODIUM SERPL-SCNC: 139 MMOL/L (ref 136–145)

## 2024-05-17 PROCEDURE — 83880 ASSAY OF NATRIURETIC PEPTIDE: CPT

## 2024-05-17 PROCEDURE — 36415 COLL VENOUS BLD VENIPUNCTURE: CPT

## 2024-05-17 PROCEDURE — 80048 BASIC METABOLIC PNL TOTAL CA: CPT

## 2024-05-22 ENCOUNTER — OFFICE VISIT (OUTPATIENT)
Dept: CARDIOLOGY | Facility: CLINIC | Age: 84
End: 2024-05-22
Payer: MEDICARE

## 2024-05-22 VITALS
BODY MASS INDEX: 31.16 KG/M2 | OXYGEN SATURATION: 96 % | SYSTOLIC BLOOD PRESSURE: 103 MMHG | DIASTOLIC BLOOD PRESSURE: 56 MMHG | HEART RATE: 85 BPM | WEIGHT: 211 LBS

## 2024-05-22 DIAGNOSIS — I50.33 ACUTE ON CHRONIC HEART FAILURE WITH PRESERVED EJECTION FRACTION (MULTI): Primary | ICD-10-CM

## 2024-05-22 DIAGNOSIS — I48.21 PERMANENT ATRIAL FIBRILLATION (MULTI): Chronic | ICD-10-CM

## 2024-05-22 DIAGNOSIS — I10 HYPERTENSION, UNSPECIFIED TYPE: ICD-10-CM

## 2024-05-22 DIAGNOSIS — I49.5 SICK SINUS SYNDROME (MULTI): Chronic | ICD-10-CM

## 2024-05-22 DIAGNOSIS — I10 HYPERTENSION, BENIGN: Chronic | ICD-10-CM

## 2024-05-22 DIAGNOSIS — I07.1 TRICUSPID VALVE INSUFFICIENCY, UNSPECIFIED ETIOLOGY: ICD-10-CM

## 2024-05-22 PROCEDURE — 99215 OFFICE O/P EST HI 40 MIN: CPT | Performed by: PHYSICIAN ASSISTANT

## 2024-05-22 PROCEDURE — 3078F DIAST BP <80 MM HG: CPT | Performed by: PHYSICIAN ASSISTANT

## 2024-05-22 PROCEDURE — 3074F SYST BP LT 130 MM HG: CPT | Performed by: PHYSICIAN ASSISTANT

## 2024-05-22 PROCEDURE — 1036F TOBACCO NON-USER: CPT | Performed by: PHYSICIAN ASSISTANT

## 2024-05-22 PROCEDURE — 1160F RVW MEDS BY RX/DR IN RCRD: CPT | Performed by: PHYSICIAN ASSISTANT

## 2024-05-22 PROCEDURE — 1159F MED LIST DOCD IN RCRD: CPT | Performed by: PHYSICIAN ASSISTANT

## 2024-05-22 NOTE — PROGRESS NOTES
Chief Complaint:   Follow-up (Patient is here for a follow up)     History Of Present Illness:    Dragan Calvin is a 83 y.o. male presenting with recent exacerbation of HFpEF with marked volume overload.  Weight was up to 236 lbs, his metolazone was uptitrated by Dr. Carlson to 2.5mg 5x weekly and 5mg 2x weekly resulting in 25 lb weight loss over the past 2 weeks.  Serum creatinine slightly increased from baseline currently at 1.37, , and he remains normokalemic.  Patient endorses improvement in ease of breathing with diuresis as well as improvement in peripheral edema.  BP has been running marginally hypotensive with regards to both SBP/DBP with concerns for increased falls risk.  Otherwise, patient is asymptomatic at this time.  Patient denies chest pain, chest pressure, palpitations, dyspnea on exertion, shortness of breath at rest, diaphoresis, nausea/vomiting, back pain, headache, lightheadedness, dizziness, syncope or presyncopal episodes, active bleeding signs or symptoms, excessive weight gain, muscle or joint pain, claudication.         Last Recorded Vitals:  Vitals:    05/22/24 1022   BP: 103/56   BP Location: Left arm   Patient Position: Sitting   BP Cuff Size: Adult   Pulse: 85   SpO2: 96%   Weight: 95.7 kg (211 lb)       Past Medical History:  He has a past medical history of Deep vein thrombosis (DVT) of lower extremity (Multi) (09/17/2023), History of falling, Hyperlipidemia (08/28/2023), Hypertension, benign (08/23/2017), Leg edema (09/05/2023), Overweight, Paroxysmal atrial fibrillation (Multi) (08/28/2023), Permanent atrial fibrillation (Multi) (10/17/2023), Personal history of other diseases of the digestive system, Personal history of other infectious and parasitic diseases, Personal history of other specified conditions, Personal history of other specified conditions, Personal history of peptic ulcer disease, Personal history of pneumonia (recurrent), Personal history of urinary calculi,  "Presence of cardiac pacemaker (08/25/2017), and Sick sinus syndrome (Multi) (09/17/2023).    Past Surgical History:  He has a past surgical history that includes Cardiac pacemaker placement (04/05/2018); Cholecystectomy (04/05/2018); and Appendectomy (07/25/2018).      Social History:  He reports that he has never smoked. He has never used smokeless tobacco. He reports that he does not drink alcohol and does not use drugs.    Family History:  Family History   Problem Relation Name Age of Onset    Coronary artery disease Father      Heart failure Father      Diabetes Father          Allergies:  Patient has no known allergies.    Outpatient Medications:  Current Outpatient Medications   Medication Instructions    acetaminophen (TYLENOL 8 HOUR) 650 mg, oral, Every 8 hours PRN, Do not crush, chew, or split.    apixaban (ELIQUIS) 5 mg, oral, 2 times daily    cyanocobalamin (Vitamin B-12) 1,000 mcg tablet 1 tablet, oral, Daily    dilTIAZem CD (CARDIZEM CD) 240 mg, oral, 2 times daily    fosinopril (MONOPRIL) 10 mg, oral, Daily    glipiZIDE XL (GLUCOTROL XL) 2.5 mg, oral, 3 times daily    lancets misc One touch ultra test strips 33G LANCETS Tests once daily    metOLazone (Zaroxolyn) 2.5 mg tablet Take 1 tablet (2.5 mg) by mouth 5 times a week AND 2 tablets (5 mg) 2 times a week. On Sunday and Wednesday.    metoprolol tartrate (Lopressor) 25 mg tablet 1 tablet, oral, 2 times daily    multivitamin tablet 1 tablet, oral, Daily    pen needle, diabetic (BD Ultra-Fine Tami Pen Needle) 32 gauge x 5/32\" needle 1 Needle, miscellaneous, 4 times daily    RABEprazole (ACIPHEX) 20 mg, oral, Daily    rosuvastatin (CRESTOR) 5 mg, oral, Daily    torsemide (DEMADEX) 20 mg, oral, 2 times daily       Physical Exam:  Constitutional: awake and alert, oriented ×3, no apparent distress  Skin: warm, dry, good turgor no obvious lesions  Eyes: pupils equal, round, reactive to light, conjunctiva pink and noninjected, no discharge  HENT: " "normocephalic and atraumatic, mucous membranes moist, trachea midline with no masses/goiter  Cardiovascular: S1/S2 irregular, no murmur no rubs/gallops, no carotid bruits, no JVD  Pulmonary: symmetrical chest expansion, lungs are clear to auscultation bilaterally, no wheezes/rales/rhonchi, normal effort  Abdomen: nontender, nondistended, active bowel sounds, no ascites  Extremities: no cyanosis, clubbing, trace - 1+ BLE edema, evidence of chronic pretibial skin changes/hyperkeratosis and rubrous, no lesions; palpable pedal pulses  Neurologic: cranial nerves II - XII grossly intact, stable gait, no tremor       Last Labs:  CBC -  Lab Results   Component Value Date    WBC 9.7 11/01/2023    HGB 13.1 (L) 11/01/2023    HCT 38.8 (L) 11/01/2023    MCV 98 11/01/2023     11/01/2023       CMP -  Lab Results   Component Value Date    CALCIUM 9.0 05/17/2024    PROT 7.5 10/31/2023    ALBUMIN 4.2 10/31/2023    AST 18 10/31/2023    ALT 17 10/31/2023    ALKPHOS 99 10/31/2023    BILITOT 1.4 (H) 10/31/2023       LIPID PANEL -   Lab Results   Component Value Date    CHOL 114 08/28/2023    TRIG 157 (H) 08/28/2023    HDL 39.8 (A) 08/28/2023    CHHDL 2.9 08/28/2023    LDLF 43 08/28/2023    VLDL 31 08/28/2023    NHDL 142 10/24/2019       RENAL FUNCTION PANEL -   Lab Results   Component Value Date    GLUCOSE 234 (H) 05/17/2024     05/17/2024    K 4.1 05/17/2024     05/17/2024    CO2 26 05/17/2024    ANIONGAP 15 05/17/2024    BUN 34 (H) 05/17/2024    CREATININE 1.37 (H) 05/17/2024    GFRMALE 67 08/28/2023    CALCIUM 9.0 05/17/2024    ALBUMIN 4.2 10/31/2023        Lab Results   Component Value Date     (H) 05/17/2024    HGBA1C 7.9 (H) 03/15/2024       Last Cardiology Tests:  ECG:  ECG 12 lead 11/24/2023      Echo:  No results found for this or any previous visit from the past 1095 days.      Ejection Fractions:  No results found for: \"EF\"    Cath:  No results found for this or any previous visit from the past 1095 " days.      Stress Test:  No results found for this or any previous visit from the past 1095 days.      Cardiac Imaging:  No results found for this or any previous visit from the past 1095 days.      Assessment/Plan   Problem List Items Addressed This Visit             ICD-10-CM       Cardiac and Vasculature    Hypertension, benign (Chronic) I10    Sick sinus syndrome (Multi) (Chronic) I49.5    Permanent atrial fibrillation (Multi) (Chronic) I48.21    Heart failure with preserved ejection fraction (Multi) - Primary I50.30    Tricuspid valve insufficiency I07.1     Other Visit Diagnoses         Codes    Hypertension, unspecified type     I10            -Patient appears stable and euvolemic s/p 25 lb volume removal    -Continue torsemide and metolazone at current dose/schedule    -Reduce fosinopril to 3x weekly in the setting of marginal/soft blood pressures    -Continue rate control via CCB/BB for VR control with underlying permanent atrial fibrillation    -DOAC for CVA prophylaxis    -F/U with Dr. Ramicone and Dr. Carlson as scheduled      Israel Fabian PA-C

## 2024-05-29 ENCOUNTER — OFFICE VISIT (OUTPATIENT)
Dept: CARDIOLOGY | Facility: CLINIC | Age: 84
End: 2024-05-29
Payer: MEDICARE

## 2024-05-29 VITALS
SYSTOLIC BLOOD PRESSURE: 120 MMHG | WEIGHT: 211 LBS | HEART RATE: 76 BPM | BODY MASS INDEX: 31.16 KG/M2 | OXYGEN SATURATION: 96 % | DIASTOLIC BLOOD PRESSURE: 80 MMHG

## 2024-05-29 DIAGNOSIS — I48.21 PERMANENT ATRIAL FIBRILLATION (MULTI): Chronic | ICD-10-CM

## 2024-05-29 DIAGNOSIS — Z95.0 PRESENCE OF CARDIAC PACEMAKER: Primary | Chronic | ICD-10-CM

## 2024-05-29 DIAGNOSIS — I50.32 CHRONIC HEART FAILURE WITH PRESERVED EJECTION FRACTION (MULTI): ICD-10-CM

## 2024-05-29 DIAGNOSIS — R60.0 LEG EDEMA: Chronic | ICD-10-CM

## 2024-05-29 DIAGNOSIS — I49.5 SICK SINUS SYNDROME (MULTI): Chronic | ICD-10-CM

## 2024-05-29 PROCEDURE — 1160F RVW MEDS BY RX/DR IN RCRD: CPT | Performed by: INTERNAL MEDICINE

## 2024-05-29 PROCEDURE — 1036F TOBACCO NON-USER: CPT | Performed by: INTERNAL MEDICINE

## 2024-05-29 PROCEDURE — 3074F SYST BP LT 130 MM HG: CPT | Performed by: INTERNAL MEDICINE

## 2024-05-29 PROCEDURE — 99214 OFFICE O/P EST MOD 30 MIN: CPT | Performed by: INTERNAL MEDICINE

## 2024-05-29 PROCEDURE — 3079F DIAST BP 80-89 MM HG: CPT | Performed by: INTERNAL MEDICINE

## 2024-05-29 PROCEDURE — 1159F MED LIST DOCD IN RCRD: CPT | Performed by: INTERNAL MEDICINE

## 2024-05-29 RX ORDER — METOLAZONE 2.5 MG/1
TABLET ORAL
Qty: 108 TABLET | Refills: 3 | Status: SHIPPED | OUTPATIENT
Start: 2024-05-29 | End: 2025-05-29

## 2024-05-29 RX ORDER — TORSEMIDE 20 MG/1
60 TABLET ORAL DAILY
Qty: 270 TABLET | Refills: 3 | Status: SHIPPED | OUTPATIENT
Start: 2024-05-29 | End: 2025-05-29

## 2024-05-29 RX ORDER — METOPROLOL TARTRATE 25 MG/1
25 TABLET, FILM COATED ORAL 2 TIMES DAILY
Qty: 180 TABLET | Refills: 3 | Status: SHIPPED | OUTPATIENT
Start: 2024-05-29 | End: 2025-05-29

## 2024-05-29 NOTE — LETTER
"May 29, 2024     Emmanuel Brown DO  [address] [address]    Patient: Dragan Calvin   YOB: 1940   Date of Visit: 5/29/2024       Dear Dr. Emmanuel Brown DO:    Thank you for referring Dragan Calvin to me for evaluation. Below are my notes for this consultation.  If you have questions, please do not hesitate to call me. I look forward to following your patient along with you.       Sincerely,     James C Ramicone, DO      CC: No Recipients  ______________________________________________________________________________________    History Of Present Illness:      This is an 83-year-old male with atrial fibrillation and sick sinus syndrome.  He reports a 30 pound weight gain but he has lost about 20 pounds with adjustments in diuretics.  He still has lower extremity edema and has mild dyspnea on exertion.  No pacemaker related problems.    Review of Systems  Other review of systems negative     Last Recorded Vitals:      12/26/2023     3:51 PM 12/26/2023     4:23 PM 1/18/2024    11:12 AM 3/15/2024     9:04 AM 5/8/2024     9:35 AM 5/22/2024    10:22 AM 5/29/2024     1:23 PM   Vitals   Systolic 124 130 94 122 106 103 120   Diastolic 78 69 50 70 58 56 80   Heart Rate 68 70 86 85 84 85 76   Temp   35.9 °C (96.6 °F)       Resp 18 18        Height (in)   1.727 m (5' 8\")  1.753 m (5' 9\")     Weight (lb)   212 222 236 211 211   BMI   32.23 kg/m2 33.75 kg/m2 34.85 kg/m2 31.16 kg/m2 31.16 kg/m2   BSA (m2)   2.15 m2 2.2 m2 2.28 m2 2.16 m2 2.16 m2   Visit Report   Report Report Report Report Report          Allergies:  Patient has no known allergies.    Outpatient Medications:  Current Outpatient Medications   Medication Instructions   • acetaminophen (TYLENOL 8 HOUR) 650 mg, oral, Every 8 hours PRN, Do not crush, chew, or split.   • apixaban (ELIQUIS) 5 mg, oral, 2 times daily   • cyanocobalamin (Vitamin B-12) 1,000 mcg tablet 1 tablet, oral, Daily   • dilTIAZem CD (CARDIZEM CD) 240 mg, oral, 2 times daily   • " "fosinopril (MONOPRIL) 10 mg, oral, Daily   • glipiZIDE XL (GLUCOTROL XL) 2.5 mg, oral, 3 times daily   • lancets misc One touch ultra test strips 33G LANCETS Tests once daily   • metOLazone (Zaroxolyn) 2.5 mg tablet Take 1 tablet (2.5 mg) by mouth 5 times a week AND 2 tablets (5 mg) 2 times a week. On Sunday and Wednesday.   • metoprolol tartrate (Lopressor) 25 mg tablet 1 tablet, oral, 2 times daily   • multivitamin tablet 1 tablet, oral, Daily   • pen needle, diabetic (BD Ultra-Fine Tami Pen Needle) 32 gauge x 5/32\" needle 1 Needle, miscellaneous, 4 times daily   • RABEprazole (ACIPHEX) 20 mg, oral, Daily   • rosuvastatin (CRESTOR) 5 mg, oral, Daily   • torsemide (DEMADEX) 20 mg, oral, 2 times daily       Physical Exam:    General Appearance:  Alert, oriented, no distress  Skin:  Warm and dry  Head and Neck:  No elevation of JVP, no carotid bruits  Cardiac Exam:  Rhythm is irregular, S1 and S2 are normal, no murmur S3 or S4  Lungs:  Clear to auscultation  Extremities:  2+ edema  Neurologic:  No focal deficits  Psychiatric:  Appropriate mood and behavior         Cardiology Tests:  I have personally review the diagnostic cardiac testing and my interpretation is as follows:    Normal pacemaker function, longstanding persistent atrial fibrillation  Echocardiogram 2022: Ejection fraction 50%      Assessment/Plan  Problem List Items Addressed This Visit             ICD-10-CM    Leg edema (Chronic) R60.0    Relevant Medications    metOLazone (Zaroxolyn) 2.5 mg tablet    torsemide (Demadex) 20 mg tablet    Presence of cardiac pacemaker - Primary (Chronic) Z95.0    Sick sinus syndrome (Multi) (Chronic) I49.5    Relevant Medications    metoprolol tartrate (Lopressor) 25 mg tablet    Permanent atrial fibrillation (Multi) (Chronic) I48.21     1.  Persistent atrial fibrillation: This patient has longstanding persistent AF and this is likely permanent since he has been in atrial fibrillation since January 2020.  The ventricular " response is controlled.  I would not recommend rhythm control strategies as this will likely be unsuccessful given the duration of this arrhythmia.    2.  Chronic diastolic CHF: The patient will temporarily increase the torsemide to 60 mg daily for the next 4 days and monitor his weight closely.  He has had a slightly elevated creatinine level but he still has significant peripheral edema.  We discussed the dosing instructions for torsemide and he will continue to monitor his weight on a daily basis.  An echocardiogram will be obtained.    3.  Sick sinus syndrome: The pacemaker function has been normal and the estimated battery longevity is 13 months.  Continue follow-up through the cardiac device clinic.             Relevant Medications    apixaban (Eliquis) 5 mg tablet    metoprolol tartrate (Lopressor) 25 mg tablet    Other Relevant Orders    Transthoracic echo (TTE) complete    Heart failure with preserved ejection fraction (Multi) I50.30    Relevant Medications    metoprolol tartrate (Lopressor) 25 mg tablet       James C Ramicone, DO

## 2024-05-29 NOTE — PROGRESS NOTES
"History Of Present Illness:      This is an 83-year-old male with atrial fibrillation and sick sinus syndrome.  He reports a 30 pound weight gain but he has lost about 20 pounds with adjustments in diuretics.  He still has lower extremity edema and has mild dyspnea on exertion.  No pacemaker related problems.    Review of Systems  Other review of systems negative     Last Recorded Vitals:      12/26/2023     3:51 PM 12/26/2023     4:23 PM 1/18/2024    11:12 AM 3/15/2024     9:04 AM 5/8/2024     9:35 AM 5/22/2024    10:22 AM 5/29/2024     1:23 PM   Vitals   Systolic 124 130 94 122 106 103 120   Diastolic 78 69 50 70 58 56 80   Heart Rate 68 70 86 85 84 85 76   Temp   35.9 °C (96.6 °F)       Resp 18 18        Height (in)   1.727 m (5' 8\")  1.753 m (5' 9\")     Weight (lb)   212 222 236 211 211   BMI   32.23 kg/m2 33.75 kg/m2 34.85 kg/m2 31.16 kg/m2 31.16 kg/m2   BSA (m2)   2.15 m2 2.2 m2 2.28 m2 2.16 m2 2.16 m2   Visit Report   Report Report Report Report Report          Allergies:  Patient has no known allergies.    Outpatient Medications:  Current Outpatient Medications   Medication Instructions    acetaminophen (TYLENOL 8 HOUR) 650 mg, oral, Every 8 hours PRN, Do not crush, chew, or split.    apixaban (ELIQUIS) 5 mg, oral, 2 times daily    cyanocobalamin (Vitamin B-12) 1,000 mcg tablet 1 tablet, oral, Daily    dilTIAZem CD (CARDIZEM CD) 240 mg, oral, 2 times daily    fosinopril (MONOPRIL) 10 mg, oral, Daily    glipiZIDE XL (GLUCOTROL XL) 2.5 mg, oral, 3 times daily    lancets misc One touch ultra test strips 33G LANCETS Tests once daily    metOLazone (Zaroxolyn) 2.5 mg tablet Take 1 tablet (2.5 mg) by mouth 5 times a week AND 2 tablets (5 mg) 2 times a week. On Sunday and Wednesday.    metoprolol tartrate (Lopressor) 25 mg tablet 1 tablet, oral, 2 times daily    multivitamin tablet 1 tablet, oral, Daily    pen needle, diabetic (BD Ultra-Fine Tami Pen Needle) 32 gauge x 5/32\" needle 1 Needle, miscellaneous, 4 times " daily    RABEprazole (ACIPHEX) 20 mg, oral, Daily    rosuvastatin (CRESTOR) 5 mg, oral, Daily    torsemide (DEMADEX) 20 mg, oral, 2 times daily       Physical Exam:    General Appearance:  Alert, oriented, no distress  Skin:  Warm and dry  Head and Neck:  No elevation of JVP, no carotid bruits  Cardiac Exam:  Rhythm is irregular, S1 and S2 are normal, no murmur S3 or S4  Lungs:  Clear to auscultation  Extremities:  2+ edema  Neurologic:  No focal deficits  Psychiatric:  Appropriate mood and behavior         Cardiology Tests:  I have personally review the diagnostic cardiac testing and my interpretation is as follows:    Normal pacemaker function, longstanding persistent atrial fibrillation  Echocardiogram 2022: Ejection fraction 50%      Assessment/Plan   Problem List Items Addressed This Visit             ICD-10-CM    Leg edema (Chronic) R60.0    Relevant Medications    metOLazone (Zaroxolyn) 2.5 mg tablet    torsemide (Demadex) 20 mg tablet    Presence of cardiac pacemaker - Primary (Chronic) Z95.0    Sick sinus syndrome (Multi) (Chronic) I49.5    Relevant Medications    metoprolol tartrate (Lopressor) 25 mg tablet    Permanent atrial fibrillation (Multi) (Chronic) I48.21     1.  Persistent atrial fibrillation: This patient has longstanding persistent AF and this is likely permanent since he has been in atrial fibrillation since January 2020.  The ventricular response is controlled.  I would not recommend rhythm control strategies as this will likely be unsuccessful given the duration of this arrhythmia.    2.  Chronic diastolic CHF: The patient will temporarily increase the torsemide to 60 mg daily for the next 4 days and monitor his weight closely.  He has had a slightly elevated creatinine level but he still has significant peripheral edema.  We discussed the dosing instructions for torsemide and he will continue to monitor his weight on a daily basis.  An echocardiogram will be obtained.    3.  Sick sinus  syndrome: The pacemaker function has been normal and the estimated battery longevity is 13 months.  Continue follow-up through the cardiac device clinic.             Relevant Medications    apixaban (Eliquis) 5 mg tablet    metoprolol tartrate (Lopressor) 25 mg tablet    Other Relevant Orders    Transthoracic echo (TTE) complete    Heart failure with preserved ejection fraction (Multi) I50.30    Relevant Medications    metoprolol tartrate (Lopressor) 25 mg tablet       James C Ramicone, DO

## 2024-05-29 NOTE — PATIENT INSTRUCTIONS
Change Torsemide 20 mg tabs to 3 tabs daily for the next 4 days, then decrease back to 2 tabs daily.    Monitor your weight daily.    Follow up with Dr Ramicone in 1 year.

## 2024-06-17 ENCOUNTER — OFFICE VISIT (OUTPATIENT)
Dept: PAIN MEDICINE | Facility: CLINIC | Age: 84
End: 2024-06-17
Payer: MEDICARE

## 2024-06-17 VITALS
TEMPERATURE: 98.6 F | DIASTOLIC BLOOD PRESSURE: 56 MMHG | BODY MASS INDEX: 30.51 KG/M2 | SYSTOLIC BLOOD PRESSURE: 111 MMHG | OXYGEN SATURATION: 98 % | HEART RATE: 75 BPM | WEIGHT: 206 LBS | HEIGHT: 69 IN | RESPIRATION RATE: 18 BRPM

## 2024-06-17 DIAGNOSIS — M48.062 SPINAL STENOSIS, LUMBAR REGION, WITH NEUROGENIC CLAUDICATION: ICD-10-CM

## 2024-06-17 DIAGNOSIS — Z79.891 ADMISSION FOR LONG-TERM OPIATE ANALGESIC USE: Primary | ICD-10-CM

## 2024-06-17 LAB
AMPHETAMINES UR QL SCN: NORMAL
BARBITURATES UR QL SCN: NORMAL
BZE UR QL SCN: NORMAL
CANNABINOIDS UR QL SCN: NORMAL
CREAT UR-MCNC: 60.8 MG/DL (ref 20–370)
PCP UR QL SCN: NORMAL

## 2024-06-17 PROCEDURE — 80346 BENZODIAZEPINES1-12: CPT | Mod: MUE | Performed by: ANESTHESIOLOGY

## 2024-06-17 PROCEDURE — 3074F SYST BP LT 130 MM HG: CPT | Performed by: ANESTHESIOLOGY

## 2024-06-17 PROCEDURE — 80307 DRUG TEST PRSMV CHEM ANLYZR: CPT | Mod: MUE | Performed by: ANESTHESIOLOGY

## 2024-06-17 PROCEDURE — 3078F DIAST BP <80 MM HG: CPT | Performed by: ANESTHESIOLOGY

## 2024-06-17 PROCEDURE — 99214 OFFICE O/P EST MOD 30 MIN: CPT | Performed by: ANESTHESIOLOGY

## 2024-06-17 PROCEDURE — 1159F MED LIST DOCD IN RCRD: CPT | Performed by: ANESTHESIOLOGY

## 2024-06-17 PROCEDURE — 1036F TOBACCO NON-USER: CPT | Performed by: ANESTHESIOLOGY

## 2024-06-17 PROCEDURE — 1125F AMNT PAIN NOTED PAIN PRSNT: CPT | Performed by: ANESTHESIOLOGY

## 2024-06-17 RX ORDER — TRAMADOL HYDROCHLORIDE 50 MG/1
50 TABLET ORAL 2 TIMES DAILY PRN
Qty: 14 TABLET | Refills: 0 | Status: SHIPPED | OUTPATIENT
Start: 2024-06-17 | End: 2024-06-24

## 2024-06-17 SDOH — ECONOMIC STABILITY: FOOD INSECURITY: WITHIN THE PAST 12 MONTHS, YOU WORRIED THAT YOUR FOOD WOULD RUN OUT BEFORE YOU GOT MONEY TO BUY MORE.: NEVER TRUE

## 2024-06-17 SDOH — ECONOMIC STABILITY: FOOD INSECURITY: WITHIN THE PAST 12 MONTHS, THE FOOD YOU BOUGHT JUST DIDN'T LAST AND YOU DIDN'T HAVE MONEY TO GET MORE.: NEVER TRUE

## 2024-06-17 ASSESSMENT — LIFESTYLE VARIABLES
HOW MANY STANDARD DRINKS CONTAINING ALCOHOL DO YOU HAVE ON A TYPICAL DAY: PATIENT DOES NOT DRINK
SKIP TO QUESTIONS 9-10: 1
HOW OFTEN DO YOU HAVE A DRINK CONTAINING ALCOHOL: NEVER
HOW OFTEN DO YOU HAVE SIX OR MORE DRINKS ON ONE OCCASION: NEVER
AUDIT-C TOTAL SCORE: 0

## 2024-06-17 ASSESSMENT — PATIENT HEALTH QUESTIONNAIRE - PHQ9
1. LITTLE INTEREST OR PLEASURE IN DOING THINGS: NOT AT ALL
2. FEELING DOWN, DEPRESSED OR HOPELESS: NOT AT ALL
SUM OF ALL RESPONSES TO PHQ9 QUESTIONS 1 AND 2: 0

## 2024-06-17 ASSESSMENT — PAIN - FUNCTIONAL ASSESSMENT: PAIN_FUNCTIONAL_ASSESSMENT: 0-10

## 2024-06-17 ASSESSMENT — ENCOUNTER SYMPTOMS
FEVER: 0
LOSS OF SENSATION IN FEET: 1
BACK PAIN: 1
DEPRESSION: 0
OCCASIONAL FEELINGS OF UNSTEADINESS: 1
SHORTNESS OF BREATH: 0

## 2024-06-17 ASSESSMENT — PAIN SCALES - GENERAL
PAINLEVEL: 5
PAINLEVEL_OUTOF10: 5 - MODERATE PAIN

## 2024-06-17 ASSESSMENT — PAIN DESCRIPTION - DESCRIPTORS: DESCRIPTORS: SHARP

## 2024-06-17 ASSESSMENT — COLUMBIA-SUICIDE SEVERITY RATING SCALE - C-SSRS
2. HAVE YOU ACTUALLY HAD ANY THOUGHTS OF KILLING YOURSELF?: NO
1. IN THE PAST MONTH, HAVE YOU WISHED YOU WERE DEAD OR WISHED YOU COULD GO TO SLEEP AND NOT WAKE UP?: NO
6. HAVE YOU EVER DONE ANYTHING, STARTED TO DO ANYTHING, OR PREPARED TO DO ANYTHING TO END YOUR LIFE?: NO

## 2024-06-17 NOTE — PROGRESS NOTES
Chief Complain  Follow-up (For pain in the left thigh. Pain is getting worse. Tylenol is not helping. Had injections in the past and it did not help. When sitting pain eases up.)       History Of Present Illness  Dragan Calvin is a 83 y.o. male here for left thigh pain. The patient rates the pain at 5  on a scale from 0-10.  The patient describes pain as sharp.  The pain is worsened by standing and walking and is alleviated by sitting.  Since the last visit the pain has worsened.  The patient denies any fever, chills, weight loss, bladder/bowel incontinence.       Past Medical History  He has a past medical history of Deep vein thrombosis (DVT) of lower extremity (Multi) (09/17/2023), History of falling, Hyperlipidemia (08/28/2023), Hypertension, benign (08/23/2017), Leg edema (09/05/2023), Overweight, Paroxysmal atrial fibrillation (Multi) (08/28/2023), Permanent atrial fibrillation (Multi) (10/17/2023), Personal history of other diseases of the digestive system, Personal history of other infectious and parasitic diseases, Personal history of other specified conditions, Personal history of other specified conditions, Personal history of peptic ulcer disease, Personal history of pneumonia (recurrent), Personal history of urinary calculi, Presence of cardiac pacemaker (08/25/2017), and Sick sinus syndrome (Multi) (09/17/2023).    Surgical History  He has a past surgical history that includes Cardiac pacemaker placement (04/05/2018); Cholecystectomy (04/05/2018); and Appendectomy (07/25/2018).     Social History  He reports that he has never smoked. He has never used smokeless tobacco. He reports that he does not drink alcohol and does not use drugs.    Family History  Family History   Problem Relation Name Age of Onset    Coronary artery disease Father      Heart failure Father      Diabetes Father          Allergies  Patient has no known allergies.    Review of Systems  Review of Systems   Constitutional:   "Negative for fever.   Respiratory:  Negative for shortness of breath.    Cardiovascular:  Negative for chest pain.   Musculoskeletal:  Positive for back pain.   Psychiatric/Behavioral:  Negative for suicidal ideas.         Physical Exam  Physical Exam  HENT:      Head: Normocephalic and atraumatic.   Eyes:      Extraocular Movements: Extraocular movements intact.      Pupils: Pupils are equal, round, and reactive to light.   Pulmonary:      Effort: Pulmonary effort is normal.   Musculoskeletal:      Cervical back: Neck supple.   Neurological:      Mental Status: He is alert.   Psychiatric:         Mood and Affect: Mood normal.           Last Recorded Vitals  Blood pressure 111/56, pulse 75, temperature 37 °C (98.6 °F), resp. rate 18, height 1.753 m (5' 9\"), weight 93.4 kg (206 lb), SpO2 98%.       Assessment/Plan     Dragan Calvin is a 83 y.o. male here for follow-up of low back, left pain radiating to left thigh.  Likely from the significant spinal stenosis he had at L3-4, L4-5.  He has previously had epidural steroid injection with limited benefit as far as his leg pain is concerned.  He has also previously failed treatment with gabapentin which makes him too sleepy, he is unable to take NSAIDs due to his being on blood thinners.  He has been managing his pain with Tylenol with limited benefit.  Has tried physical therapy.  We have previously discussed surgical referral which is not interested in at this point.  I did agree to trial him on tramadol to manage his chronic pain.  Discussed risk associated with narcotics including but not limited to addiction, dependence, respiratory depression, death, mental health issues, hormonal changes.  I have personally reviewed the OARRS report. T I have considered the risks of abuse, dependence, addiction and diversion.  opiate agreement was signed by the patient.    Total time spent caring for the patient today was 30 minutes. This includes time spent before the visit " reviewing the chart, time spent during the visit, and time spent after the visit on documentation.      Chuck Gonzalez MD

## 2024-06-18 DIAGNOSIS — E11.9 TYPE 2 DIABETES MELLITUS WITHOUT COMPLICATION, WITHOUT LONG-TERM CURRENT USE OF INSULIN (MULTI): ICD-10-CM

## 2024-06-18 DIAGNOSIS — K21.9 GASTROESOPHAGEAL REFLUX DISEASE WITHOUT ESOPHAGITIS: ICD-10-CM

## 2024-06-18 RX ORDER — GLIPIZIDE 2.5 MG/1
2.5 TABLET, EXTENDED RELEASE ORAL 3 TIMES DAILY
Qty: 270 TABLET | Refills: 1 | Status: SHIPPED | OUTPATIENT
Start: 2024-06-18

## 2024-06-18 RX ORDER — RABEPRAZOLE SODIUM 20 MG/1
20 TABLET, DELAYED RELEASE ORAL DAILY
Qty: 90 TABLET | Refills: 1 | Status: SHIPPED | OUTPATIENT
Start: 2024-06-18 | End: 2024-06-18

## 2024-06-18 RX ORDER — RABEPRAZOLE SODIUM 20 MG/1
20 TABLET, DELAYED RELEASE ORAL DAILY
Qty: 90 TABLET | Refills: 1 | Status: SHIPPED | OUTPATIENT
Start: 2024-06-18

## 2024-06-25 DIAGNOSIS — Z79.4 TYPE 2 DIABETES MELLITUS WITHOUT COMPLICATION, WITH LONG-TERM CURRENT USE OF INSULIN (MULTI): ICD-10-CM

## 2024-06-25 DIAGNOSIS — E11.9 TYPE 2 DIABETES MELLITUS WITHOUT COMPLICATION, WITH LONG-TERM CURRENT USE OF INSULIN (MULTI): ICD-10-CM

## 2024-06-25 DIAGNOSIS — K21.9 GASTROESOPHAGEAL REFLUX DISEASE WITHOUT ESOPHAGITIS: ICD-10-CM

## 2024-06-25 RX ORDER — RABEPRAZOLE SODIUM 20 MG/1
20 TABLET, DELAYED RELEASE ORAL DAILY
Qty: 90 TABLET | Refills: 3 | Status: SHIPPED | OUTPATIENT
Start: 2024-06-25 | End: 2025-06-25

## 2024-06-25 RX ORDER — INSULIN DEGLUDEC 100 U/ML
26 INJECTION, SOLUTION SUBCUTANEOUS NIGHTLY
Qty: 15 ML | Refills: 3 | Status: SHIPPED | OUTPATIENT
Start: 2024-06-25 | End: 2025-06-25

## 2024-06-27 ENCOUNTER — HOSPITAL ENCOUNTER (OUTPATIENT)
Dept: CARDIOLOGY | Facility: CLINIC | Age: 84
Discharge: HOME | End: 2024-06-27
Payer: MEDICARE

## 2024-06-27 DIAGNOSIS — I48.21 PERMANENT ATRIAL FIBRILLATION (MULTI): Chronic | ICD-10-CM

## 2024-06-27 LAB
AORTIC VALVE PEAK VELOCITY: 1.38 M/S
AV PEAK GRADIENT: 7.6 MMHG
AVA (PEAK VEL): 2.5 CM2
EJECTION FRACTION APICAL 4 CHAMBER: 55.1
EJECTION FRACTION: 55 %
LEFT VENTRICLE INTERNAL DIMENSION DIASTOLE: 4.5 CM (ref 3.5–6)
LEFT VENTRICULAR OUTFLOW TRACT DIAMETER: 1.99 CM
RIGHT VENTRICLE FREE WALL PEAK S': 11 CM/S
RIGHT VENTRICLE PEAK SYSTOLIC PRESSURE: 26 MMHG

## 2024-06-27 PROCEDURE — 93306 TTE W/DOPPLER COMPLETE: CPT

## 2024-06-27 PROCEDURE — 2500000004 HC RX 250 GENERAL PHARMACY W/ HCPCS (ALT 636 FOR OP/ED): Performed by: INTERNAL MEDICINE

## 2024-06-27 PROCEDURE — 93306 TTE W/DOPPLER COMPLETE: CPT | Performed by: INTERNAL MEDICINE

## 2024-06-28 ENCOUNTER — OFFICE VISIT (OUTPATIENT)
Dept: WOUND CARE | Facility: CLINIC | Age: 84
End: 2024-06-28
Payer: MEDICARE

## 2024-06-28 PROCEDURE — 99212 OFFICE O/P EST SF 10 MIN: CPT

## 2024-06-28 PROCEDURE — 11042 DBRDMT SUBQ TIS 1ST 20SQCM/<: CPT

## 2024-07-01 ENCOUNTER — OFFICE VISIT (OUTPATIENT)
Dept: WOUND CARE | Facility: CLINIC | Age: 84
End: 2024-07-01
Payer: MEDICARE

## 2024-07-01 PROCEDURE — 11042 DBRDMT SUBQ TIS 1ST 20SQCM/<: CPT

## 2024-07-08 ENCOUNTER — OFFICE VISIT (OUTPATIENT)
Dept: WOUND CARE | Facility: CLINIC | Age: 84
End: 2024-07-08
Payer: MEDICARE

## 2024-07-08 PROCEDURE — 99212 OFFICE O/P EST SF 10 MIN: CPT

## 2024-07-09 PROBLEM — M19.90 OSTEOARTHRITIS: Status: ACTIVE | Noted: 2024-07-09

## 2024-07-09 PROBLEM — K56.609 SBO (SMALL BOWEL OBSTRUCTION) (MULTI): Status: RESOLVED | Noted: 2023-10-31 | Resolved: 2024-07-09

## 2024-07-18 ENCOUNTER — APPOINTMENT (OUTPATIENT)
Dept: PRIMARY CARE | Facility: CLINIC | Age: 84
End: 2024-07-18
Payer: MEDICARE

## 2024-07-18 VITALS
BODY MASS INDEX: 30.57 KG/M2 | HEART RATE: 74 BPM | WEIGHT: 207 LBS | OXYGEN SATURATION: 97 % | SYSTOLIC BLOOD PRESSURE: 108 MMHG | DIASTOLIC BLOOD PRESSURE: 60 MMHG

## 2024-07-18 DIAGNOSIS — E11.9 TYPE 2 DIABETES MELLITUS WITHOUT COMPLICATION, WITH LONG-TERM CURRENT USE OF INSULIN (MULTI): ICD-10-CM

## 2024-07-18 DIAGNOSIS — I82.5Z9 CHRONIC DEEP VEIN THROMBOSIS (DVT) OF DISTAL VEIN OF LOWER EXTREMITY, UNSPECIFIED LATERALITY (MULTI): ICD-10-CM

## 2024-07-18 DIAGNOSIS — Z79.4 TYPE 2 DIABETES MELLITUS WITH DIABETIC PERIPHERAL ANGIOPATHY WITHOUT GANGRENE, WITH LONG-TERM CURRENT USE OF INSULIN (MULTI): Primary | ICD-10-CM

## 2024-07-18 DIAGNOSIS — N18.31 STAGE 3A CHRONIC KIDNEY DISEASE (MULTI): ICD-10-CM

## 2024-07-18 DIAGNOSIS — Z79.4 TYPE 2 DIABETES MELLITUS WITHOUT COMPLICATION, WITH LONG-TERM CURRENT USE OF INSULIN (MULTI): ICD-10-CM

## 2024-07-18 DIAGNOSIS — Z71.89 ADVANCE CARE PLANNING: ICD-10-CM

## 2024-07-18 DIAGNOSIS — E11.9 TYPE 2 DIABETES MELLITUS WITHOUT COMPLICATION, WITHOUT LONG-TERM CURRENT USE OF INSULIN (MULTI): ICD-10-CM

## 2024-07-18 DIAGNOSIS — E11.51 TYPE 2 DIABETES MELLITUS WITH DIABETIC PERIPHERAL ANGIOPATHY WITHOUT GANGRENE, WITH LONG-TERM CURRENT USE OF INSULIN (MULTI): Primary | ICD-10-CM

## 2024-07-18 PROBLEM — M46.1 BILATERAL SACROILIITIS (CMS-HCC): Status: RESOLVED | Noted: 2023-09-17 | Resolved: 2024-07-18

## 2024-07-18 LAB — HBA1C MFR BLD: 8.4 % (ref 4.2–6.5)

## 2024-07-18 PROCEDURE — 1159F MED LIST DOCD IN RCRD: CPT | Performed by: STUDENT IN AN ORGANIZED HEALTH CARE EDUCATION/TRAINING PROGRAM

## 2024-07-18 PROCEDURE — 3074F SYST BP LT 130 MM HG: CPT | Performed by: STUDENT IN AN ORGANIZED HEALTH CARE EDUCATION/TRAINING PROGRAM

## 2024-07-18 PROCEDURE — 1123F ACP DISCUSS/DSCN MKR DOCD: CPT | Performed by: STUDENT IN AN ORGANIZED HEALTH CARE EDUCATION/TRAINING PROGRAM

## 2024-07-18 PROCEDURE — G2211 COMPLEX E/M VISIT ADD ON: HCPCS | Performed by: STUDENT IN AN ORGANIZED HEALTH CARE EDUCATION/TRAINING PROGRAM

## 2024-07-18 PROCEDURE — 1036F TOBACCO NON-USER: CPT | Performed by: STUDENT IN AN ORGANIZED HEALTH CARE EDUCATION/TRAINING PROGRAM

## 2024-07-18 PROCEDURE — 83036 HEMOGLOBIN GLYCOSYLATED A1C: CPT | Mod: CLIA WAIVED TEST | Performed by: STUDENT IN AN ORGANIZED HEALTH CARE EDUCATION/TRAINING PROGRAM

## 2024-07-18 PROCEDURE — 3078F DIAST BP <80 MM HG: CPT | Performed by: STUDENT IN AN ORGANIZED HEALTH CARE EDUCATION/TRAINING PROGRAM

## 2024-07-18 PROCEDURE — 99214 OFFICE O/P EST MOD 30 MIN: CPT | Performed by: STUDENT IN AN ORGANIZED HEALTH CARE EDUCATION/TRAINING PROGRAM

## 2024-07-18 RX ORDER — GLIPIZIDE 2.5 MG/1
5 TABLET, EXTENDED RELEASE ORAL 2 TIMES DAILY
Qty: 180 TABLET | Refills: 3 | Status: SHIPPED | OUTPATIENT
Start: 2024-07-18

## 2024-07-18 ASSESSMENT — PATIENT HEALTH QUESTIONNAIRE - PHQ9
2. FEELING DOWN, DEPRESSED OR HOPELESS: NOT AT ALL
2. FEELING DOWN, DEPRESSED OR HOPELESS: NOT AT ALL
1. LITTLE INTEREST OR PLEASURE IN DOING THINGS: NOT AT ALL
1. LITTLE INTEREST OR PLEASURE IN DOING THINGS: NOT AT ALL
SUM OF ALL RESPONSES TO PHQ9 QUESTIONS 1 AND 2: 0
SUM OF ALL RESPONSES TO PHQ9 QUESTIONS 1 AND 2: 0

## 2024-07-18 ASSESSMENT — ENCOUNTER SYMPTOMS: DEPRESSION: 0

## 2024-07-18 NOTE — PROGRESS NOTES
Subjective   Patient ID: Dragan Calvin is a 83 y.o. male who presents for Follow-up (4 months check in/DM/a1c).    HPI     DM: on insulin and meds, checks daily , readings in low 100s, wants off insulin. Only takes about 4 units at night for meals I 26 units of tresiba a day and glipizide tid. Sugars a bit elevated, but is ok  currently     A fib: on eliquis sees lyndsay Miller, unable to feel it when he is in afib     SBO: was in hospital for SBO seen dr. Emma umana,     Hld: statin thearpy stable, no muscle aches    Review of Systems   All other systems reviewed and are negative.      Objective   /60 (BP Location: Left arm, Patient Position: Sitting, BP Cuff Size: Small adult)   Pulse 74   Wt 93.9 kg (207 lb)   SpO2 97%   BMI 30.57 kg/m²     Physical Exam  Constitutional:       Appearance: Normal appearance.   HENT:      Head: Normocephalic and atraumatic.      Right Ear: Tympanic membrane and ear canal normal.      Left Ear: Tympanic membrane and ear canal normal.      Mouth/Throat:      Mouth: Mucous membranes are moist.      Pharynx: Oropharynx is clear.   Eyes:      Extraocular Movements: Extraocular movements intact.      Conjunctiva/sclera: Conjunctivae normal.      Pupils: Pupils are equal, round, and reactive to light.   Cardiovascular:      Rate and Rhythm: Normal rate and regular rhythm.      Pulses: Normal pulses.      Heart sounds: Normal heart sounds.   Pulmonary:      Effort: Pulmonary effort is normal.      Breath sounds: Normal breath sounds.   Abdominal:      General: Abdomen is flat. Bowel sounds are normal.      Palpations: Abdomen is soft.   Musculoskeletal:         General: Normal range of motion.      Cervical back: Normal range of motion and neck supple.   Skin:     General: Skin is warm and dry.      Capillary Refill: Capillary refill takes 2 to 3 seconds.   Neurological:      General: No focal deficit present.      Mental Status: He is alert and oriented to person, place,  and time. Mental status is at baseline.   Psychiatric:         Mood and Affect: Mood normal.         Behavior: Behavior normal.         Thought Content: Thought content normal.         Judgment: Judgment normal.         Assessment/Plan       2. Type 2 diabetes mellitus without complication, without long-term current use of insulin (Multi)  8.4, up a bit, on insulin taek s26 untis  Increas glipizide no issues  - glipiZIDE XL (Glucotrol XL) 2.5 mg 24 hr tablet; Take 2 tablets (5 mg) by mouth 2 times a day.  Dispense: 180 tablet; Refill: 3    3. Type 2 diabetes mellitus with diabetic peripheral angiopathy without gangrene, with long-term current use of insulin (Multi)  Stable see q    4. Stage 3a chronic kidney disease (Multi)  Stable  Doing well  Monitor closely    5. Chronic deep vein thrombosis (DVT) of distal vein of lower extremity, unspecified laterality (Multi)  Stable  On doac  No issues  Some swelling      6. Advance care planning  - dnr cca  - living [facility]  - [facility]cpo[facility]

## 2024-07-22 DIAGNOSIS — E11.9 TYPE 2 DIABETES MELLITUS WITHOUT COMPLICATION, WITHOUT LONG-TERM CURRENT USE OF INSULIN (MULTI): ICD-10-CM

## 2024-07-22 RX ORDER — GLIPIZIDE 2.5 MG/1
5 TABLET, EXTENDED RELEASE ORAL 2 TIMES DAILY
Qty: 180 TABLET | Refills: 3 | Status: SHIPPED | OUTPATIENT
Start: 2024-07-22 | End: 2024-07-24 | Stop reason: SDUPTHER

## 2024-07-23 PROBLEM — G62.9 PERIPHERAL NERVE DISEASE: Status: RESOLVED | Noted: 2024-04-18 | Resolved: 2024-07-23

## 2024-07-23 PROBLEM — Z79.4 TYPE 2 DIABETES MELLITUS, WITH LONG-TERM CURRENT USE OF INSULIN (MULTI): Chronic | Status: ACTIVE | Noted: 2023-08-28

## 2024-07-23 PROBLEM — M25.569 KNEE PAIN: Status: RESOLVED | Noted: 2024-04-18 | Resolved: 2024-07-23

## 2024-07-23 PROBLEM — M62.81 MUSCLE WEAKNESS OF EXTREMITY: Status: RESOLVED | Noted: 2024-04-18 | Resolved: 2024-07-23

## 2024-07-23 PROBLEM — M19.90 OSTEOARTHRITIS: Status: RESOLVED | Noted: 2024-07-09 | Resolved: 2024-07-23

## 2024-07-23 PROBLEM — E11.9 TYPE 2 DIABETES MELLITUS, WITH LONG-TERM CURRENT USE OF INSULIN (MULTI): Chronic | Status: ACTIVE | Noted: 2023-08-28

## 2024-07-23 PROBLEM — M47.816 FACET DEGENERATION OF LUMBAR REGION: Status: RESOLVED | Noted: 2023-09-05 | Resolved: 2024-07-23

## 2024-07-24 ENCOUNTER — LAB (OUTPATIENT)
Dept: LAB | Facility: LAB | Age: 84
End: 2024-07-24
Payer: MEDICARE

## 2024-07-24 ENCOUNTER — TELEPHONE (OUTPATIENT)
Dept: PRIMARY CARE | Facility: CLINIC | Age: 84
End: 2024-07-24

## 2024-07-24 ENCOUNTER — APPOINTMENT (OUTPATIENT)
Dept: CARDIOLOGY | Facility: CLINIC | Age: 84
End: 2024-07-24
Payer: MEDICARE

## 2024-07-24 VITALS
WEIGHT: 210 LBS | HEART RATE: 87 BPM | DIASTOLIC BLOOD PRESSURE: 64 MMHG | BODY MASS INDEX: 31.01 KG/M2 | SYSTOLIC BLOOD PRESSURE: 98 MMHG | OXYGEN SATURATION: 96 %

## 2024-07-24 DIAGNOSIS — I10 HYPERTENSION, BENIGN: Chronic | ICD-10-CM

## 2024-07-24 DIAGNOSIS — I48.21 PERMANENT ATRIAL FIBRILLATION (MULTI): Chronic | ICD-10-CM

## 2024-07-24 DIAGNOSIS — E11.9 TYPE 2 DIABETES MELLITUS WITHOUT COMPLICATION, WITHOUT LONG-TERM CURRENT USE OF INSULIN (MULTI): ICD-10-CM

## 2024-07-24 DIAGNOSIS — Z95.0 PRESENCE OF CARDIAC PACEMAKER: Chronic | ICD-10-CM

## 2024-07-24 DIAGNOSIS — R60.0 LEG EDEMA: Chronic | ICD-10-CM

## 2024-07-24 DIAGNOSIS — I36.1 NONRHEUMATIC TRICUSPID VALVE REGURGITATION: ICD-10-CM

## 2024-07-24 DIAGNOSIS — E78.2 MIXED HYPERLIPIDEMIA: Chronic | ICD-10-CM

## 2024-07-24 DIAGNOSIS — R06.02 SOB (SHORTNESS OF BREATH): ICD-10-CM

## 2024-07-24 DIAGNOSIS — I50.32 CHRONIC HEART FAILURE WITH PRESERVED EJECTION FRACTION (MULTI): ICD-10-CM

## 2024-07-24 DIAGNOSIS — I25.10 CORONARY ARTERY DISEASE INVOLVING NATIVE CORONARY ARTERY OF NATIVE HEART WITHOUT ANGINA PECTORIS: Primary | ICD-10-CM

## 2024-07-24 LAB
ANION GAP SERPL CALC-SCNC: 16 MMOL/L (ref 10–20)
BNP SERPL-MCNC: 252 PG/ML (ref 0–99)
BUN SERPL-MCNC: 45 MG/DL (ref 6–23)
CALCIUM SERPL-MCNC: 8.8 MG/DL (ref 8.6–10.6)
CHLORIDE SERPL-SCNC: 98 MMOL/L (ref 98–107)
CO2 SERPL-SCNC: 27 MMOL/L (ref 21–32)
CREAT SERPL-MCNC: 1.57 MG/DL (ref 0.5–1.3)
EGFRCR SERPLBLD CKD-EPI 2021: 43 ML/MIN/1.73M*2
GLUCOSE SERPL-MCNC: 341 MG/DL (ref 74–99)
POTASSIUM SERPL-SCNC: 3.6 MMOL/L (ref 3.5–5.3)
SODIUM SERPL-SCNC: 137 MMOL/L (ref 136–145)

## 2024-07-24 PROCEDURE — 1160F RVW MEDS BY RX/DR IN RCRD: CPT | Performed by: INTERNAL MEDICINE

## 2024-07-24 PROCEDURE — 1159F MED LIST DOCD IN RCRD: CPT | Performed by: INTERNAL MEDICINE

## 2024-07-24 PROCEDURE — 99214 OFFICE O/P EST MOD 30 MIN: CPT | Performed by: INTERNAL MEDICINE

## 2024-07-24 PROCEDURE — 3074F SYST BP LT 130 MM HG: CPT | Performed by: INTERNAL MEDICINE

## 2024-07-24 PROCEDURE — 3078F DIAST BP <80 MM HG: CPT | Performed by: INTERNAL MEDICINE

## 2024-07-24 PROCEDURE — 83880 ASSAY OF NATRIURETIC PEPTIDE: CPT

## 2024-07-24 PROCEDURE — 36415 COLL VENOUS BLD VENIPUNCTURE: CPT

## 2024-07-24 PROCEDURE — 1036F TOBACCO NON-USER: CPT | Performed by: INTERNAL MEDICINE

## 2024-07-24 PROCEDURE — 80048 BASIC METABOLIC PNL TOTAL CA: CPT

## 2024-07-24 RX ORDER — GLIPIZIDE 5 MG/1
5 TABLET ORAL
Qty: 270 TABLET | Refills: 1 | Status: SHIPPED | OUTPATIENT
Start: 2024-07-24

## 2024-07-24 RX ORDER — GLIPIZIDE 2.5 MG/1
5 TABLET, EXTENDED RELEASE ORAL 2 TIMES DAILY
Qty: 180 TABLET | Refills: 3 | Status: SHIPPED | OUTPATIENT
Start: 2024-07-24

## 2024-07-24 NOTE — TELEPHONE ENCOUNTER
Got a msg from pharmacy about glipizide  Said his insurance wont cover more than 3 tablets per day.. need to change strength of med

## 2024-07-24 NOTE — PROGRESS NOTES
Referred by No ref. provider found    HPI  I am seeing Carlos for 2 and half month follow-up.  When I seen him in May he had put on 25 pounds he was not using his metolazone and was sleeping in a recliner.  He had significant lower extremity edema.  I put him back on his Zaroxolyn.  He has had a 26 pound weight loss.  He was seen by Jarrett.  Blood work in May was okay.  No blood work has been checked since.  No shortness of breath.  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 Zee follows    Hypertension, benign 08/23/2017    Leg edema 09/05/2023    Overweight     Overweight    Paroxysmal atrial fibrillation (Multi) 08/28/2023    PAF. On Eliquis. 7 second pauses therefore PPM placed. Freq Pafib on PPM. Chads 2 vasc 4    Permanent atrial fibrillation (Multi) 10/17/2023    On Eliquis. 7 second pauses therefore PPM placed. Freq Pafib on PPM. Chads 2 vasc 4    Personal history of other diseases of the digestive system     History of small bowel obstruction    Personal history of other infectious and parasitic diseases     History of herpes zoster    Personal history of other specified conditions     History of bradycardia    Personal history of other specified conditions     History of edema    Personal history of peptic ulcer disease     History of peptic ulcer    Personal history of pneumonia (recurrent)     History of pneumonia    Personal history of urinary calculi     History of kidney stones    Presence of cardiac pacemaker 08/25/2017    10/24/2016: Med[employer]onic Advisa MRI  model #[id number]    Sick sinus syndrome (Multi) 09/17/2023    s/p PPM 10/2016         Past Surgical History:  He has a past surgical history that includes Cardiac pacemaker placement  "(04/05/2018); Cholecystectomy (04/05/2018); and Appendectomy (07/25/2018).      Social History:  He reports that he has never smoked. He has never used smokeless tobacco. He reports that he does not drink alcohol and does not use drugs.    Family History:  Family History   Problem Relation Name Age of Onset    Coronary artery disease Father      Heart failure Father      Diabetes Father       Allergies:  Patient has no known allergies.    Outpatient Medications:  Current Outpatient Medications   Medication Instructions    acetaminophen (TYLENOL 8 HOUR) 650 mg, oral, Every 8 hours PRN, Do not crush, chew, or split.    apixaban (ELIQUIS) 5 mg, oral, 2 times daily    cyanocobalamin (Vitamin B-12) 1,000 mcg tablet 1 tablet, oral, Daily    dilTIAZem CD (CARDIZEM CD) 240 mg, oral, 2 times daily    fosinopril (MONOPRIL) 10 mg, oral, Daily    glipiZIDE XL (GLUCOTROL XL) 5 mg, oral, 2 times daily    insulin degludec (TRESIBA FLEXTOUCH U-100) 26 Units, subcutaneous, Nightly, Take as directed per insulin instructions.    lancets misc One touch ultra test strips 33G LANCETS Tests once daily    metOLazone (Zaroxolyn) 2.5 mg tablet Take 1 tablet (2.5 mg) by mouth 5 times a week AND 2 tablets (5 mg) 2 times a week. On Sunday and Wednesday.    metoprolol tartrate (LOPRESSOR) 25 mg, oral, 2 times daily    multivitamin tablet 1 tablet, oral, Daily    pen needle, diabetic (BD Ultra-Fine Tami Pen Needle) 32 gauge x 5/32\" needle 1 Needle, miscellaneous, 4 times daily    RABEprazole (ACIPHEX) 20 mg, oral, Daily    rosuvastatin (CRESTOR) 5 mg, oral, Daily    torsemide (DEMADEX) 60 mg, oral, Daily     Last Recorded Vitals:  There were no vitals filed for this visit.    Physical Exam  Patient is alert and oriented x3.  HEENT is unremarkable mucous members are moist  Neck no JVP no bruits upstrokes are full no thyromegaly  Lungs are clear bilaterally.  No wheezing crackles or rales  Heart irregular rhythm normal S1-S2 there is no S3 no " murmurs are heard.  Abdomen is soft bs are positive nontender nondistended no organomegaly no pulsatile masses  Extremities have 3+ hard edema  Distal pulses present palpable.  Neuro is grossly nonfocal  Skin has no rashes     Last Labs:  CBC -  Lab Results   Component Value Date    WBC 9.7 11/01/2023    HGB 13.1 (L) 11/01/2023    HCT 38.8 (L) 11/01/2023    MCV 98 11/01/2023     11/01/2023     CMP -  Lab Results   Component Value Date    CALCIUM 9.0 05/17/2024    PROT 7.5 10/31/2023    ALBUMIN 4.2 10/31/2023    AST 18 10/31/2023    ALT 17 10/31/2023    ALKPHOS 99 10/31/2023    BILITOT 1.4 (H) 10/31/2023     LIPID PANEL -   Lab Results   Component Value Date    CHOL 114 08/28/2023    HDL 39.8 (A) 08/28/2023    CHHDL 2.9 08/28/2023    VLDL 31 08/28/2023    TRIG 157 (H) 08/28/2023    NHDL 142 10/24/2019     RENAL FUNCTION PANEL -   Lab Results   Component Value Date    K 4.1 05/17/2024       Lab Results   Component Value Date     (H) 05/17/2024    HGBA1C 8.4 (H) 07/18/2024     Procedure    Echo 6/27/2024 EF 55%,    ECHO [03/09/2022]: Est EF 50%. RVSP mildly elev (37.3 mmHg). Mod TR.      ECHO [01/30/2016]: Nrml LVF w/ est EF 60%, nrml pattern LV diastolic filling, concentric LVH, mild LVH, mildly elev PAP.     PACER INSERT (10/24/2016): Medtronic Advisa MRI DR model #A2DR01     EVENT [03/30/2015]: SR, rate 90. One ep/of A-fib documented on 4/10/15. The vent response was between 120 and 130 beats per minute. No ev/of high-grade AV block. Isolated PVCs w/no VT. No arrhythmia symptoms documented.     EX NST [02/12/2014]: 4 min 28 sec (6.3 METs) ... Normal, EF 56%          Assessment/Plan   1. Atrial fibrillation. CHADS vasc 4. Perm. Con't Eliquis, dilt, BB.  Rates are controlled     2. Sick sinus syndrome. He had a 7-second pause. He has as [employer]tronic pacemaker followed by Dr. Ramicone.     3. Lower extremity edema.  This is resolved.  He has been on metolazone.  He takes 2.5 every day but 2 days out of the  week he takes 5 mg.  I am concerned that he is becoming dehydrated with his lower blood pressures.  Blood work has not been checked since May when I last had seen him.  A BMP and a BNP will be drawn today.  I will decrease metolazone to 2.5 mg 3 days out of the week.  He will hold it for the next 2 days and will only take torsemide 20 mg for the next 2 days.  They will call us tomorrow to review blood work.  Johnny had decreased his Monopril to 3 days out of the week because of hypotension.  My hope is to increase that back up to every day and decrease the amount of diuresis     3.  Renal function is stable.  Slight increase in November now normalized.  Need to watch on metolazone.  5/17/2024 BUN 34 creatinine 1.37.  This will be rechecked today    4. Hypertension well-controlled.  Will decide on what to do with the Monopril based upon the lab work today.    5. Hyperlipidemia. LDL 43 HDL 40. This will be followed by his primary care doctor.     6. Previous DVT. He has an IVC filter in place.    BMP today.  Decrease metolazone to 2.5 mg 3 days out of the week.  Hold for the next 2 days.  Torsemide will only be 20 mg for the next 2 days.  Call tomorrow to review lab work.  Return to see me 4 months     Benoit Carlson MD     Instructions and follow up

## 2024-07-24 NOTE — PROGRESS NOTES
Subjective   Reason for Visit: Dragan Calvin is an 83 y.o. male here for a Medicare Wellness visit.          Reviewed all medications by prescribing practitioner or clinical pharmacist (such as prescriptions, OTCs, herbal therapies and supplements) and documented in the medical record.    HPI    Patient Care Team:  Emmanuel Brown DO as PCP - General  Emmanuel Brown DO as PCP - [facility]  Benoit Carlson MD as Consulting Physician (Cardiology)  Israel Fabian PA-C as Physician Assistant (Cardiology)     Review of Systems    Objective   Vitals:  There were no vitals taken for this visit.      Physical Exam    Assessment/Plan   Problem List Items Addressed This Visit    None

## 2024-07-24 NOTE — PATIENT INSTRUCTIONS
1. Atrial fibrillation. CHADS vasc 4. Perm. Con't Eliquis, dilt, BB.  Rates are controlled     2. Sick sinus syndrome. He had a 7-second pause. He has as [employer] pacemaker followed by Dr. Ramicone.     3. Lower extremity edema.  This is resolved.  He has been on metolazone.  He takes 2.5 every day but 2 days out of the week he takes 5 mg.  I am concerned that he is becoming dehydrated with his lower blood pressures.  Blood work has not been checked since May when I last had seen him.  A BMP and a BNP will be drawn today.  I will decrease metolazone to 2.5 mg 3 days out of the week.  He will hold it for the next 2 days and will only take torsemide 20 mg for the next 2 days.  They will call us tomorrow to review blood work.  Johnny had decreased his Monopril to 3 days out of the week because of hypotension.  My hope is to increase that back up to every day and decrease the amount of diuresis     3.  Renal function is stable.  Slight increase in November now normalized.  Need to watch on metolazone.  5/17/2024 BUN 34 creatinine 1.37.  This will be rechecked today    4. Hypertension well-controlled.  Will decide on what to do with the Monopril based upon the lab work today.    5. Hyperlipidemia. LDL 43 HDL 40. This will be followed by his primary care doctor.     6. Previous DVT. He has an IVC filter in place.    BMP today.  Decrease metolazone to 2.5 mg 3 days out of the week.  Hold for the next 2 days.  Torsemide will only be 20 mg for the next 2 days.  Call tomorrow to review lab work.  Return to see me 4 months

## 2024-07-25 ENCOUNTER — TELEPHONE (OUTPATIENT)
Dept: CARDIOLOGY | Facility: CLINIC | Age: 84
End: 2024-07-25
Payer: MEDICARE

## 2024-07-26 NOTE — TELEPHONE ENCOUNTER
Left a detailed message for patient.  Dr. Carlson reviewed labs and continue medications as per last office visit. Please call us with any questions/concerns.

## 2024-08-06 ENCOUNTER — APPOINTMENT (OUTPATIENT)
Dept: CARDIOLOGY | Facility: CLINIC | Age: 84
End: 2024-08-06
Payer: MEDICARE

## 2024-08-07 ENCOUNTER — HOSPITAL ENCOUNTER (OUTPATIENT)
Dept: CARDIOLOGY | Facility: CLINIC | Age: 84
Discharge: HOME | End: 2024-08-07
Payer: MEDICARE

## 2024-08-07 DIAGNOSIS — I49.5 SICK SINUS SYNDROME (MULTI): Chronic | ICD-10-CM

## 2024-08-07 PROCEDURE — 93296 REM INTERROG EVL PM/IDS: CPT

## 2024-08-07 PROCEDURE — 93294 REM INTERROG EVL PM/LDLS PM: CPT | Performed by: INTERNAL MEDICINE

## 2024-08-12 DIAGNOSIS — I49.5 SICK SINUS SYNDROME (MULTI): ICD-10-CM

## 2024-08-12 DIAGNOSIS — Z95.0 PRESENCE OF CARDIAC PACEMAKER: ICD-10-CM

## 2024-08-21 ENCOUNTER — TELEPHONE (OUTPATIENT)
Dept: PRIMARY CARE | Facility: CLINIC | Age: 84
End: 2024-08-21
Payer: MEDICARE

## 2024-08-21 DIAGNOSIS — J06.9 UPPER RESPIRATORY TRACT INFECTION, UNSPECIFIED TYPE: Primary | ICD-10-CM

## 2024-08-21 RX ORDER — BROMPHENIRAMINE MALEATE, PSEUDOEPHEDRINE HYDROCHLORIDE, AND DEXTROMETHORPHAN HYDROBROMIDE 2; 30; 10 MG/5ML; MG/5ML; MG/5ML
5 SYRUP ORAL 4 TIMES DAILY PRN
Qty: 120 ML | Refills: 0 | Status: SHIPPED | OUTPATIENT
Start: 2024-08-21 | End: 2024-08-31

## 2024-08-21 RX ORDER — AZITHROMYCIN 250 MG/1
TABLET, FILM COATED ORAL
Qty: 6 TABLET | Refills: 0 | Status: SHIPPED | OUTPATIENT
Start: 2024-08-21 | End: 2024-08-26

## 2024-08-21 NOTE — TELEPHONE ENCOUNTER
Pt. Has a cough and scratchy throat, negative covid test wants to know if you cans end him in something to the pharmacy.

## 2024-08-21 NOTE — PROGRESS NOTES
Subjective   Reason for Visit: Dragan Calvin is an 83 y.o. male here for a Medicare Wellness visit.               HPI    Patient Care Team:  Emmanuel Brown DO as PCP - General  [facility] Brown DO [facility]  Benoit Carlson MD as Consulting Physician (Cardiology)  Israel Fabian PA-C as Physician Assistant (Cardiology)     Review of Systems    Objective   Vitals:  There were no vitals taken for this visit.      Physical Exam    Assessment/Plan   Problem List Items Addressed This Visit    None

## 2024-09-03 ENCOUNTER — APPOINTMENT (OUTPATIENT)
Dept: PRIMARY CARE | Facility: CLINIC | Age: 84
End: 2024-09-03
Payer: MEDICARE

## 2024-09-16 ENCOUNTER — OFFICE VISIT (OUTPATIENT)
Dept: PAIN MEDICINE | Facility: CLINIC | Age: 84
End: 2024-09-16
Payer: MEDICARE

## 2024-09-16 VITALS
BODY MASS INDEX: 31.83 KG/M2 | DIASTOLIC BLOOD PRESSURE: 58 MMHG | SYSTOLIC BLOOD PRESSURE: 108 MMHG | HEART RATE: 85 BPM | HEIGHT: 68 IN | OXYGEN SATURATION: 97 % | WEIGHT: 210 LBS | TEMPERATURE: 97.5 F | RESPIRATION RATE: 18 BRPM

## 2024-09-16 DIAGNOSIS — M48.062 SPINAL STENOSIS OF LUMBAR REGION WITH NEUROGENIC CLAUDICATION: Primary | ICD-10-CM

## 2024-09-16 PROCEDURE — 1036F TOBACCO NON-USER: CPT | Performed by: ANESTHESIOLOGY

## 2024-09-16 PROCEDURE — 99214 OFFICE O/P EST MOD 30 MIN: CPT | Performed by: ANESTHESIOLOGY

## 2024-09-16 PROCEDURE — 1159F MED LIST DOCD IN RCRD: CPT | Performed by: ANESTHESIOLOGY

## 2024-09-16 PROCEDURE — 1125F AMNT PAIN NOTED PAIN PRSNT: CPT | Performed by: ANESTHESIOLOGY

## 2024-09-16 PROCEDURE — 3078F DIAST BP <80 MM HG: CPT | Performed by: ANESTHESIOLOGY

## 2024-09-16 PROCEDURE — 3074F SYST BP LT 130 MM HG: CPT | Performed by: ANESTHESIOLOGY

## 2024-09-16 RX ORDER — HYDROCODONE BITARTRATE AND ACETAMINOPHEN 5; 325 MG/1; MG/1
1 TABLET ORAL EVERY 12 HOURS PRN
Qty: 20 TABLET | Refills: 0 | Status: SHIPPED | OUTPATIENT
Start: 2024-09-16 | End: 2024-09-26

## 2024-09-16 ASSESSMENT — PAIN SCALES - GENERAL
PAINLEVEL_OUTOF10: 8
PAINLEVEL: 8

## 2024-09-16 ASSESSMENT — ENCOUNTER SYMPTOMS
DEPRESSION: 1
FEVER: 0
BLOOD IN STOOL: 0
SHORTNESS OF BREATH: 0
ADENOPATHY: 0
OCCASIONAL FEELINGS OF UNSTEADINESS: 1
NUMBNESS: 0
DIFFICULTY URINATING: 0
EYE PAIN: 0
CONSTIPATION: 1
BACK PAIN: 1
LOSS OF SENSATION IN FEET: 1

## 2024-09-16 ASSESSMENT — PAIN - FUNCTIONAL ASSESSMENT: PAIN_FUNCTIONAL_ASSESSMENT: 0-10

## 2024-09-16 ASSESSMENT — PAIN DESCRIPTION - DESCRIPTORS: DESCRIPTORS: ACHING;BURNING

## 2024-09-16 NOTE — PROGRESS NOTES
Chief Complain  Follow-up (For pain in left hip to knee) and Med Management (Currently taking tylenol for pain./Was taking tramadol but was too drowsy stop taking it, it also does not help)       History Of Present Illness  Dragan Calvin is a 83 y.o. male here for left thigh pain. The patient rates the pain at 7-8  on a scale from 0-10.  The patient describes pain as sharp.  The pain is worsened by standing and walking and is alleviated by sitting.  Since the last visit the pain has worsened.  The patient denies any fever, chills, weight loss, bladder/bowel incontinence.       Past Medical History  He has a past medical history of Deep vein thrombosis (DVT) of lower extremity (Multi) (09/17/2023), History of falling, Hyperlipidemia (08/28/2023), Hypertension, benign (08/23/2017), Leg edema (09/05/2023), Overweight, Paroxysmal atrial fibrillation (Multi) (08/28/2023), Permanent atrial fibrillation (Multi) (10/17/2023), Personal history of other diseases of the digestive system, Personal history of other infectious and parasitic diseases, Personal history of other specified conditions, Personal history of other specified conditions, Personal history of peptic ulcer disease, Personal history of pneumonia (recurrent), Personal history of urinary calculi, Presence of cardiac pacemaker (08/25/2017), Sick sinus syndrome (Multi) (09/17/2023), and Type 2 diabetes mellitus, with long-term current use of insulin (Multi) (08/28/2023).    Surgical History  He has a past surgical history that includes Cardiac pacemaker placement (04/05/2018); Cholecystectomy (04/05/2018); and Appendectomy (07/25/2018).     Social History  He reports that he has never smoked. He has never used smokeless tobacco. He reports that he does not drink alcohol and does not use drugs.    Family History  Family History   Problem Relation Name Age of Onset    Coronary artery disease Father      Heart failure Father      Diabetes Father         "  Allergies  Patient has no known allergies.    Review of Systems  Review of Systems   Constitutional:  Negative for fever.   HENT:  Negative for ear pain.    Eyes:  Negative for pain.   Respiratory:  Negative for shortness of breath.    Cardiovascular:  Negative for chest pain.   Gastrointestinal:  Positive for constipation. Negative for blood in stool.   Endocrine: Negative for cold intolerance and heat intolerance.   Genitourinary:  Negative for difficulty urinating.   Musculoskeletal:  Positive for back pain.   Skin:  Negative for rash.   Allergic/Immunologic: Negative for food allergies.   Neurological:  Negative for numbness.   Hematological:  Negative for adenopathy.   Psychiatric/Behavioral:  Negative for suicidal ideas.         Physical Exam  Physical Exam  HENT:      Head: Normocephalic and atraumatic.   Eyes:      Extraocular Movements: Extraocular movements intact.      Pupils: Pupils are equal, round, and reactive to light.   Cardiovascular:      Rate and Rhythm: Normal rate and regular rhythm.   Pulmonary:      Effort: Pulmonary effort is normal.   Abdominal:      Palpations: Abdomen is soft.   Musculoskeletal:      Cervical back: Neck supple.        Legs:    Skin:     General: Skin is warm.   Neurological:      Mental Status: He is alert and oriented to person, place, and time.      Deep Tendon Reflexes:      Reflex Scores:       Patellar reflexes are 1+ on the right side and 1+ on the left side.       Achilles reflexes are 0 on the right side and 0 on the left side.     Comments: Walks with a walker     Psychiatric:         Mood and Affect: Mood normal.           Last Recorded Vitals  Blood pressure 108/58, pulse 85, temperature 36.4 °C (97.5 °F), resp. rate 18, height 1.727 m (5' 8\"), weight 95.3 kg (210 lb), SpO2 97%.       Assessment/Plan     Dragan Calvin is a 83 y.o. male here for follow-up of low back, left pain radiating to left thigh.  Likely from the significant spinal stenosis he had at " L3-4, L4-5.  He has previously had epidural steroid injection with limited benefit as far as his leg pain is concerned.  He has also previously failed treatment with gabapentin which makes him too sleepy, he is unable to take NSAIDs due to his being on blood thinners.  He has been managing his pain with Tylenol with limited benefit.  Has tried physical therapy.  We have previously discussed surgical referral which is not interested in at this point.      Last visit we tried him on tramadol which gave him modest relief however made her too loopy.  Given the side effects he stopped taking it.  Denies any new neurological, constitutional symptoms.  I would trial him on hydrocodone as next step in managing his pain.  Discussed risk associated with narcotics including but not limited to addiction, dependence, respiratory depression, death, mental health issues, hormonal changes.  I have personally reviewed the OARRS report. This report is scanned into the electronic medical record. I have considered the risks of abuse, dependence, addiction and diversion.  opiate agreement was signed by the patient.    Chuck Gonzalez MD

## 2024-09-19 DIAGNOSIS — E11.9 TYPE 2 DIABETES MELLITUS WITHOUT COMPLICATION, WITHOUT LONG-TERM CURRENT USE OF INSULIN (MULTI): ICD-10-CM

## 2024-09-19 RX ORDER — IBUPROFEN 200 MG
CAPSULE ORAL
Qty: 100 STRIP | Refills: 1 | Status: SHIPPED | OUTPATIENT
Start: 2024-09-19

## 2024-09-26 ENCOUNTER — TELEPHONE (OUTPATIENT)
Dept: CARDIOLOGY | Facility: CLINIC | Age: 84
End: 2024-09-26
Payer: MEDICARE

## 2024-09-26 DIAGNOSIS — R60.0 LEG EDEMA: Primary | ICD-10-CM

## 2024-09-26 DIAGNOSIS — I50.32 CHRONIC HEART FAILURE WITH PRESERVED EJECTION FRACTION: ICD-10-CM

## 2024-09-26 NOTE — TELEPHONE ENCOUNTER
"Spoke to pt & pt's dtr - reports increased swelling and \"hard\" BLE, along with 10# weight gain since last OV (7/24/24). Pt currently taking Torsemide 40mg daily and metolazone 2.5mg three times a week. Denies shortness of breath.    Last BUN/CR (7/24/24) - 45/1.57  Next OV w/ Israel- 11/26/24  "

## 2024-09-27 NOTE — TELEPHONE ENCOUNTER
Secure chat sent to Dr. Carlson. Per Dr. Carlson: lets get a bmp/bnp, LE venous duplex. I'm reluctant to increase diruetics more until I have new labs. May need hospital for monitored IV diruetics     Spoke to patient's dtr, Prema who is with patient currently. Made aware of new orders. Instructed to continue current medication regimen until new labs are drawn/reviewed. Venous duplex to be scheduled. Pt's dtr verbalizes understanding.

## 2024-09-28 ENCOUNTER — LAB (OUTPATIENT)
Dept: LAB | Facility: LAB | Age: 84
End: 2024-09-28
Payer: MEDICARE

## 2024-09-28 DIAGNOSIS — R60.0 LEG EDEMA: ICD-10-CM

## 2024-09-28 DIAGNOSIS — I50.32 CHRONIC HEART FAILURE WITH PRESERVED EJECTION FRACTION: ICD-10-CM

## 2024-09-28 LAB
ANION GAP SERPL CALC-SCNC: 13 MMOL/L (ref 10–20)
BUN SERPL-MCNC: 34 MG/DL (ref 6–23)
CALCIUM SERPL-MCNC: 8.7 MG/DL (ref 8.6–10.3)
CHLORIDE SERPL-SCNC: 101 MMOL/L (ref 98–107)
CO2 SERPL-SCNC: 29 MMOL/L (ref 21–32)
CREAT SERPL-MCNC: 1.36 MG/DL (ref 0.5–1.3)
EGFRCR SERPLBLD CKD-EPI 2021: 51 ML/MIN/1.73M*2
GLUCOSE SERPL-MCNC: 240 MG/DL (ref 74–99)
POTASSIUM SERPL-SCNC: 3.3 MMOL/L (ref 3.5–5.3)
SODIUM SERPL-SCNC: 140 MMOL/L (ref 136–145)

## 2024-09-28 PROCEDURE — 80048 BASIC METABOLIC PNL TOTAL CA: CPT

## 2024-09-28 PROCEDURE — 83880 ASSAY OF NATRIURETIC PEPTIDE: CPT

## 2024-09-28 PROCEDURE — 36415 COLL VENOUS BLD VENIPUNCTURE: CPT

## 2024-09-29 LAB — BNP SERPL-MCNC: 215 PG/ML (ref 0–99)

## 2024-10-02 ENCOUNTER — HOSPITAL ENCOUNTER (OUTPATIENT)
Dept: VASCULAR MEDICINE | Facility: CLINIC | Age: 84
Discharge: HOME | End: 2024-10-02
Payer: MEDICARE

## 2024-10-02 DIAGNOSIS — R60.0 LEG EDEMA: ICD-10-CM

## 2024-10-02 DIAGNOSIS — I50.32 CHRONIC HEART FAILURE WITH PRESERVED EJECTION FRACTION: ICD-10-CM

## 2024-10-02 PROCEDURE — 93970 EXTREMITY STUDY: CPT | Performed by: SURGERY

## 2024-10-02 PROCEDURE — 93970 EXTREMITY STUDY: CPT

## 2024-10-21 DIAGNOSIS — E78.5 HYPERLIPIDEMIA, UNSPECIFIED HYPERLIPIDEMIA TYPE: ICD-10-CM

## 2024-10-21 RX ORDER — ROSUVASTATIN CALCIUM 5 MG/1
5 TABLET, COATED ORAL DAILY
Qty: 90 TABLET | Refills: 1 | Status: SHIPPED | OUTPATIENT
Start: 2024-10-21

## 2024-10-28 ENCOUNTER — OFFICE VISIT (OUTPATIENT)
Dept: WOUND CARE | Facility: CLINIC | Age: 84
End: 2024-10-28
Payer: MEDICARE

## 2024-10-28 PROCEDURE — 99212 OFFICE O/P EST SF 10 MIN: CPT

## 2024-10-30 ENCOUNTER — APPOINTMENT (OUTPATIENT)
Dept: RADIOLOGY | Facility: HOSPITAL | Age: 84
End: 2024-10-30
Payer: MEDICARE

## 2024-10-30 ENCOUNTER — HOSPITAL ENCOUNTER (EMERGENCY)
Facility: HOSPITAL | Age: 84
Discharge: HOME | End: 2024-10-30
Attending: EMERGENCY MEDICINE
Payer: MEDICARE

## 2024-10-30 VITALS
WEIGHT: 211 LBS | HEART RATE: 75 BPM | HEIGHT: 68 IN | OXYGEN SATURATION: 95 % | SYSTOLIC BLOOD PRESSURE: 116 MMHG | TEMPERATURE: 97.3 F | DIASTOLIC BLOOD PRESSURE: 61 MMHG | RESPIRATION RATE: 20 BRPM | BODY MASS INDEX: 31.98 KG/M2

## 2024-10-30 DIAGNOSIS — S09.90XA CLOSED HEAD INJURY, INITIAL ENCOUNTER: Primary | ICD-10-CM

## 2024-10-30 PROCEDURE — 70450 CT HEAD/BRAIN W/O DYE: CPT | Performed by: RADIOLOGY

## 2024-10-30 PROCEDURE — 72125 CT NECK SPINE W/O DYE: CPT | Performed by: RADIOLOGY

## 2024-10-30 PROCEDURE — G0390 TRAUMA RESPONS W/HOSP CRITI: HCPCS

## 2024-10-30 PROCEDURE — 70450 CT HEAD/BRAIN W/O DYE: CPT

## 2024-10-30 PROCEDURE — 99285 EMERGENCY DEPT VISIT HI MDM: CPT | Mod: 25 | Performed by: EMERGENCY MEDICINE

## 2024-10-30 PROCEDURE — 72125 CT NECK SPINE W/O DYE: CPT

## 2024-10-30 ASSESSMENT — PAIN - FUNCTIONAL ASSESSMENT: PAIN_FUNCTIONAL_ASSESSMENT: 0-10

## 2024-10-30 ASSESSMENT — PAIN SCALES - GENERAL
PAINLEVEL_OUTOF10: 0 - NO PAIN
PAINLEVEL_OUTOF10: 0 - NO PAIN

## 2024-11-04 ENCOUNTER — APPOINTMENT (OUTPATIENT)
Dept: PRIMARY CARE | Facility: CLINIC | Age: 84
End: 2024-11-04
Payer: MEDICARE

## 2024-11-04 VITALS
SYSTOLIC BLOOD PRESSURE: 120 MMHG | DIASTOLIC BLOOD PRESSURE: 60 MMHG | HEIGHT: 68 IN | OXYGEN SATURATION: 96 % | BODY MASS INDEX: 32.74 KG/M2 | WEIGHT: 216 LBS | HEART RATE: 95 BPM

## 2024-11-04 DIAGNOSIS — I82.5Z9 CHRONIC DEEP VEIN THROMBOSIS (DVT) OF DISTAL VEIN OF LOWER EXTREMITY, UNSPECIFIED LATERALITY (MULTI): Chronic | ICD-10-CM

## 2024-11-04 DIAGNOSIS — Z00.00 ROUTINE GENERAL MEDICAL EXAMINATION AT HEALTH CARE FACILITY: Primary | ICD-10-CM

## 2024-11-04 DIAGNOSIS — Z79.4 TYPE 2 DIABETES MELLITUS WITH OTHER SPECIFIED COMPLICATION, WITH LONG-TERM CURRENT USE OF INSULIN: Chronic | ICD-10-CM

## 2024-11-04 DIAGNOSIS — D64.9 ANEMIA, UNSPECIFIED TYPE: ICD-10-CM

## 2024-11-04 DIAGNOSIS — I49.5 SICK SINUS SYNDROME (MULTI): Chronic | ICD-10-CM

## 2024-11-04 DIAGNOSIS — Z13.31 DEPRESSION SCREEN: ICD-10-CM

## 2024-11-04 DIAGNOSIS — Z71.89 ACP (ADVANCE CARE PLANNING): ICD-10-CM

## 2024-11-04 DIAGNOSIS — N18.31 STAGE 3A CHRONIC KIDNEY DISEASE (MULTI): ICD-10-CM

## 2024-11-04 DIAGNOSIS — E11.69 TYPE 2 DIABETES MELLITUS WITH OTHER SPECIFIED COMPLICATION, WITH LONG-TERM CURRENT USE OF INSULIN: Chronic | ICD-10-CM

## 2024-11-04 DIAGNOSIS — E11.9 TYPE 2 DIABETES MELLITUS WITHOUT COMPLICATION, WITHOUT LONG-TERM CURRENT USE OF INSULIN (MULTI): ICD-10-CM

## 2024-11-04 PROBLEM — E11.51 TYPE 2 DIABETES MELLITUS WITH DIABETIC PERIPHERAL ANGIOPATHY WITHOUT GANGRENE, WITH LONG-TERM CURRENT USE OF INSULIN (MULTI): Status: RESOLVED | Noted: 2024-07-18 | Resolved: 2024-11-04

## 2024-11-04 LAB
ALBUMIN SERPL BCP-MCNC: 3.9 G/DL (ref 3.4–5)
ALP SERPL-CCNC: 94 U/L (ref 33–136)
ALT SERPL W P-5'-P-CCNC: 16 U/L (ref 10–52)
ANION GAP SERPL CALC-SCNC: 17 MMOL/L (ref 10–20)
AST SERPL W P-5'-P-CCNC: 18 U/L (ref 9–39)
BASOPHILS # BLD AUTO: 0.04 X10*3/UL (ref 0–0.1)
BASOPHILS NFR BLD AUTO: 0.4 %
BILIRUB SERPL-MCNC: 1.1 MG/DL (ref 0–1.2)
BUN SERPL-MCNC: 43 MG/DL (ref 6–23)
CALCIUM SERPL-MCNC: 8.6 MG/DL (ref 8.6–10.6)
CHLORIDE SERPL-SCNC: 98 MMOL/L (ref 98–107)
CHOLEST SERPL-MCNC: 111 MG/DL (ref 0–199)
CHOLESTEROL/HDL RATIO: 2.5
CO2 SERPL-SCNC: 27 MMOL/L (ref 21–32)
CREAT SERPL-MCNC: 1.29 MG/DL (ref 0.5–1.3)
EGFRCR SERPLBLD CKD-EPI 2021: 55 ML/MIN/1.73M*2
EOSINOPHIL # BLD AUTO: 0.12 X10*3/UL (ref 0–0.4)
EOSINOPHIL NFR BLD AUTO: 1.3 %
ERYTHROCYTE [DISTWIDTH] IN BLOOD BY AUTOMATED COUNT: 13 % (ref 11.5–14.5)
GLUCOSE SERPL-MCNC: 276 MG/DL (ref 74–99)
HBA1C MFR BLD: 7.4 % (ref 4.2–6.5)
HCT VFR BLD AUTO: 34.5 % (ref 41–52)
HDLC SERPL-MCNC: 45 MG/DL
HGB BLD-MCNC: 10.7 G/DL (ref 13.5–17.5)
IMM GRANULOCYTES # BLD AUTO: 0.04 X10*3/UL (ref 0–0.5)
IMM GRANULOCYTES NFR BLD AUTO: 0.4 % (ref 0–0.9)
LDLC SERPL CALC-MCNC: 37 MG/DL
LYMPHOCYTES # BLD AUTO: 1.28 X10*3/UL (ref 0.8–3)
LYMPHOCYTES NFR BLD AUTO: 13.9 %
MCH RBC QN AUTO: 33.5 PG (ref 26–34)
MCHC RBC AUTO-ENTMCNC: 31 G/DL (ref 32–36)
MCV RBC AUTO: 108 FL (ref 80–100)
MONOCYTES # BLD AUTO: 0.52 X10*3/UL (ref 0.05–0.8)
MONOCYTES NFR BLD AUTO: 5.7 %
NEUTROPHILS # BLD AUTO: 7.19 X10*3/UL (ref 1.6–5.5)
NEUTROPHILS NFR BLD AUTO: 78.3 %
NON HDL CHOLESTEROL: 66 MG/DL (ref 0–149)
NRBC BLD-RTO: 0 /100 WBCS (ref 0–0)
PLATELET # BLD AUTO: 198 X10*3/UL (ref 150–450)
POTASSIUM SERPL-SCNC: 3.4 MMOL/L (ref 3.5–5.3)
PROT SERPL-MCNC: 6.7 G/DL (ref 6.4–8.2)
PSA SERPL-MCNC: 4.95 NG/ML
RBC # BLD AUTO: 3.19 X10*6/UL (ref 4.5–5.9)
SODIUM SERPL-SCNC: 139 MMOL/L (ref 136–145)
TRIGL SERPL-MCNC: 146 MG/DL (ref 0–149)
TSH SERPL-ACNC: 1.19 MIU/L (ref 0.44–3.98)
VLDL: 29 MG/DL (ref 0–40)
WBC # BLD AUTO: 9.2 X10*3/UL (ref 4.4–11.3)

## 2024-11-04 PROCEDURE — 1158F ADVNC CARE PLAN TLK DOCD: CPT | Performed by: STUDENT IN AN ORGANIZED HEALTH CARE EDUCATION/TRAINING PROGRAM

## 2024-11-04 PROCEDURE — 84153 ASSAY OF PSA TOTAL: CPT

## 2024-11-04 PROCEDURE — G0439 PPPS, SUBSEQ VISIT: HCPCS | Performed by: STUDENT IN AN ORGANIZED HEALTH CARE EDUCATION/TRAINING PROGRAM

## 2024-11-04 PROCEDURE — 1123F ACP DISCUSS/DSCN MKR DOCD: CPT | Performed by: STUDENT IN AN ORGANIZED HEALTH CARE EDUCATION/TRAINING PROGRAM

## 2024-11-04 PROCEDURE — 83036 HEMOGLOBIN GLYCOSYLATED A1C: CPT | Mod: CLIA WAIVED TEST | Performed by: STUDENT IN AN ORGANIZED HEALTH CARE EDUCATION/TRAINING PROGRAM

## 2024-11-04 PROCEDURE — 1036F TOBACCO NON-USER: CPT | Performed by: STUDENT IN AN ORGANIZED HEALTH CARE EDUCATION/TRAINING PROGRAM

## 2024-11-04 PROCEDURE — 3074F SYST BP LT 130 MM HG: CPT | Performed by: STUDENT IN AN ORGANIZED HEALTH CARE EDUCATION/TRAINING PROGRAM

## 2024-11-04 PROCEDURE — 99213 OFFICE O/P EST LOW 20 MIN: CPT | Performed by: STUDENT IN AN ORGANIZED HEALTH CARE EDUCATION/TRAINING PROGRAM

## 2024-11-04 PROCEDURE — 80061 LIPID PANEL: CPT

## 2024-11-04 PROCEDURE — 80053 COMPREHEN METABOLIC PANEL: CPT

## 2024-11-04 PROCEDURE — G0444 DEPRESSION SCREEN ANNUAL: HCPCS | Performed by: STUDENT IN AN ORGANIZED HEALTH CARE EDUCATION/TRAINING PROGRAM

## 2024-11-04 PROCEDURE — 3078F DIAST BP <80 MM HG: CPT | Performed by: STUDENT IN AN ORGANIZED HEALTH CARE EDUCATION/TRAINING PROGRAM

## 2024-11-04 PROCEDURE — 1170F FXNL STATUS ASSESSED: CPT | Performed by: STUDENT IN AN ORGANIZED HEALTH CARE EDUCATION/TRAINING PROGRAM

## 2024-11-04 PROCEDURE — 84443 ASSAY THYROID STIM HORMONE: CPT

## 2024-11-04 PROCEDURE — 1159F MED LIST DOCD IN RCRD: CPT | Performed by: STUDENT IN AN ORGANIZED HEALTH CARE EDUCATION/TRAINING PROGRAM

## 2024-11-04 PROCEDURE — 85025 COMPLETE CBC W/AUTO DIFF WBC: CPT

## 2024-11-04 ASSESSMENT — ENCOUNTER SYMPTOMS: DEPRESSION: 0

## 2024-11-04 ASSESSMENT — PATIENT HEALTH QUESTIONNAIRE - PHQ9
1. LITTLE INTEREST OR PLEASURE IN DOING THINGS: NOT AT ALL
SUM OF ALL RESPONSES TO PHQ9 QUESTIONS 1 AND 2: 0
1. LITTLE INTEREST OR PLEASURE IN DOING THINGS: NOT AT ALL
2. FEELING DOWN, DEPRESSED OR HOPELESS: NOT AT ALL
SUM OF ALL RESPONSES TO PHQ9 QUESTIONS 1 AND 2: 0
2. FEELING DOWN, DEPRESSED OR HOPELESS: NOT AT ALL

## 2024-11-04 ASSESSMENT — ACTIVITIES OF DAILY LIVING (ADL)
GROCERY_SHOPPING: INDEPENDENT
MANAGING_FINANCES: INDEPENDENT
DRESSING: INDEPENDENT
BATHING: INDEPENDENT
TAKING_MEDICATION: INDEPENDENT
DOING_HOUSEWORK: INDEPENDENT

## 2024-11-04 NOTE — PROGRESS NOTES
Subjective   Reason for Visit: Dragan Calvin is an 84 y.o. male here for a Medicare Wellness visit.          Reviewed all medications by prescribing practitioner or clinical pharmacist (such as prescriptions, OTCs, herbal therapies and supplements) and documented in the medical record.    HPI    Fall: tripped over shoes, had scans and everything was native    DM: on insulin and meds, checks daily , readings in low 100s, wants off insulin. Only takes about 4 units at night for meals I 26 units of tresiba a day and glipizide. A1c is 7.4 today, doing well no issues     A fib: on eliquis sees lyndsay Funez, unable to feel it when he is in afib     SBO: was in hospital for SBO seen dr. Emma umana, hospital reviewed     Hld: statin thearpy stable, no muscle aches    Patient Care Team:  Emmanuel Brown DO as PCP - General  Emmanuel Brown DO as PCP - [facility] Attributed Provider  Benoit Carlson MD as Consulting Physician (Cardiology)  Israel Fabian PA-C as Physician Assistant (Cardiology)     Review of Systems   All other systems reviewed and are negative.      Objective   Vitals:  Vitals:    11/04/24 0937   BP: 120/60   Pulse: 95   SpO2: 96%       There were no vitals taken for this visit.      Physical Exam  Constitutional:       Appearance: Normal appearance.   HENT:      Head: Normocephalic and atraumatic.      Right Ear: Tympanic membrane and ear canal normal.      Left Ear: Tympanic membrane and ear canal normal.      Mouth/Throat:      Mouth: Mucous membranes are moist.      Pharynx: Oropharynx is clear.   Eyes:      Extraocular Movements: Extraocular movements intact.      Conjunctiva/sclera: Conjunctivae normal.      Pupils: Pupils are equal, round, and reactive to light.   Cardiovascular:      Rate and Rhythm: Normal rate and regular rhythm.      Pulses: Normal pulses.      Heart sounds: Normal heart sounds.   Pulmonary:      Effort: Pulmonary effort is normal.      Breath sounds: Normal breath sounds.   Abdominal:       General: Abdomen is flat. Bowel sounds are normal.      Palpations: Abdomen is soft.   Musculoskeletal:         General: Normal range of motion.      Cervical back: Normal range of motion and neck supple.   Skin:     General: Skin is warm and dry.      Capillary Refill: Capillary refill takes 2 to 3 seconds.   Neurological:      General: No focal deficit present.      Mental Status: He is alert and oriented to person, place, and time. Mental status is at baseline.   Psychiatric:         Mood and Affect: Mood normal.         Behavior: Behavior normal.         Thought Content: Thought content normal.         Judgment: Judgment normal.       Assessment & Plan  Type 2 diabetes mellitus without complication, without long-term current use of insulin (Multi)    Orders:  •  POCT Glycosylated Hemoglobin (HGB A1C) docked device    Routine general medical examination at health care facility    Orders:  •  1 Year Follow Up In Primary Care - Wellness Exam; Future  •  CBC and Auto Differential  •  Comprehensive Metabolic Panel  •  Lipid Panel  •  TSH with reflex to Free T4 if abnormal  •  Prostate Specific Antigen, Screen    Stage 3a chronic kidney disease (Multi)         Type 2 diabetes mellitus with other specified complication, with long-term current use of insulin         Chronic deep vein thrombosis (DVT) of distal vein of lower extremity, unspecified laterality (Multi)         Sick sinus syndrome (Multi)         Anemia, unspecified type    Orders:  •  CBC and Auto Differential    Depression screen         ACP (advance care planning)          1. Type 2 diabetes mellitus without complication, without long-term current use of insulin (Multi)  - POCT Glycosylated Hemoglobin (HGB A1C) docked device    2. Routine general medical examination at health care facility (Primary)  - labs  - 1 Year Follow Up In Primary Care - Wellness Exam; Future    3. Stage 3a chronic kidney disease (Multi)  Stable  Monitor labs    4. Type 2  diabetes mellitus with other specified complication, with long-term current use of insulin  Sugars very we.. 7.4  No LH or dizziness    5. Chronic deep vein thrombosis (DVT) of distal vein of lower extremity, unspecified laterality (Multi)  Stable  On blood thinners    6. Sick sinus syndrome (Multi)  Sees. Dr. Ramicone  Needs new battery next year      7. Acp  Living wll  Hcpoa  DNR CCA    8. Depression   denies      Tobacco/Alcohol/Opioid use, as well as Illicit Drug Use was screened for/reviewed and documented in Social Documentation section of the chart and medication list as appropriate    Depression Screening  Depression screening completed using the PHQ-2 questions, with results documented in the chart/encounter (~15min).  (See Rooming Screening section for documentation, or progress note for additional information)    Cardiac Risk Assessment  The ASCVD Risk score (Hernandez DESIR, et al., 2019) failed to calculate for the following reasons:    The 2019 ASCVD risk score is only valid for ages 40 to 79  Cardiovascular risk was discussed and, if needed, lifestyle modifications recommended, including nutritional choices, exercise, and elimination of habits contributing to risk. We agreed on a plan to reduce the current cardiovascular risk based on above discussion as needed.     Aspirin use/disuse was discussed and documented in the Problem List of the medical record (under Cardiac Risk Counseling) after reviewing the updated guidelines below:  Consider low dose Aspirin ( mg) use if the benefit for cardiovascular disease prevention outweighs risk for bleeding complications.   In general, low dose ASA should be considered:  In patients WITHOUT prior MI/stroke/PAD (primary prevention):   a. Age <60: Use if 10-year cardiovascular disease risk >20%, with discussion of risks and benefits with patient  b. Age 60-<70: Use if 10-year cardiovascular disease risk >20% and low bleeding (e.g., gastrointestinal) risk, with  discussion of risks and benefits with patient  c. Age >=70: Do not use    In patients WITH prior MI/stroke/PAD (secondary prevention):   Generally use unless extremely high bleeding (e.g., gastrointenstinal) risk, with discussion of risks and benefits with patient    Advance Directives Discussion  Advanced Care Planning (including a Living Will, Healthcare POA, as well as specific end of life choices and/or directives), was discussed with the patient and/or surrogate, voluntarily, and details of that discussion documented in the Problem List (under Advanced Directives Discussion) of the medical record.    (~16 min spent discussing above)     During the course of the visit the patient was educated and counseled about age appropriate screening and preventive services.   Completed preventive screenings were documented in the chart (see Routine Health Maintenance in Problem List) and orders were placed for outstanding screenings/procedures as documented in the Assessment and Plan.

## 2024-11-04 NOTE — PROGRESS NOTES
Subjective   Patient ID: Dragan Calvin is a 84 y.o. male who presents for ER Follow-up (Closed head injury, initial encounter/) and Diabetes.    HPI     Review of Systems    Objective   /60 (BP Location: Left arm, Patient Position: Sitting, BP Cuff Size: Large adult)   Pulse 95   Wt 98 kg (216 lb)   SpO2 96%   BMI 32.84 kg/m²     Physical Exam    Assessment/Plan

## 2024-11-05 ENCOUNTER — APPOINTMENT (OUTPATIENT)
Dept: CARDIOLOGY | Facility: CLINIC | Age: 84
End: 2024-11-05
Payer: MEDICARE

## 2024-11-06 NOTE — RESULT ENCOUNTER NOTE
Would repeat psa at next visit, its elevated normally based on age if contineu to rise then we would see a urologist

## 2024-11-11 ENCOUNTER — APPOINTMENT (OUTPATIENT)
Dept: CARDIOLOGY | Facility: CLINIC | Age: 84
End: 2024-11-11
Payer: MEDICARE

## 2024-11-11 DIAGNOSIS — E11.9 TYPE 2 DIABETES MELLITUS WITHOUT COMPLICATION, WITHOUT LONG-TERM CURRENT USE OF INSULIN (MULTI): ICD-10-CM

## 2024-11-11 RX ORDER — GLIPIZIDE 2.5 MG/1
5 TABLET, EXTENDED RELEASE ORAL 2 TIMES DAILY
Qty: 180 TABLET | Refills: 3 | Status: SHIPPED | OUTPATIENT
Start: 2024-11-11 | End: 2024-11-13

## 2024-11-13 ENCOUNTER — TELEPHONE (OUTPATIENT)
Dept: PRIMARY CARE | Facility: CLINIC | Age: 84
End: 2024-11-13
Payer: MEDICARE

## 2024-11-13 DIAGNOSIS — E11.9 TYPE 2 DIABETES MELLITUS WITHOUT COMPLICATION, WITHOUT LONG-TERM CURRENT USE OF INSULIN (MULTI): ICD-10-CM

## 2024-11-13 RX ORDER — GLIPIZIDE 2.5 MG/1
2.5 TABLET, EXTENDED RELEASE ORAL 3 TIMES DAILY
Qty: 270 TABLET | Refills: 3 | Status: SHIPPED | OUTPATIENT
Start: 2024-11-13

## 2024-11-13 RX ORDER — GLIPIZIDE 2.5 MG/1
2.5 TABLET, EXTENDED RELEASE ORAL 3 TIMES DAILY
Qty: 180 TABLET | Refills: 3 | Status: SHIPPED | OUTPATIENT
Start: 2024-11-13 | End: 2024-11-13

## 2024-11-13 NOTE — PROGRESS NOTES
Subjective   Reason for Visit: Dragan Calvin is an 84 y.o. male here for a Medicare Wellness visit.               HPI    Patient Care Team:  Emmanuel Brown DO as PCP - General  [facility] DO [facility]  Benoit Carlson MD as Consulting Physician (Cardiology)  Israel Fabian PA-C as Physician Assistant (Cardiology)     Review of Systems    Objective   Vitals:  There were no vitals taken for this visit.      Physical Exam    Assessment & Plan

## 2024-11-13 NOTE — TELEPHONE ENCOUNTER
Patient came into office asking for a refill on his rx of Glipizide ER 2.5 mg 1 tablet 3x's per day.    Would like to use [address] [place] [phone] (mail)

## 2024-11-14 RX ORDER — GLIPIZIDE 2.5 MG/1
2.5 TABLET, EXTENDED RELEASE ORAL 3 TIMES DAILY
Qty: 180 TABLET | Refills: 3 | Status: SHIPPED | OUTPATIENT
Start: 2024-11-14

## 2024-11-19 ENCOUNTER — APPOINTMENT (OUTPATIENT)
Dept: PRIMARY CARE | Facility: CLINIC | Age: 84
End: 2024-11-19
Payer: MEDICARE

## 2024-11-25 ENCOUNTER — TELEPHONE (OUTPATIENT)
Dept: PAIN MEDICINE | Facility: CLINIC | Age: 84
End: 2024-11-25
Payer: MEDICARE

## 2024-11-25 DIAGNOSIS — M54.16 LUMBAR RADICULOPATHY: Primary | ICD-10-CM

## 2024-11-25 DIAGNOSIS — M48.062 SPINAL STENOSIS OF LUMBAR REGION WITH NEUROGENIC CLAUDICATION: ICD-10-CM

## 2024-11-25 RX ORDER — OXYCODONE AND ACETAMINOPHEN 5; 325 MG/1; MG/1
1 TABLET ORAL 2 TIMES DAILY PRN
Qty: 20 TABLET | Refills: 0 | Status: SHIPPED | OUTPATIENT
Start: 2024-11-25 | End: 2024-12-05

## 2024-11-26 ENCOUNTER — APPOINTMENT (OUTPATIENT)
Dept: CARDIOLOGY | Facility: CLINIC | Age: 84
End: 2024-11-26
Payer: MEDICARE

## 2024-11-26 VITALS
HEIGHT: 68 IN | SYSTOLIC BLOOD PRESSURE: 110 MMHG | OXYGEN SATURATION: 95 % | DIASTOLIC BLOOD PRESSURE: 74 MMHG | WEIGHT: 218 LBS | BODY MASS INDEX: 33.04 KG/M2 | HEART RATE: 91 BPM

## 2024-11-26 DIAGNOSIS — I25.10 CORONARY ARTERY DISEASE INVOLVING NATIVE CORONARY ARTERY OF NATIVE HEART WITHOUT ANGINA PECTORIS: ICD-10-CM

## 2024-11-26 DIAGNOSIS — E87.6 HYPOKALEMIA: Primary | ICD-10-CM

## 2024-11-26 DIAGNOSIS — I50.32 CHRONIC HEART FAILURE WITH PRESERVED EJECTION FRACTION: ICD-10-CM

## 2024-11-26 PROCEDURE — 3078F DIAST BP <80 MM HG: CPT | Performed by: PHYSICIAN ASSISTANT

## 2024-11-26 PROCEDURE — 99214 OFFICE O/P EST MOD 30 MIN: CPT | Performed by: PHYSICIAN ASSISTANT

## 2024-11-26 PROCEDURE — 1159F MED LIST DOCD IN RCRD: CPT | Performed by: PHYSICIAN ASSISTANT

## 2024-11-26 PROCEDURE — 1160F RVW MEDS BY RX/DR IN RCRD: CPT | Performed by: PHYSICIAN ASSISTANT

## 2024-11-26 PROCEDURE — 1036F TOBACCO NON-USER: CPT | Performed by: PHYSICIAN ASSISTANT

## 2024-11-26 PROCEDURE — 1123F ACP DISCUSS/DSCN MKR DOCD: CPT | Performed by: PHYSICIAN ASSISTANT

## 2024-11-26 PROCEDURE — 3074F SYST BP LT 130 MM HG: CPT | Performed by: PHYSICIAN ASSISTANT

## 2024-11-26 RX ORDER — POTASSIUM CHLORIDE 750 MG/1
10 TABLET, FILM COATED, EXTENDED RELEASE ORAL DAILY
Qty: 90 TABLET | Refills: 3 | Status: SHIPPED | OUTPATIENT
Start: 2024-11-26 | End: 2025-11-26

## 2024-11-26 NOTE — PROGRESS NOTES
Chief Complaint:   HFpEF     History Of Present Illness:    11/26/2024  Patient remains euvolemic on current dosing schedule with dual diuretics including torsemide and metolazone.  Repeat serum workup displayed a persistently mild hypokalemia, likely due in part to recent reduction of ACE inhibitor in the setting of marginal/hypotensive blood pressure readings.  Patient continues DOAC therapy for CVA prophylaxis.  Renal function remains stable on current medical therapy.  We did discuss the importance of potassium supplementation and maintenance of normokalemic levels.  Patient denies chest pain, chest pressure, palpitations, dyspnea on exertion, shortness of breath at rest, diaphoresis, nausea/vomiting, back pain, headache, lightheadedness, dizziness, syncope or presyncopal episodes, active bleeding signs or symptoms, excessive weight gain, muscle or joint pain, claudication.    5/22/2024  Dragan Calvin is a 84 y.o. male presenting with recent exacerbation of HFpEF with marked volume overload.  Weight was up to 236 lbs, his metolazone was uptitrated by Dr. Carlson to 2.5mg 5x weekly and 5mg 2x weekly resulting in 25 lb weight loss over the past 2 weeks.  Serum creatinine slightly increased from baseline currently at 1.37, , and he remains normokalemic.  Patient endorses improvement in ease of breathing with diuresis as well as improvement in peripheral edema.  BP has been running marginally hypotensive with regards to both SBP/DBP with concerns for increased falls risk.  Otherwise, patient is asymptomatic at this time.  Patient denies chest pain, chest pressure, palpitations, dyspnea on exertion, shortness of breath at rest, diaphoresis, nausea/vomiting, back pain, headache, lightheadedness, dizziness, syncope or presyncopal episodes, active bleeding signs or symptoms, excessive weight gain, muscle or joint pain, claudication.         Last Recorded Vitals:  Vitals:    11/26/24 0852   BP: 110/74   BP Location:  "Right arm   Patient Position: Sitting   BP Cuff Size: Adult   Pulse: 91   SpO2: 95%   Weight: 98.9 kg (218 lb)   Height: 1.727 m (5' 8\")       Past Medical History:  He has a past medical history of Deep vein thrombosis (DVT) of lower extremity (Multi) (09/17/2023), History of falling, Hyperlipidemia (08/28/2023), Hypertension, benign (08/23/2017), Leg edema (09/05/2023), Overweight, Paroxysmal atrial fibrillation (Multi) (08/28/2023), Permanent atrial fibrillation (Multi) (10/17/2023), Personal history of other diseases of the digestive system, Personal history of other infectious and parasitic diseases, Personal history of other specified conditions, Personal history of other specified conditions, Personal history of peptic ulcer disease, Personal history of pneumonia (recurrent), Personal history of urinary calculi, Presence of cardiac pacemaker (08/25/2017), Sick sinus syndrome (Multi) (09/17/2023), and Type 2 diabetes mellitus, with long-term current use of insulin (08/28/2023).    Past Surgical History:  He has a past surgical history that includes Cardiac pacemaker placement (04/05/2018); Cholecystectomy (04/05/2018); and Appendectomy (07/25/2018).      Social History:  He reports that he has never smoked. He has never used smokeless tobacco. He reports that he does not drink alcohol and does not use drugs.    Family History:  Family History   Problem Relation Name Age of Onset    Coronary artery disease Father      Heart failure Father      Diabetes Father          Allergies:  Patient has no known allergies.    Outpatient Medications:  Current Outpatient Medications   Medication Instructions    acetaminophen (TYLENOL 8 HOUR) 650 mg, Every 8 hours PRN    apixaban (ELIQUIS) 5 mg, oral, 2 times daily    blood sugar diagnostic (Blood Glucose Test) strip Use to test blood sugar once daily    cyanocobalamin (Vitamin B-12) 1,000 mcg tablet 1 tablet, Daily    dilTIAZem CD (CARDIZEM CD) 240 mg, oral, 2 times daily    " "fosinopril (MONOPRIL) 10 mg, oral, Daily    glipiZIDE (GLUCOTROL) 5 mg, oral, 2 times daily before meals, Take 3 tablets by mouth once a day- this is correct Wont let me get rid of it    glipiZIDE XL (GLUCOTROL XL) 2.5 mg, oral, 3 times daily    glipiZIDE XL (GLUCOTROL XL) 2.5 mg, oral, 3 times daily    insulin degludec (TRESIBA FLEXTOUCH U-100) 26 Units, subcutaneous, Nightly, Take as directed per insulin instructions.    lancets misc One touch ultra test strips 33G LANCETS Tests once daily    metOLazone (Zaroxolyn) 2.5 mg tablet Take 1 tablet (2.5 mg) by mouth 5 times a week AND 2 tablets (5 mg) 2 times a week. On Sunday and Wednesday.    metoprolol tartrate (LOPRESSOR) 25 mg, oral, 2 times daily    multivitamin tablet 1 tablet, Daily    oxyCODONE-acetaminophen (Percocet) 5-325 mg tablet 1 tablet, oral, 2 times daily PRN    pen needle, diabetic (BD Ultra-Fine Tami Pen Needle) 32 gauge x 5/32\" needle 1 Needle, miscellaneous, 4 times daily    RABEprazole (ACIPHEX) 20 mg, oral, Daily    rosuvastatin (CRESTOR) 5 mg, oral, Daily    torsemide (DEMADEX) 60 mg, oral, Daily       Physical Exam:  Constitutional: awake and alert, oriented ×3, no apparent distress  Skin: warm, dry, good turgor no obvious lesions  Eyes: pupils equal, round, reactive to light, conjunctiva pink and noninjected, no discharge  HENT: normocephalic and atraumatic, mucous membranes moist, trachea midline with no masses/goiter  Cardiovascular: S1/S2 irregular, no murmur no rubs/gallops, no carotid bruits, no JVD  Pulmonary: symmetrical chest expansion, lungs are clear to auscultation bilaterally, no wheezes/rales/rhonchi, normal effort  Abdomen: nontender, nondistended, active bowel sounds, no ascites  Extremities: no cyanosis, clubbing, trace - 1+ BLE edema, evidence of chronic pretibial skin changes/hyperkeratosis and rubrous, no lesions; palpable pedal pulses  Neurologic: cranial nerves II - XII grossly intact, stable gait, no tremor       Last " Labs:  CBC -  Lab Results   Component Value Date    WBC 9.2 11/04/2024    HGB 10.7 (L) 11/04/2024    HCT 34.5 (L) 11/04/2024     (H) 11/04/2024     11/04/2024       CMP -  Lab Results   Component Value Date    CALCIUM 8.6 11/04/2024    PROT 6.7 11/04/2024    ALBUMIN 3.9 11/04/2024    AST 18 11/04/2024    ALT 16 11/04/2024    ALKPHOS 94 11/04/2024    BILITOT 1.1 11/04/2024       LIPID PANEL -   Lab Results   Component Value Date    CHOL 111 11/04/2024    TRIG 146 11/04/2024    HDL 45.0 11/04/2024    CHHDL 2.5 11/04/2024    LDLF 43 08/28/2023    VLDL 29 11/04/2024    NHDL 66 11/04/2024       RENAL FUNCTION PANEL -   Lab Results   Component Value Date    GLUCOSE 276 (H) 11/04/2024     11/04/2024    K 3.4 (L) 11/04/2024    CL 98 11/04/2024    CO2 27 11/04/2024    ANIONGAP 17 11/04/2024    BUN 43 (H) 11/04/2024    CREATININE 1.29 11/04/2024    GFRMALE 67 08/28/2023    CALCIUM 8.6 11/04/2024    ALBUMIN 3.9 11/04/2024        Lab Results   Component Value Date     (H) 09/28/2024    HGBA1C 7.4 (H) 11/04/2024       Last Cardiology Tests:  ECG:  ECG 12 lead 11/24/2023      Echo:  No results found for this or any previous visit from the past 1095 days.      Ejection Fractions:  EF   Date/Time Value Ref Range Status   06/27/2024 04:13 PM 55 %        Cath:  No results found for this or any previous visit from the past 1095 days.      Stress Test:  No results found for this or any previous visit from the past 1095 days.      Cardiac Imaging:  No results found for this or any previous visit from the past 1095 days.      Assessment/Plan   Problem List Items Addressed This Visit             ICD-10-CM       Cardiac and Vasculature    CAD (coronary artery disease) I25.10       -Patient appears stable and euvolemic    -Initiate KCL 10 meq daily    -Repeat BMP in 2-3 months    -Continue torsemide and metolazone at current dose/schedule    -Rates control medically, DOAC for CVA prophylaxis    -F/U with   Ramicone and Dr. Carlson as scheduled      Israel Fabian PA-C

## 2024-12-06 ENCOUNTER — TELEPHONE (OUTPATIENT)
Dept: PAIN MEDICINE | Facility: CLINIC | Age: 84
End: 2024-12-06
Payer: MEDICARE

## 2024-12-06 DIAGNOSIS — M48.062 SPINAL STENOSIS OF LUMBAR REGION WITH NEUROGENIC CLAUDICATION: ICD-10-CM

## 2024-12-06 DIAGNOSIS — M54.16 LUMBAR RADICULOPATHY: ICD-10-CM

## 2024-12-06 NOTE — TELEPHONE ENCOUNTER
The daughter called and asked if you could resend the rx for oxycodone to [facility] pharmacy due to the cost of the medication.

## 2024-12-16 DIAGNOSIS — Z79.4 TYPE 2 DIABETES MELLITUS WITH OTHER SPECIFIED COMPLICATION, WITH LONG-TERM CURRENT USE OF INSULIN: Primary | Chronic | ICD-10-CM

## 2024-12-16 DIAGNOSIS — E11.69 TYPE 2 DIABETES MELLITUS WITH OTHER SPECIFIED COMPLICATION, WITH LONG-TERM CURRENT USE OF INSULIN: Primary | Chronic | ICD-10-CM

## 2024-12-16 RX ORDER — IBUPROFEN 200 MG
1 CAPSULE ORAL DAILY
Qty: 100 STRIP | Refills: 1 | Status: SHIPPED | OUTPATIENT
Start: 2024-12-16

## 2024-12-16 RX ORDER — LANCETS
EACH MISCELLANEOUS
Qty: 100 EACH | Refills: 3 | Status: SHIPPED | OUTPATIENT
Start: 2024-12-16

## 2025-01-20 ENCOUNTER — HOSPITAL ENCOUNTER (OUTPATIENT)
Dept: RADIOLOGY | Facility: HOSPITAL | Age: 85
End: 2025-01-20
Payer: MEDICARE

## 2025-01-23 ENCOUNTER — HOSPITAL ENCOUNTER (OUTPATIENT)
Dept: CARDIOLOGY | Facility: CLINIC | Age: 85
Discharge: HOME | End: 2025-01-23
Payer: MEDICARE

## 2025-01-23 ENCOUNTER — HOSPITAL ENCOUNTER (OUTPATIENT)
Dept: RADIOLOGY | Facility: HOSPITAL | Age: 85
Discharge: HOME | End: 2025-01-23
Payer: MEDICARE

## 2025-01-23 DIAGNOSIS — Z95.0 PRESENCE OF CARDIAC PACEMAKER: ICD-10-CM

## 2025-01-23 DIAGNOSIS — M25.552 PAIN IN LEFT HIP: ICD-10-CM

## 2025-01-23 DIAGNOSIS — I49.5 SICK SINUS SYNDROME (MULTI): ICD-10-CM

## 2025-01-23 PROCEDURE — 27093 INJECTION FOR HIP X-RAY: CPT | Mod: LT

## 2025-01-23 PROCEDURE — 93296 REM INTERROG EVL PM/IDS: CPT

## 2025-01-23 PROCEDURE — 93294 REM INTERROG EVL PM/LDLS PM: CPT | Performed by: INTERNAL MEDICINE

## 2025-01-23 PROCEDURE — 20610 DRAIN/INJ JOINT/BURSA W/O US: CPT | Mod: LEFT SIDE | Performed by: RADIOLOGY

## 2025-01-23 PROCEDURE — 77002 NEEDLE LOCALIZATION BY XRAY: CPT | Mod: LEFT SIDE | Performed by: RADIOLOGY

## 2025-01-23 RX ORDER — LIDOCAINE HYDROCHLORIDE 10 MG/ML
10 INJECTION, SOLUTION EPIDURAL; INFILTRATION; INTRACAUDAL; PERINEURAL ONCE
Status: DISCONTINUED | OUTPATIENT
Start: 2025-01-23 | End: 2025-01-24 | Stop reason: HOSPADM

## 2025-01-23 RX ORDER — LIDOCAINE HYDROCHLORIDE 20 MG/ML
1 INJECTION, SOLUTION EPIDURAL; INFILTRATION; INTRACAUDAL; PERINEURAL ONCE
Status: DISCONTINUED | OUTPATIENT
Start: 2025-01-23 | End: 2025-01-24 | Stop reason: HOSPADM

## 2025-01-23 RX ORDER — TRIAMCINOLONE ACETONIDE 40 MG/ML
40 INJECTION, SUSPENSION INTRA-ARTICULAR; INTRAMUSCULAR ONCE
Status: DISCONTINUED | OUTPATIENT
Start: 2025-01-23 | End: 2025-01-24 | Stop reason: HOSPADM

## 2025-03-05 ENCOUNTER — APPOINTMENT (OUTPATIENT)
Dept: PRIMARY CARE | Facility: CLINIC | Age: 85
End: 2025-03-05
Payer: MEDICARE

## 2025-03-05 VITALS
BODY MASS INDEX: 31.93 KG/M2 | WEIGHT: 210 LBS | HEART RATE: 78 BPM | OXYGEN SATURATION: 98 % | SYSTOLIC BLOOD PRESSURE: 128 MMHG | DIASTOLIC BLOOD PRESSURE: 70 MMHG

## 2025-03-05 DIAGNOSIS — E11.9 TYPE 2 DIABETES MELLITUS WITHOUT COMPLICATION, WITH LONG-TERM CURRENT USE OF INSULIN (MULTI): ICD-10-CM

## 2025-03-05 DIAGNOSIS — E11.69 TYPE 2 DIABETES MELLITUS WITH OTHER SPECIFIED COMPLICATION, WITH LONG-TERM CURRENT USE OF INSULIN: Chronic | ICD-10-CM

## 2025-03-05 DIAGNOSIS — N18.31 STAGE 3A CHRONIC KIDNEY DISEASE (MULTI): ICD-10-CM

## 2025-03-05 DIAGNOSIS — Z79.4 TYPE 2 DIABETES MELLITUS WITH OTHER SPECIFIED COMPLICATION, WITH LONG-TERM CURRENT USE OF INSULIN: Chronic | ICD-10-CM

## 2025-03-05 DIAGNOSIS — I50.33 ACUTE ON CHRONIC HEART FAILURE WITH PRESERVED EJECTION FRACTION: ICD-10-CM

## 2025-03-05 DIAGNOSIS — E11.9 TYPE 2 DIABETES MELLITUS WITHOUT COMPLICATION, WITHOUT LONG-TERM CURRENT USE OF INSULIN (MULTI): ICD-10-CM

## 2025-03-05 DIAGNOSIS — I82.5Z9 CHRONIC DEEP VEIN THROMBOSIS (DVT) OF DISTAL VEIN OF LOWER EXTREMITY, UNSPECIFIED LATERALITY (MULTI): Primary | Chronic | ICD-10-CM

## 2025-03-05 DIAGNOSIS — I49.5 SICK SINUS SYNDROME (MULTI): ICD-10-CM

## 2025-03-05 DIAGNOSIS — L89.323 PRESSURE ULCER OF LEFT BUTTOCK, STAGE 3 (MULTI): ICD-10-CM

## 2025-03-05 DIAGNOSIS — Z79.4 TYPE 2 DIABETES MELLITUS WITHOUT COMPLICATION, WITH LONG-TERM CURRENT USE OF INSULIN (MULTI): ICD-10-CM

## 2025-03-05 LAB — HBA1C MFR BLD: 8.2 % (ref 4.2–6.5)

## 2025-03-05 PROCEDURE — 3078F DIAST BP <80 MM HG: CPT | Performed by: STUDENT IN AN ORGANIZED HEALTH CARE EDUCATION/TRAINING PROGRAM

## 2025-03-05 PROCEDURE — 1123F ACP DISCUSS/DSCN MKR DOCD: CPT | Performed by: STUDENT IN AN ORGANIZED HEALTH CARE EDUCATION/TRAINING PROGRAM

## 2025-03-05 PROCEDURE — 99214 OFFICE O/P EST MOD 30 MIN: CPT | Performed by: STUDENT IN AN ORGANIZED HEALTH CARE EDUCATION/TRAINING PROGRAM

## 2025-03-05 PROCEDURE — 1036F TOBACCO NON-USER: CPT | Performed by: STUDENT IN AN ORGANIZED HEALTH CARE EDUCATION/TRAINING PROGRAM

## 2025-03-05 PROCEDURE — G2211 COMPLEX E/M VISIT ADD ON: HCPCS | Performed by: STUDENT IN AN ORGANIZED HEALTH CARE EDUCATION/TRAINING PROGRAM

## 2025-03-05 PROCEDURE — 1159F MED LIST DOCD IN RCRD: CPT | Performed by: STUDENT IN AN ORGANIZED HEALTH CARE EDUCATION/TRAINING PROGRAM

## 2025-03-05 PROCEDURE — 83036 HEMOGLOBIN GLYCOSYLATED A1C: CPT | Mod: CLIA WAIVED TEST | Performed by: STUDENT IN AN ORGANIZED HEALTH CARE EDUCATION/TRAINING PROGRAM

## 2025-03-05 PROCEDURE — 3074F SYST BP LT 130 MM HG: CPT | Performed by: STUDENT IN AN ORGANIZED HEALTH CARE EDUCATION/TRAINING PROGRAM

## 2025-03-05 RX ORDER — IBUPROFEN 200 MG
1 CAPSULE ORAL DAILY
Qty: 100 STRIP | Refills: 1 | Status: SHIPPED | OUTPATIENT
Start: 2025-03-05

## 2025-03-05 RX ORDER — INSULIN DEGLUDEC 100 U/ML
26 INJECTION, SOLUTION SUBCUTANEOUS NIGHTLY
Qty: 15 ML | Refills: 3 | Status: SHIPPED | OUTPATIENT
Start: 2025-03-05 | End: 2026-03-05

## 2025-03-05 RX ORDER — TAMSULOSIN HYDROCHLORIDE 0.4 MG/1
1 CAPSULE ORAL
COMMUNITY
Start: 2024-12-01

## 2025-03-05 ASSESSMENT — ENCOUNTER SYMPTOMS: DEPRESSION: 0

## 2025-03-05 NOTE — PROGRESS NOTES
Subjective   Patient ID: Dragan Calvin is a 84 y.o. male who presents for Follow-up.    HPI     all: tripped over shoes, had scans and everything was native     DM: on insulin and meds, checks daily , readings in low 100s, wants off insulin. Only takes about 4 units at night for meals I 26 units of tresiba a day and glipizide. A1c is 7.4 today, doing well no issues     A fib: on eliquis sees lyndsay Funez, unable to feel it when he is in afib     SBO: was in hospital for SBO seen dr. Emma umana, hospital reviewed     Hld: statin thearpy stable, no muscle aches       Review of Systems   All other systems reviewed and are negative.      Objective   /70 (BP Location: Left arm, Patient Position: Sitting, BP Cuff Size: Large adult)   Pulse 78   Wt 95.3 kg (210 lb)   SpO2 98%   BMI 31.93 kg/m²     Physical Exam  Constitutional:       Appearance: Normal appearance.   HENT:      Head: Normocephalic and atraumatic.      Right Ear: Tympanic membrane and ear canal normal.      Left Ear: Tympanic membrane and ear canal normal.      Mouth/Throat:      Mouth: Mucous membranes are moist.      Pharynx: Oropharynx is clear.   Eyes:      Extraocular Movements: Extraocular movements intact.      Conjunctiva/sclera: Conjunctivae normal.      Pupils: Pupils are equal, round, and reactive to light.   Cardiovascular:      Rate and Rhythm: Normal rate and regular rhythm.      Pulses: Normal pulses.      Heart sounds: Normal heart sounds.   Pulmonary:      Effort: Pulmonary effort is normal.      Breath sounds: Normal breath sounds.   Abdominal:      General: Abdomen is flat. Bowel sounds are normal.      Palpations: Abdomen is soft.   Musculoskeletal:         General: Normal range of motion.      Cervical back: Normal range of motion and neck supple.   Skin:     General: Skin is warm and dry.      Capillary Refill: Capillary refill takes 2 to 3 seconds.   Neurological:      General: No focal deficit present.      Mental  Status: He is alert and oriented to person, place, and time. Mental status is at baseline.   Psychiatric:         Mood and Affect: Mood normal.         Behavior: Behavior normal.         Thought Content: Thought content normal.         Judgment: Judgment normal.       Assessment/Plan   1. Type 2 diabetes mellitus without complication, without long-term current use of insulin (Multi)    - POCT Glycosylated Hemoglobin (HGB A1C) docked device    2. Type 2 diabetes mellitus with other specified complication, with long-term current use of insulin  Stable 8.2 today no issues  - blood sugar diagnostic (Blood Glucose Test) strip; 1 strip once daily.  Dispense: 100 strip; Refill: 1    3. Type 2 diabetes mellitus without complication, with long-term current use of insulin (Multi)    - insulin degludec (Tresiba FlexTouch U-100) 100 unit/mL (3 mL) pen; Inject 26 Units under the skin once daily at bedtime. Take as directed per insulin instructions.  Dispense: 15 mL; Refill: 3    4. 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 Acute on chronic heart failure with preserved ejection fraction  Stable euvolemic    7. Sick sinus syndrome (Multi)  Stable s/p pm    8. Stage 3a chronic kidney disease (Multi)  Stable doing well

## 2025-03-17 ENCOUNTER — TELEPHONE (OUTPATIENT)
Dept: CARDIOLOGY | Facility: CLINIC | Age: 85
End: 2025-03-17
Payer: MEDICARE

## 2025-03-17 DIAGNOSIS — E78.2 MIXED HYPERLIPIDEMIA: Chronic | ICD-10-CM

## 2025-03-17 RX ORDER — DILTIAZEM HYDROCHLORIDE 240 MG/1
240 CAPSULE, COATED, EXTENDED RELEASE ORAL 2 TIMES DAILY
Qty: 180 CAPSULE | Refills: 3 | Status: SHIPPED | OUTPATIENT
Start: 2025-03-17

## 2025-04-01 PROBLEM — M16.12 PRIMARY LOCALIZED OSTEOARTHRITIS OF LEFT HIP: Status: ACTIVE | Noted: 2025-01-16

## 2025-04-22 DIAGNOSIS — E78.5 HYPERLIPIDEMIA, UNSPECIFIED HYPERLIPIDEMIA TYPE: ICD-10-CM

## 2025-04-22 DIAGNOSIS — I48.21 PERMANENT ATRIAL FIBRILLATION (MULTI): Chronic | ICD-10-CM

## 2025-04-22 PROBLEM — N18.31 STAGE 3A CHRONIC KIDNEY DISEASE (MULTI): Chronic | Status: ACTIVE | Noted: 2024-07-18

## 2025-04-22 RX ORDER — ROSUVASTATIN CALCIUM 5 MG/1
5 TABLET, COATED ORAL DAILY
Qty: 90 TABLET | Refills: 1 | Status: SHIPPED | OUTPATIENT
Start: 2025-04-22

## 2025-04-22 NOTE — PROGRESS NOTES
Referred by No ref. provider found    HPI    I am seeing Carlos for the first time since July 2024.  He was seen by Jarrett in November.  He has had varying doses of metolazone and torsemide.  Currently taking 40 mg torsemide daily.  2.5 metolazone 3 times a week.  Doing well until about a month ago when his weight started to go up and his edema increased.  This correlated with his decrease in functional capacity because of his back pain and sciatica.  He has had no orthopnea although sleeps in a recliner.  He has had no shortness of breath and no chest pain.  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 Zee follows    Hypertension, benign 08/23/2017    Leg edema 09/05/2023    Overweight     Overweight    Paroxysmal atrial fibrillation (Multi) 08/28/2023    PAF. On Eliquis. 7 second pauses therefore PPM placed. Freq Pafib on PPM. Chads 2 vasc 4    Permanent atrial fibrillation (Multi) 10/17/2023    On Eliquis. 7 second pauses therefore PPM placed. Freq Pafib on PPM. Chads 2 vasc 4    Personal history of other diseases of the digestive system     History of small bowel obstruction    Personal history of other infectious and parasitic diseases     History of herpes zoster    Personal history of other specified conditions     History of bradycardia    Personal history of other specified conditions     History of edema    Personal history of peptic ulcer disease     History of peptic ulcer    Personal history of pneumonia (recurrent)     History of pneumonia    Personal history of urinary calculi     History of kidney stones    Presence of cardiac pacemaker 08/25/2017    10/24/2016: Med[employer] Advisa MRI  model #[id number]    Sick sinus syndrome (Multi) 09/17/2023     s/p PPM 10/2016    Type 2 diabetes mellitus, with long-term current use of insulin 08/28/2023      Past Surgical History:  He has a past surgical history that includes Cardiac pacemaker placement (04/05/2018); Cholecystectomy (04/05/2018); and Appendectomy (07/25/2018).      Social History:  He reports that he has never smoked. He has never used smokeless tobacco. He reports that he does not drink alcohol and does not use drugs.    Family History:  Family History   Problem Relation Name Age of Onset    Coronary artery disease Father      Heart failure Father      Diabetes Father       Allergies:  Patient has no known allergies.    Outpatient Medications:  Current Outpatient Medications   Medication Instructions    acetaminophen (TYLENOL 8 HOUR) 650 mg, Every 8 hours PRN    apixaban (ELIQUIS) 5 mg, oral, 2 times daily    blood sugar diagnostic (Blood Glucose Test) strip Use to test blood sugar once daily    blood sugar diagnostic (Blood Glucose Test) strip 1 strip, miscellaneous, Daily    cyanocobalamin (Vitamin B-12) 1,000 mcg tablet 1 tablet, Daily    dilTIAZem CD (CARDIZEM CD) 240 mg, oral, 2 times daily    fosinopril (MONOPRIL) 10 mg, oral, Daily    glipiZIDE (GLUCOTROL) 5 mg, oral, 2 times daily before meals, Take 3 tablets by mouth once a day- this is correct Wont let me get rid of it    glipiZIDE XL (GLUCOTROL XL) 2.5 mg, oral, 3 times daily    glipiZIDE XL (GLUCOTROL XL) 2.5 mg, oral, 3 times daily    insulin degludec (TRESIBA FLEXTOUCH U-100) 26 Units, subcutaneous, Nightly, Take as directed per insulin instructions.    lancets misc One touch ultra test strips 33G LANCETS Tests once daily    lancets misc Use to test blood sugar once a day    metOLazone (Zaroxolyn) 2.5 mg tablet Take 1 tablet (2.5 mg) by mouth 5 times a week AND 2 tablets (5 mg) 2 times a week. On Sunday and Wednesday.    metoprolol tartrate (LOPRESSOR) 25 mg, oral, 2 times daily    multivitamin tablet 1 tablet, Daily    pen needle,  "diabetic (BD Ultra-Fine Tami Pen Needle) 32 gauge x 5/32\" needle 1 Needle, miscellaneous, 4 times daily    potassium chloride CR (Klor-Con) 10 mEq ER tablet 10 mEq, oral, Daily, Do not crush, chew, or split.    RABEprazole (ACIPHEX) 20 mg, oral, Daily    rosuvastatin (CRESTOR) 5 mg, oral, Daily    tamsulosin (Flomax) 0.4 mg 24 hr capsule 1 capsule, Daily (0630)    torsemide (DEMADEX) 60 mg, oral, Daily     Last Recorded Vitals:  There were no vitals filed for this visit.    Physical Exam  Patient is alert and oriented x3.  HEENT is unremarkable mucous members are moist  Neck no JVP no bruits upstrokes are full no thyromegaly  Lungs are clear bilaterally.  No wheezing crackles or rales  Heart irregular rhythm normal S1-S2 there is no S3 no murmurs are heard.  Abdomen is soft bs are positive nontender nondistended no organomegaly no pulsatile masses  Extremities have 3+ hard edema  Distal pulses present palpable.  Neuro is grossly nonfocal  Skin has no rashes     Last Labs:  CBC -  Lab Results   Component Value Date    WBC 9.2 11/04/2024    HGB 10.7 (L) 11/04/2024    HCT 34.5 (L) 11/04/2024     (H) 11/04/2024     11/04/2024     CMP -  Lab Results   Component Value Date    CALCIUM 8.6 11/04/2024    PROT 6.7 11/04/2024    ALBUMIN 3.9 11/04/2024    AST 18 11/04/2024    ALT 16 11/04/2024    ALKPHOS 94 11/04/2024    BILITOT 1.1 11/04/2024     LIPID PANEL -   Lab Results   Component Value Date    CHOL 111 11/04/2024    HDL 45.0 11/04/2024    CHHDL 2.5 11/04/2024    VLDL 29 11/04/2024    TRIG 146 11/04/2024    NHDL 66 11/04/2024     RENAL FUNCTION PANEL -   Lab Results   Component Value Date    K 3.4 (L) 11/04/2024       Lab Results   Component Value Date     (H) 09/28/2024    HGBA1C 8.2 (H) 03/05/2025     Procedure    Echo 6/27/2024 EF 55%,    ECHO [03/09/2022]: Est EF 50%. RVSP mildly elev (37.3 mmHg). Mod TR.      ECHO [01/30/2016]: Nrml LVF w/ est EF 60%, nrml pattern LV diastolic filling, concentric " LVH, mild LVH, mildly elev PAP.     PACER INSERT (10/24/2016): Medtronic Advisa MRI DR model #A2DR01     EVENT [03/30/2015]: SR, rate 90. One ep/of A-fib documented on 4/10/15. The vent response was between 120 and 130 beats per minute. No ev/of high-grade AV block. Isolated PVCs w/no VT. No arrhythmia symptoms documented.     EX NST [02/12/2014]: 4 min 28 sec (6.3 METs) ... Normal, EF 56%          Assessment/Plan   1. Atrial fibrillation. CHADS vasc 4. Perm. Con't Eliquis, dilt, BB.  Rates are controlled     2. Sick sinus syndrome. He had a 7-second pause. He has as Medtronic pacemaker followed by Dr. Ramicone.     3. Lower extremity edema.  This has been fluctuating.  We had good control this when we gave him metolazone daily.  When I seen him in July I was worried about hypotension hypokalemia and therefore the metolazone was decreased.  He is currently taking metolazone 2.5 mg 3 times a week.  I will increase it to 2.5 mg daily.  He will continue with torsemide 40 mg daily.  A BMP will be checked today and again in 1 month.     3.  Renal function is stable.  Slight increase in November now normalized.  Need to watch on metolazone.  11/4/2024 B/C 43/1.3 potassium 3.4.  Repeat BMP today and in 1 month    4. Hypertension well-controlled.  Will decide on what to do with the Monopril based upon the lab work today.  11/4/2024 B/C 43/1.3 potassium 3.4.  Blood pressures are slightly soft also because of the addition of Flomax.  He has been taking Monopril 10 mg 3 times a week.  I will change that to 5 mg daily.    5. Hyperlipidemia.  Followed by Dr. Brown.  11/4/2024 LDL 37 HDL 45 triglycerides 146 LFTs normal 6. Previous DVT. He has an IVC filter in place.    Patient instructions    1.  Blood work today.    2.  Blood work in 1 month.    3.  Change of Monopril to 1/2 tablet (5 mg) daily.    4.  Increase metolazone to 2.5 mg every day.  Continue torsemide 40 mg daily.    RTC 3 months  Benoit Carlson MD     Instructions and  follow up

## 2025-04-23 ENCOUNTER — APPOINTMENT (OUTPATIENT)
Dept: CARDIOLOGY | Facility: CLINIC | Age: 85
End: 2025-04-23
Payer: MEDICARE

## 2025-04-23 VITALS
SYSTOLIC BLOOD PRESSURE: 112 MMHG | OXYGEN SATURATION: 96 % | WEIGHT: 228 LBS | DIASTOLIC BLOOD PRESSURE: 76 MMHG | BODY MASS INDEX: 34.67 KG/M2 | HEART RATE: 87 BPM

## 2025-04-23 DIAGNOSIS — R60.0 LEG EDEMA: Chronic | ICD-10-CM

## 2025-04-23 DIAGNOSIS — I10 HYPERTENSION, UNSPECIFIED TYPE: ICD-10-CM

## 2025-04-23 DIAGNOSIS — I25.10 CORONARY ARTERY DISEASE INVOLVING NATIVE CORONARY ARTERY OF NATIVE HEART WITHOUT ANGINA PECTORIS: Primary | ICD-10-CM

## 2025-04-23 DIAGNOSIS — E78.2 MIXED HYPERLIPIDEMIA: Chronic | ICD-10-CM

## 2025-04-23 DIAGNOSIS — I48.21 PERMANENT ATRIAL FIBRILLATION (MULTI): Chronic | ICD-10-CM

## 2025-04-23 DIAGNOSIS — Z95.0 PRESENCE OF CARDIAC PACEMAKER: Chronic | ICD-10-CM

## 2025-04-23 DIAGNOSIS — I11.0 HYPERTENSIVE HEART DISEASE WITH HEART FAILURE: ICD-10-CM

## 2025-04-23 DIAGNOSIS — I10 HYPERTENSION, BENIGN: Chronic | ICD-10-CM

## 2025-04-23 PROCEDURE — 1160F RVW MEDS BY RX/DR IN RCRD: CPT | Performed by: INTERNAL MEDICINE

## 2025-04-23 PROCEDURE — 1159F MED LIST DOCD IN RCRD: CPT | Performed by: INTERNAL MEDICINE

## 2025-04-23 PROCEDURE — 3074F SYST BP LT 130 MM HG: CPT | Performed by: INTERNAL MEDICINE

## 2025-04-23 PROCEDURE — 3078F DIAST BP <80 MM HG: CPT | Performed by: INTERNAL MEDICINE

## 2025-04-23 PROCEDURE — 1123F ACP DISCUSS/DSCN MKR DOCD: CPT | Performed by: INTERNAL MEDICINE

## 2025-04-23 PROCEDURE — 1036F TOBACCO NON-USER: CPT | Performed by: INTERNAL MEDICINE

## 2025-04-23 PROCEDURE — 99215 OFFICE O/P EST HI 40 MIN: CPT | Performed by: INTERNAL MEDICINE

## 2025-04-23 RX ORDER — METOLAZONE 2.5 MG/1
2.5 TABLET ORAL DAILY
Qty: 90 TABLET | Refills: 3 | Status: SHIPPED | OUTPATIENT
Start: 2025-04-23 | End: 2025-04-23 | Stop reason: SDUPTHER

## 2025-04-23 RX ORDER — METOPROLOL TARTRATE 25 MG/1
25 TABLET, FILM COATED ORAL 2 TIMES DAILY
Qty: 180 TABLET | Refills: 3 | Status: SHIPPED | OUTPATIENT
Start: 2025-04-23

## 2025-04-23 RX ORDER — FOSINOPIRL SODIUM 10 MG/1
5 TABLET ORAL DAILY
Qty: 90 TABLET | Refills: 1 | Status: SHIPPED | OUTPATIENT
Start: 2025-04-23 | End: 2025-04-23 | Stop reason: SDUPTHER

## 2025-04-23 RX ORDER — TORSEMIDE 20 MG/1
40 TABLET ORAL DAILY
Qty: 180 TABLET | Refills: 3 | Status: SHIPPED | OUTPATIENT
Start: 2025-04-23 | End: 2025-04-23 | Stop reason: SDUPTHER

## 2025-04-23 NOTE — PATIENT INSTRUCTIONS
1. Atrial fibrillation. CHADS vasc 4. Perm. Con't Eliquis, dilt, BB.  Rates are controlled     2. Sick sinus syndrome. He had a 7-second pause. He has as [employer]tronic pacemaker followed by Dr. Ramicone.     3. Lower extremity edema.  This has been fluctuating.  We had good control this when we gave him metolazone daily.  When I seen him in July I was worried about hypotension hypokalemia and therefore the metolazone was decreased.  He is currently taking metolazone 2.5 mg 3 times a week.  I will increase it to 2.5 mg daily.  He will continue with torsemide 40 mg daily.  A BMP will be checked today and again in 1 month.     3.  Renal function is stable.  Slight increase in November now normalized.  Need to watch on metolazone.  11/4/2024 B/C 43/1.3 potassium 3.4.  Repeat BMP today and in 1 month    4. Hypertension well-controlled.  Will decide on what to do with the Monopril based upon the lab work today.  11/4/2024 B/C 43/1.3 potassium 3.4.  Blood pressures are slightly soft also because of the addition of Flomax.  He has been taking Monopril 10 mg 3 times a week.  I will change that to 5 mg daily.    5. Hyperlipidemia.  Followed by Dr. Brown.  11/4/2024 LDL 37 HDL 45 triglycerides 146 LFTs normal 6. Previous DVT. He has an IVC filter in place.    Patient instructions    1.  Blood work today.    2.  Blood work in 1 month.    3.  Change of Monopril to 1/2 tablet (5 mg) daily.    4.  Increase metolazone to 2.5 mg every day.  Continue torsemide 40 mg daily.    RTC 3 months

## 2025-04-24 LAB
ANION GAP SERPL CALCULATED.4IONS-SCNC: 15 MMOL/L (CALC) (ref 7–17)
BNP SERPL-MCNC: 258 PG/ML
BUN SERPL-MCNC: 38 MG/DL (ref 7–25)
BUN/CREAT SERPL: 25 (CALC) (ref 6–22)
CALCIUM SERPL-MCNC: 8.7 MG/DL (ref 8.6–10.3)
CHLORIDE SERPL-SCNC: 102 MMOL/L (ref 98–110)
CO2 SERPL-SCNC: 24 MMOL/L (ref 20–32)
CREAT SERPL-MCNC: 1.5 MG/DL (ref 0.7–1.22)
EGFRCR SERPLBLD CKD-EPI 2021: 46 ML/MIN/1.73M2
GLUCOSE SERPL-MCNC: 152 MG/DL (ref 65–99)
POTASSIUM SERPL-SCNC: 3.8 MMOL/L (ref 3.5–5.3)
SODIUM SERPL-SCNC: 141 MMOL/L (ref 135–146)

## 2025-04-24 RX ORDER — TORSEMIDE 20 MG/1
40 TABLET ORAL DAILY
Qty: 180 TABLET | Refills: 3 | Status: SHIPPED | OUTPATIENT
Start: 2025-04-24 | End: 2026-04-24

## 2025-04-24 RX ORDER — FOSINOPIRL SODIUM 10 MG/1
5 TABLET ORAL DAILY
Qty: 45 TABLET | Refills: 3 | Status: SHIPPED | OUTPATIENT
Start: 2025-04-24

## 2025-04-24 RX ORDER — METOLAZONE 2.5 MG/1
2.5 TABLET ORAL DAILY
Qty: 90 TABLET | Refills: 3 | Status: SHIPPED | OUTPATIENT
Start: 2025-04-24 | End: 2026-04-24

## 2025-04-28 DIAGNOSIS — E11.9 TYPE 2 DIABETES MELLITUS WITHOUT COMPLICATION, WITH LONG-TERM CURRENT USE OF INSULIN: ICD-10-CM

## 2025-04-28 DIAGNOSIS — Z79.4 TYPE 2 DIABETES MELLITUS WITHOUT COMPLICATION, WITH LONG-TERM CURRENT USE OF INSULIN: ICD-10-CM

## 2025-04-28 RX ORDER — INSULIN DEGLUDEC 100 U/ML
26 INJECTION, SOLUTION SUBCUTANEOUS NIGHTLY
Qty: 24 ML | Refills: 3 | Status: SHIPPED | OUTPATIENT
Start: 2025-04-28

## 2025-05-10 ENCOUNTER — HOSPITAL ENCOUNTER (EMERGENCY)
Facility: HOSPITAL | Age: 85
Discharge: HOME | End: 2025-05-10
Attending: EMERGENCY MEDICINE
Payer: MEDICARE

## 2025-05-10 ENCOUNTER — APPOINTMENT (OUTPATIENT)
Dept: RADIOLOGY | Facility: HOSPITAL | Age: 85
End: 2025-05-10
Payer: MEDICARE

## 2025-05-10 ENCOUNTER — APPOINTMENT (OUTPATIENT)
Dept: CARDIOLOGY | Facility: HOSPITAL | Age: 85
End: 2025-05-10
Payer: MEDICARE

## 2025-05-10 VITALS
SYSTOLIC BLOOD PRESSURE: 138 MMHG | RESPIRATION RATE: 21 BRPM | WEIGHT: 212 LBS | HEIGHT: 70 IN | HEART RATE: 80 BPM | DIASTOLIC BLOOD PRESSURE: 97 MMHG | BODY MASS INDEX: 30.35 KG/M2 | TEMPERATURE: 98.2 F | OXYGEN SATURATION: 96 %

## 2025-05-10 DIAGNOSIS — R53.1 GENERAL WEAKNESS: Primary | ICD-10-CM

## 2025-05-10 LAB
ALBUMIN SERPL BCP-MCNC: 4 G/DL (ref 3.4–5)
ALP SERPL-CCNC: 101 U/L (ref 33–136)
ALT SERPL W P-5'-P-CCNC: 15 U/L (ref 10–52)
ANION GAP SERPL CALC-SCNC: 15 MMOL/L (ref 10–20)
APPEARANCE UR: CLEAR
APTT PPP: 30 SECONDS (ref 26–36)
AST SERPL W P-5'-P-CCNC: 16 U/L (ref 9–39)
BASOPHILS # BLD AUTO: 0.05 X10*3/UL (ref 0–0.1)
BASOPHILS NFR BLD AUTO: 0.5 %
BILIRUB DIRECT SERPL-MCNC: 0.1 MG/DL (ref 0–0.3)
BILIRUB SERPL-MCNC: 0.7 MG/DL (ref 0–1.2)
BILIRUB UR STRIP.AUTO-MCNC: NEGATIVE MG/DL
BNP SERPL-MCNC: 241 PG/ML (ref 0–99)
BUN SERPL-MCNC: 41 MG/DL (ref 6–23)
CALCIUM SERPL-MCNC: 8.9 MG/DL (ref 8.6–10.3)
CARDIAC TROPONIN I PNL SERPL HS: 15 NG/L (ref 0–20)
CARDIAC TROPONIN I PNL SERPL HS: 16 NG/L (ref 0–20)
CHLORIDE SERPL-SCNC: 101 MMOL/L (ref 98–107)
CO2 SERPL-SCNC: 25 MMOL/L (ref 21–32)
COLOR UR: NORMAL
CREAT SERPL-MCNC: 1.4 MG/DL (ref 0.5–1.3)
EGFRCR SERPLBLD CKD-EPI 2021: 50 ML/MIN/1.73M*2
EOSINOPHIL # BLD AUTO: 0.11 X10*3/UL (ref 0–0.4)
EOSINOPHIL NFR BLD AUTO: 1.2 %
ERYTHROCYTE [DISTWIDTH] IN BLOOD BY AUTOMATED COUNT: 12.6 % (ref 11.5–14.5)
GLUCOSE SERPL-MCNC: 265 MG/DL (ref 74–99)
GLUCOSE UR STRIP.AUTO-MCNC: NORMAL MG/DL
HCT VFR BLD AUTO: 33.5 % (ref 41–52)
HGB BLD-MCNC: 11.2 G/DL (ref 13.5–17.5)
IMM GRANULOCYTES # BLD AUTO: 0.06 X10*3/UL (ref 0–0.5)
IMM GRANULOCYTES NFR BLD AUTO: 0.7 % (ref 0–0.9)
INR PPP: 1.3 (ref 0.9–1.1)
KETONES UR STRIP.AUTO-MCNC: NEGATIVE MG/DL
LEUKOCYTE ESTERASE UR QL STRIP.AUTO: NEGATIVE
LYMPHOCYTES # BLD AUTO: 1.33 X10*3/UL (ref 0.8–3)
LYMPHOCYTES NFR BLD AUTO: 14.4 %
MCH RBC QN AUTO: 33.9 PG (ref 26–34)
MCHC RBC AUTO-ENTMCNC: 33.4 G/DL (ref 32–36)
MCV RBC AUTO: 102 FL (ref 80–100)
MONOCYTES # BLD AUTO: 0.69 X10*3/UL (ref 0.05–0.8)
MONOCYTES NFR BLD AUTO: 7.5 %
NEUTROPHILS # BLD AUTO: 6.98 X10*3/UL (ref 1.6–5.5)
NEUTROPHILS NFR BLD AUTO: 75.7 %
NITRITE UR QL STRIP.AUTO: NEGATIVE
NRBC BLD-RTO: 0 /100 WBCS (ref 0–0)
PH UR STRIP.AUTO: 5 [PH]
PLATELET # BLD AUTO: 202 X10*3/UL (ref 150–450)
POTASSIUM SERPL-SCNC: 3.3 MMOL/L (ref 3.5–5.3)
PROT SERPL-MCNC: 7 G/DL (ref 6.4–8.2)
PROT UR STRIP.AUTO-MCNC: NEGATIVE MG/DL
PROTHROMBIN TIME: 14.5 SECONDS (ref 9.8–12.4)
RBC # BLD AUTO: 3.3 X10*6/UL (ref 4.5–5.9)
RBC # UR STRIP.AUTO: NEGATIVE MG/DL
SODIUM SERPL-SCNC: 138 MMOL/L (ref 136–145)
SP GR UR STRIP.AUTO: 1.01
UROBILINOGEN UR STRIP.AUTO-MCNC: NORMAL MG/DL
WBC # BLD AUTO: 9.2 X10*3/UL (ref 4.4–11.3)

## 2025-05-10 PROCEDURE — 85730 THROMBOPLASTIN TIME PARTIAL: CPT | Performed by: EMERGENCY MEDICINE

## 2025-05-10 PROCEDURE — 80048 BASIC METABOLIC PNL TOTAL CA: CPT | Performed by: EMERGENCY MEDICINE

## 2025-05-10 PROCEDURE — 36415 COLL VENOUS BLD VENIPUNCTURE: CPT | Performed by: EMERGENCY MEDICINE

## 2025-05-10 PROCEDURE — 81003 URINALYSIS AUTO W/O SCOPE: CPT | Performed by: EMERGENCY MEDICINE

## 2025-05-10 PROCEDURE — 2500000001 HC RX 250 WO HCPCS SELF ADMINISTERED DRUGS (ALT 637 FOR MEDICARE OP): Performed by: STUDENT IN AN ORGANIZED HEALTH CARE EDUCATION/TRAINING PROGRAM

## 2025-05-10 PROCEDURE — 84484 ASSAY OF TROPONIN QUANT: CPT | Performed by: EMERGENCY MEDICINE

## 2025-05-10 PROCEDURE — 99285 EMERGENCY DEPT VISIT HI MDM: CPT | Mod: 25 | Performed by: EMERGENCY MEDICINE

## 2025-05-10 PROCEDURE — 71045 X-RAY EXAM CHEST 1 VIEW: CPT

## 2025-05-10 PROCEDURE — 93005 ELECTROCARDIOGRAM TRACING: CPT

## 2025-05-10 PROCEDURE — 71045 X-RAY EXAM CHEST 1 VIEW: CPT | Mod: FOREIGN READ | Performed by: STUDENT IN AN ORGANIZED HEALTH CARE EDUCATION/TRAINING PROGRAM

## 2025-05-10 PROCEDURE — 82248 BILIRUBIN DIRECT: CPT | Performed by: EMERGENCY MEDICINE

## 2025-05-10 PROCEDURE — 85025 COMPLETE CBC W/AUTO DIFF WBC: CPT | Performed by: EMERGENCY MEDICINE

## 2025-05-10 PROCEDURE — 83880 ASSAY OF NATRIURETIC PEPTIDE: CPT | Performed by: EMERGENCY MEDICINE

## 2025-05-10 PROCEDURE — 85610 PROTHROMBIN TIME: CPT | Performed by: EMERGENCY MEDICINE

## 2025-05-10 RX ORDER — POTASSIUM CHLORIDE 1.5 G/1.58G
40 POWDER, FOR SOLUTION ORAL ONCE
Status: COMPLETED | OUTPATIENT
Start: 2025-05-10 | End: 2025-05-10

## 2025-05-10 RX ORDER — PREDNISONE 10 MG/1
50 TABLET ORAL DAILY
Qty: 25 TABLET | Refills: 0 | Status: SHIPPED | OUTPATIENT
Start: 2025-05-10 | End: 2025-05-10

## 2025-05-10 RX ORDER — PREDNISONE 10 MG/1
50 TABLET ORAL DAILY
Qty: 25 TABLET | Refills: 0 | Status: SHIPPED | OUTPATIENT
Start: 2025-05-10 | End: 2025-05-15

## 2025-05-10 RX ORDER — LIDOCAINE 560 MG/1
1 PATCH PERCUTANEOUS; TOPICAL; TRANSDERMAL DAILY
Qty: 7 PATCH | Refills: 0 | Status: SHIPPED | OUTPATIENT
Start: 2025-05-10

## 2025-05-10 RX ORDER — LIDOCAINE 560 MG/1
1 PATCH PERCUTANEOUS; TOPICAL; TRANSDERMAL DAILY
Qty: 7 PATCH | Refills: 0 | Status: SHIPPED | OUTPATIENT
Start: 2025-05-10 | End: 2025-05-10

## 2025-05-10 RX ADMIN — POTASSIUM CHLORIDE 40 MEQ: 1.5 POWDER, FOR SOLUTION ORAL at 16:14

## 2025-05-10 ASSESSMENT — COLUMBIA-SUICIDE SEVERITY RATING SCALE - C-SSRS
6. HAVE YOU EVER DONE ANYTHING, STARTED TO DO ANYTHING, OR PREPARED TO DO ANYTHING TO END YOUR LIFE?: NO
1. IN THE PAST MONTH, HAVE YOU WISHED YOU WERE DEAD OR WISHED YOU COULD GO TO SLEEP AND NOT WAKE UP?: NO
2. HAVE YOU ACTUALLY HAD ANY THOUGHTS OF KILLING YOURSELF?: NO

## 2025-05-10 NOTE — ED TRIAGE NOTES
Patient BIB EMS from home for c/o left and right leg weakness. Patient states he has chronic left leg numbness and weakness d/t spinal stenosis and was not able to get himself up a few stairs at home because his legs were too weak to hold him up. Patient states he is usually able to get up the stairs fine and uses a walker at baseline to get around. Patient also states chronic lower extremity swelling.

## 2025-05-10 NOTE — ED PROVIDER NOTES
"Limitations to History: ***  External Records Reviewed: ***  Independent Historians: ***    HPI  Dragan CABRERA Male is a 84 y.o. male with a history of ***    PM  Medical History[1]    Meds  Current Outpatient Medications   Medication Instructions    acetaminophen (TYLENOL 8 HOUR) 650 mg, Every 8 hours PRN    apixaban (ELIQUIS) 5 mg, oral, 2 times daily    blood sugar diagnostic (Blood Glucose Test) strip Use to test blood sugar once daily    blood sugar diagnostic (Blood Glucose Test) strip 1 strip, miscellaneous, Daily    cyanocobalamin (Vitamin B-12) 1,000 mcg tablet 1 tablet, Daily    dilTIAZem CD (CARDIZEM CD) 240 mg, oral, 2 times daily    fosinopril (MONOPRIL) 5 mg, oral, Daily    glipiZIDE XL (GLUCOTROL XL) 2.5 mg, oral, 3 times daily    insulin degludec (TRESIBA FLEXTOUCH U-100) 26 Units, subcutaneous, Nightly, Take as directed per insulin instructions.    lancets misc One touch ultra test strips 33G LANCETS Tests once daily    lancets misc Use to test blood sugar once a day    metOLazone (ZAROXOLYN) 2.5 mg, oral, Daily, On Sunday and Wednesday    metoprolol tartrate (LOPRESSOR) 25 mg, oral, 2 times daily    multivitamin tablet 1 tablet, Daily    pen needle, diabetic (BD Ultra-Fine Tami Pen Needle) 32 gauge x 5/32\" needle 1 Needle, miscellaneous, 4 times daily    potassium chloride CR (Klor-Con) 10 mEq ER tablet 10 mEq, oral, Daily, Do not crush, chew, or split.    RABEprazole (ACIPHEX) 20 mg, oral, Daily    rosuvastatin (CRESTOR) 5 mg, oral, Daily    tamsulosin (Flomax) 0.4 mg 24 hr capsule 1 capsule, Daily (0630)    torsemide (DEMADEX) 40 mg, oral, Daily       Allergies  RX Allergies[2]     SHx  Social History[3]    ------------------------------------------------------------------------------------------------------------------------------------------    There were no vitals taken for this visit.    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 Zee follows    Hypertension, benign 08/23/2017    Leg edema 09/05/2023    Overweight     Overweight    Paroxysmal atrial fibrillation (Multi) 08/28/2023    PAF. On Eliquis. 7 second pauses therefore PPM placed. Freq Pafib on PPM. Chads 2 vasc 4    Permanent atrial fibrillation (Multi) 10/17/2023    On Eliquis. 7 second pauses therefore PPM placed. Freq Pafib on PPM. Chads 2 vasc 4    Personal history of other diseases of the digestive system     History of small bowel obstruction    Personal history of other infectious and parasitic diseases     History of herpes zoster    Personal history of other specified conditions     History of bradycardia    Personal history of other specified conditions     History of edema    Personal history of peptic ulcer disease     History of peptic ulcer    Personal history of pneumonia (recurrent)     History of pneumonia    Personal history of urinary calculi     History of kidney stones    Presence of cardiac pacemaker 08/25/2017    10/24/2016: Medtronic Advisa MRI  model #[id number]    Sick sinus syndrome (Multi) 09/17/2023    s/p PPM 10/2016    Stage 3a chronic kidney disease (Multi) 07/18/2024    Type 2 diabetes mellitus, with long-term current use of insulin 08/28/2023   [2] No Known Allergies  [3]   Social History  Tobacco Use    Smoking status: Never    Smokeless tobacco: Never   Vaping Use    Vaping status: Never  Used   Substance Use Topics    Alcohol use: Never    Drug use: Never      parasitic diseases     History of herpes zoster    Personal history of other specified conditions     History of bradycardia    Personal history of other specified conditions     History of edema    Personal history of peptic ulcer disease     History of peptic ulcer    Personal history of pneumonia (recurrent)     History of pneumonia    Personal history of urinary calculi     History of kidney stones    Presence of cardiac pacemaker 08/25/2017    10/24/2016: Medtronic Advisa MRI DR model #[id number]    Sick sinus syndrome (Multi) 09/17/2023    s/p PPM 10/2016    Stage 3a chronic kidney disease (Multi) 07/18/2024    Type 2 diabetes mellitus, with long-term current use of insulin 08/28/2023   [2] No Known Allergies  [3]   Social History  Tobacco Use    Smoking status: Never    Smokeless tobacco: Never   Vaping Use    Vaping status: Never Used   Substance Use Topics    Alcohol use: Never    Drug use: Never        Kenroy Barbosa MD  05/18/25 [id number]

## 2025-05-10 NOTE — DISCHARGE INSTRUCTIONS
As discussed should you been experiencing saddle numbness changes in bowel or bladder habits weakness in the lower extremities which is worsening fevers chills symptoms concerning to call 911 or return to the nearest emergency department immediately.  Please follow-up with spine as soon as possible.  Lidocaine patches 12 hours on 12 hours off.  Prednisone once daily for total 5 days for inflammation.  Please monitor your blood sugars very closely with this as it can cause your blood sugar to increase significantly if this occurs return immediately.

## 2025-05-11 LAB — HOLD SPECIMEN: NORMAL

## 2025-05-13 PROBLEM — I49.5 SICK SINUS SYNDROME (MULTI): Chronic | Status: RESOLVED | Noted: 2023-09-17 | Resolved: 2025-05-13

## 2025-05-13 LAB
Q ONSET: 220 MS
QRS COUNT: 15 BEATS
QRS DURATION: 96 MS
QT INTERVAL: 386 MS
QTC CALCULATION(BAZETT): 467 MS
QTC FREDERICIA: 438 MS
R AXIS: 91 DEGREES
T AXIS: 72 DEGREES
T OFFSET: 413 MS
VENTRICULAR RATE: 88 BPM

## 2025-05-23 DIAGNOSIS — I25.10 CORONARY ARTERY DISEASE INVOLVING NATIVE CORONARY ARTERY OF NATIVE HEART WITHOUT ANGINA PECTORIS: ICD-10-CM

## 2025-05-28 LAB
ANION GAP SERPL CALCULATED.4IONS-SCNC: 14 MMOL/L (CALC) (ref 7–17)
BUN SERPL-MCNC: 40 MG/DL (ref 7–25)
BUN/CREAT SERPL: 31 (CALC) (ref 6–22)
CALCIUM SERPL-MCNC: 8.9 MG/DL (ref 8.6–10.3)
CHLORIDE SERPL-SCNC: 98 MMOL/L (ref 98–110)
CO2 SERPL-SCNC: 25 MMOL/L (ref 20–32)
CREAT SERPL-MCNC: 1.3 MG/DL (ref 0.7–1.22)
EGFRCR SERPLBLD CKD-EPI 2021: 54 ML/MIN/1.73M2
GLUCOSE SERPL-MCNC: 358 MG/DL (ref 65–99)
POTASSIUM SERPL-SCNC: 3.8 MMOL/L (ref 3.5–5.3)
SODIUM SERPL-SCNC: 137 MMOL/L (ref 135–146)

## 2025-05-29 ENCOUNTER — TELEPHONE (OUTPATIENT)
Dept: CARDIOLOGY | Facility: CLINIC | Age: 85
End: 2025-05-29
Payer: MEDICARE

## 2025-05-29 NOTE — TELEPHONE ENCOUNTER
Left detailed message for patient and asked patient to call our office back with any questions/concerns.

## 2025-05-29 NOTE — TELEPHONE ENCOUNTER
----- Message from Benoit Carlson sent at 5/28/2025 10:22 PM EDT -----  Have pt follow up with PCP regarding elevated BS  ----- Message -----  From: [facility] [employer] [facility]  Sent: 5/28/2025   5:07 AM EDT  To: Benoit Carlson MD

## 2025-06-01 DIAGNOSIS — R60.0 LEG EDEMA: Chronic | ICD-10-CM

## 2025-06-04 ENCOUNTER — APPOINTMENT (OUTPATIENT)
Dept: CARDIOLOGY | Facility: CLINIC | Age: 85
End: 2025-06-04
Payer: MEDICARE

## 2025-06-04 VITALS
DIASTOLIC BLOOD PRESSURE: 70 MMHG | SYSTOLIC BLOOD PRESSURE: 130 MMHG | BODY MASS INDEX: 30.21 KG/M2 | HEIGHT: 70 IN | HEART RATE: 75 BPM | OXYGEN SATURATION: 94 % | WEIGHT: 211 LBS

## 2025-06-04 DIAGNOSIS — Z95.0 PRESENCE OF CARDIAC PACEMAKER: Primary | Chronic | ICD-10-CM

## 2025-06-04 DIAGNOSIS — I48.11 LONGSTANDING PERSISTENT ATRIAL FIBRILLATION (MULTI): ICD-10-CM

## 2025-06-04 DIAGNOSIS — I10 HYPERTENSION, BENIGN: Chronic | ICD-10-CM

## 2025-06-04 PROCEDURE — 1036F TOBACCO NON-USER: CPT | Performed by: INTERNAL MEDICINE

## 2025-06-04 PROCEDURE — 1159F MED LIST DOCD IN RCRD: CPT | Performed by: INTERNAL MEDICINE

## 2025-06-04 PROCEDURE — 3078F DIAST BP <80 MM HG: CPT | Performed by: INTERNAL MEDICINE

## 2025-06-04 PROCEDURE — 99214 OFFICE O/P EST MOD 30 MIN: CPT | Performed by: INTERNAL MEDICINE

## 2025-06-04 PROCEDURE — 3075F SYST BP GE 130 - 139MM HG: CPT | Performed by: INTERNAL MEDICINE

## 2025-06-04 NOTE — ASSESSMENT & PLAN NOTE
Ventricular response is controlled at this time.  No bleeding problems on Eliquis.  Continue same medication regimen.

## 2025-06-04 NOTE — PROGRESS NOTES
"    History Of Present Illness:      This is an 84-year-old male with atrial fibrillation and sick sinus syndrome.  He has a Medtronic pacemaker which is approaching end-of-life.  No new cardiac complaints at this time.    Review of Systems  Other review of systems negative  Last Recorded Vitals:      11/26/2024     8:52 AM 3/5/2025     9:36 AM 4/23/2025     9:07 AM 5/10/2025     2:30 PM 5/10/2025     4:00 PM 5/10/2025     6:00 PM 6/4/2025     1:08 PM   Vitals   Systolic 110 128 112 131 128 138 130   Diastolic 74 70 76 66 63 97 70   BP Location Right arm Left arm Left arm    Right arm   Heart Rate 91 78 87 90 84 80 75   Temp    36.8 °C (98.2 °F)      Resp    12 19 21    Height 1.727 m (5' 8\")   1.778 m (5' 10\")   1.778 m (5' 10\")   Weight (lb) 218 210 228 212   211   BMI 33.15 kg/m2 31.93 kg/m2 34.67 kg/m2 30.42 kg/m2   30.28 kg/m2   BSA (m2) 2.18 m2 2.14 m2 2.22 m2 2.18 m2   2.17 m2   Visit Report Report Report Report    Report     Allergies:  Patient has no known allergies.  Outpatient Medications:  Current Outpatient Medications   Medication Instructions    acetaminophen (TYLENOL 8 HOUR) 650 mg, Every 8 hours PRN    apixaban (ELIQUIS) 5 mg, oral, 2 times daily    blood sugar diagnostic (Blood Glucose Test) strip Use to test blood sugar once daily    blood sugar diagnostic (Blood Glucose Test) strip 1 strip, miscellaneous, Daily    cyanocobalamin (Vitamin B-12) 1,000 mcg tablet 1 tablet, Daily    dilTIAZem CD (CARDIZEM CD) 240 mg, oral, 2 times daily    fosinopril (MONOPRIL) 5 mg, oral, Daily    glipiZIDE XL (GLUCOTROL XL) 2.5 mg, oral, 3 times daily    insulin degludec (TRESIBA FLEXTOUCH U-100) 26 Units, subcutaneous, Nightly, Take as directed per insulin instructions.    lancets misc One touch ultra test strips 33G LANCETS Tests once daily    lancets misc Use to test blood sugar once a day    lidocaine 4 % patch 1 patch, transdermal, Daily, Remove & discard patch within 12 hours or as directed by MD.    " "metOLazone (ZAROXOLYN) 2.5 mg, oral, Daily, On Sunday and Wednesday    metoprolol tartrate (LOPRESSOR) 25 mg, oral, 2 times daily    multivitamin tablet 1 tablet, Daily    pen needle, diabetic (BD Ultra-Fine Tami Pen Needle) 32 gauge x 5/32\" needle 1 Needle, miscellaneous, 4 times daily    potassium chloride CR (Klor-Con) 10 mEq ER tablet 10 mEq, oral, Daily, Do not crush, chew, or split.    RABEprazole (ACIPHEX) 20 mg, oral, Daily    rosuvastatin (CRESTOR) 5 mg, oral, Daily    tamsulosin (Flomax) 0.4 mg 24 hr capsule 1 capsule, Daily (0630)    torsemide (DEMADEX) 40 mg, oral, Daily       Physical Exam:    General Appearance:  Alert, oriented, no distress  Skin:  Warm and dry  Head and Neck:  No elevation of JVP, no carotid bruits  Cardiac Exam:  Rhythm is regular, S1 and S2 are normal, no murmur S3 or S4  Lungs:  Clear to auscultation  Extremities:  no edema  Neurologic:  No focal deficits  Psychiatric:  Appropriate mood and behavior    Lab Results:    CMP:  Recent Labs     05/27/25  0924 05/10/25  1457 04/23/25  0957 11/04/24  1018 09/28/24  1014 07/24/24  1049 05/17/24  0754 11/01/23  0715    138 141 139 140 137 139 141   K 3.8 3.3* 3.8 3.4* 3.3* 3.6 4.1 3.7   CL 98 101 102 98 101 98 102 103   CO2 25 25 24 27 29 27 26 27   ANIONGAP 14 15 15 17 13 16 15 15   BUN 40* 41* 38* 43* 34* 45* 34* 26*   CREATININE 1.30* 1.40* 1.50* 1.29 1.36* 1.57* 1.37* 1.05   EGFR 54* 50* 46* 55* 51* 43* 51* 70     Recent Labs     05/10/25  1457 11/04/24  1018 10/31/23  0347 08/28/23  1030 06/19/21  0459   ALBUMIN 4.0 3.9 4.2 4.1 4.1   ALKPHOS 101 94 99 90 82   ALT 15 16 17 14 26   AST 16 18 18 17 22   BILITOT 0.7 1.1 1.4* 0.9 1.8*   LIPASE  --   --  44  --   --      CBC:  Recent Labs     05/10/25  1457 11/04/24  1018 11/01/23  0715 10/31/23  0347 08/28/23  1030 04/04/23  0800 06/21/21  0533 06/20/21  0533   WBC 9.2 9.2 9.7 13.7* 10.0 12.0* 12.5* 13.9*   HGB 11.2* 10.7* 13.1* 13.5 12.1* 13.0* 14.0 13.1*   HCT 33.5* 34.5* 38.8* " 39.4* 36.4* 39.7* 42.3 39.0*    198 171 178 185 257 159 140*   * 108* 98 96 106* 102* 101* 101*     COAG:   Recent Labs     05/10/25  1457 04/04/23  0800   INR 1.3* 1.3*     Cardiology Tests (personally reviewed):    I have personally review the diagnostic cardiac testing and my interpretation is as follows:     Normal pacemaker function, longstanding persistent atrial fibrillation  Echocardiogram 2022: Ejection fraction 50%    Assessment/Plan   Problem List Items Addressed This Visit           ICD-10-CM    Hypertension, benign (Chronic) I10    Blood pressure is controlled on current medication regimen.         Presence of cardiac pacemaker - Primary (Chronic) Z95.0      The patient has a history of sick sinus syndrome and has a Medtronic permanent pacemaker that is approaching elective replacement.  I performed a pacemaker interrogation today and the estimated battery longevity is 2 months.  We will plan for monthly battery checks until the device reaches elective replacement, then the pacemaker pulse generator replacement procedure will be scheduled.  The procedure and risks were discussed with the patient and his family.  The procedure will be performed at [facility] later this year.  Dosing instructions regarding Eliquis prior to the pacemaker procedure will be provided.         Longstanding persistent atrial fibrillation (Multi) I48.11    Ventricular response is controlled at this time.  No bleeding problems on Eliquis.  Continue same medication regimen.            James C Ramicone, DO

## 2025-06-04 NOTE — ASSESSMENT & PLAN NOTE
The patient has a history of sick sinus syndrome and has a Medtronic permanent pacemaker that is approaching elective replacement.  I performed a pacemaker interrogation today and the estimated battery longevity is 2 months.  We will plan for monthly battery checks until the device reaches elective replacement, then the pacemaker pulse generator replacement procedure will be scheduled.  The procedure and risks were discussed with the patient and his family.  The procedure will be performed at [facility] later this year.  Dosing instructions regarding Eliquis prior to the pacemaker procedure will be provided.

## 2025-06-05 DIAGNOSIS — Z95.0 PRESENCE OF CARDIAC PACEMAKER: ICD-10-CM

## 2025-06-05 DIAGNOSIS — Z79.4 TYPE 2 DIABETES MELLITUS WITH OTHER SPECIFIED COMPLICATION, WITH LONG-TERM CURRENT USE OF INSULIN: Chronic | ICD-10-CM

## 2025-06-05 DIAGNOSIS — I49.5 SICK SINUS SYNDROME (MULTI): ICD-10-CM

## 2025-06-05 DIAGNOSIS — E11.69 TYPE 2 DIABETES MELLITUS WITH OTHER SPECIFIED COMPLICATION, WITH LONG-TERM CURRENT USE OF INSULIN: Chronic | ICD-10-CM

## 2025-06-05 RX ORDER — BLOOD SUGAR DIAGNOSTIC
STRIP MISCELLANEOUS
Qty: 100 STRIP | Refills: 1 | Status: SHIPPED | OUTPATIENT
Start: 2025-06-05

## 2025-06-05 RX ORDER — TORSEMIDE 20 MG/1
TABLET ORAL
Qty: 270 TABLET | Refills: 3 | Status: SHIPPED | OUTPATIENT
Start: 2025-06-05

## 2025-06-09 ENCOUNTER — APPOINTMENT (OUTPATIENT)
Dept: PRIMARY CARE | Facility: CLINIC | Age: 85
End: 2025-06-09
Payer: MEDICARE

## 2025-06-09 VITALS
SYSTOLIC BLOOD PRESSURE: 122 MMHG | OXYGEN SATURATION: 96 % | DIASTOLIC BLOOD PRESSURE: 66 MMHG | BODY MASS INDEX: 30.56 KG/M2 | WEIGHT: 213 LBS | HEART RATE: 85 BPM

## 2025-06-09 DIAGNOSIS — M48.062 SPINAL STENOSIS, LUMBAR REGION, WITH NEUROGENIC CLAUDICATION: Primary | ICD-10-CM

## 2025-06-09 DIAGNOSIS — E11.9 TYPE 2 DIABETES MELLITUS WITHOUT COMPLICATION, WITHOUT LONG-TERM CURRENT USE OF INSULIN: ICD-10-CM

## 2025-06-09 LAB — HBA1C MFR BLD: 8.4 % (ref 4.2–6.5)

## 2025-06-09 RX ORDER — CYCLOBENZAPRINE HCL 5 MG
5 TABLET ORAL 3 TIMES DAILY PRN
Qty: 30 TABLET | Refills: 0 | Status: SHIPPED | OUTPATIENT
Start: 2025-06-09 | End: 2025-08-08

## 2025-06-09 ASSESSMENT — ENCOUNTER SYMPTOMS: DEPRESSION: 0

## 2025-06-09 NOTE — PROGRESS NOTES
Subjective   Patient ID: Dragan CABRERA [employer] is a 84 y.o. male who presents for Follow-up (3 months/Went to ER about a month ago/Wanted to talk about a long term steroid for the pain in his LT leg).    HPI     Leg weakness: went ot er month ago had lumbar stenosis exacerbation, labs and imaging reviewed, was given steroids and it helped him well, has seen pain management     DM: on insulin and meds, checks daily , readings in low 100s, wants off insulin. Only takes about 4 units at night for meals I 26 units of tresiba a day and glipizide. A1c is 8.4     A fib: on eliquis sees lyndsay Miller, unable to feel it when he is in afib     SBO: was in hospital for SBO seen dr. Emma umana, hospital reviewed     Hld: statin thearpy stable, no muscle aches    Review of Systems   All other systems reviewed and are negative.      Objective   /66 (BP Location: Left arm, Patient Position: Sitting, BP Cuff Size: Large adult)   Pulse 85   Wt 96.6 kg (213 lb)   SpO2 96%   BMI 30.56 kg/m²     Physical Exam  Constitutional:       Appearance: Normal appearance.   HENT:      Head: Normocephalic and atraumatic.      Right Ear: Tympanic membrane and ear canal normal.      Left Ear: Tympanic membrane and ear canal normal.      Mouth/Throat:      Mouth: Mucous membranes are moist.      Pharynx: Oropharynx is clear.   Eyes:      Extraocular Movements: Extraocular movements intact.      Conjunctiva/sclera: Conjunctivae normal.      Pupils: Pupils are equal, round, and reactive to light.   Cardiovascular:      Rate and Rhythm: Normal rate and regular rhythm.      Pulses: Normal pulses.      Heart sounds: Normal heart sounds.   Pulmonary:      Effort: Pulmonary effort is normal.      Breath sounds: Normal breath sounds.   Abdominal:      General: Abdomen is flat. Bowel sounds are normal.      Palpations: Abdomen is soft.   Musculoskeletal:         General: Normal range of motion.      Cervical back: Normal range of motion and neck  supple.   Skin:     General: Skin is warm and dry.      Capillary Refill: Capillary refill takes 2 to 3 seconds.   Neurological:      General: No focal deficit present.      Mental Status: He is alert and oriented to person, place, and time. Mental status is at baseline.   Psychiatric:         Mood and Affect: Mood normal.         Behavior: Behavior normal.         Thought Content: Thought content normal.         Judgment: Judgment normal.       Assessment/Plan   1. Type 2 diabetes mellitus without complication, without long-term current use of insulin  8.4 today up a bit, but was on steroids recently  - mostly upper hundreds  - sometimes low 200s  - POCT Glycosylated Hemoglobin (HGB A1C) docked device    2. Spinal stenosis, lumbar region, with neurogenic claudication (Primary)    - cyclobenzaprine (Flexeril) 5 mg tablet; Take 1 tablet (5 mg) by mouth 3 times a day as needed for muscle spasms.  Dispense: 30 tablet; Refill: 0  - er visit revewied  - steroids not ideal longterm given side effects  Agrees to muscle relaxers

## 2025-07-03 ENCOUNTER — APPOINTMENT (OUTPATIENT)
Dept: CARDIOLOGY | Facility: HOSPITAL | Age: 85
End: 2025-07-03
Payer: MEDICARE

## 2025-07-03 ENCOUNTER — APPOINTMENT (OUTPATIENT)
Dept: RADIOLOGY | Facility: HOSPITAL | Age: 85
DRG: 552 | End: 2025-07-03
Payer: MEDICARE

## 2025-07-03 ENCOUNTER — HOSPITAL ENCOUNTER (INPATIENT)
Facility: HOSPITAL | Age: 85
LOS: 2 days | Discharge: NURSING, REHAB OR LONG-TERM CARE | End: 2025-07-06
Attending: EMERGENCY MEDICINE | Admitting: STUDENT IN AN ORGANIZED HEALTH CARE EDUCATION/TRAINING PROGRAM
Payer: MEDICARE

## 2025-07-03 DIAGNOSIS — R53.1 GENERALIZED WEAKNESS: Primary | ICD-10-CM

## 2025-07-03 DIAGNOSIS — R26.2 DIFFICULTY IN WALKING: ICD-10-CM

## 2025-07-03 DIAGNOSIS — E87.6 HYPOKALEMIA: ICD-10-CM

## 2025-07-03 PROBLEM — R79.89 ELEVATED SERUM CREATININE: Status: ACTIVE | Noted: 2025-07-03

## 2025-07-03 PROBLEM — M48.00 SPINAL STENOSIS: Status: ACTIVE | Noted: 2023-11-22
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
## 2025-07-03 LAB
ALBUMIN SERPL BCP-MCNC: 3.9 G/DL (ref 3.4–5)
ALP SERPL-CCNC: 90 U/L (ref 33–136)
ALT SERPL W P-5'-P-CCNC: 15 U/L (ref 10–52)
ANION GAP SERPL CALC-SCNC: 16 MMOL/L (ref 10–20)
APPEARANCE UR: CLEAR
AST SERPL W P-5'-P-CCNC: 19 U/L (ref 9–39)
BASOPHILS # BLD AUTO: 0.05 X10*3/UL (ref 0–0.1)
BASOPHILS NFR BLD AUTO: 0.5 %
BILIRUB SERPL-MCNC: 1.1 MG/DL (ref 0–1.2)
BILIRUB UR STRIP.AUTO-MCNC: NEGATIVE MG/DL
BNP SERPL-MCNC: 200 PG/ML (ref 0–99)
BUN SERPL-MCNC: 42 MG/DL (ref 6–23)
CALCIUM SERPL-MCNC: 8.8 MG/DL (ref 8.6–10.3)
CARDIAC TROPONIN I PNL SERPL HS: 17 NG/L (ref 0–20)
CHLORIDE SERPL-SCNC: 100 MMOL/L (ref 98–107)
CO2 SERPL-SCNC: 23 MMOL/L (ref 21–32)
COLOR UR: NORMAL
CREAT SERPL-MCNC: 1.55 MG/DL (ref 0.5–1.3)
EGFRCR SERPLBLD CKD-EPI 2021: 44 ML/MIN/1.73M*2
EOSINOPHIL # BLD AUTO: 0.04 X10*3/UL (ref 0–0.4)
EOSINOPHIL NFR BLD AUTO: 0.4 %
ERYTHROCYTE [DISTWIDTH] IN BLOOD BY AUTOMATED COUNT: 13.2 % (ref 11.5–14.5)
GLUCOSE BLD MANUAL STRIP-MCNC: 163 MG/DL (ref 74–99)
GLUCOSE BLD MANUAL STRIP-MCNC: 210 MG/DL (ref 74–99)
GLUCOSE BLD MANUAL STRIP-MCNC: 218 MG/DL (ref 74–99)
GLUCOSE SERPL-MCNC: 309 MG/DL (ref 74–99)
GLUCOSE UR STRIP.AUTO-MCNC: NORMAL MG/DL
HCT VFR BLD AUTO: 34 % (ref 41–52)
HGB BLD-MCNC: 11.6 G/DL (ref 13.5–17.5)
IMM GRANULOCYTES # BLD AUTO: 0.06 X10*3/UL (ref 0–0.5)
IMM GRANULOCYTES NFR BLD AUTO: 0.6 % (ref 0–0.9)
KETONES UR STRIP.AUTO-MCNC: NEGATIVE MG/DL
LEUKOCYTE ESTERASE UR QL STRIP.AUTO: NEGATIVE
LYMPHOCYTES # BLD AUTO: 0.51 X10*3/UL (ref 0.8–3)
LYMPHOCYTES NFR BLD AUTO: 4.9 %
MAGNESIUM SERPL-MCNC: 1.92 MG/DL (ref 1.6–2.4)
MCH RBC QN AUTO: 33.5 PG (ref 26–34)
MCHC RBC AUTO-ENTMCNC: 34.1 G/DL (ref 32–36)
MCV RBC AUTO: 98 FL (ref 80–100)
MONOCYTES # BLD AUTO: 0.57 X10*3/UL (ref 0.05–0.8)
MONOCYTES NFR BLD AUTO: 5.5 %
NEUTROPHILS # BLD AUTO: 9.21 X10*3/UL (ref 1.6–5.5)
NEUTROPHILS NFR BLD AUTO: 88.1 %
NITRITE UR QL STRIP.AUTO: NEGATIVE
NRBC BLD-RTO: 0 /100 WBCS (ref 0–0)
PH UR STRIP.AUTO: 6 [PH]
PLATELET # BLD AUTO: 181 X10*3/UL (ref 150–450)
POTASSIUM SERPL-SCNC: 3.1 MMOL/L (ref 3.5–5.3)
PROT SERPL-MCNC: 6.9 G/DL (ref 6.4–8.2)
PROT UR STRIP.AUTO-MCNC: NEGATIVE MG/DL
RBC # BLD AUTO: 3.46 X10*6/UL (ref 4.5–5.9)
RBC # UR STRIP.AUTO: NEGATIVE MG/DL
SARS-COV-2 RNA RESP QL NAA+PROBE: NOT DETECTED
SODIUM SERPL-SCNC: 136 MMOL/L (ref 136–145)
SP GR UR STRIP.AUTO: 1.01
UROBILINOGEN UR STRIP.AUTO-MCNC: NORMAL MG/DL
WBC # BLD AUTO: 10.4 X10*3/UL (ref 4.4–11.3)

## 2025-07-03 PROCEDURE — 2500000002 HC RX 250 W HCPCS SELF ADMINISTERED DRUGS (ALT 637 FOR MEDICARE OP, ALT 636 FOR OP/ED)

## 2025-07-03 PROCEDURE — 71045 X-RAY EXAM CHEST 1 VIEW: CPT | Mod: FOREIGN READ | Performed by: RADIOLOGY

## 2025-07-03 PROCEDURE — 81003 URINALYSIS AUTO W/O SCOPE: CPT | Performed by: NURSE PRACTITIONER

## 2025-07-03 PROCEDURE — 70450 CT HEAD/BRAIN W/O DYE: CPT | Performed by: RADIOLOGY

## 2025-07-03 PROCEDURE — 2500000002 HC RX 250 W HCPCS SELF ADMINISTERED DRUGS (ALT 637 FOR MEDICARE OP, ALT 636 FOR OP/ED): Performed by: NURSE PRACTITIONER

## 2025-07-03 PROCEDURE — 85025 COMPLETE CBC W/AUTO DIFF WBC: CPT | Performed by: NURSE PRACTITIONER

## 2025-07-03 PROCEDURE — 72125 CT NECK SPINE W/O DYE: CPT | Performed by: RADIOLOGY

## 2025-07-03 PROCEDURE — G0378 HOSPITAL OBSERVATION PER HR: HCPCS

## 2025-07-03 PROCEDURE — 2500000004 HC RX 250 GENERAL PHARMACY W/ HCPCS (ALT 636 FOR OP/ED): Performed by: NURSE PRACTITIONER

## 2025-07-03 PROCEDURE — 71045 X-RAY EXAM CHEST 1 VIEW: CPT

## 2025-07-03 PROCEDURE — 83880 ASSAY OF NATRIURETIC PEPTIDE: CPT | Performed by: NURSE PRACTITIONER

## 2025-07-03 PROCEDURE — 72125 CT NECK SPINE W/O DYE: CPT

## 2025-07-03 PROCEDURE — 84484 ASSAY OF TROPONIN QUANT: CPT | Performed by: NURSE PRACTITIONER

## 2025-07-03 PROCEDURE — 96360 HYDRATION IV INFUSION INIT: CPT

## 2025-07-03 PROCEDURE — 87635 SARS-COV-2 COVID-19 AMP PRB: CPT | Performed by: NURSE PRACTITIONER

## 2025-07-03 PROCEDURE — 83735 ASSAY OF MAGNESIUM: CPT | Performed by: NURSE PRACTITIONER

## 2025-07-03 PROCEDURE — 80053 COMPREHEN METABOLIC PANEL: CPT | Performed by: NURSE PRACTITIONER

## 2025-07-03 PROCEDURE — 99223 1ST HOSP IP/OBS HIGH 75: CPT | Performed by: NURSE PRACTITIONER

## 2025-07-03 PROCEDURE — 93005 ELECTROCARDIOGRAM TRACING: CPT

## 2025-07-03 PROCEDURE — 36415 COLL VENOUS BLD VENIPUNCTURE: CPT | Performed by: NURSE PRACTITIONER

## 2025-07-03 PROCEDURE — 99285 EMERGENCY DEPT VISIT HI MDM: CPT | Mod: 25 | Performed by: EMERGENCY MEDICINE

## 2025-07-03 PROCEDURE — 96361 HYDRATE IV INFUSION ADD-ON: CPT

## 2025-07-03 PROCEDURE — 82947 ASSAY GLUCOSE BLOOD QUANT: CPT

## 2025-07-03 PROCEDURE — 2500000001 HC RX 250 WO HCPCS SELF ADMINISTERED DRUGS (ALT 637 FOR MEDICARE OP): Performed by: NURSE PRACTITIONER

## 2025-07-03 PROCEDURE — 70450 CT HEAD/BRAIN W/O DYE: CPT

## 2025-07-03 RX ORDER — METOPROLOL TARTRATE 25 MG/1
25 TABLET, FILM COATED ORAL 2 TIMES DAILY
Status: DISCONTINUED | OUTPATIENT
Start: 2025-07-03 | End: 2025-07-06 | Stop reason: HOSPADM

## 2025-07-03 RX ORDER — GLIPIZIDE 2.5 MG/1
2.5 TABLET, EXTENDED RELEASE ORAL EVERY EVENING
Status: ON HOLD | COMMUNITY
End: 2025-07-06 | Stop reason: ALTCHOICE

## 2025-07-03 RX ORDER — DEXTROSE 50 % IN WATER (D50W) INTRAVENOUS SYRINGE
12.5
Status: DISCONTINUED | OUTPATIENT
Start: 2025-07-03 | End: 2025-07-06 | Stop reason: HOSPADM

## 2025-07-03 RX ORDER — TORSEMIDE 20 MG/1
40 TABLET ORAL DAILY
Status: DISCONTINUED | OUTPATIENT
Start: 2025-07-04 | End: 2025-07-06 | Stop reason: HOSPADM

## 2025-07-03 RX ORDER — ACETAMINOPHEN 325 MG/1
650 TABLET ORAL EVERY 4 HOURS PRN
Status: DISCONTINUED | OUTPATIENT
Start: 2025-07-03 | End: 2025-07-06 | Stop reason: HOSPADM

## 2025-07-03 RX ORDER — ACETAMINOPHEN 160 MG/5ML
650 SOLUTION ORAL EVERY 4 HOURS PRN
Status: DISCONTINUED | OUTPATIENT
Start: 2025-07-03 | End: 2025-07-06 | Stop reason: HOSPADM

## 2025-07-03 RX ORDER — TAMSULOSIN HYDROCHLORIDE 0.4 MG/1
0.8 CAPSULE ORAL DAILY
Status: DISCONTINUED | OUTPATIENT
Start: 2025-07-03 | End: 2025-07-06 | Stop reason: HOSPADM

## 2025-07-03 RX ORDER — DILTIAZEM HYDROCHLORIDE 240 MG/1
240 CAPSULE, COATED, EXTENDED RELEASE ORAL 2 TIMES DAILY
Status: DISCONTINUED | OUTPATIENT
Start: 2025-07-03 | End: 2025-07-06 | Stop reason: HOSPADM

## 2025-07-03 RX ORDER — POLYETHYLENE GLYCOL 3350 17 G/17G
17 POWDER, FOR SOLUTION ORAL DAILY
Status: DISCONTINUED | OUTPATIENT
Start: 2025-07-03 | End: 2025-07-06 | Stop reason: HOSPADM

## 2025-07-03 RX ORDER — DEXTROSE 50 % IN WATER (D50W) INTRAVENOUS SYRINGE
12.5
Status: DISCONTINUED | OUTPATIENT
Start: 2025-07-03 | End: 2025-07-03

## 2025-07-03 RX ORDER — ACETAMINOPHEN 650 MG/1
650 SUPPOSITORY RECTAL EVERY 4 HOURS PRN
Status: DISCONTINUED | OUTPATIENT
Start: 2025-07-03 | End: 2025-07-06 | Stop reason: HOSPADM

## 2025-07-03 RX ORDER — DEXTROSE 50 % IN WATER (D50W) INTRAVENOUS SYRINGE
25
Status: DISCONTINUED | OUTPATIENT
Start: 2025-07-03 | End: 2025-07-03

## 2025-07-03 RX ORDER — POTASSIUM CHLORIDE 20 MEQ/1
40 TABLET, EXTENDED RELEASE ORAL DAILY
Status: DISCONTINUED | OUTPATIENT
Start: 2025-07-03 | End: 2025-07-03

## 2025-07-03 RX ORDER — POTASSIUM CHLORIDE 750 MG/1
10 TABLET, FILM COATED, EXTENDED RELEASE ORAL DAILY
Status: DISCONTINUED | OUTPATIENT
Start: 2025-07-04 | End: 2025-07-06 | Stop reason: HOSPADM

## 2025-07-03 RX ORDER — PANTOPRAZOLE SODIUM 40 MG/1
40 TABLET, DELAYED RELEASE ORAL
Status: DISCONTINUED | OUTPATIENT
Start: 2025-07-04 | End: 2025-07-04

## 2025-07-03 RX ORDER — DEXTROSE 50 % IN WATER (D50W) INTRAVENOUS SYRINGE
25
Status: DISCONTINUED | OUTPATIENT
Start: 2025-07-03 | End: 2025-07-06 | Stop reason: HOSPADM

## 2025-07-03 RX ORDER — LISINOPRIL 10 MG/1
10 TABLET ORAL DAILY
Status: DISCONTINUED | OUTPATIENT
Start: 2025-07-03 | End: 2025-07-06 | Stop reason: HOSPADM

## 2025-07-03 RX ORDER — SODIUM CHLORIDE 9 MG/ML
100 INJECTION, SOLUTION INTRAVENOUS CONTINUOUS
Status: ACTIVE | OUTPATIENT
Start: 2025-07-03 | End: 2025-07-03

## 2025-07-03 RX ORDER — ROSUVASTATIN CALCIUM 10 MG/1
5 TABLET, COATED ORAL DAILY
Status: DISCONTINUED | OUTPATIENT
Start: 2025-07-03 | End: 2025-07-06 | Stop reason: HOSPADM

## 2025-07-03 RX ORDER — INSULIN GLARGINE 100 [IU]/ML
20 INJECTION, SOLUTION SUBCUTANEOUS NIGHTLY
Status: DISCONTINUED | OUTPATIENT
Start: 2025-07-03 | End: 2025-07-05

## 2025-07-03 RX ORDER — INSULIN LISPRO 100 [IU]/ML
0-5 INJECTION, SOLUTION INTRAVENOUS; SUBCUTANEOUS
Status: DISCONTINUED | OUTPATIENT
Start: 2025-07-03 | End: 2025-07-06 | Stop reason: HOSPADM

## 2025-07-03 RX ORDER — INSULIN DEGLUDEC 100 U/ML
26 INJECTION, SOLUTION SUBCUTANEOUS NIGHTLY
Status: DISCONTINUED | OUTPATIENT
Start: 2025-07-03 | End: 2025-07-03

## 2025-07-03 RX ORDER — LIDOCAINE 560 MG/1
1 PATCH PERCUTANEOUS; TOPICAL; TRANSDERMAL DAILY PRN
Status: DISCONTINUED | OUTPATIENT
Start: 2025-07-03 | End: 2025-07-06 | Stop reason: HOSPADM

## 2025-07-03 RX ORDER — LANOLIN ALCOHOL/MO/W.PET/CERES
1000 CREAM (GRAM) TOPICAL DAILY
Status: DISCONTINUED | OUTPATIENT
Start: 2025-07-03 | End: 2025-07-06 | Stop reason: HOSPADM

## 2025-07-03 RX ADMIN — LISINOPRIL 10 MG: 10 TABLET ORAL at 13:15

## 2025-07-03 RX ADMIN — METOPROLOL TARTRATE 25 MG: 25 TABLET, FILM COATED ORAL at 21:48

## 2025-07-03 RX ADMIN — APIXABAN 5 MG: 5 TABLET, FILM COATED ORAL at 13:15

## 2025-07-03 RX ADMIN — DILTIAZEM HYDROCHLORIDE 240 MG: 240 CAPSULE, COATED, EXTENDED RELEASE ORAL at 22:28

## 2025-07-03 RX ADMIN — CYANOCOBALAMIN TAB 1000 MCG 1000 MCG: 1000 TAB at 13:36

## 2025-07-03 RX ADMIN — SODIUM CHLORIDE 100 ML/HR: 0.9 INJECTION, SOLUTION INTRAVENOUS at 13:15

## 2025-07-03 RX ADMIN — POLYETHYLENE GLYCOL 3350 17 G: 17 POWDER, FOR SOLUTION ORAL at 13:15

## 2025-07-03 RX ADMIN — APIXABAN 5 MG: 5 TABLET, FILM COATED ORAL at 21:45

## 2025-07-03 RX ADMIN — TAMSULOSIN HYDROCHLORIDE 0.8 MG: 0.4 CAPSULE ORAL at 13:15

## 2025-07-03 RX ADMIN — METOPROLOL TARTRATE 25 MG: 25 TABLET, FILM COATED ORAL at 13:15

## 2025-07-03 RX ADMIN — INSULIN GLARGINE 20 UNITS: 100 INJECTION, SOLUTION SUBCUTANEOUS at 22:28

## 2025-07-03 RX ADMIN — POTASSIUM CHLORIDE 40 MEQ: 1500 TABLET, EXTENDED RELEASE ORAL at 10:43

## 2025-07-03 RX ADMIN — INSULIN LISPRO 2 UNITS: 100 INJECTION, SOLUTION INTRAVENOUS; SUBCUTANEOUS at 21:45

## 2025-07-03 RX ADMIN — DILTIAZEM HYDROCHLORIDE 240 MG: 240 CAPSULE, COATED, EXTENDED RELEASE ORAL at 13:36

## 2025-07-03 RX ADMIN — ROSUVASTATIN CALCIUM 5 MG: 10 TABLET, FILM COATED ORAL at 13:15

## 2025-07-03 RX ADMIN — INSULIN LISPRO 2 UNITS: 100 INJECTION, SOLUTION INTRAVENOUS; SUBCUTANEOUS at 18:50

## 2025-07-03 RX ADMIN — ACETAMINOPHEN 650 MG: 325 TABLET ORAL at 18:50

## 2025-07-03 SDOH — HEALTH STABILITY: MENTAL HEALTH: HOW OFTEN DO YOU HAVE SIX OR MORE DRINKS ON ONE OCCASION?: NEVER

## 2025-07-03 SDOH — ECONOMIC STABILITY: HOUSING INSECURITY: AT ANY TIME IN THE PAST 12 MONTHS, WERE YOU HOMELESS OR LIVING IN A SHELTER (INCLUDING NOW)?: NO

## 2025-07-03 SDOH — HEALTH STABILITY: MENTAL HEALTH: HOW OFTEN DO YOU HAVE A DRINK CONTAINING ALCOHOL?: NEVER

## 2025-07-03 SDOH — ECONOMIC STABILITY: HOUSING INSECURITY: IN THE PAST 12 MONTHS, HOW MANY TIMES HAVE YOU MOVED WHERE YOU WERE LIVING?: 1

## 2025-07-03 SDOH — SOCIAL STABILITY: SOCIAL INSECURITY
WITHIN THE LAST YEAR, HAVE YOU BEEN RAPED OR FORCED TO HAVE ANY KIND OF SEXUAL ACTIVITY BY YOUR PARTNER OR EX-PARTNER?: NO

## 2025-07-03 SDOH — ECONOMIC STABILITY: FOOD INSECURITY: HOW HARD IS IT FOR YOU TO PAY FOR THE VERY BASICS LIKE FOOD, HOUSING, MEDICAL CARE, AND HEATING?: NOT HARD AT ALL

## 2025-07-03 SDOH — SOCIAL STABILITY: SOCIAL INSECURITY: WITHIN THE LAST YEAR, HAVE YOU BEEN HUMILIATED OR EMOTIONALLY ABUSED IN OTHER WAYS BY YOUR PARTNER OR EX-PARTNER?: NO

## 2025-07-03 SDOH — SOCIAL STABILITY: SOCIAL INSECURITY: WERE YOU ABLE TO COMPLETE ALL THE BEHAVIORAL HEALTH SCREENINGS?: YES

## 2025-07-03 SDOH — ECONOMIC STABILITY: FOOD INSECURITY: WITHIN THE PAST 12 MONTHS, THE FOOD YOU BOUGHT JUST DIDN'T LAST AND YOU DIDN'T HAVE MONEY TO GET MORE.: NEVER TRUE

## 2025-07-03 SDOH — SOCIAL STABILITY: SOCIAL INSECURITY
WITHIN THE LAST YEAR, HAVE YOU BEEN KICKED, HIT, SLAPPED, OR OTHERWISE PHYSICALLY HURT BY YOUR PARTNER OR EX-PARTNER?: NO

## 2025-07-03 SDOH — SOCIAL STABILITY: SOCIAL INSECURITY: HAVE YOU HAD THOUGHTS OF HARMING ANYONE ELSE?: NO

## 2025-07-03 SDOH — SOCIAL STABILITY: SOCIAL INSECURITY: WITHIN THE LAST YEAR, HAVE YOU BEEN AFRAID OF YOUR PARTNER OR EX-PARTNER?: NO

## 2025-07-03 SDOH — ECONOMIC STABILITY: HOUSING INSECURITY: IN THE LAST 12 MONTHS, WAS THERE A TIME WHEN YOU WERE NOT ABLE TO PAY THE MORTGAGE OR RENT ON TIME?: NO

## 2025-07-03 SDOH — SOCIAL STABILITY: SOCIAL INSECURITY: HAS ANYONE EVER THREATENED TO HURT YOUR FAMILY OR YOUR PETS?: NO

## 2025-07-03 SDOH — ECONOMIC STABILITY: FOOD INSECURITY: WITHIN THE PAST 12 MONTHS, YOU WORRIED THAT YOUR FOOD WOULD RUN OUT BEFORE YOU GOT THE MONEY TO BUY MORE.: NEVER TRUE

## 2025-07-03 SDOH — HEALTH STABILITY: MENTAL HEALTH: HOW MANY DRINKS CONTAINING ALCOHOL DO YOU HAVE ON A TYPICAL DAY WHEN YOU ARE DRINKING?: PATIENT DOES NOT DRINK

## 2025-07-03 SDOH — ECONOMIC STABILITY: INCOME INSECURITY: IN THE PAST 12 MONTHS HAS THE ELECTRIC, GAS, OIL, OR WATER COMPANY THREATENED TO SHUT OFF SERVICES IN YOUR HOME?: NO

## 2025-07-03 SDOH — SOCIAL STABILITY: SOCIAL INSECURITY: DOES ANYONE TRY TO KEEP YOU FROM HAVING/CONTACTING OTHER FRIENDS OR DOING THINGS OUTSIDE YOUR HOME?: NO

## 2025-07-03 SDOH — HEALTH STABILITY: MENTAL HEALTH
DO YOU FEEL STRESS - TENSE, RESTLESS, NERVOUS, OR ANXIOUS, OR UNABLE TO SLEEP AT NIGHT BECAUSE YOUR MIND IS TROUBLED ALL THE TIME - THESE DAYS?: NOT AT ALL

## 2025-07-03 SDOH — ECONOMIC STABILITY: TRANSPORTATION INSECURITY: IN THE PAST 12 MONTHS, HAS LACK OF TRANSPORTATION KEPT YOU FROM MEDICAL APPOINTMENTS OR FROM GETTING MEDICATIONS?: NO

## 2025-07-03 SDOH — SOCIAL STABILITY: SOCIAL INSECURITY: DO YOU FEEL ANYONE HAS EXPLOITED OR TAKEN ADVANTAGE OF YOU FINANCIALLY OR OF YOUR PERSONAL PROPERTY?: NO

## 2025-07-03 SDOH — SOCIAL STABILITY: SOCIAL INSECURITY: HAVE YOU HAD ANY THOUGHTS OF HARMING ANYONE ELSE?: NO

## 2025-07-03 SDOH — SOCIAL STABILITY: SOCIAL INSECURITY: ARE YOU OR HAVE YOU BEEN THREATENED OR ABUSED PHYSICALLY, EMOTIONALLY, OR SEXUALLY BY ANYONE?: NO

## 2025-07-03 SDOH — SOCIAL STABILITY: SOCIAL INSECURITY: ABUSE: ADULT

## 2025-07-03 SDOH — SOCIAL STABILITY: SOCIAL INSECURITY: ARE THERE ANY APPARENT SIGNS OF INJURIES/BEHAVIORS THAT COULD BE RELATED TO ABUSE/NEGLECT?: NO

## 2025-07-03 SDOH — SOCIAL STABILITY: SOCIAL INSECURITY: DO YOU FEEL UNSAFE GOING BACK TO THE PLACE WHERE YOU ARE LIVING?: NO

## 2025-07-03 ASSESSMENT — ACTIVITIES OF DAILY LIVING (ADL)
JUDGMENT_ADEQUATE_SAFELY_COMPLETE_DAILY_ACTIVITIES: YES
TOILETING: NEEDS ASSISTANCE
WALKS IN HOME: NEEDS ASSISTANCE
BATHING: NEEDS ASSISTANCE
LACK_OF_TRANSPORTATION: NO
LACK_OF_TRANSPORTATION: NO
HEARING - RIGHT EAR: HEARING AID
LACK_OF_TRANSPORTATION: NO
FEEDING YOURSELF: INDEPENDENT
HEARING - LEFT EAR: HEARING AID
DRESSING YOURSELF: INDEPENDENT
ADEQUATE_TO_COMPLETE_ADL: YES
PATIENT'S MEMORY ADEQUATE TO SAFELY COMPLETE DAILY ACTIVITIES?: YES
GROOMING: INDEPENDENT

## 2025-07-03 ASSESSMENT — PAIN - FUNCTIONAL ASSESSMENT: PAIN_FUNCTIONAL_ASSESSMENT: 0-10

## 2025-07-03 ASSESSMENT — LIFESTYLE VARIABLES
AUDIT-C TOTAL SCORE: 0
PRESCIPTION_ABUSE_PAST_12_MONTHS: NO
HOW MANY STANDARD DRINKS CONTAINING ALCOHOL DO YOU HAVE ON A TYPICAL DAY: PATIENT DOES NOT DRINK
AUDIT-C TOTAL SCORE: 0
AUDIT-C TOTAL SCORE: 0
HOW OFTEN DO YOU HAVE A DRINK CONTAINING ALCOHOL: NEVER
SUBSTANCE_ABUSE_PAST_12_MONTHS: NO
SKIP TO QUESTIONS 9-10: 1
SKIP TO QUESTIONS 9-10: 1
HOW OFTEN DO YOU HAVE 6 OR MORE DRINKS ON ONE OCCASION: NEVER

## 2025-07-03 ASSESSMENT — COGNITIVE AND FUNCTIONAL STATUS - GENERAL
DAILY ACTIVITIY SCORE: 19
HELP NEEDED FOR BATHING: A LITTLE
MOVING TO AND FROM BED TO CHAIR: A LITTLE
TOILETING: A LITTLE
MOBILITY SCORE: 18
MOVING FROM LYING ON BACK TO SITTING ON SIDE OF FLAT BED WITH BEDRAILS: A LITTLE
PERSONAL GROOMING: A LITTLE
WALKING IN HOSPITAL ROOM: A LOT
STANDING UP FROM CHAIR USING ARMS: A LITTLE
STANDING UP FROM CHAIR USING ARMS: A LITTLE
TOILETING: A LITTLE
WALKING IN HOSPITAL ROOM: A LOT
DAILY ACTIVITIY SCORE: 20
TURNING FROM BACK TO SIDE WHILE IN FLAT BAD: A LITTLE
MOVING TO AND FROM BED TO CHAIR: A LITTLE
DRESSING REGULAR UPPER BODY CLOTHING: A LITTLE
MOBILITY SCORE: 16
PERSONAL GROOMING: A LITTLE
DRESSING REGULAR LOWER BODY CLOTHING: A LITTLE
PATIENT BASELINE BEDBOUND: NO
CLIMB 3 TO 5 STEPS WITH RAILING: A LOT
CLIMB 3 TO 5 STEPS WITH RAILING: A LOT
DRESSING REGULAR LOWER BODY CLOTHING: A LITTLE
HELP NEEDED FOR BATHING: A LITTLE

## 2025-07-03 ASSESSMENT — PATIENT HEALTH QUESTIONNAIRE - PHQ9
2. FEELING DOWN, DEPRESSED OR HOPELESS: NOT AT ALL
SUM OF ALL RESPONSES TO PHQ9 QUESTIONS 1 & 2: 0
1. LITTLE INTEREST OR PLEASURE IN DOING THINGS: NOT AT ALL

## 2025-07-03 ASSESSMENT — PAIN SCALES - GENERAL
PAINLEVEL_OUTOF10: 0 - NO PAIN
PAINLEVEL_OUTOF10: 0 - NO PAIN

## 2025-07-03 NOTE — ED PROVIDER NOTES
Emergency Department Provider Note        History of Present Illness     History provided by: Patient  Limitations to History: None    HPI:  Dragan Calvin is a 84 y.o. male presents emergency department today due to weakness and a fall.  Patient states he got up to do something in the kitchen and his legs became very weak and he went down.  He denies striking his head or any loss of consciousness.  He does have some abrasions to bilateral elbows.  Bleeding is controlled, he is takes Eliquis for his atrial fibrillation.  He denies any congestion, cough, fevers, chills.  He did endorse some diarrhea that started today.    Physical Exam   Triage vitals:  T 36.8 °C (98.2 °F)  HR 87  /70  RR 17  O2 95 %      Physical Exam  Vitals and nursing note reviewed.   Constitutional:       General: He is not in acute distress.     Appearance: He is well-developed.   HENT:      Head: Normocephalic and atraumatic.   Eyes:      Conjunctiva/sclera: Conjunctivae normal.   Cardiovascular:      Rate and Rhythm: Normal rate and regular rhythm.      Heart sounds: No murmur heard.  Pulmonary:      Effort: Pulmonary effort is normal. No respiratory distress.      Breath sounds: Normal breath sounds.   Abdominal:      Palpations: Abdomen is soft.      Tenderness: There is no abdominal tenderness.   Musculoskeletal:         General: No swelling.      Cervical back: Neck supple.   Skin:     General: Skin is warm and dry.      Capillary Refill: Capillary refill takes less than 2 seconds.   Neurological:      Mental Status: He is alert.   Psychiatric:         Mood and Affect: Mood normal.          Medical Decision Making & ED Course   Medical Decision Makin y.o. male presents emergency department today due to weakness and a fall.  Patient states he got up to do something in the kitchen and his legs became very weak and he went down.  He denies striking his head or any loss of consciousness.  He does have some abrasions to  bilateral elbows.  Bleeding is controlled, he is not on any blood thinners.  He denies any congestion, cough, fevers, chills.  He did endorse some diarrhea that started today.  Patient's vital signs are essentially stable, he is afebrile.  Patient's lab work shows a slight hypokalemia, BUN and creatinine around his baseline, no other significant electrolyte abnormalities or evidence of liver pathology.  Magnesium was normal.  BNP was slightly elevated but around his baseline.  Urinalysis shows no evidence of UTI.  Troponin was not elevated, unlikely ACS.  Patient's CBC shows no leukocytosis, stable anemia, and normal platelets.  X-ray of the chest reveals no acute cardiopulmonary process.  CT of the head reveals no acute intracranial process.  CT of the cervical spine does reveal degenerative changes but no traumatic findings.  Given patient's difficulty with ambulation and lack of someone to care for him at home we do recommend admission for evaluation by PT OT.  I did speak with Dr. Brown who agreed to accept the patient to the floor.  ----  Differential diagnoses considered include but are not limited to: Intracranial injury, cervical injury, pneumonia, UTI, electrolyte derangement, ACS, NSTEMI, arrhythmia     Social Determinants of Health which Significantly Impact Care: Chronic illness in caregivers The following actions were taken to address these social determinants: Patient is going to be admitted for evaluation by PT OT and possible placement in rehab    EKG Independent Interpretation: EKG interpreted by myself. Please see ED Course for full interpretation.    Independent Result Review and Interpretation: Relevant laboratory and radiographic results were reviewed and independently interpreted by myself.  As necessary, they are commented on in the ED Course.    Chronic conditions affecting the patient's care: As documented above in MDM    The patient was discussed with the following consultants/services:  None    Care Considerations: As documented above in [facility]    ED Course:  ED Course as of 07/03/25 1120   Thu Jul 03, 2025   0911 ECG 12 Lead  EKG, my read, 0907  Atrial fibrillation with PVCs no acute ST elevation or depression.  Ventricular rate-93 BPM [WS]   0938 CBC and Auto Differential(!)  No leukocytosis, slight anemia, normal platelet [WS]   0939 XR chest 1 view  No focal regions of airspace consolidation.  Stable positioning left chest pacemaker.   [WS]   0943 Urinalysis with Reflex Microscopic  Negative for UTI [WS]   1014 Magnesium  Not elevated [WS]   1014 B-Type Natriuretic Peptide(!)  Slightly elevated, lower than his baseline [WS]   1014 Troponin I, High Sensitivity  Not elevated, unlikely ACS [WS]   1014 Comprehensive Metabolic Panel(!)  Hypokalemia, this is consistent with his diarrhea, will replete orally in the ED.  Remaining electrolytes are stable, BUN and creatinine are elevated on his baseline, liver enzymes normal [WS]   1029 CT head wo IV contrast  No evidence of an acute intracranial process. [WS]   1029 CT cervical spine wo IV contrast  No evidence of an acute fracture or subluxation.  Degenerative  changes.   [WS]      ED Course User Index  [WS] MERRY Magdaleno         Diagnoses as of 07/03/25 1120   Generalized weakness   Difficulty in walking   Hypokalemia     Disposition   As a result of the work-up, the patient was discharged home.  he was informed of his diagnosis and instructed to come back with any concerns or worsening of condition.  he and was agreeable to the plan as discussed above.  he was given the opportunity to ask questions.  All of the patient's questions were answered.    Procedures   Procedures    This was a shared visit with an ED attending.  The patient was seen and discussed with the ED attending    MERRY Magdaleno  Emergency Medicine     MERRY Magdaleno  07/03/25 1122

## 2025-07-03 NOTE — ED TRIAGE NOTES
Pt arrives to ED via ems from home. Pt reports he just ate breakfast and stood up to get something out of the microwave and fell back pt has multiple skin tears denies pain. Thinks he did not have loc. Pt is A&Ox4 and ambulatoruy with assistive devices

## 2025-07-03 NOTE — PROGRESS NOTES
Pharmacy Medication History Review    Dragan Calvin is a 84 y.o. male admitted for No Principal Problem: There is no principal problem currently on the Problem List. Please update the Problem List and refresh.. Pharmacy reviewed the patient's [id number] medications and allergies for accuracy.    The list below reflectives the updated PTA list. Please review each medication in order reconciliation for additional clarification and justification.  Prior to Admission medications    Medication Sig Start Date End Date Authorizing Provider   apixaban (Eliquis) 5 mg tablet Take 1 tablet (5 mg) by mouth 2 times a day.   Benoit Carlson MD   cyanocobalamin (Vitamin B-12) 1,000 mcg tablet Take 1 tablet (1,000 mcg) by mouth once daily.   Historical Provider, MD   dilTIAZem CD (Cardizem CD) 240 mg 24 hr capsule Take 1 capsule (240 mg) by mouth 2 times a day.   Benoit Carlson MD   fosinopril (Monopril) 10 mg tablet Take 0.5 tablets (5 mg) by mouth once daily.   Benoit Carlson MD   glipiZIDE XL (Glucotrol XL) 2.5 mg 24 hr tablet Take 2 tablets (5 mg) by mouth once daily in the morning.   Emmanuel Brown, DO   glipiZIDE XL (Glucotrol XL) 2.5 mg 24 hr tablet Take 1 tablet (2.5 mg) by mouth once daily in the evening. Do not crush, chew, or split.   Historical Provider, MD   insulin degludec (Tresiba FlexTouch U-100) 100 unit/mL (3 mL) pen Inject 26 Units under the skin once daily at bedtime. Take as directed per insulin instructions.   Emmanuel Brown, DO   lidocaine 4 % patch Place 1 patch on the skin once daily as needed. Remove & discard patch within 12 hours or as directed by MD.   Blade Kramer, DO   metOLazone (Zaroxolyn) 2.5 mg tablet Take 1 tablet (2.5 mg) by mouth once daily.   Benoit Carlson MD   metoprolol tartrate (Lopressor) 25 mg tablet TAKE 1 TABLET(25 MG) BY MOUTH TWICE DAILY   James C Ramicone, DO   multivitamin tablet Take 1 tablet by mouth once daily.   Historical Provider, MD   potassium chloride CR (Klor-Con) 10 mEq ER tablet  Take 1 tablet (10 mEq) by mouth once daily. Do not crush, chew, or split.   Israel Fabian PA-C   RABEprazole (Aciphex) EC tablet Take 1 tablet (20 mg) by mouth once daily.   Emmanuel Ice, DO   rosuvastatin (Crestor) 5 mg tablet TAKE 1 TABLET(5 MG) BY MOUTH DAILY   Emmanuel Ice, DO   tamsulosin (Flomax) 0.4 mg 24 hr capsule Take 2 capsules (0.8 mg) by mouth once daily.   Historical Provider, MD   torsemide (Demadex) 20 mg tablet Take 2 tablets (40 mg) by mouth once daily.   Benoit Carlson MD   acetaminophen (Tylenol 8 HOUR) 650 mg ER tablet Take 1 tablet (650 mg) by mouth every 8 hours if needed for mild pain (1 - 3). Do not crush, chew, or split.   Historical Provider, MD        The list below reflectives the updated allergy list. Please review each documented allergy for additional clarification and justification.  Allergies  Reviewed by Annika Andrea RN on 7/3/2025   No Known Allergies         Below are additional concerns with the patient's PTA list.      Mary Doshi

## 2025-07-03 NOTE — H&P
History Of Present Illness  Dragan Calvin is a 84 y.o. male with PMH significant for CAD, permanent Afib (on eliquis), DVT s/p IVC filter, HLD, HTN, SSS s/p implanted pacemaker, PUD, GERD, DMT2, peripheral edema, SBO(s), and chronic BLE weakness d/t spinal stenosis who presented to [facility] ED from home with complaints of weakness.  HPI supplemented with patient's family who was at the bedside.  Patient notes he is particularly stiff in the morning with his chronic bilateral lower extremity pain and weakness and this morning, while preparing breakfast, his legs became extremely weak and gave out on him, causing him to fall and injure both of his arms.  He denies any dizziness prior to the fall.  He denies any head injury or loss of consciousness.  He reports he is sustained bilateral arm abrasions that have since ceased bleeding.  He denies any recent illness, coughs, colds, fevers, chills, chest pain, shortness of breath, abdominal pain, nausea, vomiting, diarrhea, or dysuria.  He does note being in contact with his wife who was diagnosed with bronchitis but tested negative for COVID.  He otherwise denies any other sick contacts.  He continues that over the past several months he has been experiencing progressively worsening bilateral lower extremity pain and weakness for which he has been waiting for an appointment with neurosurgery that he has at [facility] next week.  However, he is unable to do most of his activities of daily living due to this pain and weakness and it has caused him to fall several times that needed intervention of EMS to help him off the ground.  Him and his family feel that he is not safe to continue at home this way and would like to see neurosurgery while he is admitted.  He denies any recent medicine changes and reports he has been taking his medicines as prescribed.  Record review indicates that his pacer battery is nearing the end of his life in the next few months so a pacer check will  be don. rule     In the ED lab work, EKG, head CT, C-spine CT, and chest x-ray were performed. Labs revealed potassium 3.1, bun 42, creatinine 1.55 (slightly higher than recent baseline), ,H&H 11.6 and 34 within patient's baseline, neutrophils 9.21, lymphocytes 0.51, UA negative. EKG, per ER physician impression revealed Atrial fibrillation with PVCs no acute ST elevation or depression. Ventricular rate-93 BPM.  Imaging was negative for acute process.  Initial vital signs were stable.  Potassium was replaced and patient was admitted for further evaluation and treatment under the care of Dr. Brown.    CT lumbar spine postmyelogram 4/11/2023    Impression  1.  Lumbar puncture for intrathecal administration of iodinated  contrast.  2. Multilevel degenerative changes of the lumbar spine, fully  detailed above, worst at L4-L5 where there is severe central canal  stenosis.       Past Medical History  Medical History[1]    Surgical History  Surgical History[2]     Social History  He reports that he has never smoked. He has never used smokeless tobacco. He reports that he does not drink alcohol and does not use drugs.    Family History  Family History[3]     Allergies  Patient has no known allergies.    10 point review of systems performed and is negative besides what is stated in HPI.     Physical Exam  Constitutional:       Appearance: Normal appearance.   HENT:      Head: Normocephalic and atraumatic.      Nose: Nose normal.      Mouth/Throat:      Mouth: Mucous membranes are moist.   Eyes:      Conjunctiva/sclera: Conjunctivae normal.   Cardiovascular:      Rate and Rhythm: Tachycardia present. Rhythm irregular.      Heart sounds: Normal heart sounds.   Pulmonary:      Effort: Pulmonary effort is normal.      Breath sounds: Normal breath sounds.   Abdominal:      General: Bowel sounds are normal.      Palpations: Abdomen is soft.      Tenderness: There is no abdominal tenderness.   Musculoskeletal:      Cervical  "back: Neck supple.      Right lower leg: Edema present.      Left lower leg: Edema present.      Comments: Nonpitting bilateral lower extremity edema, bilateral lower extremity weakness   Skin:     General: Skin is warm and dry.   Neurological:      Mental Status: He is alert. Mental status is at baseline.   Psychiatric:         Mood and Affect: Mood normal.          Last Recorded Vitals  Blood pressure 135/70, pulse 92, temperature 36.8 °C (98.2 °F), resp. rate (!) 25, height 1.778 m (5' 10\"), weight 96.6 kg (213 lb), SpO2 99%.    Relevant Results    Medications Ordered Prior to Encounter[4]     Results for orders placed or performed during the hospital encounter of 07/03/25 (from the past 24 hours)   Urinalysis with Reflex Microscopic   Result Value Ref Range    Color, Urine Light-Yellow Light-Yellow, Yellow, Dark-Yellow    Appearance, Urine Clear Clear    Specific Gravity, Urine 1.011 1.005 - 1.035    pH, Urine 6.0 5.0, 5.5, 6.0, 6.5, 7.0, 7.5, 8.0    Protein, Urine NEGATIVE NEGATIVE, 10 (TRACE), 20 (TRACE) mg/dL    Glucose, Urine Normal Normal mg/dL    Blood, Urine NEGATIVE NEGATIVE mg/dL    Ketones, Urine NEGATIVE NEGATIVE mg/dL    Bilirubin, Urine NEGATIVE NEGATIVE mg/dL    Urobilinogen, Urine Normal Normal mg/dL    Nitrite, Urine NEGATIVE NEGATIVE    Leukocyte Esterase, Urine NEGATIVE NEGATIVE   B-Type Natriuretic Peptide   Result Value Ref Range     (H) 0 - 99 pg/mL   Troponin I, High Sensitivity   Result Value Ref Range    Troponin I, High Sensitivity 17 0 - 20 ng/L   Comprehensive Metabolic Panel   Result Value Ref Range    Glucose 309 (H) 74 - 99 mg/dL    Sodium 136 136 - 145 mmol/L    Potassium 3.1 (L) 3.5 - 5.3 mmol/L    Chloride 100 98 - 107 mmol/L    Bicarbonate 23 21 - 32 mmol/L    Anion Gap 16 10 - 20 mmol/L    Urea Nitrogen 42 (H) 6 - 23 mg/dL    Creatinine 1.55 (H) 0.50 - 1.30 mg/dL    eGFR 44 (L) >60 mL/min/1.73m*2    Calcium 8.8 8.6 - 10.3 mg/dL    Albumin 3.9 3.4 - 5.0 g/dL    Alkaline " Phosphatase 90 33 - 136 U/L    Total Protein 6.9 6.4 - 8.2 g/dL    AST 19 9 - 39 U/L    Bilirubin, Total 1.1 0.0 - 1.2 mg/dL    ALT 15 10 - 52 U/L   Magnesium   Result Value Ref Range    Magnesium 1.92 1.60 - 2.40 mg/dL   CBC and Auto Differential   Result Value Ref Range    WBC 10.4 4.4 - 11.3 x10*3/uL    nRBC 0.0 0.0 - 0.0 /100 WBCs    RBC 3.46 (L) 4.50 - 5.90 x10*6/uL    Hemoglobin 11.6 (L) 13.5 - 17.5 g/dL    Hematocrit 34.0 (L) 41.0 - 52.0 %    MCV 98 80 - 100 fL    MCH 33.5 26.0 - 34.0 pg    MCHC 34.1 32.0 - 36.0 g/dL    RDW 13.2 11.5 - 14.5 %    Platelets 181 150 - 450 x10*3/uL    Neutrophils % 88.1 40.0 - 80.0 %    Immature Granulocytes %, Automated 0.6 0.0 - 0.9 %    Lymphocytes % 4.9 13.0 - 44.0 %    Monocytes % 5.5 2.0 - 10.0 %    Eosinophils % 0.4 0.0 - 6.0 %    Basophils % 0.5 0.0 - 2.0 %    Neutrophils Absolute 9.21 (H) 1.60 - 5.50 x10*3/uL    Immature Granulocytes Absolute, Automated 0.06 0.00 - 0.50 x10*3/uL    Lymphocytes Absolute 0.51 (L) 0.80 - 3.00 x10*3/uL    Monocytes Absolute 0.57 0.05 - 0.80 x10*3/uL    Eosinophils Absolute 0.04 0.00 - 0.40 x10*3/uL    Basophils Absolute 0.05 0.00 - 0.10 x10*3/uL     *Note: Due to a large number of results and/or encounters for the requested time period, some results have not been displayed. A complete set of results can be found in Results Review.      CT cervical spine wo IV contrast  Result Date: 7/3/2025  Interpreted By:  Hunter Dodd, STUDY: CT CERVICAL SPINE WO IV CONTRAST;  7/3/2025 9:48 am   INDICATION: Signs/Symptoms:Fall over 65.     COMPARISON: None.   ACCESSION NUMBER(S): [id number]   ORDERING CLINICIAN: WILLA ROMAN   TECHNIQUE: Unenhanced axial images were obtained through the cervical spine. The axial data was utilized to reconstruct images in sagittal and coronal planes.   FINDINGS: No acute fracture is identified. Degenerative changes include disc space narrowing, endplate spurring, endplate sclerosis, uncovertebral spurring, posterior disc  osteophyte complexes, and facet hypertrophy. Minimal subluxation at a few levels most likely relates to facet degenerative disease. Alignment is unchanged when compared to the previous study. Facet joints demonstrate a normal alignment. Prevertebral soft tissues are within normal limits. No lytic or blastic lesion is noted.       No evidence of an acute fracture or subluxation.  Degenerative changes.   MACRO: None.   Signed by: Hunter Dodd 7/3/2025 10:02 AM Dictation workstation:   [id number]    CT head wo IV contrast  Result Date: 7/3/2025  Interpreted By:  Hunter Dodd, STUDY: CT HEAD WO IV CONTRAST;  7/3/2025 9:48 am   INDICATION: Signs/Symptoms:Fall over 65.     COMPARISON: 10/30/2024   ACCESSION NUMBER(S): [id number]   ORDERING CLINICIAN: WILLA ROMAN   TECHNIQUE: Unenhanced images were obtained through the brain.   FINDINGS: There is atrophy resulting in prominence of the ventricles and sulci. There are areas of decreased attenuation within the white matter which are nonspecific but are commonly associated with small vessel ischemic disease. There is no mass effect or midline shift. No acute intracranial hemorrhage is identified. No extra-axial fluid collections are seen. No intraparenchymal mass lesions are identified.  Bone windows demonstrate no evidence of an acute calvarial fracture.       No evidence of an acute intracranial process.   MACRO: None.   Signed by: Hunter Dodd 7/3/2025 10:01 AM Dictation workstation:   [id number]    XR chest 1 view  Result Date: 7/3/2025  STUDY: Chest Radiograph;  7/3/2025 9:27 AM INDICATION: Weakness. COMPARISON: 5/10/2025 XR Chest ACCESSION NUMBER(S): [id number] ORDERING CLINICIAN: WILLA ROMAN TECHNIQUE:  Frontal chest was obtained at 0914 hours. FINDINGS: CARDIOMEDIASTINAL SILHOUETTE: Cardiomediastinal silhouette is normal in size and configuration. Dual-lead left chest pacemaker.  LUNGS: No focal regions of airspace consolidation. No pneumothorax or significant  pleural effusion.  ABDOMEN: No remarkable upper abdominal findings.  BONES: No acute osseous changes.    No focal regions of airspace consolidation. Stable positioning left chest pacemaker. Signed by Jaxson Hicks MD          Assessment & Plan  Generalized weakness    Spinal stenosis    Fall    Hypokalemia    Elevated serum creatinine      Admit to regular medical floor  Appreciate neurosurgery recommendations  Patient to check  Check COVID  Potassium replaced in ED  500 cc of IV fluid over 5 hours  A.m. CBC, BMP  PT/OT      Chronic conditions: CAD, permanent Afib (on eliquis), DVT s/p IVC filter, HLD, HTN, SSS s/p implanted pacemaker, PUD, GERD, DMT2, peripheral edema, SBO(s), and chronic BLE weakness d/t spinal stenosis    Continue home medications as listed above starting lisinopril and torsemide tomorrow  Cardiac/diabetic diet  Hypoglycemia protocol  Sliding scale insulin    DVT prophylaxis    SCDs  On Eliquis          Gita Piper, APRN-CNP         [1]   Past Medical History:  Diagnosis Date    Deep vein thrombosis (DVT) of lower extremity 09/17/2023    s/p IVC filter and has post-phlebotic syndrome    History of falling     History of fall    Hyperlipidemia 08/28/2023    Dr. Zee follows    Hypertension, benign 08/23/2017    Leg edema 09/05/2023    Overweight     Overweight    Paroxysmal atrial fibrillation (Multi) 08/28/2023    PAF. On Eliquis. 7 second pauses therefore PPM placed. Freq Pafib on PPM. Chads 2 vasc 4    Permanent atrial fibrillation (Multi) 10/17/2023    On Eliquis. 7 second pauses therefore PPM placed. Freq Pafib on PPM. Chads 2 vasc 4    Personal history of other diseases of the digestive system     History of small bowel obstruction    Personal history of other infectious and parasitic diseases     History of herpes zoster    Personal history of other specified conditions     History of bradycardia    Personal history of other specified conditions     History of edema    Personal history  of peptic ulcer disease     History of peptic ulcer    Personal history of pneumonia (recurrent)     History of pneumonia    Personal history of urinary calculi     History of kidney stones    Presence of cardiac pacemaker 08/25/2017    10/24/2016: [employer] Advisa MRI DR model #[id number]    Sick sinus syndrome (Multi) 09/17/2023    s/p PPM 10/2016    Stage 3a chronic kidney disease (Multi) 07/18/2024    Type 2 diabetes mellitus, with long-term current use of insulin 08/28/2023   [2]   Past Surgical History:  Procedure Laterality Date    APPENDECTOMY  07/25/2018    Appendectomy    CARDIAC PACEMAKER PLACEMENT  04/05/2018    Pacemaker Placement    CHOLECYSTECTOMY  04/05/2018    Cholecystectomy   [3]   Family History  Problem Relation Name Age of Onset    Coronary artery disease Father      Heart failure Father      Diabetes Father     [4]   No current facility-administered medications on file prior to encounter.     Current Outpatient Medications on File Prior to Encounter   Medication Sig Dispense Refill    apixaban (Eliquis) 5 mg tablet Take 1 tablet (5 mg) by mouth 2 times a day. 180 tablet 3    cyanocobalamin (Vitamin B-12) 1,000 mcg tablet Take 1 tablet (1,000 mcg) by mouth once daily.      dilTIAZem CD (Cardizem CD) 240 mg 24 hr capsule Take 1 capsule (240 mg) by mouth 2 times a day. 180 capsule 3    fosinopril (Monopril) 10 mg tablet Take 0.5 tablets (5 mg) by mouth once daily. 45 tablet 3    glipiZIDE XL (Glucotrol XL) 2.5 mg 24 hr tablet Take 1 tablet (2.5 mg) by mouth 3 times a day. (Patient taking differently: Take 2 tablets (5 mg) by mouth once daily in the morning.) 180 tablet 3    glipiZIDE XL (Glucotrol XL) 2.5 mg 24 hr tablet Take 1 tablet (2.5 mg) by mouth once daily in the evening. Do not crush, chew, or split.      insulin degludec (Tresiba FlexTouch U-100) 100 unit/mL (3 mL) pen Inject 26 Units under the skin once daily at bedtime. Take as directed per insulin instructions. 24 mL 3    lidocaine 4 %  "patch Place 1 patch over 12 hours on the skin once daily. Remove & discard patch within 12 hours or as directed by MD. (Patient taking differently: Place 1 patch on the skin once daily as needed. Remove & discard patch within 12 hours or as directed by MD.) 7 patch 0    metOLazone (Zaroxolyn) 2.5 mg tablet Take 1 tablet (2.5 mg) by mouth once daily. On Sunday and Wednesday (Patient taking differently: Take 1 tablet (2.5 mg) by mouth once daily.) 90 tablet 3    metoprolol tartrate (Lopressor) 25 mg tablet TAKE 1 TABLET(25 MG) BY MOUTH TWICE DAILY 180 tablet 3    multivitamin tablet Take 1 tablet by mouth once daily.      potassium chloride CR (Klor-Con) 10 mEq ER tablet Take 1 tablet (10 mEq) by mouth once daily. Do not crush, chew, or split. 90 tablet 3    RABEprazole (Aciphex) EC tablet Take 1 tablet (20 mg) by mouth once daily. 90 tablet 3    rosuvastatin (Crestor) 5 mg tablet TAKE 1 TABLET(5 MG) BY MOUTH DAILY 90 tablet 1    tamsulosin (Flomax) 0.4 mg 24 hr capsule Take 2 capsules (0.8 mg) by mouth once daily.      torsemide (Demadex) 20 mg tablet TAKE 3 TABLETS(60 MG) BY MOUTH DAILY (Patient taking differently: Take 2 tablets (40 mg) by mouth once daily.) 270 tablet 3    acetaminophen (Tylenol 8 HOUR) 650 mg ER tablet Take 1 tablet (650 mg) by mouth every 8 hours if needed for mild pain (1 - 3). Do not crush, chew, or split.      blood sugar diagnostic (Blood Glucose Test) strip Use to test blood sugar once daily 100 strip 1    cyclobenzaprine (Flexeril) 5 mg tablet Take 1 tablet (5 mg) by mouth 3 times a day as needed for muscle spasms. (Patient not taking: Reported on 7/3/2025) 30 tablet 0    lancets misc One touch ultra test strips 33G LANCETS Tests once daily 100 each 3    lancets misc Use to test blood sugar once a day 100 each 3    OneTouch Ultra Test USE TO TEST BLOOD SUGAR ONCE DAILY 100 strip 1    pen needle, diabetic (BD Ultra-Fine Tami Pen Needle) 32 gauge x 5/32\" needle 1 Needle 4 times a day. 100 " each 3

## 2025-07-04 LAB
ALBUMIN SERPL BCP-MCNC: 3.6 G/DL (ref 3.4–5)
ALP SERPL-CCNC: 82 U/L (ref 33–136)
ALT SERPL W P-5'-P-CCNC: 16 U/L (ref 10–52)
ANION GAP SERPL CALC-SCNC: 12 MMOL/L (ref 10–20)
ANION GAP SERPL CALC-SCNC: 12 MMOL/L (ref 10–20)
AST SERPL W P-5'-P-CCNC: 23 U/L (ref 9–39)
BASOPHILS # BLD AUTO: 0.03 X10*3/UL (ref 0–0.1)
BASOPHILS NFR BLD AUTO: 0.3 %
BILIRUB SERPL-MCNC: 0.9 MG/DL (ref 0–1.2)
BUN SERPL-MCNC: 30 MG/DL (ref 6–23)
BUN SERPL-MCNC: 31 MG/DL (ref 6–23)
CALCIUM SERPL-MCNC: 8.7 MG/DL (ref 8.6–10.3)
CALCIUM SERPL-MCNC: 8.8 MG/DL (ref 8.6–10.3)
CHLORIDE SERPL-SCNC: 102 MMOL/L (ref 98–107)
CHLORIDE SERPL-SCNC: 103 MMOL/L (ref 98–107)
CO2 SERPL-SCNC: 27 MMOL/L (ref 21–32)
CO2 SERPL-SCNC: 29 MMOL/L (ref 21–32)
CREAT SERPL-MCNC: 1.16 MG/DL (ref 0.5–1.3)
CREAT SERPL-MCNC: 1.2 MG/DL (ref 0.5–1.3)
EGFRCR SERPLBLD CKD-EPI 2021: 60 ML/MIN/1.73M*2
EGFRCR SERPLBLD CKD-EPI 2021: 62 ML/MIN/1.73M*2
EOSINOPHIL # BLD AUTO: 0.07 X10*3/UL (ref 0–0.4)
EOSINOPHIL NFR BLD AUTO: 0.8 %
ERYTHROCYTE [DISTWIDTH] IN BLOOD BY AUTOMATED COUNT: 13.3 % (ref 11.5–14.5)
ERYTHROCYTE [DISTWIDTH] IN BLOOD BY AUTOMATED COUNT: 13.5 % (ref 11.5–14.5)
GLUCOSE BLD MANUAL STRIP-MCNC: 150 MG/DL (ref 74–99)
GLUCOSE BLD MANUAL STRIP-MCNC: 235 MG/DL (ref 74–99)
GLUCOSE BLD MANUAL STRIP-MCNC: 244 MG/DL (ref 74–99)
GLUCOSE BLD MANUAL STRIP-MCNC: 356 MG/DL (ref 74–99)
GLUCOSE SERPL-MCNC: 148 MG/DL (ref 74–99)
GLUCOSE SERPL-MCNC: 290 MG/DL (ref 74–99)
HCT VFR BLD AUTO: 33.1 % (ref 41–52)
HCT VFR BLD AUTO: 33.6 % (ref 41–52)
HGB BLD-MCNC: 11.2 G/DL (ref 13.5–17.5)
HGB BLD-MCNC: 11.2 G/DL (ref 13.5–17.5)
HOLD SPECIMEN: NORMAL
IMM GRANULOCYTES # BLD AUTO: 0.03 X10*3/UL (ref 0–0.5)
IMM GRANULOCYTES NFR BLD AUTO: 0.3 % (ref 0–0.9)
LYMPHOCYTES # BLD AUTO: 1.07 X10*3/UL (ref 0.8–3)
LYMPHOCYTES NFR BLD AUTO: 11.5 %
MCH RBC QN AUTO: 33 PG (ref 26–34)
MCH RBC QN AUTO: 33.3 PG (ref 26–34)
MCHC RBC AUTO-ENTMCNC: 33.3 G/DL (ref 32–36)
MCHC RBC AUTO-ENTMCNC: 33.8 G/DL (ref 32–36)
MCV RBC AUTO: 99 FL (ref 80–100)
MCV RBC AUTO: 99 FL (ref 80–100)
MONOCYTES # BLD AUTO: 0.71 X10*3/UL (ref 0.05–0.8)
MONOCYTES NFR BLD AUTO: 7.6 %
NEUTROPHILS # BLD AUTO: 7.4 X10*3/UL (ref 1.6–5.5)
NEUTROPHILS NFR BLD AUTO: 79.5 %
NRBC BLD-RTO: 0 /100 WBCS (ref 0–0)
NRBC BLD-RTO: 0 /100 WBCS (ref 0–0)
PLATELET # BLD AUTO: 168 X10*3/UL (ref 150–450)
PLATELET # BLD AUTO: 169 X10*3/UL (ref 150–450)
POTASSIUM SERPL-SCNC: 3 MMOL/L (ref 3.5–5.3)
POTASSIUM SERPL-SCNC: 3.5 MMOL/L (ref 3.5–5.3)
PROT SERPL-MCNC: 6.2 G/DL (ref 6.4–8.2)
RBC # BLD AUTO: 3.36 X10*6/UL (ref 4.5–5.9)
RBC # BLD AUTO: 3.39 X10*6/UL (ref 4.5–5.9)
SODIUM SERPL-SCNC: 138 MMOL/L (ref 136–145)
SODIUM SERPL-SCNC: 140 MMOL/L (ref 136–145)
WBC # BLD AUTO: 11.1 X10*3/UL (ref 4.4–11.3)
WBC # BLD AUTO: 9.3 X10*3/UL (ref 4.4–11.3)

## 2025-07-04 PROCEDURE — 2500000002 HC RX 250 W HCPCS SELF ADMINISTERED DRUGS (ALT 637 FOR MEDICARE OP, ALT 636 FOR OP/ED): Performed by: STUDENT IN AN ORGANIZED HEALTH CARE EDUCATION/TRAINING PROGRAM

## 2025-07-04 PROCEDURE — 36415 COLL VENOUS BLD VENIPUNCTURE: CPT | Performed by: NURSE PRACTITIONER

## 2025-07-04 PROCEDURE — 2500000001 HC RX 250 WO HCPCS SELF ADMINISTERED DRUGS (ALT 637 FOR MEDICARE OP): Performed by: INTERNAL MEDICINE

## 2025-07-04 PROCEDURE — 2500000004 HC RX 250 GENERAL PHARMACY W/ HCPCS (ALT 636 FOR OP/ED): Performed by: NURSE PRACTITIONER

## 2025-07-04 PROCEDURE — 80053 COMPREHEN METABOLIC PANEL: CPT | Performed by: NURSE PRACTITIONER

## 2025-07-04 PROCEDURE — 80048 BASIC METABOLIC PNL TOTAL CA: CPT | Mod: CCI | Performed by: INTERNAL MEDICINE

## 2025-07-04 PROCEDURE — 82947 ASSAY GLUCOSE BLOOD QUANT: CPT

## 2025-07-04 PROCEDURE — 2500000001 HC RX 250 WO HCPCS SELF ADMINISTERED DRUGS (ALT 637 FOR MEDICARE OP): Performed by: STUDENT IN AN ORGANIZED HEALTH CARE EDUCATION/TRAINING PROGRAM

## 2025-07-04 PROCEDURE — 2500000002 HC RX 250 W HCPCS SELF ADMINISTERED DRUGS (ALT 637 FOR MEDICARE OP, ALT 636 FOR OP/ED): Performed by: NURSE PRACTITIONER

## 2025-07-04 PROCEDURE — 85027 COMPLETE CBC AUTOMATED: CPT | Performed by: NURSE PRACTITIONER

## 2025-07-04 PROCEDURE — 1100000001 HC PRIVATE ROOM DAILY

## 2025-07-04 PROCEDURE — 2500000004 HC RX 250 GENERAL PHARMACY W/ HCPCS (ALT 636 FOR OP/ED): Performed by: INTERNAL MEDICINE

## 2025-07-04 PROCEDURE — 2500000002 HC RX 250 W HCPCS SELF ADMINISTERED DRUGS (ALT 637 FOR MEDICARE OP, ALT 636 FOR OP/ED)

## 2025-07-04 PROCEDURE — 80048 BASIC METABOLIC PNL TOTAL CA: CPT | Performed by: NURSE PRACTITIONER

## 2025-07-04 PROCEDURE — 36415 COLL VENOUS BLD VENIPUNCTURE: CPT | Performed by: INTERNAL MEDICINE

## 2025-07-04 PROCEDURE — 85025 COMPLETE CBC W/AUTO DIFF WBC: CPT | Performed by: INTERNAL MEDICINE

## 2025-07-04 PROCEDURE — 2500000001 HC RX 250 WO HCPCS SELF ADMINISTERED DRUGS (ALT 637 FOR MEDICARE OP): Performed by: NURSE PRACTITIONER

## 2025-07-04 RX ORDER — POTASSIUM CHLORIDE 20 MEQ/1
40 TABLET, EXTENDED RELEASE ORAL ONCE
Status: COMPLETED | OUTPATIENT
Start: 2025-07-04 | End: 2025-07-04

## 2025-07-04 RX ORDER — OXYCODONE HYDROCHLORIDE 5 MG/1
5 TABLET ORAL EVERY 6 HOURS PRN
Refills: 0 | Status: DISCONTINUED | OUTPATIENT
Start: 2025-07-04 | End: 2025-07-06 | Stop reason: HOSPADM

## 2025-07-04 RX ORDER — PANTOPRAZOLE SODIUM 40 MG/1
40 TABLET, DELAYED RELEASE ORAL
Status: DISCONTINUED | OUTPATIENT
Start: 2025-07-04 | End: 2025-07-06 | Stop reason: HOSPADM

## 2025-07-04 RX ADMIN — APIXABAN 5 MG: 5 TABLET, FILM COATED ORAL at 22:49

## 2025-07-04 RX ADMIN — OXYCODONE HYDROCHLORIDE 5 MG: 5 TABLET ORAL at 15:33

## 2025-07-04 RX ADMIN — INSULIN GLARGINE 20 UNITS: 100 INJECTION, SOLUTION SUBCUTANEOUS at 22:49

## 2025-07-04 RX ADMIN — CYANOCOBALAMIN TAB 1000 MCG 1000 MCG: 1000 TAB at 08:56

## 2025-07-04 RX ADMIN — POLYETHYLENE GLYCOL 3350 17 G: 17 POWDER, FOR SOLUTION ORAL at 08:57

## 2025-07-04 RX ADMIN — DEXAMETHASONE SODIUM PHOSPHATE 4 MG: 4 INJECTION, SOLUTION INTRAMUSCULAR; INTRAVENOUS at 15:28

## 2025-07-04 RX ADMIN — TORSEMIDE 40 MG: 20 TABLET ORAL at 08:58

## 2025-07-04 RX ADMIN — INSULIN LISPRO 2 UNITS: 100 INJECTION, SOLUTION INTRAVENOUS; SUBCUTANEOUS at 13:01

## 2025-07-04 RX ADMIN — TAMSULOSIN HYDROCHLORIDE 0.8 MG: 0.4 CAPSULE ORAL at 08:58

## 2025-07-04 RX ADMIN — METOPROLOL TARTRATE 25 MG: 25 TABLET, FILM COATED ORAL at 22:49

## 2025-07-04 RX ADMIN — DEXAMETHASONE SODIUM PHOSPHATE 4 MG: 4 INJECTION, SOLUTION INTRAMUSCULAR; INTRAVENOUS at 22:49

## 2025-07-04 RX ADMIN — OXYCODONE HYDROCHLORIDE 5 MG: 5 TABLET ORAL at 22:49

## 2025-07-04 RX ADMIN — LISINOPRIL 10 MG: 10 TABLET ORAL at 08:57

## 2025-07-04 RX ADMIN — DILTIAZEM HYDROCHLORIDE 240 MG: 240 CAPSULE, COATED, EXTENDED RELEASE ORAL at 22:49

## 2025-07-04 RX ADMIN — INSULIN LISPRO 2 UNITS: 100 INJECTION, SOLUTION INTRAVENOUS; SUBCUTANEOUS at 18:21

## 2025-07-04 RX ADMIN — INSULIN LISPRO 5 UNITS: 100 INJECTION, SOLUTION INTRAVENOUS; SUBCUTANEOUS at 22:49

## 2025-07-04 RX ADMIN — POTASSIUM CHLORIDE 40 MEQ: 1500 TABLET, EXTENDED RELEASE ORAL at 09:33

## 2025-07-04 RX ADMIN — PANTOPRAZOLE SODIUM 40 MG: 40 TABLET, DELAYED RELEASE ORAL at 15:33

## 2025-07-04 RX ADMIN — ROSUVASTATIN CALCIUM 5 MG: 10 TABLET, FILM COATED ORAL at 08:58

## 2025-07-04 RX ADMIN — DILTIAZEM HYDROCHLORIDE 240 MG: 240 CAPSULE, COATED, EXTENDED RELEASE ORAL at 09:09

## 2025-07-04 RX ADMIN — POTASSIUM CHLORIDE 10 MEQ: 750 TABLET, EXTENDED RELEASE ORAL at 08:57

## 2025-07-04 RX ADMIN — APIXABAN 5 MG: 5 TABLET, FILM COATED ORAL at 08:56

## 2025-07-04 RX ADMIN — OXYCODONE HYDROCHLORIDE 5 MG: 5 TABLET ORAL at 09:33

## 2025-07-04 RX ADMIN — PANTOPRAZOLE SODIUM 40 MG: 40 TABLET, DELAYED RELEASE ORAL at 06:02

## 2025-07-04 RX ADMIN — METOPROLOL TARTRATE 25 MG: 25 TABLET, FILM COATED ORAL at 08:57

## 2025-07-04 ASSESSMENT — PAIN DESCRIPTION - DESCRIPTORS: DESCRIPTORS: ACHING;DISCOMFORT

## 2025-07-04 ASSESSMENT — COGNITIVE AND FUNCTIONAL STATUS - GENERAL
PERSONAL GROOMING: A LITTLE
STANDING UP FROM CHAIR USING ARMS: A LITTLE
DAILY ACTIVITIY SCORE: 19
DRESSING REGULAR LOWER BODY CLOTHING: A LITTLE
DRESSING REGULAR UPPER BODY CLOTHING: A LITTLE
MOBILITY SCORE: 18
TOILETING: A LITTLE
MOVING TO AND FROM BED TO CHAIR: A LITTLE
CLIMB 3 TO 5 STEPS WITH RAILING: A LITTLE
HELP NEEDED FOR BATHING: A LITTLE
MOVING FROM LYING ON BACK TO SITTING ON SIDE OF FLAT BED WITH BEDRAILS: A LITTLE
TURNING FROM BACK TO SIDE WHILE IN FLAT BAD: A LITTLE
WALKING IN HOSPITAL ROOM: A LITTLE

## 2025-07-04 ASSESSMENT — PAIN DESCRIPTION - LOCATION
LOCATION: BACK
LOCATION: BACK
LOCATION: SHOULDER

## 2025-07-04 ASSESSMENT — PAIN SCALES - GENERAL
PAINLEVEL_OUTOF10: 8
PAINLEVEL_OUTOF10: 5 - MODERATE PAIN
PAINLEVEL_OUTOF10: 5 - MODERATE PAIN
PAINLEVEL_OUTOF10: 4
PAINLEVEL_OUTOF10: 7

## 2025-07-04 ASSESSMENT — PAIN SCALES - WONG BAKER: WONGBAKER_NUMERICALRESPONSE: HURTS WHOLE LOT

## 2025-07-04 ASSESSMENT — PAIN - FUNCTIONAL ASSESSMENT
PAIN_FUNCTIONAL_ASSESSMENT: 0-10
PAIN_FUNCTIONAL_ASSESSMENT: 0-10
PAIN_FUNCTIONAL_ASSESSMENT: WONG-BAKER FACES
PAIN_FUNCTIONAL_ASSESSMENT: 0-10

## 2025-07-04 ASSESSMENT — PAIN DESCRIPTION - ORIENTATION
ORIENTATION: RIGHT
ORIENTATION: LOWER
ORIENTATION: LOWER

## 2025-07-04 NOTE — PROGRESS NOTES
Occupational Therapy                 Therapy Communication Note    Patient Name: Dragan Calvin  MRN: [id number]  Department: [facility]  Room: 70/702-A  Today's Date: 7/4/2025     Discipline: Occupational Therapy    Missed Visit: OT Missed Visit: Yes     Missed Visit Reason: Missed Visit Reason:  (neuro-surgery has consulted and at this time, patient is pending MRI, if he is not MRI compatiable then he should undergo a CT myelogram of the entire spine 2/2 neuro-surgery concerns; will hold at this time pending imaging and further recommendations)    Missed Time: Attempt

## 2025-07-04 NOTE — H&P
History Of Present Illness  Dragan Calvin is a 84 y.o. male with PMH significant for CAD, permanent Afib (on eliquis), DVT s/p IVC filter, HLD, HTN, SSS s/p implanted pacemaker, PUD, GERD, DMT2, peripheral edema, SBO(s), and chronic BLE weakness d/t spinal stenosis who presented to [facility] ED from home with complaints of weakness.  HPI supplemented with patient's family who was at the bedside.  Patient notes he is particularly stiff in the morning with his chronic bilateral lower extremity pain and weakness and this morning, while preparing breakfast, his legs became extremely weak and gave out on him, causing him to fall and injure both of his arms.  He denies any dizziness prior to the fall.  He denies any head injury or loss of consciousness.  He reports he is sustained bilateral arm abrasions that have since ceased bleeding.  He denies any recent illness, coughs, colds, fevers, chills, chest pain, shortness of breath, abdominal pain, nausea, vomiting, diarrhea, or dysuria.  He does note being in contact with his wife who was diagnosed with bronchitis but tested negative for COVID.  He otherwise denies any other sick contacts.  He continues that over the past several months he has been experiencing progressively worsening bilateral lower extremity pain and weakness for which he has been waiting for an appointment with neurosurgery that he has at [facility] next week.  However, he is unable to do most of his activities of daily living due to this pain and weakness and it has caused him to fall several times that needed intervention of EMS to help him off the ground.  Him and his family feel that he is not safe to continue at home this way and would like to see neurosurgery while he is admitted.  He denies any recent medicine changes and reports he has been taking his medicines as prescribed.  Record review indicates that his pacer battery is nearing the end of his life in the next few months so a pacer check will  be don. rule     In the ED lab work, EKG, head CT, C-spine CT, and chest x-ray were performed. Labs revealed potassium 3.1, bun 42, creatinine 1.55 (slightly higher than recent baseline), ,H&H 11.6 and 34 within patient's baseline, neutrophils 9.21, lymphocytes 0.51, UA negative. EKG, per ER physician impression revealed Atrial fibrillation with PVCs no acute ST elevation or depression. Ventricular rate-93 BPM.  Imaging was negative for acute process.  Initial vital signs were stable.  Potassium was replaced   CT lumbar spine postmyelogram 4/11/2023    Impression  1.  Lumbar puncture for intrathecal administration of iodinated  contrast.  2. Multilevel degenerative changes of the lumbar spine, fully  detailed above, worst at L4-L5 where there is severe central canal  stenosis.       Past Medical History  Medical History[1]    Surgical History  Surgical History[2]     Social History  He reports that he has never smoked. He has never used smokeless tobacco. He reports that he does not drink alcohol and does not use drugs.    Family History  Family History[3]     Allergies  Patient has no known allergies.    10 point review of systems performed and is negative besides what is stated in HPI.     Physical Exam  Constitutional:       Appearance: Normal appearance.   HENT:      Head: Normocephalic and atraumatic.      Nose: Nose normal.      Mouth/Throat:      Mouth: Mucous membranes are moist.   Eyes:      Conjunctiva/sclera: Conjunctivae normal.   Cardiovascular:      Rate and Rhythm: Tachycardia present. Rhythm irregular.      Heart sounds: Normal heart sounds.   Pulmonary:      Effort: Pulmonary effort is normal.      Breath sounds: Normal breath sounds.   Abdominal:      General: Bowel sounds are normal.      Palpations: Abdomen is soft.      Tenderness: There is no abdominal tenderness.   Musculoskeletal:      Cervical back: Neck supple.      Right lower leg: Edema present.      Left lower leg: Edema present.  "     Comments: Nonpitting bilateral lower extremity edema, bilateral lower extremity weakness   Skin:     General: Skin is warm and dry.   Neurological:      Mental Status: He is alert. Mental status is at baseline.   Psychiatric:         Mood and Affect: Mood normal.          Last Recorded Vitals  Blood pressure 123/64, pulse 109, temperature 37.1 °C (98.8 °F), temperature source Temporal, resp. rate 14, height 1.778 m (5' 10\"), weight 96.6 kg (213 lb), SpO2 96%.    Relevant Results    Medications Ordered Prior to Encounter[4]     Results for orders placed or performed during the hospital encounter of 07/03/25 (from the past 24 hours)   POCT GLUCOSE   Result Value Ref Range    POCT Glucose 210 (H) 74 - 99 mg/dL   POCT GLUCOSE   Result Value Ref Range    POCT Glucose 218 (H) 74 - 99 mg/dL   CBC   Result Value Ref Range    WBC 11.1 4.4 - 11.3 x10*3/uL    nRBC 0.0 0.0 - 0.0 /100 WBCs    RBC 3.36 (L) 4.50 - 5.90 x10*6/uL    Hemoglobin 11.2 (L) 13.5 - 17.5 g/dL    Hematocrit 33.1 (L) 41.0 - 52.0 %    MCV 99 80 - 100 fL    MCH 33.3 26.0 - 34.0 pg    MCHC 33.8 32.0 - 36.0 g/dL    RDW 13.5 11.5 - 14.5 %    Platelets 169 150 - 450 x10*3/uL   Basic metabolic panel   Result Value Ref Range    Glucose 148 (H) 74 - 99 mg/dL    Sodium 140 136 - 145 mmol/L    Potassium 3.0 (L) 3.5 - 5.3 mmol/L    Chloride 102 98 - 107 mmol/L    Bicarbonate 29 21 - 32 mmol/L    Anion Gap 12 10 - 20 mmol/L    Urea Nitrogen 30 (H) 6 - 23 mg/dL    Creatinine 1.20 0.50 - 1.30 mg/dL    eGFR 60 (L) >60 mL/min/1.73m*2    Calcium 8.8 8.6 - 10.3 mg/dL   POCT GLUCOSE   Result Value Ref Range    POCT Glucose 150 (H) 74 - 99 mg/dL   POCT GLUCOSE   Result Value Ref Range    POCT Glucose 235 (H) 74 - 99 mg/dL     *Note: Due to a large number of results and/or encounters for the requested time period, some results have not been displayed. A complete set of results can be found in Results Review.      CT cervical spine wo IV contrast  Result Date: " 7/3/2025  Interpreted By:  Hunter Dodd, STUDY: CT CERVICAL SPINE WO IV CONTRAST;  7/3/2025 9:48 am   INDICATION: Signs/Symptoms:Fall over 65.     COMPARISON: None.   ACCESSION NUMBER(S): [id number]   ORDERING CLINICIAN: WILLA ROMAN   TECHNIQUE: Unenhanced axial images were obtained through the cervical spine. The axial data was utilized to reconstruct images in sagittal and coronal planes.   FINDINGS: No acute fracture is identified. Degenerative changes include disc space narrowing, endplate spurring, endplate sclerosis, uncovertebral spurring, posterior disc osteophyte complexes, and facet hypertrophy. Minimal subluxation at a few levels most likely relates to facet degenerative disease. Alignment is unchanged when compared to the previous study. Facet joints demonstrate a normal alignment. Prevertebral soft tissues are within normal limits. No lytic or blastic lesion is noted.       No evidence of an acute fracture or subluxation.  Degenerative changes.   MACRO: None.   Signed by: Hunter Dodd 7/3/2025 10:02 AM Dictation workstation:   [id number]    CT head wo IV contrast  Result Date: 7/3/2025  Interpreted By:  Hunter Dodd, STUDY: CT HEAD WO IV CONTRAST;  7/3/2025 9:48 am   INDICATION: Signs/Symptoms:Fall over 65.     COMPARISON: 10/30/2024   ACCESSION NUMBER(S): [id number]   ORDERING CLINICIAN: WILLA ROMAN   TECHNIQUE: Unenhanced images were obtained through the brain.   FINDINGS: There is atrophy resulting in prominence of the ventricles and sulci. There are areas of decreased attenuation within the white matter which are nonspecific but are commonly associated with small vessel ischemic disease. There is no mass effect or midline shift. No acute intracranial hemorrhage is identified. No extra-axial fluid collections are seen. No intraparenchymal mass lesions are identified.  Bone windows demonstrate no evidence of an acute calvarial fracture.       No evidence of an acute intracranial process.    MACRO: None.   Signed by: Hunter Dodd 7/3/2025 10:01 AM Dictation workstation:   [id number]    XR chest 1 view  Result Date: 7/3/2025  STUDY: Chest Radiograph;  7/3/2025 9:27 AM INDICATION: Weakness. COMPARISON: 5/10/2025 XR Chest ACCESSION NUMBER(S): [id number] ORDERING CLINICIAN: WILLA ROMAN TECHNIQUE:  Frontal chest was obtained at 0914 hours. FINDINGS: CARDIOMEDIASTINAL SILHOUETTE: Cardiomediastinal silhouette is normal in size and configuration. Dual-lead left chest pacemaker.  LUNGS: No focal regions of airspace consolidation. No pneumothorax or significant pleural effusion.  ABDOMEN: No remarkable upper abdominal findings.  BONES: No acute osseous changes.    No focal regions of airspace consolidation. Stable positioning left chest pacemaker. Signed by Jaxson Hicks MD          Assessment & Plan  Generalized weakness    Spinal stenosis    Fall    Hypokalemia    Elevated serum creatinine      Admit to regular medical floor  Appreciate neurosurgery recommendations  Patient to check  Check COVID  Potassium replaced in ED  500 cc of IV fluid over 5 hours  A.m. CBC, BMP  PT/OT      Chronic conditions: CAD, permanent Afib (on eliquis), DVT s/p IVC filter, HLD, HTN, SSS s/p implanted pacemaker, PUD, GERD, DMT2, peripheral edema, SBO(s), and chronic BLE weakness d/t spinal stenosis    Continue home medications as listed above starting lisinopril and torsemide tomorrow  Cardiac/diabetic diet  Hypoglycemia protocol  Sliding scale insulin    DVT prophylaxis    SCDs  On Eliquis            Patient fully evaluated 07/04  ,thorough record review performed of previous labs and notes from prior encounters. Plan discussed with interdisciplinary team, consults placed, appreciate input. Will continue current and repeat labs in the AM.      Potassium replaced, will trial IV steroids with reevaluations with PT and OT. Defer further diagnostics to neurosurgery.   Discharge planning discussed with patient and care team.  Therapy evaluations ordered. Patient aware and agreeable to current plan, continue plan as above.     I spent a total of 75 minutes on the date of the service which included preparing to see the patient, face-to-face patient care, completing clinical documentation, obtaining and/or reviewing separately obtained history, performing a medically appropriate examination, counseling and educating the patient/family/caregiver, ordering medications, tests, or procedures, communicating with other HCPs (not separately reported), independently interpreting results (not separately reported), communicating results to the patient/family/caregiver, and care coordination (not separately reported).       Razia Bailon           [1]   Past Medical History:  Diagnosis Date    Deep vein thrombosis (DVT) of lower extremity 09/17/2023    s/p IVC filter and has post-phlebotic syndrome    History of falling     History of fall    Hyperlipidemia 08/28/2023    Dr. Zee follows    Hypertension, benign 08/23/2017    Leg edema 09/05/2023    Overweight     Overweight    Paroxysmal atrial fibrillation (Multi) 08/28/2023    PAF. On Eliquis. 7 second pauses therefore PPM placed. Freq Pafib on PPM. Chads 2 vasc 4    Permanent atrial fibrillation (Multi) 10/17/2023    On Eliquis. 7 second pauses therefore PPM placed. Freq Pafib on PPM. Chads 2 vasc 4    Personal history of other diseases of the digestive system     History of small bowel obstruction    Personal history of other infectious and parasitic diseases     History of herpes zoster    Personal history of other specified conditions     History of bradycardia    Personal history of other specified conditions     History of edema    Personal history of peptic ulcer disease     History of peptic ulcer    Personal history of pneumonia (recurrent)     History of pneumonia    Personal history of urinary calculi     History of kidney stones    Presence of cardiac pacemaker 08/25/2017    10/24/2016:  [employer] MRI DR model #[id number]    Sick sinus syndrome (Multi) 09/17/2023    s/p PPM 10/2016    Stage 3a chronic kidney disease (Multi) 07/18/2024    Type 2 diabetes mellitus, with long-term current use of insulin 08/28/2023   [2]   Past Surgical History:  Procedure Laterality Date    APPENDECTOMY  07/25/2018    Appendectomy    CARDIAC PACEMAKER PLACEMENT  04/05/2018    Pacemaker Placement    CHOLECYSTECTOMY  04/05/2018    Cholecystectomy   [3]   Family History  Problem Relation Name Age of Onset    Coronary artery disease Father      Heart failure Father      Diabetes Father     [4]   No current facility-administered medications on file prior to encounter.     Current Outpatient Medications on File Prior to Encounter   Medication Sig Dispense Refill    apixaban (Eliquis) 5 mg tablet Take 1 tablet (5 mg) by mouth 2 times a day. 180 tablet 3    cyanocobalamin (Vitamin B-12) 1,000 mcg tablet Take 1 tablet (1,000 mcg) by mouth once daily.      dilTIAZem CD (Cardizem CD) 240 mg 24 hr capsule Take 1 capsule (240 mg) by mouth 2 times a day. 180 capsule 3    fosinopril (Monopril) 10 mg tablet Take 0.5 tablets (5 mg) by mouth once daily. 45 tablet 3    glipiZIDE XL (Glucotrol XL) 2.5 mg 24 hr tablet Take 1 tablet (2.5 mg) by mouth 3 times a day. (Patient taking differently: Take 2 tablets (5 mg) by mouth once daily in the morning.) 180 tablet 3    glipiZIDE XL (Glucotrol XL) 2.5 mg 24 hr tablet Take 1 tablet (2.5 mg) by mouth once daily in the evening. Do not crush, chew, or split.      insulin degludec (Tresiba FlexTouch U-100) 100 unit/mL (3 mL) pen Inject 26 Units under the skin once daily at bedtime. Take as directed per insulin instructions. 24 mL 3    lidocaine 4 % patch Place 1 patch over 12 hours on the skin once daily. Remove & discard patch within 12 hours or as directed by MD. (Patient taking differently: Place 1 patch on the skin once daily as needed. Remove & discard patch within 12 hours or as directed  "by MD.) 7 patch 0    metOLazone (Zaroxolyn) 2.5 mg tablet Take 1 tablet (2.5 mg) by mouth once daily. On Sunday and Wednesday (Patient taking differently: Take 1 tablet (2.5 mg) by mouth once daily.) 90 tablet 3    metoprolol tartrate (Lopressor) 25 mg tablet TAKE 1 TABLET(25 MG) BY MOUTH TWICE DAILY 180 tablet 3    multivitamin tablet Take 1 tablet by mouth once daily.      potassium chloride CR (Klor-Con) 10 mEq ER tablet Take 1 tablet (10 mEq) by mouth once daily. Do not crush, chew, or split. 90 tablet 3    RABEprazole (Aciphex) EC tablet Take 1 tablet (20 mg) by mouth once daily. 90 tablet 3    rosuvastatin (Crestor) 5 mg tablet TAKE 1 TABLET(5 MG) BY MOUTH DAILY 90 tablet 1    tamsulosin (Flomax) 0.4 mg 24 hr capsule Take 2 capsules (0.8 mg) by mouth once daily.      torsemide (Demadex) 20 mg tablet TAKE 3 TABLETS(60 MG) BY MOUTH DAILY (Patient taking differently: Take 2 tablets (40 mg) by mouth once daily.) 270 tablet 3    acetaminophen (Tylenol 8 HOUR) 650 mg ER tablet Take 1 tablet (650 mg) by mouth every 8 hours if needed for mild pain (1 - 3). Do not crush, chew, or split.      blood sugar diagnostic (Blood Glucose Test) strip Use to test blood sugar once daily 100 strip 1    cyclobenzaprine (Flexeril) 5 mg tablet Take 1 tablet (5 mg) by mouth 3 times a day as needed for muscle spasms. (Patient not taking: Reported on 7/3/2025) 30 tablet 0    lancets misc One touch ultra test strips 33G LANCETS Tests once daily 100 each 3    lancets misc Use to test blood sugar once a day 100 each 3    OneTouch Ultra Test USE TO TEST BLOOD SUGAR ONCE DAILY 100 strip 1    pen needle, diabetic (BD Ultra-Fine Tami Pen Needle) 32 gauge x 5/32\" needle 1 Needle 4 times a day. 100 each 3     "

## 2025-07-04 NOTE — CARE PLAN
The patient's goals for the shift include      The clinical goals for the shift include comfort, safety      Problem: Skin  Goal: Decreased wound size/increased tissue granulation at next dressing change  Outcome: Progressing  Flowsheets (Taken 7/4/2025 1104)  Decreased wound size/increased tissue granulation at next dressing change: Promote sleep for wound healing  Goal: Participates in plan/prevention/treatment measures  Outcome: Progressing  Flowsheets (Taken 7/4/2025 1104)  Participates in plan/prevention/treatment measures: Elevate heels  Goal: Prevent/manage excess moisture  Outcome: Progressing  Flowsheets (Taken 7/4/2025 1104)  Prevent/manage excess moisture: Monitor for/manage infection if present  Goal: Prevent/minimize sheer/friction injuries  Outcome: Progressing  Flowsheets (Taken 7/4/2025 1104)  Prevent/minimize sheer/friction injuries: Use pull sheet  Goal: Promote/optimize nutrition  Outcome: Progressing  Flowsheets (Taken 7/4/2025 1104)  Promote/optimize nutrition: Offer water/supplements/favorite foods  Goal: Promote skin healing  Outcome: Progressing  Flowsheets (Taken 7/4/2025 1104)  Promote skin healing: Assess skin/pad under line(s)/device(s)     Problem: Pain - Adult  Goal: Verbalizes/displays adequate comfort level or baseline comfort level  Outcome: Progressing     Problem: Safety - Adult  Goal: Free from fall injury  Outcome: Progressing     Problem: Discharge Planning  Goal: Discharge to home or other facility with appropriate resources  Outcome: Progressing     Problem: Chronic Conditions and Co-morbidities  Goal: Patient's chronic conditions and co-morbidity symptoms are monitored and maintained or improved  Outcome: Progressing     Problem: Nutrition  Goal: Nutrient intake appropriate for maintaining nutritional needs  Outcome: Progressing

## 2025-07-04 NOTE — PROGRESS NOTES
Physical Therapy                 Therapy Communication Note    Patient Name: Dragan Calvin  MRN: [id number]  Department: [facility]  Room: 70/702-A  Today's Date: 7/4/2025     Discipline: Physical Therapy    Missed Visit:   Yes    Missed Visit Reason:  Neurosurgery consulted and at this time, patient is pending MRI (if he is not MRI compatiable then he should undergo a CT myelogram of the entire spine secondary to neuro-surgery concerns). Will hold at this time pending imaging, further recommendations.     Missed Time: Attempt

## 2025-07-04 NOTE — PROGRESS NOTES
07/04/25 1114   Discharge Planning   Living Arrangements Spouse/significant other   Support Systems Spouse/significant other   Type of Residence Private residence   Do you have animals or pets at home? No   Who is requesting discharge planning? Provider   Expected Discharge Disposition Rehab   Stroke Family Assessment   Stroke Family Assessment Needed No   Intensity of Service   Intensity of Service 0-30 min     Met with pt and his spouse, pt admit for generalized weakness, falling, neuro surgery consulted , requesting MRI  unable due to pt has a pacemaker.  Awaiting on therapies evaluations.  Per pt and his spouse pt was independent , although uses a walker at times,  PTA.   Home address, insurance and PCP Dr Brown confirmed.   Team to follow.  Margareth Jesus RN [facility]

## 2025-07-04 NOTE — PROGRESS NOTES
Patient presenting with chronic progressive low back and bilateral lower extremity pain, balance/gait dysfunction and multiple falls.  He has a history of atrial fibrillation on Eliquis and pacemaker.  I personally reviewed his head & cervical CTs.  He has microvascular changes, chronic degenerative changes in cervical spine with multilevel degenerative disc disease.  I also reviewed his lumbar CT myelogram from April 2023, which shows severe central stenosis at L4-5.  At this time, I am concerned that he has both symptomatic cervical and lumbar stenosis.  If his pacemaker is not MRI-compatible, then he should undergo a CT myelogram of the entire spine.  This would require holding his Eliquis.  Neurosurgery will continue to follow along.

## 2025-07-04 NOTE — CARE PLAN
The patient's goals for the shift include  safety and comfort  Problem: Skin  Goal: Decreased wound size/increased tissue granulation at next dressing change  Outcome: Progressing  Flowsheets (Taken 7/3/2025 2217)  Decreased wound size/increased tissue granulation at next dressing change: Promote sleep for wound healing  Goal: Participates in plan/prevention/treatment measures  Outcome: Progressing  Flowsheets (Taken 7/3/2025 2217)  Participates in plan/prevention/treatment measures: Elevate heels  Goal: Prevent/manage excess moisture  Outcome: Progressing  Flowsheets (Taken 7/3/2025 2217)  Prevent/manage excess moisture:   Monitor for/manage infection if present   Moisturize dry skin  Goal: Prevent/minimize sheer/friction injuries  Outcome: Progressing  Flowsheets (Taken 7/3/2025 2217)  Prevent/minimize sheer/friction injuries:   Use pull sheet   HOB 30 degrees or less  Goal: Promote/optimize nutrition  Outcome: Progressing  Flowsheets (Taken 7/3/2025 2217)  Promote/optimize nutrition:   Offer water/supplements/favorite foods   Monitor/record intake including meals  Goal: Promote skin healing  Outcome: Progressing  Flowsheets (Taken 7/3/2025 2217)  Promote skin healing: Assess skin/pad under line(s)/device(s)     Problem: Pain - Adult  Goal: Verbalizes/displays adequate comfort level or baseline comfort level  Outcome: Progressing     Problem: Safety - Adult  Goal: Free from fall injury  Outcome: Progressing     Problem: Discharge Planning  Goal: Discharge to home or other facility with appropriate resources  Outcome: Progressing     Problem: Chronic Conditions and Co-morbidities  Goal: Patient's chronic conditions and co-morbidity symptoms are monitored and maintained or improved  Outcome: Progressing     Problem: Nutrition  Goal: Nutrient intake appropriate for maintaining nutritional needs  Outcome: Progressing       The clinical goals for the shift include Pt will remain safe and comfortable during the  shift

## 2025-07-05 LAB
ALBUMIN SERPL BCP-MCNC: 3.8 G/DL (ref 3.4–5)
ALP SERPL-CCNC: 80 U/L (ref 33–136)
ALT SERPL W P-5'-P-CCNC: 16 U/L (ref 10–52)
ANION GAP SERPL CALC-SCNC: 12 MMOL/L (ref 10–20)
AST SERPL W P-5'-P-CCNC: 32 U/L (ref 9–39)
BASOPHILS # BLD AUTO: 0.02 X10*3/UL (ref 0–0.1)
BASOPHILS NFR BLD AUTO: 0.2 %
BILIRUB SERPL-MCNC: 0.8 MG/DL (ref 0–1.2)
BUN SERPL-MCNC: 33 MG/DL (ref 6–23)
CALCIUM SERPL-MCNC: 8.9 MG/DL (ref 8.6–10.3)
CHLORIDE SERPL-SCNC: 104 MMOL/L (ref 98–107)
CO2 SERPL-SCNC: 28 MMOL/L (ref 21–32)
CREAT SERPL-MCNC: 1.12 MG/DL (ref 0.5–1.3)
EGFRCR SERPLBLD CKD-EPI 2021: 65 ML/MIN/1.73M*2
EOSINOPHIL # BLD AUTO: 0 X10*3/UL (ref 0–0.4)
EOSINOPHIL NFR BLD AUTO: 0 %
ERYTHROCYTE [DISTWIDTH] IN BLOOD BY AUTOMATED COUNT: 13.2 % (ref 11.5–14.5)
GLUCOSE BLD MANUAL STRIP-MCNC: 239 MG/DL (ref 74–99)
GLUCOSE BLD MANUAL STRIP-MCNC: 270 MG/DL (ref 74–99)
GLUCOSE BLD MANUAL STRIP-MCNC: 294 MG/DL (ref 74–99)
GLUCOSE BLD MANUAL STRIP-MCNC: 352 MG/DL (ref 74–99)
GLUCOSE SERPL-MCNC: 248 MG/DL (ref 74–99)
HCT VFR BLD AUTO: 36.8 % (ref 41–52)
HGB BLD-MCNC: 11.7 G/DL (ref 13.5–17.5)
IMM GRANULOCYTES # BLD AUTO: 0.08 X10*3/UL (ref 0–0.5)
IMM GRANULOCYTES NFR BLD AUTO: 0.8 % (ref 0–0.9)
LYMPHOCYTES # BLD AUTO: 0.65 X10*3/UL (ref 0.8–3)
LYMPHOCYTES NFR BLD AUTO: 6.1 %
MCH RBC QN AUTO: 33.1 PG (ref 26–34)
MCHC RBC AUTO-ENTMCNC: 31.8 G/DL (ref 32–36)
MCV RBC AUTO: 104 FL (ref 80–100)
MONOCYTES # BLD AUTO: 0.15 X10*3/UL (ref 0.05–0.8)
MONOCYTES NFR BLD AUTO: 1.4 %
NEUTROPHILS # BLD AUTO: 9.76 X10*3/UL (ref 1.6–5.5)
NEUTROPHILS NFR BLD AUTO: 91.5 %
NRBC BLD-RTO: 0 /100 WBCS (ref 0–0)
PLATELET # BLD AUTO: 166 X10*3/UL (ref 150–450)
POTASSIUM SERPL-SCNC: 4 MMOL/L (ref 3.5–5.3)
PROT SERPL-MCNC: 6.7 G/DL (ref 6.4–8.2)
RBC # BLD AUTO: 3.54 X10*6/UL (ref 4.5–5.9)
SODIUM SERPL-SCNC: 140 MMOL/L (ref 136–145)
WBC # BLD AUTO: 10.7 X10*3/UL (ref 4.4–11.3)

## 2025-07-05 PROCEDURE — 80053 COMPREHEN METABOLIC PANEL: CPT | Performed by: INTERNAL MEDICINE

## 2025-07-05 PROCEDURE — 2500000002 HC RX 250 W HCPCS SELF ADMINISTERED DRUGS (ALT 637 FOR MEDICARE OP, ALT 636 FOR OP/ED): Performed by: NURSE PRACTITIONER

## 2025-07-05 PROCEDURE — 2500000004 HC RX 250 GENERAL PHARMACY W/ HCPCS (ALT 636 FOR OP/ED): Performed by: NURSE PRACTITIONER

## 2025-07-05 PROCEDURE — 2500000001 HC RX 250 WO HCPCS SELF ADMINISTERED DRUGS (ALT 637 FOR MEDICARE OP): Performed by: INTERNAL MEDICINE

## 2025-07-05 PROCEDURE — 2500000001 HC RX 250 WO HCPCS SELF ADMINISTERED DRUGS (ALT 637 FOR MEDICARE OP): Performed by: NURSE PRACTITIONER

## 2025-07-05 PROCEDURE — 99221 1ST HOSP IP/OBS SF/LOW 40: CPT | Performed by: NEUROLOGICAL SURGERY

## 2025-07-05 PROCEDURE — 2500000004 HC RX 250 GENERAL PHARMACY W/ HCPCS (ALT 636 FOR OP/ED): Performed by: INTERNAL MEDICINE

## 2025-07-05 PROCEDURE — 36415 COLL VENOUS BLD VENIPUNCTURE: CPT | Performed by: INTERNAL MEDICINE

## 2025-07-05 PROCEDURE — 82947 ASSAY GLUCOSE BLOOD QUANT: CPT

## 2025-07-05 PROCEDURE — 1100000001 HC PRIVATE ROOM DAILY

## 2025-07-05 PROCEDURE — 85025 COMPLETE CBC W/AUTO DIFF WBC: CPT | Performed by: INTERNAL MEDICINE

## 2025-07-05 PROCEDURE — 99222 1ST HOSP IP/OBS MODERATE 55: CPT | Performed by: STUDENT IN AN ORGANIZED HEALTH CARE EDUCATION/TRAINING PROGRAM

## 2025-07-05 RX ORDER — INSULIN GLARGINE 100 [IU]/ML
30 INJECTION, SOLUTION SUBCUTANEOUS NIGHTLY
Status: DISCONTINUED | OUTPATIENT
Start: 2025-07-05 | End: 2025-07-06 | Stop reason: HOSPADM

## 2025-07-05 RX ADMIN — POTASSIUM CHLORIDE 10 MEQ: 750 TABLET, EXTENDED RELEASE ORAL at 08:38

## 2025-07-05 RX ADMIN — DEXAMETHASONE SODIUM PHOSPHATE 4 MG: 4 INJECTION, SOLUTION INTRAMUSCULAR; INTRAVENOUS at 06:44

## 2025-07-05 RX ADMIN — PANTOPRAZOLE SODIUM 40 MG: 40 TABLET, DELAYED RELEASE ORAL at 06:44

## 2025-07-05 RX ADMIN — INSULIN LISPRO 3 UNITS: 100 INJECTION, SOLUTION INTRAVENOUS; SUBCUTANEOUS at 12:03

## 2025-07-05 RX ADMIN — APIXABAN 5 MG: 5 TABLET, FILM COATED ORAL at 08:38

## 2025-07-05 RX ADMIN — INSULIN GLARGINE 30 UNITS: 100 INJECTION, SOLUTION SUBCUTANEOUS at 20:50

## 2025-07-05 RX ADMIN — APIXABAN 5 MG: 5 TABLET, FILM COATED ORAL at 20:49

## 2025-07-05 RX ADMIN — METOPROLOL TARTRATE 25 MG: 25 TABLET, FILM COATED ORAL at 08:39

## 2025-07-05 RX ADMIN — DILTIAZEM HYDROCHLORIDE 240 MG: 240 CAPSULE, COATED, EXTENDED RELEASE ORAL at 08:38

## 2025-07-05 RX ADMIN — TAMSULOSIN HYDROCHLORIDE 0.8 MG: 0.4 CAPSULE ORAL at 08:38

## 2025-07-05 RX ADMIN — POLYETHYLENE GLYCOL 3350 17 G: 17 POWDER, FOR SOLUTION ORAL at 08:38

## 2025-07-05 RX ADMIN — DEXAMETHASONE SODIUM PHOSPHATE 4 MG: 4 INJECTION, SOLUTION INTRAMUSCULAR; INTRAVENOUS at 16:43

## 2025-07-05 RX ADMIN — TORSEMIDE 40 MG: 20 TABLET ORAL at 08:38

## 2025-07-05 RX ADMIN — PANTOPRAZOLE SODIUM 40 MG: 40 TABLET, DELAYED RELEASE ORAL at 16:43

## 2025-07-05 RX ADMIN — METOPROLOL TARTRATE 25 MG: 25 TABLET, FILM COATED ORAL at 20:49

## 2025-07-05 RX ADMIN — LISINOPRIL 10 MG: 10 TABLET ORAL at 08:39

## 2025-07-05 RX ADMIN — ROSUVASTATIN CALCIUM 5 MG: 10 TABLET, FILM COATED ORAL at 08:39

## 2025-07-05 RX ADMIN — INSULIN LISPRO 2 UNITS: 100 INJECTION, SOLUTION INTRAVENOUS; SUBCUTANEOUS at 08:37

## 2025-07-05 RX ADMIN — INSULIN LISPRO 5 UNITS: 100 INJECTION, SOLUTION INTRAVENOUS; SUBCUTANEOUS at 20:50

## 2025-07-05 RX ADMIN — INSULIN LISPRO 3 UNITS: 100 INJECTION, SOLUTION INTRAVENOUS; SUBCUTANEOUS at 16:43

## 2025-07-05 ASSESSMENT — COGNITIVE AND FUNCTIONAL STATUS - GENERAL
PERSONAL GROOMING: A LITTLE
MOVING FROM LYING ON BACK TO SITTING ON SIDE OF FLAT BED WITH BEDRAILS: A LITTLE
EATING MEALS: A LITTLE
TURNING FROM BACK TO SIDE WHILE IN FLAT BAD: A LITTLE
TOILETING: A LITTLE
DRESSING REGULAR UPPER BODY CLOTHING: A LITTLE
DRESSING REGULAR LOWER BODY CLOTHING: A LITTLE
HELP NEEDED FOR BATHING: A LITTLE
STANDING UP FROM CHAIR USING ARMS: A LITTLE
WALKING IN HOSPITAL ROOM: A LITTLE
DAILY ACTIVITIY SCORE: 18
MOBILITY SCORE: 18
MOVING TO AND FROM BED TO CHAIR: A LITTLE
CLIMB 3 TO 5 STEPS WITH RAILING: A LITTLE

## 2025-07-05 ASSESSMENT — PAIN SCALES - GENERAL
PAINLEVEL_OUTOF10: 0 - NO PAIN
PAINLEVEL_OUTOF10: 0 - NO PAIN

## 2025-07-05 ASSESSMENT — PAIN SCALES - WONG BAKER: WONGBAKER_NUMERICALRESPONSE: NO HURT

## 2025-07-05 ASSESSMENT — PAIN - FUNCTIONAL ASSESSMENT: PAIN_FUNCTIONAL_ASSESSMENT: 0-10

## 2025-07-05 NOTE — PROGRESS NOTES
Physical Therapy                 Therapy Communication Note    Patient Name: Dragan Calvin  MRN: [id number]  Department: [facility]  Room: [facility]  Today's Date: 7/5/2025     Discipline: Physical Therapy    Missed Visit:   Yes    Missed Visit Reason:   Awaiting CT myelogram and further recommendations from Neurosurgery.     Missed Time: Cancel

## 2025-07-05 NOTE — CARE PLAN
The patient's goals for the shift include      The clinical goals for the shift include Remain HDS and pain free

## 2025-07-05 NOTE — CARE PLAN
The patient's goals for the shift include  Safety    The clinical goals for the shift include Pain management

## 2025-07-05 NOTE — PROGRESS NOTES
Dragan Calvin is a 84 y.o. male on day 1 of admission presenting with Generalized weakness.      Subjective   Spoke with Dr. Ramicone; patient has MRI compatible Pace- maker   Patient states pain is tolerable          Objective     Last Recorded Vitals  /70 (BP Location: Right arm, Patient Position: Lying)   Pulse 96   Temp 36.6 °C (97.9 °F) (Temporal)   Resp 18   Wt 96.6 kg (213 lb)   SpO2 94%   Intake/Output last 3 Shifts:    Intake/Output Summary (Last 24 hours) at 7/5/2025 1115  Last data filed at 7/5/2025 0900  Gross per 24 hour   Intake --   Output 675 ml   Net -675 ml       Admission Weight  Weight: 96.6 kg (213 lb) (07/03/25 0829)    Daily Weight  07/03/25 : 96.6 kg (213 lb)    Image Results  CT cervical spine wo IV contrast  Narrative: Interpreted By:  Hunter Dodd,   STUDY:  CT CERVICAL SPINE WO IV CONTRAST;  7/3/2025 9:48 am      INDICATION:  Signs/Symptoms:Fall over 65.          COMPARISON:  None.      ACCESSION NUMBER(S):  [id number]      ORDERING CLINICIAN:  WILLA ROMAN      TECHNIQUE:  Unenhanced axial images were obtained through the cervical spine. The  axial data was utilized to reconstruct images in sagittal and coronal  planes.      FINDINGS:  No acute fracture is identified. Degenerative changes include disc  space narrowing, endplate spurring, endplate sclerosis, uncovertebral  spurring, posterior disc osteophyte complexes, and facet hypertrophy.  Minimal subluxation at a few levels most likely relates to facet  degenerative disease. Alignment is unchanged when compared to the  previous study. Facet joints demonstrate a normal alignment.  Prevertebral soft tissues are within normal limits. No lytic or  blastic lesion is noted.      Impression: No evidence of an acute fracture or subluxation.  Degenerative  changes.      MACRO:  None.      Signed by: Hunter Dodd 7/3/2025 10:02 AM  Dictation workstation:   [id number]  CT head wo IV contrast  Narrative: Interpreted By:  Ju  Hunter,   STUDY:  CT HEAD WO IV CONTRAST;  7/3/2025 9:48 am      INDICATION:  Signs/Symptoms:Fall over 65.          COMPARISON:  10/30/2024      ACCESSION NUMBER(S):  [id number]      ORDERING CLINICIAN:  WILLA ROMAN      TECHNIQUE:  Unenhanced images were obtained through the brain.      FINDINGS:  There is atrophy resulting in prominence of the ventricles and sulci.  There are areas of decreased attenuation within the white matter  which are nonspecific but are commonly associated with small vessel  ischemic disease. There is no mass effect or midline shift. No acute  intracranial hemorrhage is identified. No extra-axial fluid  collections are seen. No intraparenchymal mass lesions are  identified.  Bone windows demonstrate no evidence of an acute  calvarial fracture.      Impression: No evidence of an acute intracranial process.      MACRO:  None.      Signed by: Hunter Dodd 7/3/2025 10:01 AM  Dictation workstation:   [id number]  XR chest 1 view  Narrative: STUDY:  Chest Radiograph;  7/3/2025 9:27 AM  INDICATION:  Weakness.  COMPARISON:  5/10/2025 XR Chest  ACCESSION NUMBER(S):  [id number]  ORDERING CLINICIAN:  WILLA ROMAN  TECHNIQUE:  Frontal chest was obtained at 0914 hours.  FINDINGS:  CARDIOMEDIASTINAL SILHOUETTE:  Cardiomediastinal silhouette is normal in size and configuration.  Dual-lead left chest pacemaker.     LUNGS:  No focal regions of airspace consolidation. No pneumothorax or  significant pleural effusion.     ABDOMEN:  No remarkable upper abdominal findings.     BONES:  No acute osseous changes.  Impression: No focal regions of airspace consolidation.  Stable positioning left chest pacemaker.  Signed by Jaxson Hicks MD      Physical Exam  Vitals and nursing note reviewed.   Constitutional:       Appearance: Normal appearance. He is normal weight.   HENT:      Head: Normocephalic.      Nose: Nose normal.      Mouth/Throat:      Mouth: Mucous membranes are moist.   Eyes:      Extraocular  Movements: Extraocular movements intact.      Conjunctiva/sclera: Conjunctivae normal.      Pupils: Pupils are equal, round, and reactive to light.   Cardiovascular:      Rate and Rhythm: Normal rate and regular rhythm.      Pulses: Normal pulses.      Heart sounds: Normal heart sounds.   Pulmonary:      Effort: Pulmonary effort is normal.   Abdominal:      General: Abdomen is flat. Bowel sounds are normal.      Palpations: Abdomen is soft.   Musculoskeletal:         General: Tenderness present. Normal range of motion.      Cervical back: Normal range of motion.   Skin:     General: Skin is warm and dry.      Capillary Refill: Capillary refill takes 2 to 3 seconds.   Neurological:      General: No focal deficit present.      Mental Status: He is alert and oriented to person, place, and time.   Psychiatric:         Mood and Affect: Mood normal.         Behavior: Behavior normal.         Relevant Results     Results for orders placed or performed during the hospital encounter of 07/03/25 (from the past 96 hours)   Urinalysis with Reflex Microscopic   Result Value Ref Range    Color, Urine Light-Yellow Light-Yellow, Yellow, Dark-Yellow    Appearance, Urine Clear Clear    Specific Gravity, Urine 1.011 1.005 - 1.035    pH, Urine 6.0 5.0, 5.5, 6.0, 6.5, 7.0, 7.5, 8.0    Protein, Urine NEGATIVE NEGATIVE, 10 (TRACE), 20 (TRACE) mg/dL    Glucose, Urine Normal Normal mg/dL    Blood, Urine NEGATIVE NEGATIVE mg/dL    Ketones, Urine NEGATIVE NEGATIVE mg/dL    Bilirubin, Urine NEGATIVE NEGATIVE mg/dL    Urobilinogen, Urine Normal Normal mg/dL    Nitrite, Urine NEGATIVE NEGATIVE    Leukocyte Esterase, Urine NEGATIVE NEGATIVE   B-Type Natriuretic Peptide   Result Value Ref Range     (H) 0 - 99 pg/mL   Troponin I, High Sensitivity   Result Value Ref Range    Troponin I, High Sensitivity 17 0 - 20 ng/L   Comprehensive Metabolic Panel   Result Value Ref Range    Glucose 309 (H) 74 - 99 mg/dL    Sodium 136 136 - 145 mmol/L     Potassium 3.1 (L) 3.5 - 5.3 mmol/L    Chloride 100 98 - 107 mmol/L    Bicarbonate 23 21 - 32 mmol/L    Anion Gap 16 10 - 20 mmol/L    Urea Nitrogen 42 (H) 6 - 23 mg/dL    Creatinine 1.55 (H) 0.50 - 1.30 mg/dL    eGFR 44 (L) >60 mL/min/1.73m*2    Calcium 8.8 8.6 - 10.3 mg/dL    Albumin 3.9 3.4 - 5.0 g/dL    Alkaline Phosphatase 90 33 - 136 U/L    Total Protein 6.9 6.4 - 8.2 g/dL    AST 19 9 - 39 U/L    Bilirubin, Total 1.1 0.0 - 1.2 mg/dL    ALT 15 10 - 52 U/L   Magnesium   Result Value Ref Range    Magnesium 1.92 1.60 - 2.40 mg/dL   CBC and Auto Differential   Result Value Ref Range    WBC 10.4 4.4 - 11.3 x10*3/uL    nRBC 0.0 0.0 - 0.0 /100 WBCs    RBC 3.46 (L) 4.50 - 5.90 x10*6/uL    Hemoglobin 11.6 (L) 13.5 - 17.5 g/dL    Hematocrit 34.0 (L) 41.0 - 52.0 %    MCV 98 80 - 100 fL    MCH 33.5 26.0 - 34.0 pg    MCHC 34.1 32.0 - 36.0 g/dL    RDW 13.2 11.5 - 14.5 %    Platelets 181 150 - 450 x10*3/uL    Neutrophils % 88.1 40.0 - 80.0 %    Immature Granulocytes %, Automated 0.6 0.0 - 0.9 %    Lymphocytes % 4.9 13.0 - 44.0 %    Monocytes % 5.5 2.0 - 10.0 %    Eosinophils % 0.4 0.0 - 6.0 %    Basophils % 0.5 0.0 - 2.0 %    Neutrophils Absolute 9.21 (H) 1.60 - 5.50 x10*3/uL    Immature Granulocytes Absolute, Automated 0.06 0.00 - 0.50 x10*3/uL    Lymphocytes Absolute 0.51 (L) 0.80 - 3.00 x10*3/uL    Monocytes Absolute 0.57 0.05 - 0.80 x10*3/uL    Eosinophils Absolute 0.04 0.00 - 0.40 x10*3/uL    Basophils Absolute 0.05 0.00 - 0.10 x10*3/uL   Sars-CoV-2 PCR   Result Value Ref Range    Coronavirus 2019, PCR Not Detected Not Detected   POCT GLUCOSE   Result Value Ref Range    POCT Glucose 163 (H) 74 - 99 mg/dL   POCT GLUCOSE   Result Value Ref Range    POCT Glucose 210 (H) 74 - 99 mg/dL   POCT GLUCOSE   Result Value Ref Range    POCT Glucose 218 (H) 74 - 99 mg/dL   CBC   Result Value Ref Range    WBC 11.1 4.4 - 11.3 x10*3/uL    nRBC 0.0 0.0 - 0.0 /100 WBCs    RBC 3.36 (L) 4.50 - 5.90 x10*6/uL    Hemoglobin 11.2 (L) 13.5 - 17.5  g/dL    Hematocrit 33.1 (L) 41.0 - 52.0 %    MCV 99 80 - 100 fL    MCH 33.3 26.0 - 34.0 pg    MCHC 33.8 32.0 - 36.0 g/dL    RDW 13.5 11.5 - 14.5 %    Platelets 169 150 - 450 x10*3/uL   Basic metabolic panel   Result Value Ref Range    Glucose 148 (H) 74 - 99 mg/dL    Sodium 140 136 - 145 mmol/L    Potassium 3.0 (L) 3.5 - 5.3 mmol/L    Chloride 102 98 - 107 mmol/L    Bicarbonate 29 21 - 32 mmol/L    Anion Gap 12 10 - 20 mmol/L    Urea Nitrogen 30 (H) 6 - 23 mg/dL    Creatinine 1.20 0.50 - 1.30 mg/dL    eGFR 60 (L) >60 mL/min/1.73m*2    Calcium 8.8 8.6 - 10.3 mg/dL   POCT GLUCOSE   Result Value Ref Range    POCT Glucose 150 (H) 74 - 99 mg/dL   POCT GLUCOSE   Result Value Ref Range    POCT Glucose 235 (H) 74 - 99 mg/dL   SST TOP   Result Value Ref Range    Extra Tube Hold for add-ons.    CBC and Auto Differential   Result Value Ref Range    WBC 9.3 4.4 - 11.3 x10*3/uL    nRBC 0.0 0.0 - 0.0 /100 WBCs    RBC 3.39 (L) 4.50 - 5.90 x10*6/uL    Hemoglobin 11.2 (L) 13.5 - 17.5 g/dL    Hematocrit 33.6 (L) 41.0 - 52.0 %    MCV 99 80 - 100 fL    MCH 33.0 26.0 - 34.0 pg    MCHC 33.3 32.0 - 36.0 g/dL    RDW 13.3 11.5 - 14.5 %    Platelets 168 150 - 450 x10*3/uL    Neutrophils % 79.5 40.0 - 80.0 %    Immature Granulocytes %, Automated 0.3 0.0 - 0.9 %    Lymphocytes % 11.5 13.0 - 44.0 %    Monocytes % 7.6 2.0 - 10.0 %    Eosinophils % 0.8 0.0 - 6.0 %    Basophils % 0.3 0.0 - 2.0 %    Neutrophils Absolute 7.40 (H) 1.60 - 5.50 x10*3/uL    Immature Granulocytes Absolute, Automated 0.03 0.00 - 0.50 x10*3/uL    Lymphocytes Absolute 1.07 0.80 - 3.00 x10*3/uL    Monocytes Absolute 0.71 0.05 - 0.80 x10*3/uL    Eosinophils Absolute 0.07 0.00 - 0.40 x10*3/uL    Basophils Absolute 0.03 0.00 - 0.10 x10*3/uL   Comprehensive Metabolic Panel   Result Value Ref Range    Glucose 290 (H) 74 - 99 mg/dL    Sodium 138 136 - 145 mmol/L    Potassium 3.5 3.5 - 5.3 mmol/L    Chloride 103 98 - 107 mmol/L    Bicarbonate 27 21 - 32 mmol/L    Anion Gap 12 10 -  20 mmol/L    Urea Nitrogen 31 (H) 6 - 23 mg/dL    Creatinine 1.16 0.50 - 1.30 mg/dL    eGFR 62 >60 mL/min/1.73m*2    Calcium 8.7 8.6 - 10.3 mg/dL    Albumin 3.6 3.4 - 5.0 g/dL    Alkaline Phosphatase 82 33 - 136 U/L    Total Protein 6.2 (L) 6.4 - 8.2 g/dL    AST 23 9 - 39 U/L    Bilirubin, Total 0.9 0.0 - 1.2 mg/dL    ALT 16 10 - 52 U/L   POCT GLUCOSE   Result Value Ref Range    POCT Glucose 244 (H) 74 - 99 mg/dL   POCT GLUCOSE   Result Value Ref Range    POCT Glucose 356 (H) 74 - 99 mg/dL   Comprehensive Metabolic Panel   Result Value Ref Range    Glucose 248 (H) 74 - 99 mg/dL    Sodium 140 136 - 145 mmol/L    Potassium 4.0 3.5 - 5.3 mmol/L    Chloride 104 98 - 107 mmol/L    Bicarbonate 28 21 - 32 mmol/L    Anion Gap 12 10 - 20 mmol/L    Urea Nitrogen 33 (H) 6 - 23 mg/dL    Creatinine 1.12 0.50 - 1.30 mg/dL    eGFR 65 >60 mL/min/1.73m*2    Calcium 8.9 8.6 - 10.3 mg/dL    Albumin 3.8 3.4 - 5.0 g/dL    Alkaline Phosphatase 80 33 - 136 U/L    Total Protein 6.7 6.4 - 8.2 g/dL    AST 32 9 - 39 U/L    Bilirubin, Total 0.8 0.0 - 1.2 mg/dL    ALT 16 10 - 52 U/L   CBC and Auto Differential   Result Value Ref Range    WBC 10.7 4.4 - 11.3 x10*3/uL    nRBC 0.0 0.0 - 0.0 /100 WBCs    RBC 3.54 (L) 4.50 - 5.90 x10*6/uL    Hemoglobin 11.7 (L) 13.5 - 17.5 g/dL    Hematocrit 36.8 (L) 41.0 - 52.0 %     (H) 80 - 100 fL    MCH 33.1 26.0 - 34.0 pg    MCHC 31.8 (L) 32.0 - 36.0 g/dL    RDW 13.2 11.5 - 14.5 %    Platelets 166 150 - 450 x10*3/uL    Neutrophils % 91.5 40.0 - 80.0 %    Immature Granulocytes %, Automated 0.8 0.0 - 0.9 %    Lymphocytes % 6.1 13.0 - 44.0 %    Monocytes % 1.4 2.0 - 10.0 %    Eosinophils % 0.0 0.0 - 6.0 %    Basophils % 0.2 0.0 - 2.0 %    Neutrophils Absolute 9.76 (H) 1.60 - 5.50 x10*3/uL    Immature Granulocytes Absolute, Automated 0.08 0.00 - 0.50 x10*3/uL    Lymphocytes Absolute 0.65 (L) 0.80 - 3.00 x10*3/uL    Monocytes Absolute 0.15 0.05 - 0.80 x10*3/uL    Eosinophils Absolute 0.00 0.00 - 0.40 x10*3/uL     Basophils Absolute 0.02 0.00 - 0.10 x10*3/uL   POCT GLUCOSE   Result Value Ref Range    POCT Glucose 239 (H) 74 - 99 mg/dL        Scheduled medications  Scheduled Medications[1]  Continuous medications  Continuous Medications[2]  PRN medications  PRN Medications[3]                Assessment & Plan  Generalized weakness    Spinal stenosis    Fall    Hypokalemia    Elevated serum creatinine    Admit to regular medical floor  Appreciate neurosurgery recommendations  Patient to check  Check COVID  Potassium replaced in ED  500 cc of IV fluid over 5 hours  A.m. CBC, BMP  PT/OT        Chronic conditions: CAD, permanent Afib (on eliquis), DVT s/p IVC filter, HLD, HTN, SSS s/p implanted pacemaker, PUD, GERD, DMT2, peripheral edema, SBO(s), and chronic BLE weakness d/t spinal stenosis     Continue home medications as listed above starting lisinopril and torsemide tomorrow  Cardiac/diabetic diet  Hypoglycemia protocol  Sliding scale insulin     DVT prophylaxis     SCDs  On Eliquis      7/5:   Per neurosurgical team::  concern for symptomatic cervical and lumbar stenosis. MRI vsCT myelogram of the entire spine.   --> pacer compatible per EP  --> MRI at [facility] will not complete  --> imaging per neurosurgical team, increase Lantus; noted with uncontrolled blood sugars   Trend labs     Plan of car discussed with Attending Dr. Emmanuel Ludwig, APRN-CNP           [1] apixaban, 5 mg, oral, BID  cyanocobalamin, 1,000 mcg, oral, Daily  dexAMETHasone, 4 mg, intravenous, q8h  dilTIAZem CD, 240 mg, oral, BID  insulin glargine, 20 Units, subcutaneous, Nightly  insulin lispro, 0-5 Units, subcutaneous, Before meals & nightly  lisinopril, 10 mg, oral, Daily  metoprolol tartrate, 25 mg, oral, BID  pantoprazole, 40 mg, oral, BID AC  polyethylene glycol, 17 g, oral, Daily  potassium chloride CR, 10 mEq, oral, Daily  rosuvastatin, 5 mg, oral, Daily  tamsulosin, 0.8 mg, oral, Daily  torsemide, 40 mg, oral, Daily  [2]     [3] PRN medications: acetaminophen **OR** acetaminophen **OR** acetaminophen, dextrose, dextrose, glucagon, glucagon, lidocaine, oxyCODONE

## 2025-07-05 NOTE — PROGRESS NOTES
Occupational Therapy                 Therapy Communication Note    Patient Name: Dragan Calvin  MRN: [id number]  Department: [facility] 7  Room: 70/702-A  Today's Date: 7/5/2025     Discipline: Occupational Therapy    Missed Visit:       Missed Visit Reason:  Awaiting further testing. Will continue to monitor for pt appropriateness for OT intervention     Missed Time: Cancel    Comment:

## 2025-07-06 ENCOUNTER — HOSPITAL ENCOUNTER (OUTPATIENT)
Facility: HOSPITAL | Age: 85
Setting detail: OBSERVATION
End: 2025-07-06
Attending: STUDENT IN AN ORGANIZED HEALTH CARE EDUCATION/TRAINING PROGRAM | Admitting: STUDENT IN AN ORGANIZED HEALTH CARE EDUCATION/TRAINING PROGRAM
Payer: MEDICARE

## 2025-07-06 VITALS
RESPIRATION RATE: 18 BRPM | HEIGHT: 70 IN | WEIGHT: 215 LBS | TEMPERATURE: 97.7 F | DIASTOLIC BLOOD PRESSURE: 58 MMHG | HEART RATE: 91 BPM | OXYGEN SATURATION: 93 % | SYSTOLIC BLOOD PRESSURE: 102 MMHG | BODY MASS INDEX: 30.78 KG/M2

## 2025-07-06 VITALS
TEMPERATURE: 97.2 F | HEART RATE: 71 BPM | BODY MASS INDEX: 30.49 KG/M2 | WEIGHT: 213 LBS | OXYGEN SATURATION: 94 % | HEIGHT: 70 IN | DIASTOLIC BLOOD PRESSURE: 69 MMHG | SYSTOLIC BLOOD PRESSURE: 117 MMHG | RESPIRATION RATE: 17 BRPM

## 2025-07-06 DIAGNOSIS — I49.5 SICK SINUS SYNDROME (MULTI): ICD-10-CM

## 2025-07-06 DIAGNOSIS — I50.32 CHRONIC HEART FAILURE WITH PRESERVED EJECTION FRACTION (HFPEF, >= 50%): ICD-10-CM

## 2025-07-06 DIAGNOSIS — Z79.4 TYPE 2 DIABETES MELLITUS WITH HYPEROSMOLARITY WITHOUT COMA, WITH LONG-TERM CURRENT USE OF INSULIN (MULTI): Chronic | ICD-10-CM

## 2025-07-06 DIAGNOSIS — R60.0 LEG EDEMA: Chronic | ICD-10-CM

## 2025-07-06 DIAGNOSIS — Z95.0 PRESENCE OF CARDIAC PACEMAKER: ICD-10-CM

## 2025-07-06 DIAGNOSIS — M48.061 SPINAL STENOSIS OF LUMBAR REGION WITHOUT NEUROGENIC CLAUDICATION: ICD-10-CM

## 2025-07-06 DIAGNOSIS — E11.00 TYPE 2 DIABETES MELLITUS WITH HYPEROSMOLARITY WITHOUT COMA, WITH LONG-TERM CURRENT USE OF INSULIN (MULTI): Chronic | ICD-10-CM
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
## 2025-07-06 DIAGNOSIS — R29.898 LEG WEAKNESS, BILATERAL: Primary | ICD-10-CM

## 2025-07-06 DIAGNOSIS — Z45.010 PACEMAKER AT END OF BATTERY LIFE: ICD-10-CM

## 2025-07-06 DIAGNOSIS — N18.31 STAGE 3A CHRONIC KIDNEY DISEASE (MULTI): Chronic | ICD-10-CM

## 2025-07-06 DIAGNOSIS — R53.1 GENERALIZED WEAKNESS: ICD-10-CM

## 2025-07-06 PROBLEM — M48.02 CERVICAL STENOSIS OF SPINE: Status: ACTIVE | Noted: 2023-11-22

## 2025-07-06 LAB
ALBUMIN SERPL BCP-MCNC: 4 G/DL (ref 3.4–5)
ALP SERPL-CCNC: 78 U/L (ref 33–136)
ALT SERPL W P-5'-P-CCNC: 24 U/L (ref 10–52)
ANION GAP SERPL CALC-SCNC: 15 MMOL/L (ref 10–20)
AST SERPL W P-5'-P-CCNC: 37 U/L (ref 9–39)
BILIRUB SERPL-MCNC: 0.8 MG/DL (ref 0–1.2)
BUN SERPL-MCNC: 49 MG/DL (ref 6–23)
CALCIUM SERPL-MCNC: 9 MG/DL (ref 8.6–10.6)
CHLORIDE SERPL-SCNC: 101 MMOL/L (ref 98–107)
CO2 SERPL-SCNC: 28 MMOL/L (ref 21–32)
CREAT SERPL-MCNC: 1.24 MG/DL (ref 0.5–1.3)
EGFRCR SERPLBLD CKD-EPI 2021: 57 ML/MIN/1.73M*2
EST. AVERAGE GLUCOSE BLD GHB EST-MCNC: 180 MG/DL
GLUCOSE BLD MANUAL STRIP-MCNC: 208 MG/DL (ref 74–99)
GLUCOSE BLD MANUAL STRIP-MCNC: 211 MG/DL (ref 74–99)
GLUCOSE BLD MANUAL STRIP-MCNC: 285 MG/DL (ref 74–99)
GLUCOSE SERPL-MCNC: 224 MG/DL (ref 74–99)
HBA1C MFR BLD: 7.9 % (ref ?–5.7)
HOLD SPECIMEN: NORMAL
HOLD SPECIMEN: NORMAL
POTASSIUM SERPL-SCNC: 3.6 MMOL/L (ref 3.5–5.3)
PROT SERPL-MCNC: 6.3 G/DL (ref 6.4–8.2)
SODIUM SERPL-SCNC: 140 MMOL/L (ref 136–145)

## 2025-07-06 PROCEDURE — 2500000001 HC RX 250 WO HCPCS SELF ADMINISTERED DRUGS (ALT 637 FOR MEDICARE OP): Performed by: NURSE PRACTITIONER

## 2025-07-06 PROCEDURE — 99223 1ST HOSP IP/OBS HIGH 75: CPT | Performed by: NURSE PRACTITIONER

## 2025-07-06 PROCEDURE — 82947 ASSAY GLUCOSE BLOOD QUANT: CPT

## 2025-07-06 PROCEDURE — G0378 HOSPITAL OBSERVATION PER HR: HCPCS

## 2025-07-06 PROCEDURE — 36415 COLL VENOUS BLD VENIPUNCTURE: CPT | Performed by: NURSE PRACTITIONER

## 2025-07-06 PROCEDURE — 2500000004 HC RX 250 GENERAL PHARMACY W/ HCPCS (ALT 636 FOR OP/ED): Performed by: NURSE PRACTITIONER

## 2025-07-06 PROCEDURE — 99238 HOSP IP/OBS DSCHRG MGMT 30/<: CPT | Performed by: STUDENT IN AN ORGANIZED HEALTH CARE EDUCATION/TRAINING PROGRAM

## 2025-07-06 PROCEDURE — 2500000001 HC RX 250 WO HCPCS SELF ADMINISTERED DRUGS (ALT 637 FOR MEDICARE OP): Performed by: INTERNAL MEDICINE

## 2025-07-06 PROCEDURE — 2500000002 HC RX 250 W HCPCS SELF ADMINISTERED DRUGS (ALT 637 FOR MEDICARE OP, ALT 636 FOR OP/ED): Performed by: NURSE PRACTITIONER

## 2025-07-06 PROCEDURE — 2500000004 HC RX 250 GENERAL PHARMACY W/ HCPCS (ALT 636 FOR OP/ED): Performed by: INTERNAL MEDICINE

## 2025-07-06 PROCEDURE — 83036 HEMOGLOBIN GLYCOSYLATED A1C: CPT | Mod: PARLAB | Performed by: NURSE PRACTITIONER

## 2025-07-06 PROCEDURE — 80053 COMPREHEN METABOLIC PANEL: CPT | Performed by: NURSE PRACTITIONER

## 2025-07-06 RX ORDER — ONDANSETRON HYDROCHLORIDE 2 MG/ML
4 INJECTION, SOLUTION INTRAVENOUS EVERY 8 HOURS PRN
Status: ACTIVE | OUTPATIENT
Start: 2025-07-06

## 2025-07-06 RX ORDER — INSULIN LISPRO 100 [IU]/ML
0-5 INJECTION, SOLUTION INTRAVENOUS; SUBCUTANEOUS
Status: DISPENSED | OUTPATIENT
Start: 2025-07-06

## 2025-07-06 RX ORDER — METOLAZONE 2.5 MG/1
2.5 TABLET ORAL
Status: DISCONTINUED | OUTPATIENT
Start: 2025-07-06 | End: 2025-07-06 | Stop reason: HOSPADM

## 2025-07-06 RX ORDER — ACETAMINOPHEN 650 MG/1
650 SUPPOSITORY RECTAL EVERY 4 HOURS PRN
Status: DISCONTINUED | OUTPATIENT
Start: 2025-07-06 | End: 2025-07-06

## 2025-07-06 RX ORDER — BISACODYL 10 MG/1
10 SUPPOSITORY RECTAL DAILY PRN
Status: ACTIVE | OUTPATIENT
Start: 2025-07-06

## 2025-07-06 RX ORDER — METOPROLOL TARTRATE 25 MG/1
25 TABLET, FILM COATED ORAL 2 TIMES DAILY
Status: DISPENSED | OUTPATIENT
Start: 2025-07-06

## 2025-07-06 RX ORDER — PROCHLORPERAZINE EDISYLATE 5 MG/ML
10 INJECTION INTRAMUSCULAR; INTRAVENOUS EVERY 6 HOURS PRN
Status: ACTIVE | OUTPATIENT
Start: 2025-07-06

## 2025-07-06 RX ORDER — BISACODYL 5 MG
10 TABLET, DELAYED RELEASE (ENTERIC COATED) ORAL DAILY PRN
Status: ACTIVE | OUTPATIENT
Start: 2025-07-06

## 2025-07-06 RX ORDER — POLYETHYLENE GLYCOL 3350 17 G/17G
17 POWDER, FOR SOLUTION ORAL DAILY PRN
Status: DISCONTINUED | OUTPATIENT
Start: 2025-07-06 | End: 2025-07-06 | Stop reason: SDUPTHER

## 2025-07-06 RX ORDER — ROSUVASTATIN CALCIUM 5 MG/1
5 TABLET, COATED ORAL DAILY
Status: DISPENSED | OUTPATIENT
Start: 2025-07-06

## 2025-07-06 RX ORDER — METOLAZONE 2.5 MG/1
2.5 TABLET ORAL CONTINUOUS
Status: DISCONTINUED | OUTPATIENT
Start: 2025-07-06 | End: 2025-07-06

## 2025-07-06 RX ORDER — FAMOTIDINE 20 MG/1
20 TABLET, FILM COATED ORAL 2 TIMES DAILY
Status: DISCONTINUED | OUTPATIENT
Start: 2025-07-06 | End: 2025-07-06

## 2025-07-06 RX ORDER — POLYETHYLENE GLYCOL 3350 17 G/17G
17 POWDER, FOR SOLUTION ORAL DAILY
Status: ACTIVE | OUTPATIENT
Start: 2025-07-07

## 2025-07-06 RX ORDER — PROCHLORPERAZINE MALEATE 10 MG
10 TABLET ORAL EVERY 6 HOURS PRN
Status: DISPENSED | OUTPATIENT
Start: 2025-07-06

## 2025-07-06 RX ORDER — GLIPIZIDE 2.5 MG/1
2.5 TABLET, EXTENDED RELEASE ORAL
Status: DISCONTINUED | OUTPATIENT
Start: 2025-07-06 | End: 2025-07-06 | Stop reason: HOSPADM

## 2025-07-06 RX ORDER — LIDOCAINE 560 MG/1
1 PATCH PERCUTANEOUS; TOPICAL; TRANSDERMAL DAILY
Status: ACTIVE | OUTPATIENT
Start: 2025-07-06

## 2025-07-06 RX ORDER — DILTIAZEM HYDROCHLORIDE 240 MG/1
240 CAPSULE, COATED, EXTENDED RELEASE ORAL 2 TIMES DAILY
Status: DISPENSED | OUTPATIENT
Start: 2025-07-06

## 2025-07-06 RX ORDER — TAMSULOSIN HYDROCHLORIDE 0.4 MG/1
0.8 CAPSULE ORAL DAILY
Status: ACTIVE | OUTPATIENT
Start: 2025-07-07

## 2025-07-06 RX ORDER — ACETAMINOPHEN 650 MG/1
650 SUPPOSITORY RECTAL EVERY 4 HOURS PRN
Status: DISPENSED | OUTPATIENT
Start: 2025-07-06

## 2025-07-06 RX ORDER — POTASSIUM CHLORIDE 750 MG/1
10 TABLET, FILM COATED, EXTENDED RELEASE ORAL DAILY
Status: DISPENSED | OUTPATIENT
Start: 2025-07-06

## 2025-07-06 RX ORDER — ACETAMINOPHEN 325 MG/1
650 TABLET ORAL EVERY 4 HOURS PRN
Status: DISPENSED | OUTPATIENT
Start: 2025-07-06

## 2025-07-06 RX ORDER — PROCHLORPERAZINE 25 MG/1
25 SUPPOSITORY RECTAL EVERY 12 HOURS PRN
Status: ACTIVE | OUTPATIENT
Start: 2025-07-06

## 2025-07-06 RX ORDER — LANOLIN ALCOHOL/MO/W.PET/CERES
1000 CREAM (GRAM) TOPICAL DAILY
Status: ACTIVE | OUTPATIENT
Start: 2025-07-07

## 2025-07-06 RX ORDER — INSULIN GLARGINE 100 [IU]/ML
30 INJECTION, SOLUTION SUBCUTANEOUS NIGHTLY
Status: DISPENSED | OUTPATIENT
Start: 2025-07-06

## 2025-07-06 RX ORDER — METOLAZONE 2.5 MG/1
2.5 TABLET ORAL
Status: ACTIVE | OUTPATIENT
Start: 2025-07-09

## 2025-07-06 RX ORDER — DEXTROSE 50 % IN WATER (D50W) INTRAVENOUS SYRINGE
12.5
Status: ACTIVE | OUTPATIENT
Start: 2025-07-06

## 2025-07-06 RX ORDER — DEXTROSE 50 % IN WATER (D50W) INTRAVENOUS SYRINGE
25
Status: ACTIVE | OUTPATIENT
Start: 2025-07-06

## 2025-07-06 RX ORDER — TORSEMIDE 20 MG/1
40 TABLET ORAL DAILY
Status: DISPENSED | OUTPATIENT
Start: 2025-07-06

## 2025-07-06 RX ORDER — METOLAZONE 2.5 MG/1
2.5 TABLET ORAL DAILY
Status: DISCONTINUED | OUTPATIENT
Start: 2025-07-07 | End: 2025-07-06

## 2025-07-06 RX ORDER — ONDANSETRON 4 MG/1
4 TABLET, FILM COATED ORAL EVERY 8 HOURS PRN
Status: ACTIVE | OUTPATIENT
Start: 2025-07-06

## 2025-07-06 RX ORDER — ACETAMINOPHEN 160 MG/5ML
650 SOLUTION ORAL EVERY 4 HOURS PRN
Status: DISCONTINUED | OUTPATIENT
Start: 2025-07-06 | End: 2025-07-06

## 2025-07-06 RX ORDER — ACETAMINOPHEN 160 MG/5ML
650 SOLUTION ORAL EVERY 4 HOURS PRN
Status: ACTIVE | OUTPATIENT
Start: 2025-07-06

## 2025-07-06 RX ORDER — GLIPIZIDE 2.5 MG/1
2.5 TABLET, EXTENDED RELEASE ORAL
Status: ON HOLD
Start: 2025-07-06

## 2025-07-06 RX ORDER — OXYCODONE HYDROCHLORIDE 5 MG/1
5 TABLET ORAL EVERY 6 HOURS PRN
Refills: 0 | Status: ACTIVE | OUTPATIENT
Start: 2025-07-06

## 2025-07-06 RX ORDER — PANTOPRAZOLE SODIUM 40 MG/1
40 TABLET, DELAYED RELEASE ORAL
Status: DISPENSED | OUTPATIENT
Start: 2025-07-06

## 2025-07-06 RX ORDER — ACETAMINOPHEN 325 MG/1
650 TABLET ORAL EVERY 4 HOURS PRN
Status: DISCONTINUED | OUTPATIENT
Start: 2025-07-06 | End: 2025-07-06

## 2025-07-06 RX ORDER — METOLAZONE 2.5 MG/1
2.5 TABLET ORAL CONTINUOUS
Status: ON HOLD
Start: 2025-07-06

## 2025-07-06 RX ORDER — FAMOTIDINE 10 MG/ML
20 INJECTION, SOLUTION INTRAVENOUS 2 TIMES DAILY
Status: DISCONTINUED | OUTPATIENT
Start: 2025-07-06 | End: 2025-07-06

## 2025-07-06 RX ORDER — GLIPIZIDE 2.5 MG/1
2.5 TABLET, EXTENDED RELEASE ORAL
Status: DISPENSED | OUTPATIENT
Start: 2025-07-07

## 2025-07-06 RX ORDER — LISINOPRIL 10 MG/1
10 TABLET ORAL DAILY
Status: DISPENSED | OUTPATIENT
Start: 2025-07-06

## 2025-07-06 RX ADMIN — POTASSIUM CHLORIDE 10 MEQ: 750 TABLET, FILM COATED, EXTENDED RELEASE ORAL at 15:29

## 2025-07-06 RX ADMIN — DEXAMETHASONE SODIUM PHOSPHATE 4 MG: 4 INJECTION, SOLUTION INTRA-ARTICULAR; INTRALESIONAL; INTRAMUSCULAR; INTRAVENOUS; SOFT TISSUE at 15:16

## 2025-07-06 RX ADMIN — METOPROLOL TARTRATE 25 MG: 25 TABLET, FILM COATED ORAL at 13:11

## 2025-07-06 RX ADMIN — PANTOPRAZOLE SODIUM 40 MG: 40 TABLET, DELAYED RELEASE ORAL at 15:18

## 2025-07-06 RX ADMIN — DILTIAZEM HYDROCHLORIDE 240 MG: 240 CAPSULE, EXTENDED RELEASE ORAL at 20:39

## 2025-07-06 RX ADMIN — PANTOPRAZOLE SODIUM 40 MG: 40 TABLET, DELAYED RELEASE ORAL at 06:35

## 2025-07-06 RX ADMIN — INSULIN LISPRO 3 UNITS: 100 INJECTION, SOLUTION INTRAVENOUS; SUBCUTANEOUS at 20:38

## 2025-07-06 RX ADMIN — ROSUVASTATIN 5 MG: 5 TABLET, FILM COATED ORAL at 20:40

## 2025-07-06 RX ADMIN — APIXABAN 5 MG: 5 TABLET, FILM COATED ORAL at 20:39

## 2025-07-06 RX ADMIN — DEXAMETHASONE SODIUM PHOSPHATE 4 MG: 4 INJECTION, SOLUTION INTRA-ARTICULAR; INTRALESIONAL; INTRAMUSCULAR; INTRAVENOUS; SOFT TISSUE at 23:29

## 2025-07-06 RX ADMIN — METOPROLOL TARTRATE 25 MG: 25 TABLET, FILM COATED ORAL at 20:39

## 2025-07-06 RX ADMIN — INSULIN LISPRO 2 UNITS: 100 INJECTION, SOLUTION INTRAVENOUS; SUBCUTANEOUS at 15:17

## 2025-07-06 RX ADMIN — INSULIN LISPRO 2 UNITS: 100 INJECTION, SOLUTION INTRAVENOUS; SUBCUTANEOUS at 13:11

## 2025-07-06 RX ADMIN — LISINOPRIL 10 MG: 10 TABLET ORAL at 13:11

## 2025-07-06 RX ADMIN — APIXABAN 5 MG: 5 TABLET, FILM COATED ORAL at 13:11

## 2025-07-06 RX ADMIN — INSULIN GLARGINE 30 UNITS: 100 INJECTION, SOLUTION SUBCUTANEOUS at 20:39

## 2025-07-06 RX ADMIN — ACETAMINOPHEN 650 MG: 325 TABLET ORAL at 13:11

## 2025-07-06 RX ADMIN — DEXAMETHASONE SODIUM PHOSPHATE 4 MG: 4 INJECTION, SOLUTION INTRAMUSCULAR; INTRAVENOUS at 00:19

## 2025-07-06 RX ADMIN — TORSEMIDE 40 MG: 20 TABLET ORAL at 15:16

## 2025-07-06 ASSESSMENT — ENCOUNTER SYMPTOMS
TROUBLE SWALLOWING: 0
FATIGUE: 1
PSYCHIATRIC NEGATIVE: 1
EYES NEGATIVE: 1
HEADACHES: 0
BLOOD IN STOOL: 0
WHEEZING: 0
APPETITE CHANGE: 0
VOICE CHANGE: 0
CONSTIPATION: 0
DIARRHEA: 0
VOMITING: 0
SORE THROAT: 0
DIFFICULTY URINATING: 0
DIZZINESS: 0
NAUSEA: 0
ABDOMINAL DISTENTION: 0
SPEECH DIFFICULTY: 0
ABDOMINAL PAIN: 0
COUGH: 0
UNEXPECTED WEIGHT CHANGE: 0
ACTIVITY CHANGE: 1
WEAKNESS: 1
TREMORS: 0
SHORTNESS OF BREATH: 0

## 2025-07-06 ASSESSMENT — COGNITIVE AND FUNCTIONAL STATUS - GENERAL
TURNING FROM BACK TO SIDE WHILE IN FLAT BAD: A LITTLE
HELP NEEDED FOR BATHING: A LOT
STANDING UP FROM CHAIR USING ARMS: A LITTLE
CLIMB 3 TO 5 STEPS WITH RAILING: A LOT
WALKING IN HOSPITAL ROOM: A LOT
TOILETING: A LITTLE
MOBILITY SCORE: 16
MOVING FROM LYING ON BACK TO SITTING ON SIDE OF FLAT BED WITH BEDRAILS: A LITTLE
MOVING TO AND FROM BED TO CHAIR: A LITTLE
DAILY ACTIVITIY SCORE: 17
DRESSING REGULAR UPPER BODY CLOTHING: A LOT
DRESSING REGULAR LOWER BODY CLOTHING: A LITTLE
PERSONAL GROOMING: A LITTLE

## 2025-07-06 ASSESSMENT — PAIN SCALES - GENERAL
PAINLEVEL_OUTOF10: 0 - NO PAIN
PAINLEVEL_OUTOF10: 0 - NO PAIN

## 2025-07-06 ASSESSMENT — PAIN - FUNCTIONAL ASSESSMENT
PAIN_FUNCTIONAL_ASSESSMENT: 0-10
PAIN_FUNCTIONAL_ASSESSMENT: 0-10

## 2025-07-06 NOTE — HOSPITAL COURSE
84 y.o. male with PMH significant for CAD, permanent Afib (on eliquis), DVT s/p IVC filter, HLD, HTN, SSS s/p implanted pacemaker, PUD, GERD, DMT2, peripheral edema, SBO(s), and chronic BLE weakness d/t spinal stenosis who presented to [facility] ED from home with complaints of weakness . Seen by NSG at [facility] who rec MRI. MRI department allegedly refused to do MRI 2/2 PPM (despite alleged EP hilda)

## 2025-07-06 NOTE — ASSESSMENT & PLAN NOTE
Due to weakness and multiple falls in the past in addition to history of stenosis on lumbar and cervical spine.   CT cervical shows no evidence of an acute fracture or subluxation.  Degenerative changes.   order MRI for lumbar and cervical  Will consider neurosurgeon if needed.    Patient has an appointment with the neurosurgeon next week at [facility]

## 2025-07-06 NOTE — ASSESSMENT & PLAN NOTE
Due to weakness and multiple falls in the past in addition to history of stenosis on lumbar and cervical spine.   CT cervical shows no evidence of an acute fracture or subluxation.  Degenerative changes.   order MRI for lumbar and cervical  Will consider neurosurgeon if needed.    Patient has an appointment with the neurosurgeon next week at [facility]

## 2025-07-06 NOTE — DISCHARGE SUMMARY
Dragan Calvin is a 84 y.o. male on day 2 of admission presenting with Generalized weakness.      Subjective   Pending transfer to [facility] for further workup per neurosurgical team        Objective     Last Recorded Vitals  /57   Pulse 75   Temp 35.8 °C (96.4 °F)   Resp 18   Wt 96.6 kg (213 lb)   SpO2 95%   Intake/Output last 3 Shifts:    Intake/Output Summary (Last 24 hours) at 7/6/2025 0619  Last data filed at 7/6/2025 0000  Gross per 24 hour   Intake --   Output 1075 ml   Net -1075 ml       Admission Weight  Weight: 96.6 kg (213 lb) (07/03/25 0829)    Daily Weight  07/03/25 : 96.6 kg (213 lb)    Image Results  CT cervical spine wo IV contrast  Narrative: Interpreted By:  Hunter Dodd,   STUDY:  CT CERVICAL SPINE WO IV CONTRAST;  7/3/2025 9:48 am      INDICATION:  Signs/Symptoms:Fall over 65.          COMPARISON:  None.      ACCESSION NUMBER(S):  [id number]      ORDERING CLINICIAN:  WILLA ROMAN      TECHNIQUE:  Unenhanced axial images were obtained through the cervical spine. The  axial data was utilized to reconstruct images in sagittal and coronal  planes.      FINDINGS:  No acute fracture is identified. Degenerative changes include disc  space narrowing, endplate spurring, endplate sclerosis, uncovertebral  spurring, posterior disc osteophyte complexes, and facet hypertrophy.  Minimal subluxation at a few levels most likely relates to facet  degenerative disease. Alignment is unchanged when compared to the  previous study. Facet joints demonstrate a normal alignment.  Prevertebral soft tissues are within normal limits. No lytic or  blastic lesion is noted.      Impression: No evidence of an acute fracture or subluxation.  Degenerative  changes.      MACRO:  None.      Signed by: Hunter Dodd 7/3/2025 10:02 AM  Dictation workstation:   [id number]  CT head wo IV contrast  Narrative: Interpreted By:  Hunter Dodd,   STUDY:  CT HEAD WO IV CONTRAST;  7/3/2025 9:48 am      INDICATION:  Signs/Symptoms:Fall  over 65.          COMPARISON:  10/30/2024      ACCESSION NUMBER(S):  [id number]      ORDERING CLINICIAN:  WILLA ROMAN      TECHNIQUE:  Unenhanced images were obtained through the brain.      FINDINGS:  There is atrophy resulting in prominence of the ventricles and sulci.  There are areas of decreased attenuation within the white matter  which are nonspecific but are commonly associated with small vessel  ischemic disease. There is no mass effect or midline shift. No acute  intracranial hemorrhage is identified. No extra-axial fluid  collections are seen. No intraparenchymal mass lesions are  identified.  Bone windows demonstrate no evidence of an acute  calvarial fracture.      Impression: No evidence of an acute intracranial process.      MACRO:  None.      Signed by: Hunter Dodd 7/3/2025 10:01 AM  Dictation workstation:   [id number]  XR chest 1 view  Narrative: STUDY:  Chest Radiograph;  7/3/2025 9:27 AM  INDICATION:  Weakness.  COMPARISON:  5/10/2025 XR Chest  ACCESSION NUMBER(S):  [id number]  ORDERING CLINICIAN:  WILLA ROMAN  TECHNIQUE:  Frontal chest was obtained at 0914 hours.  FINDINGS:  CARDIOMEDIASTINAL SILHOUETTE:  Cardiomediastinal silhouette is normal in size and configuration.  Dual-lead left chest pacemaker.     LUNGS:  No focal regions of airspace consolidation. No pneumothorax or  significant pleural effusion.     ABDOMEN:  No remarkable upper abdominal findings.     BONES:  No acute osseous changes.  Impression: No focal regions of airspace consolidation.  Stable positioning left chest pacemaker.  Signed by Jaxson Hicks MD      Physical Exam  Vitals and nursing note reviewed.   Constitutional:       Appearance: Normal appearance. He is normal weight.   HENT:      Head: Normocephalic.      Nose: Nose normal.      Mouth/Throat:      Mouth: Mucous membranes are moist.   Eyes:      Extraocular Movements: Extraocular movements intact.      Conjunctiva/sclera: Conjunctivae normal.      Pupils:  Pupils are equal, round, and reactive to light.   Cardiovascular:      Rate and Rhythm: Normal rate and regular rhythm.      Pulses: Normal pulses.      Heart sounds: Normal heart sounds.   Pulmonary:      Effort: Pulmonary effort is normal.   Abdominal:      General: Abdomen is flat. Bowel sounds are normal.      Palpations: Abdomen is soft.   Musculoskeletal:         General: Tenderness present. Normal range of motion.      Cervical back: Normal range of motion.   Skin:     General: Skin is warm and dry.      Capillary Refill: Capillary refill takes 2 to 3 seconds.   Neurological:      General: No focal deficit present.      Mental Status: He is alert and oriented to person, place, and time.      Motor: Weakness present.   Psychiatric:         Mood and Affect: Mood normal.         Behavior: Behavior normal.         Relevant Results     Results for orders placed or performed during the hospital encounter of 07/03/25 (from the past 96 hours)   Urinalysis with Reflex Microscopic   Result Value Ref Range    Color, Urine Light-Yellow Light-Yellow, Yellow, Dark-Yellow    Appearance, Urine Clear Clear    Specific Gravity, Urine 1.011 1.005 - 1.035    pH, Urine 6.0 5.0, 5.5, 6.0, 6.5, 7.0, 7.5, 8.0    Protein, Urine NEGATIVE NEGATIVE, 10 (TRACE), 20 (TRACE) mg/dL    Glucose, Urine Normal Normal mg/dL    Blood, Urine NEGATIVE NEGATIVE mg/dL    Ketones, Urine NEGATIVE NEGATIVE mg/dL    Bilirubin, Urine NEGATIVE NEGATIVE mg/dL    Urobilinogen, Urine Normal Normal mg/dL    Nitrite, Urine NEGATIVE NEGATIVE    Leukocyte Esterase, Urine NEGATIVE NEGATIVE   B-Type Natriuretic Peptide   Result Value Ref Range     (H) 0 - 99 pg/mL   Troponin I, High Sensitivity   Result Value Ref Range    Troponin I, High Sensitivity 17 0 - 20 ng/L   Comprehensive Metabolic Panel   Result Value Ref Range    Glucose 309 (H) 74 - 99 mg/dL    Sodium 136 136 - 145 mmol/L    Potassium 3.1 (L) 3.5 - 5.3 mmol/L    Chloride 100 98 - 107 mmol/L     Bicarbonate 23 21 - 32 mmol/L    Anion Gap 16 10 - 20 mmol/L    Urea Nitrogen 42 (H) 6 - 23 mg/dL    Creatinine 1.55 (H) 0.50 - 1.30 mg/dL    eGFR 44 (L) >60 mL/min/1.73m*2    Calcium 8.8 8.6 - 10.3 mg/dL    Albumin 3.9 3.4 - 5.0 g/dL    Alkaline Phosphatase 90 33 - 136 U/L    Total Protein 6.9 6.4 - 8.2 g/dL    AST 19 9 - 39 U/L    Bilirubin, Total 1.1 0.0 - 1.2 mg/dL    ALT 15 10 - 52 U/L   Magnesium   Result Value Ref Range    Magnesium 1.92 1.60 - 2.40 mg/dL   CBC and Auto Differential   Result Value Ref Range    WBC 10.4 4.4 - 11.3 x10*3/uL    nRBC 0.0 0.0 - 0.0 /100 WBCs    RBC 3.46 (L) 4.50 - 5.90 x10*6/uL    Hemoglobin 11.6 (L) 13.5 - 17.5 g/dL    Hematocrit 34.0 (L) 41.0 - 52.0 %    MCV 98 80 - 100 fL    MCH 33.5 26.0 - 34.0 pg    MCHC 34.1 32.0 - 36.0 g/dL    RDW 13.2 11.5 - 14.5 %    Platelets 181 150 - 450 x10*3/uL    Neutrophils % 88.1 40.0 - 80.0 %    Immature Granulocytes %, Automated 0.6 0.0 - 0.9 %    Lymphocytes % 4.9 13.0 - 44.0 %    Monocytes % 5.5 2.0 - 10.0 %    Eosinophils % 0.4 0.0 - 6.0 %    Basophils % 0.5 0.0 - 2.0 %    Neutrophils Absolute 9.21 (H) 1.60 - 5.50 x10*3/uL    Immature Granulocytes Absolute, Automated 0.06 0.00 - 0.50 x10*3/uL    Lymphocytes Absolute 0.51 (L) 0.80 - 3.00 x10*3/uL    Monocytes Absolute 0.57 0.05 - 0.80 x10*3/uL    Eosinophils Absolute 0.04 0.00 - 0.40 x10*3/uL    Basophils Absolute 0.05 0.00 - 0.10 x10*3/uL   Sars-CoV-2 PCR   Result Value Ref Range    Coronavirus 2019, PCR Not Detected Not Detected   POCT GLUCOSE   Result Value Ref Range    POCT Glucose 163 (H) 74 - 99 mg/dL   POCT GLUCOSE   Result Value Ref Range    POCT Glucose 210 (H) 74 - 99 mg/dL   POCT GLUCOSE   Result Value Ref Range    POCT Glucose 218 (H) 74 - 99 mg/dL   CBC   Result Value Ref Range    WBC 11.1 4.4 - 11.3 x10*3/uL    nRBC 0.0 0.0 - 0.0 /100 WBCs    RBC 3.36 (L) 4.50 - 5.90 x10*6/uL    Hemoglobin 11.2 (L) 13.5 - 17.5 g/dL    Hematocrit 33.1 (L) 41.0 - 52.0 %    MCV 99 80 - 100 fL    MCH  33.3 26.0 - 34.0 pg    MCHC 33.8 32.0 - 36.0 g/dL    RDW 13.5 11.5 - 14.5 %    Platelets 169 150 - 450 x10*3/uL   Basic metabolic panel   Result Value Ref Range    Glucose 148 (H) 74 - 99 mg/dL    Sodium 140 136 - 145 mmol/L    Potassium 3.0 (L) 3.5 - 5.3 mmol/L    Chloride 102 98 - 107 mmol/L    Bicarbonate 29 21 - 32 mmol/L    Anion Gap 12 10 - 20 mmol/L    Urea Nitrogen 30 (H) 6 - 23 mg/dL    Creatinine 1.20 0.50 - 1.30 mg/dL    eGFR 60 (L) >60 mL/min/1.73m*2    Calcium 8.8 8.6 - 10.3 mg/dL   POCT GLUCOSE   Result Value Ref Range    POCT Glucose 150 (H) 74 - 99 mg/dL   POCT GLUCOSE   Result Value Ref Range    POCT Glucose 235 (H) 74 - 99 mg/dL   SST TOP   Result Value Ref Range    Extra Tube Hold for add-ons.    CBC and Auto Differential   Result Value Ref Range    WBC 9.3 4.4 - 11.3 x10*3/uL    nRBC 0.0 0.0 - 0.0 /100 WBCs    RBC 3.39 (L) 4.50 - 5.90 x10*6/uL    Hemoglobin 11.2 (L) 13.5 - 17.5 g/dL    Hematocrit 33.6 (L) 41.0 - 52.0 %    MCV 99 80 - 100 fL    MCH 33.0 26.0 - 34.0 pg    MCHC 33.3 32.0 - 36.0 g/dL    RDW 13.3 11.5 - 14.5 %    Platelets 168 150 - 450 x10*3/uL    Neutrophils % 79.5 40.0 - 80.0 %    Immature Granulocytes %, Automated 0.3 0.0 - 0.9 %    Lymphocytes % 11.5 13.0 - 44.0 %    Monocytes % 7.6 2.0 - 10.0 %    Eosinophils % 0.8 0.0 - 6.0 %    Basophils % 0.3 0.0 - 2.0 %    Neutrophils Absolute 7.40 (H) 1.60 - 5.50 x10*3/uL    Immature Granulocytes Absolute, Automated 0.03 0.00 - 0.50 x10*3/uL    Lymphocytes Absolute 1.07 0.80 - 3.00 x10*3/uL    Monocytes Absolute 0.71 0.05 - 0.80 x10*3/uL    Eosinophils Absolute 0.07 0.00 - 0.40 x10*3/uL    Basophils Absolute 0.03 0.00 - 0.10 x10*3/uL   Comprehensive Metabolic Panel   Result Value Ref Range    Glucose 290 (H) 74 - 99 mg/dL    Sodium 138 136 - 145 mmol/L    Potassium 3.5 3.5 - 5.3 mmol/L    Chloride 103 98 - 107 mmol/L    Bicarbonate 27 21 - 32 mmol/L    Anion Gap 12 10 - 20 mmol/L    Urea Nitrogen 31 (H) 6 - 23 mg/dL    Creatinine 1.16 0.50  - 1.30 mg/dL    eGFR 62 >60 mL/min/1.73m*2    Calcium 8.7 8.6 - 10.3 mg/dL    Albumin 3.6 3.4 - 5.0 g/dL    Alkaline Phosphatase 82 33 - 136 U/L    Total Protein 6.2 (L) 6.4 - 8.2 g/dL    AST 23 9 - 39 U/L    Bilirubin, Total 0.9 0.0 - 1.2 mg/dL    ALT 16 10 - 52 U/L   POCT GLUCOSE   Result Value Ref Range    POCT Glucose 244 (H) 74 - 99 mg/dL   POCT GLUCOSE   Result Value Ref Range    POCT Glucose 356 (H) 74 - 99 mg/dL   Comprehensive Metabolic Panel   Result Value Ref Range    Glucose 248 (H) 74 - 99 mg/dL    Sodium 140 136 - 145 mmol/L    Potassium 4.0 3.5 - 5.3 mmol/L    Chloride 104 98 - 107 mmol/L    Bicarbonate 28 21 - 32 mmol/L    Anion Gap 12 10 - 20 mmol/L    Urea Nitrogen 33 (H) 6 - 23 mg/dL    Creatinine 1.12 0.50 - 1.30 mg/dL    eGFR 65 >60 mL/min/1.73m*2    Calcium 8.9 8.6 - 10.3 mg/dL    Albumin 3.8 3.4 - 5.0 g/dL    Alkaline Phosphatase 80 33 - 136 U/L    Total Protein 6.7 6.4 - 8.2 g/dL    AST 32 9 - 39 U/L    Bilirubin, Total 0.8 0.0 - 1.2 mg/dL    ALT 16 10 - 52 U/L   CBC and Auto Differential   Result Value Ref Range    WBC 10.7 4.4 - 11.3 x10*3/uL    nRBC 0.0 0.0 - 0.0 /100 WBCs    RBC 3.54 (L) 4.50 - 5.90 x10*6/uL    Hemoglobin 11.7 (L) 13.5 - 17.5 g/dL    Hematocrit 36.8 (L) 41.0 - 52.0 %     (H) 80 - 100 fL    MCH 33.1 26.0 - 34.0 pg    MCHC 31.8 (L) 32.0 - 36.0 g/dL    RDW 13.2 11.5 - 14.5 %    Platelets 166 150 - 450 x10*3/uL    Neutrophils % 91.5 40.0 - 80.0 %    Immature Granulocytes %, Automated 0.8 0.0 - 0.9 %    Lymphocytes % 6.1 13.0 - 44.0 %    Monocytes % 1.4 2.0 - 10.0 %    Eosinophils % 0.0 0.0 - 6.0 %    Basophils % 0.2 0.0 - 2.0 %    Neutrophils Absolute 9.76 (H) 1.60 - 5.50 x10*3/uL    Immature Granulocytes Absolute, Automated 0.08 0.00 - 0.50 x10*3/uL    Lymphocytes Absolute 0.65 (L) 0.80 - 3.00 x10*3/uL    Monocytes Absolute 0.15 0.05 - 0.80 x10*3/uL    Eosinophils Absolute 0.00 0.00 - 0.40 x10*3/uL    Basophils Absolute 0.02 0.00 - 0.10 x10*3/uL   POCT GLUCOSE    Result Value Ref Range    POCT Glucose 239 (H) 74 - 99 mg/dL   POCT GLUCOSE   Result Value Ref Range    POCT Glucose 294 (H) 74 - 99 mg/dL   POCT GLUCOSE   Result Value Ref Range    POCT Glucose 270 (H) 74 - 99 mg/dL   POCT GLUCOSE   Result Value Ref Range    POCT Glucose 352 (H) 74 - 99 mg/dL        Scheduled medications  Scheduled Medications[1]  Continuous medications  Continuous Medications[2]  PRN medications  PRN Medications[3]                Assessment & Plan  Generalized weakness    Spinal stenosis    Fall    Hypokalemia    Elevated serum creatinine    Admit to regular medical floor  Appreciate neurosurgery recommendations  Patient to check  Check COVID  Potassium replaced in ED  500 cc of IV fluid over 5 hours  A.m. CBC, BMP  PT/OT        Chronic conditions: CAD, permanent Afib (on eliquis), DVT s/p IVC filter, HLD, HTN, SSS s/p implanted pacemaker, PUD, GERD, DMT2, peripheral edema, SBO(s), and chronic BLE weakness d/t spinal stenosis     Continue home medications as listed above starting lisinopril and torsemide tomorrow  Cardiac/diabetic diet  Hypoglycemia protocol  Sliding scale insulin     DVT prophylaxis     SCDs  On Eliquis      7/5:   Per neurosurgical team::  concern for symptomatic cervical and lumbar stenosis. MRI vsCT myelogram of the entire spine.   --> pacer compatible per EP  --> MRI at [facility] will not complete  --> imaging per neurosurgical team, increase Lantus; noted with uncontrolled blood sugars   Trend labs     7/6  Transfer to [facility] for further workup per neurosurgical  team for MRI and further intervention as warranted     Plan of care discussed with Attending Dr. Benitez Ice    I spent over 30 min in professional care of this patient          Leslie Ludwig, APRN-CNP             [1] apixaban, 5 mg, oral, BID  cyanocobalamin, 1,000 mcg, oral, Daily  dexAMETHasone, 4 mg, intravenous, q8h  dilTIAZem CD, 240 mg, oral, BID  glipiZIDE XL, 2.5 mg, oral, Daily  insulin glargine, 30 Units,  subcutaneous, Nightly  insulin lispro, 0-5 Units, subcutaneous, Before meals & nightly  lisinopril, 10 mg, oral, Daily  metOLazone, 2.5 mg, oral, Once  metoprolol tartrate, 25 mg, oral, BID  pantoprazole, 40 mg, oral, BID AC  polyethylene glycol, 17 g, oral, Daily  potassium chloride CR, 10 mEq, oral, Daily  rosuvastatin, 5 mg, oral, Daily  tamsulosin, 0.8 mg, oral, Daily  torsemide, 40 mg, oral, Daily     [2]    [3] PRN medications: acetaminophen **OR** acetaminophen **OR** acetaminophen, dextrose, dextrose, glucagon, glucagon, lidocaine, oxyCODONE

## 2025-07-06 NOTE — SIGNIFICANT EVENT
Transfer Acceptance Note    Patient Name: Dragan Calvin    :  1940(84 y.o.)    MRN:  [id number]    TRANSFER INFORMATION     Date/Time of Conference: 25 at 11:45 PM   Sending Facility: [facility]  Sending Physician: Juan Verma MD    Receiving Facility: [facility] [place]  Accepting Physician: Richie Casillas MD   Accepting Service: Medicine     Reason for Transfer:  Procedure not available at facility.   Further context:  Lumbar stenosis with neurogenic claudication needs MRI for neurosurgery planning intervention has pacemaker     CLINICAL SUMMARY     Pertinent information:  Past Medical History: CAD, permanent Afib (on eliquis), DVT s/p IVC filter, HLD, HTN, SSS s/p implanted pacemaker, PUD, GERD, DMT2, peripheral edema   Chief Complaint/Presentation: Bilateral lower extremity weakness recurrent falls  Pertinent Objective Findings:  Labs:  Macrocytic anemia Hgb 11.7 , POCT glucose sugars in the 250s to 350s  Imaging:   Only CT head and C-spine performed at outside hospital grossly unremarkable  Clinical:    Awaiting imaging and further recommendations from neurosurgery no PT OT evaluation as yet  Current interventions/medications:  On IV dexamethasone 4 mg every 8 hours    Vital Signs (at time of conference):  T36.1 HR 93 RR 18 /57 SpO2 93% on RA       PLAN & DISPOSITION     I have spoken to the referring physician at sending facility via transfer center, and agree with transfer to our facility; patient is accepted for transfer.  Patient will be assigned to an admitting team under medicine upon bed assignment.  Transfer center and sending facility advised to re-contact accepting physician if any clinical deterioration occurs prior to transfer.   Transfer center advised to arrange for repeat provider to provider report if there has been significant delay between the time the patient was accepted and the time the bed was assigned, as condition may have changed  and reason for transfer must remain valid.    [x] I have updated [facility] Admissions Coordinators about transfer acceptance of this patient.        Electronically signed by Richie Casillas MD on 07/05/25 at 11:45 PM    [facility]

## 2025-07-06 NOTE — PROGRESS NOTES
07/06/25 0812   Discharge Planning   Expected Discharge Disposition [facility]   Does the patient need discharge transport arranged? Yes   [facility] coordination needed? Yes   Has discharge transport been arranged? No     Care transitions reviewed patient's chart, patient is currently waiting for transfer to [facility] at this time.  Care transitions to follow.

## 2025-07-06 NOTE — CARE PLAN
The patient's goals for the shift include      The clinical goals for the shift include comfort and safety      Problem: Skin  Goal: Decreased wound size/increased tissue granulation at next dressing change  7/5/2025 2257 by Enma Carter RN  Outcome: Progressing  7/5/2025 2256 by Enma Carter RN  Flowsheets (Taken 7/5/2025 2256)  Decreased wound size/increased tissue granulation at next dressing change: Promote sleep for wound healing  Goal: Participates in plan/prevention/treatment measures  7/5/2025 2257 by Enma Carter RN  Outcome: Progressing  7/5/2025 2256 by Enma Carter RN  Flowsheets (Taken 7/5/2025 2256)  Participates in plan/prevention/treatment measures: Elevate heels  Goal: Prevent/manage excess moisture  7/5/2025 2257 by Enma Carter RN  Outcome: Progressing  7/5/2025 2256 by Enma Carter RN  Flowsheets (Taken 7/5/2025 2256)  Prevent/manage excess moisture: Monitor for/manage infection if present  Goal: Prevent/minimize sheer/friction injuries  7/5/2025 2257 by Enma Carter RN  Outcome: Progressing  7/5/2025 2256 by Enma Carter RN  Flowsheets (Taken 7/5/2025 2256)  Prevent/minimize sheer/friction injuries:   Use pull sheet   Turn/reposition every 2 hours/use positioning/transfer devices  Goal: Promote/optimize nutrition  7/5/2025 2257 by Enma Carter RN  Outcome: Progressing  7/5/2025 2256 by Enma Carter RN  Flowsheets (Taken 7/5/2025 2256)  Promote/optimize nutrition: Offer water/supplements/favorite foods  Goal: Promote skin healing  7/5/2025 2257 by Enma Carter RN  Outcome: Progressing  7/5/2025 2256 by Enma Carter RN  Flowsheets (Taken 7/5/2025 2256)  Promote skin healing: Protective dressings over bony prominences     Problem: Pain - Adult  Goal: Verbalizes/displays adequate comfort level or baseline comfort level  Outcome: Progressing     Problem: Safety - Adult  Goal: Free from fall injury  Outcome: Progressing     Problem: Discharge Planning  Goal:  Discharge to home or other facility with appropriate resources  Outcome: Progressing     Problem: Chronic Conditions and Co-morbidities  Goal: Patient's chronic conditions and co-morbidity symptoms are monitored and maintained or improved  Outcome: Progressing     Problem: Nutrition  Goal: Nutrient intake appropriate for maintaining nutritional needs  Outcome: Progressing

## 2025-07-06 NOTE — H&P
History Of Present Illness  Dragan Calvin is a 84 y.o. male  with PMH significant for CAD, permanent Afib (on eliquis), DVT s/p IVC filter, HLD, HTN, SSS s/p implanted pacemaker, PUD, GERD, DMT2, peripheral edema, SBO(s), and chronic BLE weakness d/t spinal stenosis.    Patient transferred from [facility] ED to [facility] for further workup.  Neurological team.  Patient was presented to the hospital due to both legs' weakness and fall.  Patient said that he was preparing his breakfast then suddenly his legs gave up on him and fell on his right side.  Did not hit head.  No loss of consciousness.  Has a history of lumbar and cervical stenosis.  Patient noticed increased weakness in his legs, especially in the morning. He had a hard time walking due to stiffness, pain in his lower back and left leg mostly.  Denies any dizziness or recent illness prior to the fall.  Patient is on blood thinner Eliquis, sustained bilateral arm abrasions that have since ceased bleeding. He denies any cold symptoms such as coughs, colds, fevers, chills, chest pain, shortness of breath, abdominal pain, nausea, vomiting, diarrhea, or dysuria.  He has an appointment with neurosurgery that he has at [facility] next week.    Record review indicates that his pacer battery is nearing the end of his life in the next few months.      Of record : Lab work, EKG, head CT, C-spine CT, and chest x-ray were performed. Labs revealed potassium 3.1, bun 42, creatinine 1.55 (slightly higher than recent baseline), ,H&H 11.6 and 34 within patient's baseline, neutrophils 9.21, lymphocytes 0.51, UA negative. EKG, per ER physician impression revealed Atrial fibrillation with PVCs no acute ST elevation or depression. Ventricular rate-93 BPM.  Imaging was negative for acute process.  Initial vital signs were stable.  Potassium was replaced   CT lumbar spine postmyelogram 4/11/2023     Impression  1.  Lumbar puncture for intrathecal administration of  iodinated  contrast.  2. Multilevel degenerative changes of the lumbar spine, fully  detailed above, worst at L4-L5 where there is severe central canal  stenosis.  Past Medical History  Medical History[1]    Surgical History  Surgical History[2]     Social History  He reports that he has never smoked. He has never used smokeless tobacco. He reports that he does not drink alcohol and does not use drugs.    Family History  Family History[3]     Allergies  Patient has no known allergies.    Review of Systems   Constitutional:  Positive for activity change and fatigue. Negative for appetite change and unexpected weight change.   HENT:  Negative for congestion, sore throat, trouble swallowing and voice change.    Eyes: Negative.    Respiratory:  Negative for cough, shortness of breath and wheezing.    Gastrointestinal:  Negative for abdominal distention, abdominal pain, blood in stool, constipation, diarrhea, nausea and vomiting.   Genitourinary:  Negative for decreased urine volume and difficulty urinating.   Skin: Negative.    Neurological:  Positive for weakness. Negative for dizziness, tremors, syncope, speech difficulty and headaches.   Psychiatric/Behavioral: Negative.          Physical Exam  Vitals and nursing note reviewed.   Constitutional:       Appearance: Normal appearance.   HENT:      Head: Normocephalic and atraumatic.      Nose: Nose normal. No congestion or rhinorrhea.   Eyes:      Extraocular Movements: Extraocular movements intact.      Pupils: Pupils are equal, round, and reactive to light.   Pulmonary:      Effort: Pulmonary effort is normal.      Breath sounds: Normal breath sounds.   Abdominal:      General: Bowel sounds are normal.      Palpations: Abdomen is soft.   Neurological:      Mental Status: He is alert.          Last Recorded Vitals  There were no vitals taken for this visit.    I Update home meds reviewed the chart.  I reviewed labs and imaging see below  Medications Ordered Prior to  Encounter[4]  Results for orders placed or performed during the hospital encounter of 07/03/25 (from the past 24 hours)   POCT GLUCOSE   Result Value Ref Range    POCT Glucose 270 (H) 74 - 99 mg/dL   POCT GLUCOSE   Result Value Ref Range    POCT Glucose 352 (H) 74 - 99 mg/dL   Lavender Top   Result Value Ref Range    Extra Tube Hold for add-ons.    SST TOP   Result Value Ref Range    Extra Tube Hold for add-ons.    POCT GLUCOSE   Result Value Ref Range    POCT Glucose 208 (H) 74 - 99 mg/dL     No results found.       Assessment & Plan  Lumbar spinal stenosis  Weakness of both lower extremities  Cervical stenosis of spine  Due to weakness and multiple falls in the past in addition to history of stenosis on lumbar and cervical spine.   CT cervical shows no evidence of an acute fracture or subluxation.  Degenerative changes.   order MRI for lumbar and cervical  Will consider neurosurgeon if needed.    Patient has an appointment with the neurosurgeon next week at [facility]    Type 2 diabetes mellitus, with long-term current use of insulin  Continue with home meds  ACHS with sliding scale  Paroxysmal atrial fibrillation (Multi)  Continue with Eliquis    Hypertension, benign  Continue with home meds lisinopril  Fall  CT of the head on 7/3/2025 no acute finding  Fall precaution  PT OT   DVT prophylaxis:  On Eliquis  Discharge plan:  Admit to regular medical floor   MRI cervical and lumbar spine pending  Labs in a.m.  PT OT      JUDY Wick-JIGNESH         [1]   Past Medical History:  Diagnosis Date    Deep vein thrombosis (DVT) of lower extremity 09/17/2023    s/p IVC filter and has post-phlebotic syndrome    History of falling     History of fall    Hyperlipidemia 08/28/2023    Dr. Zee follows    Hypertension, benign 08/23/2017    Leg edema 09/05/2023    Overweight     Overweight    Paroxysmal atrial fibrillation (Multi) 08/28/2023    PAF. On Eliquis. 7 second pauses therefore PPM placed. Freq Pafib on PPM. Chads 2  vasc 4    Permanent atrial fibrillation (Multi) 10/17/2023    On Eliquis. 7 second pauses therefore PPM placed. Freq Pafib on PPM. Chads 2 vasc 4    Personal history of other diseases of the digestive system     History of small bowel obstruction    Personal history of other infectious and parasitic diseases     History of herpes zoster    Personal history of other specified conditions     History of bradycardia    Personal history of other specified conditions     History of edema    Personal history of peptic ulcer disease     History of peptic ulcer    Personal history of pneumonia (recurrent)     History of pneumonia    Personal history of urinary calculi     History of kidney stones    Presence of cardiac pacemaker 08/25/2017    10/24/2016: [employer] Advisa MRI DR model #[id number]    Sick sinus syndrome (Multi) 09/17/2023    s/p PPM 10/2016    Stage 3a chronic kidney disease (Multi) 07/18/2024    Type 2 diabetes mellitus, with long-term current use of insulin 08/28/2023   [2]   Past Surgical History:  Procedure Laterality Date    APPENDECTOMY  07/25/2018    Appendectomy    CARDIAC PACEMAKER PLACEMENT  04/05/2018    Pacemaker Placement    CHOLECYSTECTOMY  04/05/2018    Cholecystectomy   [3]   Family History  Problem Relation Name Age of Onset    Coronary artery disease Father      Heart failure Father      Diabetes Father     [4]   Current Facility-Administered Medications on File Prior to Encounter   Medication Dose Route Frequency Provider Last Rate Last Admin    [DISCONTINUED] acetaminophen (Tylenol) oral liquid 650 mg  650 mg oral q4h PRN JUDY Gregory-CNP        [DISCONTINUED] acetaminophen (Tylenol) suppository 650 mg  650 mg rectal q4h PRN Gita Piper APRN-CNP        [DISCONTINUED] acetaminophen (Tylenol) tablet 650 mg  650 mg oral q4h PRN JOHNSON GregoryCNP   650 mg at 07/03/25 1850    [DISCONTINUED] apixaban (Eliquis) tablet 5 mg  5 mg oral BID JOHNSON GregoryCNP   5 mg at  07/05/25 2049    [DISCONTINUED] cyanocobalamin (Vitamin B-12) tablet 1,000 mcg  1,000 mcg oral Daily MERRY Gregory   1,000 mcg at 07/04/25 0856    [DISCONTINUED] dexAMETHasone (Decadron) injection 4 mg  4 mg intravenous q8h Dragan Mir MD   4 mg at 07/06/25 0019    [DISCONTINUED] dextrose 50 % injection 12.5 g  12.5 g intravenous q15 min PRN MERRY Gregory        [DISCONTINUED] dextrose 50 % injection 25 g  25 g intravenous q15 min PRN MERRY Gregory        [DISCONTINUED] dilTIAZem CD (Cardizem CD) 24 hr capsule 240 mg  240 mg oral BID MERRY Gregory   240 mg at 07/05/25 0838    [DISCONTINUED] glipiZIDE XL (Glucotrol XL) 24 hr tablet 2.5 mg  2.5 mg oral Daily with breakfast MERRY Ogden        [DISCONTINUED] glucagon (Glucagen) injection 1 mg  1 mg intramuscular q15 min PRN MERRY Gregory        [DISCONTINUED] glucagon (Glucagen) injection 1 mg  1 mg intramuscular q15 min PRN MERRY Gregory        [DISCONTINUED] insulin glargine (Lantus) injection 30 Units  30 Units subcutaneous Nightly MERRY Ogden   30 Units at 07/05/25 2050    [DISCONTINUED] insulin lispro injection 0-5 Units  0-5 Units subcutaneous Before meals & nightly MERRY Gregory   5 Units at 07/05/25 2050    [DISCONTINUED] lidocaine 4 % patch 1 patch  1 patch transdermal Daily PRN MERRY Gregory        [DISCONTINUED] lisinopril tablet 10 mg  10 mg oral Daily MERRY Gregory   10 mg at 07/05/25 0839    [DISCONTINUED] metOLazone (Zaroxolyn) tablet 2.5 mg  2.5 mg oral Once per day on Sunday Wednesday MERRY Ogden        [DISCONTINUED] metoprolol tartrate (Lopressor) tablet 25 mg  25 mg oral BID JOHNSON GregoryCNP   25 mg at 07/05/25 2049    [DISCONTINUED] oxyCODONE (Roxicodone) immediate release tablet 5 mg  5 mg oral q6h PRN Emmanuel Brown DO   5 mg at 07/04/25 2249    [DISCONTINUED] pantoprazole  (ProtoNix) EC tablet 40 mg  40 mg oral BID AC Dragan Mir MD   40 mg at 07/06/25 0635    [DISCONTINUED] polyethylene glycol (Glycolax, Miralax) packet 17 g  17 g oral Daily JUDY Gregory-CNP   17 g at 07/05/25 0838    [DISCONTINUED] potassium chloride CR (Klor-Con) ER tablet 10 mEq  10 mEq oral Daily JUDY Gregory-CNP   10 mEq at 07/05/25 0838    [DISCONTINUED] rosuvastatin (Crestor) tablet 5 mg  5 mg oral Daily Gita Piper APRN-CNP   5 mg at 07/05/25 0839    [DISCONTINUED] tamsulosin (Flomax) 24 hr capsule 0.8 mg  0.8 mg oral Daily JUDY Gregory-CNP   0.8 mg at 07/05/25 0838    [DISCONTINUED] torsemide (Demadex) tablet 40 mg  40 mg oral Daily Gita Piper APRN-CNP   40 mg at 07/05/25 0838     Current Outpatient Medications on File Prior to Encounter   Medication Sig Dispense Refill    acetaminophen (Tylenol 8 HOUR) 650 mg ER tablet Take 1 tablet (650 mg) by mouth every 8 hours if needed for mild pain (1 - 3). Do not crush, chew, or split.      apixaban (Eliquis) 5 mg tablet Take 1 tablet (5 mg) by mouth 2 times a day. 180 tablet 3    blood sugar diagnostic (Blood Glucose Test) strip Use to test blood sugar once daily 100 strip 1    cyanocobalamin (Vitamin B-12) 1,000 mcg tablet Take 1 tablet (1,000 mcg) by mouth once daily.      dilTIAZem CD (Cardizem CD) 240 mg 24 hr capsule Take 1 capsule (240 mg) by mouth 2 times a day. 180 capsule 3    fosinopril (Monopril) 10 mg tablet Take 0.5 tablets (5 mg) by mouth once daily. 45 tablet 3    glipiZIDE XL (Glucotrol XL) 2.5 mg 24 hr tablet Take 1 tablet (2.5 mg) by mouth once daily with breakfast. Do not crush, chew, or split.      insulin degludec (Tresiba FlexTouch U-100) 100 unit/mL (3 mL) pen Inject 26 Units under the skin once daily at bedtime. Take as directed per insulin instructions. 24 mL 3    lancets misc One touch ultra test strips 33G LANCETS Tests once daily 100 each 3    lancets misc Use to test blood sugar once a day  "100 each 3    lidocaine 4 % patch Place 1 patch over 12 hours on the skin once daily. Remove & discard patch within 12 hours or as directed by MD. 7 patch 0    metOLazone (Zaroxolyn) 2.5 mg tablet Take 1 tablet (2.5 mg) by mouth continuously. Every Sunday and wednesday      metoprolol tartrate (Lopressor) 25 mg tablet TAKE 1 TABLET(25 MG) BY MOUTH TWICE DAILY 180 tablet 3    multivitamin tablet Take 1 tablet by mouth once daily.      OneTouch Ultra Test USE TO TEST BLOOD SUGAR ONCE DAILY 100 strip 1    pen needle, diabetic (BD Ultra-Fine Tami Pen Needle) 32 gauge x 5/32\" needle 1 Needle 4 times a day. 100 each 3    potassium chloride CR (Klor-Con) 10 mEq ER tablet Take 1 tablet (10 mEq) by mouth once daily. Do not crush, chew, or split. 90 tablet 3    RABEprazole (Aciphex) EC tablet Take 1 tablet (20 mg) by mouth once daily. 90 tablet 3    rosuvastatin (Crestor) 5 mg tablet TAKE 1 TABLET(5 MG) BY MOUTH DAILY 90 tablet 1    tamsulosin (Flomax) 0.4 mg 24 hr capsule Take 2 capsules (0.8 mg) by mouth once daily.      torsemide (Demadex) 20 mg tablet TAKE 3 TABLETS(60 MG) BY MOUTH DAILY (Patient taking differently: Take 2 tablets (40 mg) by mouth once daily.) 270 tablet 3    [DISCONTINUED] cyclobenzaprine (Flexeril) 5 mg tablet Take 1 tablet (5 mg) by mouth 3 times a day as needed for muscle spasms. (Patient not taking: Reported on 7/3/2025) 30 tablet 0    [DISCONTINUED] glipiZIDE XL (Glucotrol XL) 2.5 mg 24 hr tablet Take 1 tablet (2.5 mg) by mouth 3 times a day. 180 tablet 3    [DISCONTINUED] glipiZIDE XL (Glucotrol XL) 2.5 mg 24 hr tablet Take 1 tablet (2.5 mg) by mouth once daily in the evening. Do not crush, chew, or split.      [DISCONTINUED] metOLazone (Zaroxolyn) 2.5 mg tablet Take 1 tablet (2.5 mg) by mouth once daily. On Sunday and Wednesday 90 tablet 3     "

## 2025-07-06 NOTE — CONSULTS
Neurological Surgery  Consult Note      Referral Diagnosis:   1. Generalized weakness  Cardiac device check - Inpatient    Cardiac device check - Inpatient      2. Difficulty in walking        3. Hypokalemia         Hypokalemia [E87.6]  Generalized weakness [R53.1]  Difficulty in walking [R26.2]    Reason For Consult: Low back and bilateral lower extremity pain    History Of Present Illness  Dragan Calvin is a 84 y.o. male with a history of atrial fibrillation on Eliquis, pacemaker and diabetes, who presented to the emergency department with chronic progressive low back and left lower extremity pain.  He describes low back pain with standing, walking and other physical activity that radiates over the lateral aspect of his left thigh.  He states that his left leg will give out on him.  He also describes balance/gait dysfunction with multiple falls.  He has been ambulating with a walker.  He denies any significant neck pain or upper extremity symptoms.  He states that he has been under the care of a pain management specialist.  He states that he has been told that he is not a good candidate for surgery.    Past Medical History  Medical History[1]    Surgical History  Surgical History[2]    Social History  Social History[3]    Allergies  Patient has no known allergies.  Prescriptions Prior to Admission[4]    Review of Systems  14/14 systems reviewed and negative other than what is listed in the history of present illness    Physical Exam  General: well developed, awake/alert/oriented x3, no distress, alert and cooperative  Head/Neck: neck Supple, no apparent injury  ENMT: mucous membranes moist, no apparent injury, no lesions seen  Cardiovascular: no pitting edema, no JVD  Respiratory/Thorax: Normal breath sounds with good chest expansion, thorax symmetric  Skin: warm and dry, no lesions, no rashes    Neurological/Musculoskeletal:    - Posture: normal coronal & sagittal alignment    - Muscle Bulk: normal and symmetric  "in all extremities    - Paraspinal muscle spasm/tenderness absent    - Motor Strength: 4/5 strength in bilateral finger abduction, 4/5 strength in bilateral dorsiflexion, otherwise full strength in bilateral upper and lower extremities    - Sensation: intact to light touch    - Reflexes: normal, no clonus, negative Marie's sign      Last Recorded Vitals  Blood pressure 106/57, pulse 93, temperature 36.1 °C (97 °F), resp. rate 18, height 1.778 m (5' 10\"), weight 96.6 kg (213 lb), SpO2 93%.    Relevant Results  Scheduled medications  Scheduled Medications[5]  Continuous medications  Continuous Medications[6]  PRN medications  PRN Medications[7]    Results for orders placed or performed during the hospital encounter of 07/03/25 (from the past 96 hours)   Urinalysis with Reflex Microscopic   Result Value Ref Range    Color, Urine Light-Yellow Light-Yellow, Yellow, Dark-Yellow    Appearance, Urine Clear Clear    Specific Gravity, Urine 1.011 1.005 - 1.035    pH, Urine 6.0 5.0, 5.5, 6.0, 6.5, 7.0, 7.5, 8.0    Protein, Urine NEGATIVE NEGATIVE, 10 (TRACE), 20 (TRACE) mg/dL    Glucose, Urine Normal Normal mg/dL    Blood, Urine NEGATIVE NEGATIVE mg/dL    Ketones, Urine NEGATIVE NEGATIVE mg/dL    Bilirubin, Urine NEGATIVE NEGATIVE mg/dL    Urobilinogen, Urine Normal Normal mg/dL    Nitrite, Urine NEGATIVE NEGATIVE    Leukocyte Esterase, Urine NEGATIVE NEGATIVE   B-Type Natriuretic Peptide   Result Value Ref Range     (H) 0 - 99 pg/mL   Troponin I, High Sensitivity   Result Value Ref Range    Troponin I, High Sensitivity 17 0 - 20 ng/L   Comprehensive Metabolic Panel   Result Value Ref Range    Glucose 309 (H) 74 - 99 mg/dL    Sodium 136 136 - 145 mmol/L    Potassium 3.1 (L) 3.5 - 5.3 mmol/L    Chloride 100 98 - 107 mmol/L    Bicarbonate 23 21 - 32 mmol/L    Anion Gap 16 10 - 20 mmol/L    Urea Nitrogen 42 (H) 6 - 23 mg/dL    Creatinine 1.55 (H) 0.50 - 1.30 mg/dL    eGFR 44 (L) >60 mL/min/1.73m*2    Calcium 8.8 8.6 - " 10.3 mg/dL    Albumin 3.9 3.4 - 5.0 g/dL    Alkaline Phosphatase 90 33 - 136 U/L    Total Protein 6.9 6.4 - 8.2 g/dL    AST 19 9 - 39 U/L    Bilirubin, Total 1.1 0.0 - 1.2 mg/dL    ALT 15 10 - 52 U/L   Magnesium   Result Value Ref Range    Magnesium 1.92 1.60 - 2.40 mg/dL   CBC and Auto Differential   Result Value Ref Range    WBC 10.4 4.4 - 11.3 x10*3/uL    nRBC 0.0 0.0 - 0.0 /100 WBCs    RBC 3.46 (L) 4.50 - 5.90 x10*6/uL    Hemoglobin 11.6 (L) 13.5 - 17.5 g/dL    Hematocrit 34.0 (L) 41.0 - 52.0 %    MCV 98 80 - 100 fL    MCH 33.5 26.0 - 34.0 pg    MCHC 34.1 32.0 - 36.0 g/dL    RDW 13.2 11.5 - 14.5 %    Platelets 181 150 - 450 x10*3/uL    Neutrophils % 88.1 40.0 - 80.0 %    Immature Granulocytes %, Automated 0.6 0.0 - 0.9 %    Lymphocytes % 4.9 13.0 - 44.0 %    Monocytes % 5.5 2.0 - 10.0 %    Eosinophils % 0.4 0.0 - 6.0 %    Basophils % 0.5 0.0 - 2.0 %    Neutrophils Absolute 9.21 (H) 1.60 - 5.50 x10*3/uL    Immature Granulocytes Absolute, Automated 0.06 0.00 - 0.50 x10*3/uL    Lymphocytes Absolute 0.51 (L) 0.80 - 3.00 x10*3/uL    Monocytes Absolute 0.57 0.05 - 0.80 x10*3/uL    Eosinophils Absolute 0.04 0.00 - 0.40 x10*3/uL    Basophils Absolute 0.05 0.00 - 0.10 x10*3/uL   Sars-CoV-2 PCR   Result Value Ref Range    Coronavirus 2019, PCR Not Detected Not Detected   POCT GLUCOSE   Result Value Ref Range    POCT Glucose 163 (H) 74 - 99 mg/dL   POCT GLUCOSE   Result Value Ref Range    POCT Glucose 210 (H) 74 - 99 mg/dL   POCT GLUCOSE   Result Value Ref Range    POCT Glucose 218 (H) 74 - 99 mg/dL   CBC   Result Value Ref Range    WBC 11.1 4.4 - 11.3 x10*3/uL    nRBC 0.0 0.0 - 0.0 /100 WBCs    RBC 3.36 (L) 4.50 - 5.90 x10*6/uL    Hemoglobin 11.2 (L) 13.5 - 17.5 g/dL    Hematocrit 33.1 (L) 41.0 - 52.0 %    MCV 99 80 - 100 fL    MCH 33.3 26.0 - 34.0 pg    MCHC 33.8 32.0 - 36.0 g/dL    RDW 13.5 11.5 - 14.5 %    Platelets 169 150 - 450 x10*3/uL   Basic metabolic panel   Result Value Ref Range    Glucose 148 (H) 74 - 99 mg/dL     Sodium 140 136 - 145 mmol/L    Potassium 3.0 (L) 3.5 - 5.3 mmol/L    Chloride 102 98 - 107 mmol/L    Bicarbonate 29 21 - 32 mmol/L    Anion Gap 12 10 - 20 mmol/L    Urea Nitrogen 30 (H) 6 - 23 mg/dL    Creatinine 1.20 0.50 - 1.30 mg/dL    eGFR 60 (L) >60 mL/min/1.73m*2    Calcium 8.8 8.6 - 10.3 mg/dL   POCT GLUCOSE   Result Value Ref Range    POCT Glucose 150 (H) 74 - 99 mg/dL   POCT GLUCOSE   Result Value Ref Range    POCT Glucose 235 (H) 74 - 99 mg/dL   SST TOP   Result Value Ref Range    Extra Tube Hold for add-ons.    CBC and Auto Differential   Result Value Ref Range    WBC 9.3 4.4 - 11.3 x10*3/uL    nRBC 0.0 0.0 - 0.0 /100 WBCs    RBC 3.39 (L) 4.50 - 5.90 x10*6/uL    Hemoglobin 11.2 (L) 13.5 - 17.5 g/dL    Hematocrit 33.6 (L) 41.0 - 52.0 %    MCV 99 80 - 100 fL    MCH 33.0 26.0 - 34.0 pg    MCHC 33.3 32.0 - 36.0 g/dL    RDW 13.3 11.5 - 14.5 %    Platelets 168 150 - 450 x10*3/uL    Neutrophils % 79.5 40.0 - 80.0 %    Immature Granulocytes %, Automated 0.3 0.0 - 0.9 %    Lymphocytes % 11.5 13.0 - 44.0 %    Monocytes % 7.6 2.0 - 10.0 %    Eosinophils % 0.8 0.0 - 6.0 %    Basophils % 0.3 0.0 - 2.0 %    Neutrophils Absolute 7.40 (H) 1.60 - 5.50 x10*3/uL    Immature Granulocytes Absolute, Automated 0.03 0.00 - 0.50 x10*3/uL    Lymphocytes Absolute 1.07 0.80 - 3.00 x10*3/uL    Monocytes Absolute 0.71 0.05 - 0.80 x10*3/uL    Eosinophils Absolute 0.07 0.00 - 0.40 x10*3/uL    Basophils Absolute 0.03 0.00 - 0.10 x10*3/uL   Comprehensive Metabolic Panel   Result Value Ref Range    Glucose 290 (H) 74 - 99 mg/dL    Sodium 138 136 - 145 mmol/L    Potassium 3.5 3.5 - 5.3 mmol/L    Chloride 103 98 - 107 mmol/L    Bicarbonate 27 21 - 32 mmol/L    Anion Gap 12 10 - 20 mmol/L    Urea Nitrogen 31 (H) 6 - 23 mg/dL    Creatinine 1.16 0.50 - 1.30 mg/dL    eGFR 62 >60 mL/min/1.73m*2    Calcium 8.7 8.6 - 10.3 mg/dL    Albumin 3.6 3.4 - 5.0 g/dL    Alkaline Phosphatase 82 33 - 136 U/L    Total Protein 6.2 (L) 6.4 - 8.2 g/dL    AST 23  9 - 39 U/L    Bilirubin, Total 0.9 0.0 - 1.2 mg/dL    ALT 16 10 - 52 U/L   POCT GLUCOSE   Result Value Ref Range    POCT Glucose 244 (H) 74 - 99 mg/dL   POCT GLUCOSE   Result Value Ref Range    POCT Glucose 356 (H) 74 - 99 mg/dL   Comprehensive Metabolic Panel   Result Value Ref Range    Glucose 248 (H) 74 - 99 mg/dL    Sodium 140 136 - 145 mmol/L    Potassium 4.0 3.5 - 5.3 mmol/L    Chloride 104 98 - 107 mmol/L    Bicarbonate 28 21 - 32 mmol/L    Anion Gap 12 10 - 20 mmol/L    Urea Nitrogen 33 (H) 6 - 23 mg/dL    Creatinine 1.12 0.50 - 1.30 mg/dL    eGFR 65 >60 mL/min/1.73m*2    Calcium 8.9 8.6 - 10.3 mg/dL    Albumin 3.8 3.4 - 5.0 g/dL    Alkaline Phosphatase 80 33 - 136 U/L    Total Protein 6.7 6.4 - 8.2 g/dL    AST 32 9 - 39 U/L    Bilirubin, Total 0.8 0.0 - 1.2 mg/dL    ALT 16 10 - 52 U/L   CBC and Auto Differential   Result Value Ref Range    WBC 10.7 4.4 - 11.3 x10*3/uL    nRBC 0.0 0.0 - 0.0 /100 WBCs    RBC 3.54 (L) 4.50 - 5.90 x10*6/uL    Hemoglobin 11.7 (L) 13.5 - 17.5 g/dL    Hematocrit 36.8 (L) 41.0 - 52.0 %     (H) 80 - 100 fL    MCH 33.1 26.0 - 34.0 pg    MCHC 31.8 (L) 32.0 - 36.0 g/dL    RDW 13.2 11.5 - 14.5 %    Platelets 166 150 - 450 x10*3/uL    Neutrophils % 91.5 40.0 - 80.0 %    Immature Granulocytes %, Automated 0.8 0.0 - 0.9 %    Lymphocytes % 6.1 13.0 - 44.0 %    Monocytes % 1.4 2.0 - 10.0 %    Eosinophils % 0.0 0.0 - 6.0 %    Basophils % 0.2 0.0 - 2.0 %    Neutrophils Absolute 9.76 (H) 1.60 - 5.50 x10*3/uL    Immature Granulocytes Absolute, Automated 0.08 0.00 - 0.50 x10*3/uL    Lymphocytes Absolute 0.65 (L) 0.80 - 3.00 x10*3/uL    Monocytes Absolute 0.15 0.05 - 0.80 x10*3/uL    Eosinophils Absolute 0.00 0.00 - 0.40 x10*3/uL    Basophils Absolute 0.02 0.00 - 0.10 x10*3/uL   POCT GLUCOSE   Result Value Ref Range    POCT Glucose 239 (H) 74 - 99 mg/dL   POCT GLUCOSE   Result Value Ref Range    POCT Glucose 294 (H) 74 - 99 mg/dL   POCT GLUCOSE   Result Value Ref Range    POCT Glucose 270  (H) 74 - 99 mg/dL   POCT GLUCOSE   Result Value Ref Range    POCT Glucose 352 (H) 74 - 99 mg/dL         Intake/Output Summary (Last 24 hours) at 7/5/2025 2255  Last data filed at 7/5/2025 1700  Gross per 24 hour   Intake --   Output 1125 ml   Net -1125 ml       Imaging  I personally reviewed CTs of the head and cervical spine performed on July 3, 2025.  There is evidence of chronic microvascular disease.  There is advanced multilevel spondylotic change throughout the cervical spine.    I also reviewed and interpreted a CT myelogram of the lumbar spine performed on April 11, 2023.  This shows severe central stenosis at L4-5.    Assessment and Plan  Assessment/Plan     Assessment & Plan  Generalized weakness    Spinal stenosis    Hypokalemia    Fall    Elevated serum creatinine      Dragan Calvin is a 84 y.o. male with a history of atrial fibrillation on Eliquis, pacemaker and diabetes, who presented to the emergency department with chronic progressive low back and left lower extremity pain.  He describes low back pain with standing, walking and other physical activity that radiates over the lateral aspect of his left thigh.  He states that his left leg will give out on him.  He also describes balance/gait dysfunction with multiple falls.  He has been ambulating with a walker.  He denies any significant neck pain or upper extremity symptoms.  He states that he has been under the care of a pain management specialist.  He states that he has been told that he is not a good candidate for surgery.  There are no acute findings on his CTs of the head and cervical spine.  His prior CT myelogram of the lumbar spine from April 2023 shows severe central stenosis at L4-5.  I told the patient that he may be a candidate for a simple lumbar laminectomy for treatment of neurogenic claudication.  That said, I told him that updated imaging is needed of his cervical and lumbar spine to ensure that we are not missing other pathology.   He has a history of a pacemaker, for which I have been told it is not MRI compatible.  I do lengthy discussion with the patient regarding his subsequent care.  He and his family state that he is unsafe to go home due to his inability to ambulate and fall risk.  I believe this is reasonable.  I discussed having him transferred to [facility] so that he can undergo MRIs of the cervical and lumbar spine.  Per report, [facility] is on willing to perform MRIs on patients with pacemakers regardless of compatibility.    Plan:  - Please arrange for the patient to be transferred to a medicine service at [facility] so that he can get MRIs of the cervical and lumbar spine  - Patient may be a candidate for surgery during his subsequent hospitalization pending the results of imaging studies and medical clearance         Attestation  I have reviewed all prior documentation and reviewed the electronic medical record since admission. I have personally have reviewed all advanced imaging not just the reports and used my interpretation as documented as the relevant findings. I have reviewed the risks and benefits of all treatment recommendations listed in this note with the patient and family.     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 Zee follows    Hypertension, benign 08/23/2017    Leg edema 09/05/2023    Overweight     Overweight    Paroxysmal atrial fibrillation (Multi) 08/28/2023    PAF. On Eliquis. 7 second pauses therefore PPM placed. Freq Pafib on PPM. Chads 2 vasc 4    Permanent  atrial fibrillation (Multi) 10/17/2023    On Eliquis. 7 second pauses therefore PPM placed. Freq Pafib on PPM. Chads 2 vasc 4    Personal history of other diseases of the digestive system     History of small bowel obstruction    Personal history of other infectious and parasitic diseases     History of herpes zoster    Personal history of other specified conditions     History of bradycardia    Personal history of other specified conditions     History of edema    Personal history of peptic ulcer disease     History of peptic ulcer    Personal history of pneumonia (recurrent)     History of pneumonia    Personal history of urinary calculi     History of kidney stones    Presence of cardiac pacemaker 08/25/2017    10/24/2016: [employer] Advisa MRI DR model #[id number]    Sick sinus syndrome (Multi) 09/17/2023    s/p PPM 10/2016    Stage 3a chronic kidney disease (Multi) 07/18/2024    Type 2 diabetes mellitus, with long-term current use of insulin 08/28/2023   [2]   Past Surgical History:  Procedure Laterality Date    APPENDECTOMY  07/25/2018    Appendectomy    CARDIAC PACEMAKER PLACEMENT  04/05/2018    Pacemaker Placement    CHOLECYSTECTOMY  04/05/2018    Cholecystectomy   [3]   Social History  Tobacco Use    Smoking status: Never    Smokeless tobacco: Never   Vaping Use    Vaping status: Never Used   Substance Use Topics    Alcohol use: Never    Drug use: Never   [4]   Medications Prior to Admission   Medication Sig Dispense Refill Last Dose/Taking    apixaban (Eliquis) 5 mg tablet Take 1 tablet (5 mg) by mouth 2 times a day. 180 tablet 3 7/2/2025    cyanocobalamin (Vitamin B-12) 1,000 mcg tablet Take 1 tablet (1,000 mcg) by mouth once daily.   7/2/2025    dilTIAZem CD (Cardizem CD) 240 mg 24 hr capsule Take 1 capsule (240 mg) by mouth 2 times a day. 180 capsule 3 7/2/2025    fosinopril (Monopril) 10 mg tablet Take 0.5 tablets (5 mg) by mouth once daily. 45 tablet 3 7/2/2025    glipiZIDE XL (Glucotrol XL) 2.5 mg 24 hr  tablet Take 1 tablet (2.5 mg) by mouth 3 times a day. (Patient taking differently: Take 2 tablets (5 mg) by mouth once daily in the morning.) 180 tablet 3 7/2/2025    glipiZIDE XL (Glucotrol XL) 2.5 mg 24 hr tablet Take 1 tablet (2.5 mg) by mouth once daily in the evening. Do not crush, chew, or split.   7/2/2025    insulin degludec (Tresiba FlexTouch U-100) 100 unit/mL (3 mL) pen Inject 26 Units under the skin once daily at bedtime. Take as directed per insulin instructions. 24 mL 3 7/2/2025    lidocaine 4 % patch Place 1 patch over 12 hours on the skin once daily. Remove & discard patch within 12 hours or as directed by MD. (Patient taking differently: Place 1 patch on the skin once daily as needed. Remove & discard patch within 12 hours or as directed by MD.) 7 patch 0 Unknown    metOLazone (Zaroxolyn) 2.5 mg tablet Take 1 tablet (2.5 mg) by mouth once daily. On Sunday and Wednesday (Patient taking differently: Take 1 tablet (2.5 mg) by mouth once daily.) 90 tablet 3 7/3/2025 Morning    metoprolol tartrate (Lopressor) 25 mg tablet TAKE 1 TABLET(25 MG) BY MOUTH TWICE DAILY 180 tablet 3 7/2/2025    multivitamin tablet Take 1 tablet by mouth once daily.   7/2/2025    potassium chloride CR (Klor-Con) 10 mEq ER tablet Take 1 tablet (10 mEq) by mouth once daily. Do not crush, chew, or split. 90 tablet 3 7/2/2025    RABEprazole (Aciphex) EC tablet Take 1 tablet (20 mg) by mouth once daily. 90 tablet 3 7/2/2025    rosuvastatin (Crestor) 5 mg tablet TAKE 1 TABLET(5 MG) BY MOUTH DAILY 90 tablet 1 7/2/2025    tamsulosin (Flomax) 0.4 mg 24 hr capsule Take 2 capsules (0.8 mg) by mouth once daily.   7/2/2025    torsemide (Demadex) 20 mg tablet TAKE 3 TABLETS(60 MG) BY MOUTH DAILY (Patient taking differently: Take 2 tablets (40 mg) by mouth once daily.) 270 tablet 3 7/3/2025 Morning    acetaminophen (Tylenol 8 HOUR) 650 mg ER tablet Take 1 tablet (650 mg) by mouth every 8 hours if needed for mild pain (1 - 3). Do not crush,  "chew, or split.   Unknown    blood sugar diagnostic (Blood Glucose Test) strip Use to test blood sugar once daily 100 strip 1 Unknown    cyclobenzaprine (Flexeril) 5 mg tablet Take 1 tablet (5 mg) by mouth 3 times a day as needed for muscle spasms. (Patient not taking: Reported on 7/3/2025) 30 tablet 0 Not Taking    lancets misc One touch ultra test strips 33G LANCETS Tests once daily 100 each 3 Unknown    lancets misc Use to test blood sugar once a day 100 each 3     OneTouch Ultra Test USE TO TEST BLOOD SUGAR ONCE DAILY 100 strip 1 Unknown    pen needle, diabetic (BD Ultra-Fine Tami Pen Needle) 32 gauge x 5/32\" needle 1 Needle 4 times a day. 100 each 3 Unknown   [5] apixaban, 5 mg, oral, BID  cyanocobalamin, 1,000 mcg, oral, Daily  dexAMETHasone, 4 mg, intravenous, q8h  dilTIAZem CD, 240 mg, oral, BID  insulin glargine, 30 Units, subcutaneous, Nightly  insulin lispro, 0-5 Units, subcutaneous, Before meals & nightly  lisinopril, 10 mg, oral, Daily  metoprolol tartrate, 25 mg, oral, BID  pantoprazole, 40 mg, oral, BID AC  polyethylene glycol, 17 g, oral, Daily  potassium chloride CR, 10 mEq, oral, Daily  rosuvastatin, 5 mg, oral, Daily  tamsulosin, 0.8 mg, oral, Daily  torsemide, 40 mg, oral, Daily  [6]    [7] PRN medications: acetaminophen **OR** acetaminophen **OR** acetaminophen, dextrose, dextrose, glucagon, glucagon, lidocaine, oxyCODONE    "

## 2025-07-06 NOTE — ASSESSMENT & PLAN NOTE
Due to weakness and multiple falls in the past in addition to history of stenosis on lumbar and cervical spine.   CT cervical shows no evidence of an acute fracture or subluxation.  Degenerative changes.   order MRI for lumbar and cervical  Will consider neurosurgeon if needed.    Patient has an appointment with the neurosurgeon next week at [facility]

## 2025-07-06 NOTE — CARE PLAN
The patient's goals for the shift include      The clinical goals for the shift include  remain free from falls      Problem: Pain - Adult  Goal: Verbalizes/displays adequate comfort level or baseline comfort level  Outcome: Progressing     Problem: Safety - Adult  Goal: Free from fall injury  Outcome: Progressing     Problem: Discharge Planning  Goal: Discharge to home or other facility with appropriate resources  Outcome: Progressing     Problem: Chronic Conditions and Co-morbidities  Goal: Patient's chronic conditions and co-morbidity symptoms are monitored and maintained or improved  Outcome: Progressing     Problem: Nutrition  Goal: Nutrient intake appropriate for maintaining nutritional needs  Outcome: Progressing     Problem: Skin  Goal: Decreased wound size/increased tissue granulation at next dressing change  Outcome: Progressing  Flowsheets (Taken 7/6/2025 1740)  Decreased wound size/increased tissue granulation at next dressing change: Promote sleep for wound healing  Goal: Participates in plan/prevention/treatment measures  Outcome: Progressing  Flowsheets (Taken 7/6/2025 1740)  Participates in plan/prevention/treatment measures: Elevate heels  Goal: Prevent/manage excess moisture  Outcome: Progressing  Goal: Prevent/minimize sheer/friction injuries  Outcome: Progressing  Goal: Promote/optimize nutrition  Outcome: Progressing  Goal: Promote skin healing  Outcome: Progressing

## 2025-07-07 ENCOUNTER — APPOINTMENT (OUTPATIENT)
Dept: CARDIOLOGY | Facility: HOSPITAL | Age: 85
DRG: 259 | End: 2025-07-07
Payer: MEDICARE

## 2025-07-07 PROBLEM — I50.32: Status: ACTIVE | Noted: 2024-04-18

## 2025-07-07 PROBLEM — Z45.010 PACEMAKER AT END OF BATTERY LIFE: Status: ACTIVE | Noted: 2025-07-07

## 2025-07-07 LAB
BODY SURFACE AREA: 2.2 M2
ERYTHROCYTE [DISTWIDTH] IN BLOOD BY AUTOMATED COUNT: 12.9 % (ref 11.5–14.5)
GLUCOSE BLD MANUAL STRIP-MCNC: 294 MG/DL (ref 74–99)
GLUCOSE BLD MANUAL STRIP-MCNC: 314 MG/DL (ref 74–99)
GLUCOSE BLD MANUAL STRIP-MCNC: 343 MG/DL (ref 74–99)
GLUCOSE BLD MANUAL STRIP-MCNC: 357 MG/DL (ref 74–99)
GLUCOSE BLD MANUAL STRIP-MCNC: 373 MG/DL (ref 74–99)
HCT VFR BLD AUTO: 37.4 % (ref 41–52)
HGB BLD-MCNC: 12.4 G/DL (ref 13.5–17.5)
HOLD SPECIMEN: NORMAL
MCH RBC QN AUTO: 32.8 PG (ref 26–34)
MCHC RBC AUTO-ENTMCNC: 33.2 G/DL (ref 32–36)
MCV RBC AUTO: 99 FL (ref 80–100)
NRBC BLD-RTO: 0 /100 WBCS (ref 0–0)
PLATELET # BLD AUTO: 202 X10*3/UL (ref 150–450)
Q ONSET: 220 MS
QRS COUNT: 16 BEATS
QRS DURATION: 100 MS
QT INTERVAL: 404 MS
QTC CALCULATION(BAZETT): 502 MS
QTC FREDERICIA: 467 MS
R AXIS: 66 DEGREES
RBC # BLD AUTO: 3.78 X10*6/UL (ref 4.5–5.9)
T AXIS: 29 DEGREES
T OFFSET: 422 MS
VENTRICULAR RATE: 93 BPM
WBC # BLD AUTO: 15.5 X10*3/UL (ref 4.4–11.3)

## 2025-07-07 PROCEDURE — 2500000001 HC RX 250 WO HCPCS SELF ADMINISTERED DRUGS (ALT 637 FOR MEDICARE OP): Performed by: NURSE PRACTITIONER

## 2025-07-07 PROCEDURE — 82947 ASSAY GLUCOSE BLOOD QUANT: CPT

## 2025-07-07 PROCEDURE — 93286 PERI-PX EVAL PM/LDLS PM IP: CPT

## 2025-07-07 PROCEDURE — 1210000001 HC SEMI-PRIVATE ROOM DAILY

## 2025-07-07 PROCEDURE — 2500000005 HC RX 250 GENERAL PHARMACY W/O HCPCS: Performed by: NURSE PRACTITIONER

## 2025-07-07 PROCEDURE — 99223 1ST HOSP IP/OBS HIGH 75: CPT | Performed by: STUDENT IN AN ORGANIZED HEALTH CARE EDUCATION/TRAINING PROGRAM

## 2025-07-07 PROCEDURE — 99233 SBSQ HOSP IP/OBS HIGH 50: CPT | Performed by: STUDENT IN AN ORGANIZED HEALTH CARE EDUCATION/TRAINING PROGRAM

## 2025-07-07 PROCEDURE — 85027 COMPLETE CBC AUTOMATED: CPT | Performed by: NURSE PRACTITIONER

## 2025-07-07 PROCEDURE — 2500000004 HC RX 250 GENERAL PHARMACY W/ HCPCS (ALT 636 FOR OP/ED): Performed by: NURSE PRACTITIONER

## 2025-07-07 PROCEDURE — 2500000004 HC RX 250 GENERAL PHARMACY W/ HCPCS (ALT 636 FOR OP/ED): Performed by: STUDENT IN AN ORGANIZED HEALTH CARE EDUCATION/TRAINING PROGRAM

## 2025-07-07 PROCEDURE — 2500000002 HC RX 250 W HCPCS SELF ADMINISTERED DRUGS (ALT 637 FOR MEDICARE OP, ALT 636 FOR OP/ED): Performed by: STUDENT IN AN ORGANIZED HEALTH CARE EDUCATION/TRAINING PROGRAM

## 2025-07-07 PROCEDURE — 36415 COLL VENOUS BLD VENIPUNCTURE: CPT | Performed by: NURSE PRACTITIONER

## 2025-07-07 PROCEDURE — 93280 PM DEVICE PROGR EVAL DUAL: CPT

## 2025-07-07 PROCEDURE — 2500000002 HC RX 250 W HCPCS SELF ADMINISTERED DRUGS (ALT 637 FOR MEDICARE OP, ALT 636 FOR OP/ED): Performed by: NURSE PRACTITIONER

## 2025-07-07 RX ORDER — INSULIN GLARGINE 100 [IU]/ML
18 INJECTION, SOLUTION SUBCUTANEOUS NIGHTLY
Status: DISCONTINUED | OUTPATIENT
Start: 2025-07-07 | End: 2025-07-09

## 2025-07-07 RX ORDER — DEXAMETHASONE 2 MG/1
4 TABLET ORAL EVERY 8 HOURS SCHEDULED
Status: DISCONTINUED | OUTPATIENT
Start: 2025-07-07 | End: 2025-07-10

## 2025-07-07 RX ADMIN — CYANOCOBALAMIN TAB 1000 MCG 1000 MCG: 1000 TAB at 08:42

## 2025-07-07 RX ADMIN — DEXAMETHASONE SODIUM PHOSPHATE 4 MG: 4 INJECTION, SOLUTION INTRA-ARTICULAR; INTRALESIONAL; INTRAMUSCULAR; INTRAVENOUS; SOFT TISSUE at 08:40

## 2025-07-07 RX ADMIN — INSULIN LISPRO 5 UNITS: 100 INJECTION, SOLUTION INTRAVENOUS; SUBCUTANEOUS at 16:27

## 2025-07-07 RX ADMIN — INSULIN GLARGINE 18 UNITS: 100 INJECTION, SOLUTION SUBCUTANEOUS at 20:53

## 2025-07-07 RX ADMIN — DEXAMETHASONE 4 MG: 2 TABLET ORAL at 22:00

## 2025-07-07 RX ADMIN — OXYCODONE HYDROCHLORIDE 5 MG: 5 TABLET ORAL at 03:55

## 2025-07-07 RX ADMIN — METOPROLOL TARTRATE 25 MG: 25 TABLET, FILM COATED ORAL at 08:42

## 2025-07-07 RX ADMIN — PANTOPRAZOLE SODIUM 40 MG: 40 TABLET, DELAYED RELEASE ORAL at 16:27

## 2025-07-07 RX ADMIN — APIXABAN 5 MG: 5 TABLET, FILM COATED ORAL at 08:42

## 2025-07-07 RX ADMIN — DILTIAZEM HYDROCHLORIDE 240 MG: 240 CAPSULE, EXTENDED RELEASE ORAL at 08:42

## 2025-07-07 RX ADMIN — LISINOPRIL 10 MG: 10 TABLET ORAL at 08:42

## 2025-07-07 RX ADMIN — DILTIAZEM HYDROCHLORIDE 240 MG: 240 CAPSULE, EXTENDED RELEASE ORAL at 21:05

## 2025-07-07 RX ADMIN — POTASSIUM CHLORIDE 10 MEQ: 750 TABLET, FILM COATED, EXTENDED RELEASE ORAL at 08:42

## 2025-07-07 RX ADMIN — INSULIN LISPRO 5 UNITS: 100 INJECTION, SOLUTION INTRAVENOUS; SUBCUTANEOUS at 11:12

## 2025-07-07 RX ADMIN — GLIPIZIDE 2.5 MG: 2.5 TABLET, EXTENDED RELEASE ORAL at 08:42

## 2025-07-07 RX ADMIN — ROSUVASTATIN 5 MG: 5 TABLET, FILM COATED ORAL at 21:05

## 2025-07-07 RX ADMIN — INSULIN LISPRO 4 UNITS: 100 INJECTION, SOLUTION INTRAVENOUS; SUBCUTANEOUS at 08:41

## 2025-07-07 RX ADMIN — LIDOCAINE 4% 1 PATCH: 40 PATCH TOPICAL at 08:42

## 2025-07-07 RX ADMIN — TORSEMIDE 40 MG: 20 TABLET ORAL at 08:41

## 2025-07-07 RX ADMIN — TAMSULOSIN HYDROCHLORIDE 0.8 MG: 0.4 CAPSULE ORAL at 08:42

## 2025-07-07 RX ADMIN — PANTOPRAZOLE SODIUM 40 MG: 40 TABLET, DELAYED RELEASE ORAL at 06:08

## 2025-07-07 RX ADMIN — INSULIN LISPRO 4 UNITS: 100 INJECTION, SOLUTION INTRAVENOUS; SUBCUTANEOUS at 20:53

## 2025-07-07 RX ADMIN — DEXAMETHASONE SODIUM PHOSPHATE 4 MG: 4 INJECTION, SOLUTION INTRA-ARTICULAR; INTRALESIONAL; INTRAMUSCULAR; INTRAVENOUS; SOFT TISSUE at 16:27

## 2025-07-07 RX ADMIN — METOPROLOL TARTRATE 25 MG: 25 TABLET, FILM COATED ORAL at 21:05

## 2025-07-07 ASSESSMENT — COGNITIVE AND FUNCTIONAL STATUS - GENERAL
MOBILITY SCORE: 17
PERSONAL GROOMING: A LITTLE
MOVING FROM LYING ON BACK TO SITTING ON SIDE OF FLAT BED WITH BEDRAILS: A LITTLE
MOVING TO AND FROM BED TO CHAIR: A LITTLE
TOILETING: A LITTLE
CLIMB 3 TO 5 STEPS WITH RAILING: A LOT
DAILY ACTIVITIY SCORE: 19
STANDING UP FROM CHAIR USING ARMS: A LITTLE
CLIMB 3 TO 5 STEPS WITH RAILING: A LOT
DRESSING REGULAR UPPER BODY CLOTHING: A LITTLE
PERSONAL GROOMING: A LITTLE
MOVING FROM LYING ON BACK TO SITTING ON SIDE OF FLAT BED WITH BEDRAILS: A LITTLE
TURNING FROM BACK TO SIDE WHILE IN FLAT BAD: A LITTLE
DRESSING REGULAR LOWER BODY CLOTHING: A LITTLE
DAILY ACTIVITIY SCORE: 19
MOBILITY SCORE: 17
DRESSING REGULAR UPPER BODY CLOTHING: A LITTLE
HELP NEEDED FOR BATHING: A LITTLE
STANDING UP FROM CHAIR USING ARMS: A LITTLE
DRESSING REGULAR LOWER BODY CLOTHING: A LITTLE
WALKING IN HOSPITAL ROOM: A LITTLE
WALKING IN HOSPITAL ROOM: A LITTLE
MOVING TO AND FROM BED TO CHAIR: A LITTLE
HELP NEEDED FOR BATHING: A LITTLE
TURNING FROM BACK TO SIDE WHILE IN FLAT BAD: A LITTLE
TOILETING: A LITTLE

## 2025-07-07 ASSESSMENT — ACTIVITIES OF DAILY LIVING (ADL): LACK_OF_TRANSPORTATION: NO

## 2025-07-07 ASSESSMENT — PAIN - FUNCTIONAL ASSESSMENT
PAIN_FUNCTIONAL_ASSESSMENT: 0-10
PAIN_FUNCTIONAL_ASSESSMENT: 0-10

## 2025-07-07 ASSESSMENT — PAIN SCALES - GENERAL
PAINLEVEL_OUTOF10: 0 - NO PAIN
PAINLEVEL_OUTOF10: 0 - NO PAIN

## 2025-07-07 NOTE — ASSESSMENT & PLAN NOTE
HOLD glipizide  home med  #Type 2 diabetes mellitus   -Lantus 30units >18 units while NPO as pt been running high   -SSI   -Hypoglycemia protocol    -Blood glucose checks qAC & qHS   -Carbohydrate controlled diet

## 2025-07-07 NOTE — CARE PLAN
Problem: Pain - Adult  Goal: Verbalizes/displays adequate comfort level or baseline comfort level  Outcome: Progressing     Problem: Safety - Adult  Goal: Free from fall injury  Outcome: Progressing     Problem: Discharge Planning  Goal: Discharge to home or other facility with appropriate resources  Outcome: Progressing     Problem: Chronic Conditions and Co-morbidities  Goal: Patient's chronic conditions and co-morbidity symptoms are monitored and maintained or improved  Outcome: Progressing     Problem: Nutrition  Goal: Nutrient intake appropriate for maintaining nutritional needs  Outcome: Progressing     Problem: Skin  Goal: Decreased wound size/increased tissue granulation at next dressing change  7/7/2025 1057 by Terese Bey  Flowsheets (Taken 7/7/2025 1057)  Decreased wound size/increased tissue granulation at next dressing change: Promote sleep for wound healing  7/7/2025 1056 by Terese Bey  Outcome: Progressing  Goal: Participates in plan/prevention/treatment measures  7/7/2025 1057 by Terese Bey  Flowsheets (Taken 7/7/2025 1057)  Participates in plan/prevention/treatment measures: Elevate heels  7/7/2025 1056 by Terese Bey  Outcome: Progressing  Goal: Prevent/manage excess moisture  7/7/2025 1057 by Terese Bey  Flowsheets (Taken 7/7/2025 1057)  Prevent/manage excess moisture: Monitor for/manage infection if present  7/7/2025 1056 by Terese Bey  Outcome: Progressing  Goal: Prevent/minimize sheer/friction injuries  7/7/2025 1057 by Terese Bey  Flowsheets (Taken 7/7/2025 1057)  Prevent/minimize sheer/friction injuries: Use pull sheet  7/7/2025 1056 by Terese Bey  Outcome: Progressing  Goal: Promote/optimize nutrition  7/7/2025 1057 by Terese Bey  Flowsheets (Taken 7/7/2025 1057)  Promote/optimize nutrition: Monitor/record intake including meals  7/7/2025 1056 by Terese Bey  Outcome: Progressing  Goal: Promote skin healing  7/7/2025 1057 by Terese Bey  Flowsadama  (Taken 7/7/2025 1057)  Promote skin healing: Assess skin/pad under line(s)/device(s)  7/7/2025 1056 by Terese Bey  Outcome: Progressing

## 2025-07-07 NOTE — ASSESSMENT & PLAN NOTE
- MRI for lumbar and cervical per NSG   -pain control   -please notify NSG when MRI done   -ctn dexamethasone 4mg q8 but switch to oral

## 2025-07-07 NOTE — PROGRESS NOTES
[facility]   HOSPITAL MEDICINE     PROGRESS NOTE       PATIENT NAME: Dragan Calvin   MRN: [id number]   SERVICE DATE: 25   SERVICE TIME: 5:30 PM      Hospital Medicine/Primary Attending: Anna eYh DO   LENGTH OF STAY: 1     Assessment      84 y.o. male with PMH significant for CAD, permanent Afib (on eliquis), DVT s/p IVC filter, HLD, HTN, SSS s/p implanted pacemaker, PUD, GERD, DMT2, peripheral edema, SBO(s), and chronic BLE weakness d/t spinal stenosis who presented to [facility] ED from home with complaints of weakness . Seen by NSG at [facility] who rec MRI. MRI department allegedly refused to do MRI 2/2 PPM (despite alleged EP hilda).  During device check at our hospital, it was discovered that the device his battery had .  EP was consulted and plan to do an exchange on 2025.  Subsequently, hopefully patient can get MRI.  Please notify neurosurgery when MRI is complete.     CHIEF COMPLAINT: Leg weakness, bilateral    Assessment & Plan  Pacemaker at end of battery life  Presence of cardiac pacemaker  -can NOT have MRI until pacemaker replaced, per EP   -Consult to EP, discussed with team   -EP plans replacement tomorrow afternoon  -NPO after MN clears till 5am then NPO   -coags   -T&S   -HOLD apixaban  Lumbar spinal stenosis  Weakness of both lower extremities  Cervical stenosis of spine  - MRI for lumbar and cervical per NSG   -pain control   -please notify NSG when MRI done   -ctn dexamethasone 4mg q8 but switch to oral     Type 2 diabetes mellitus, with long-term current use of insulin  HOLD glipizide  home med  #Type 2 diabetes mellitus   -Lantus 30units >18 units while NPO as pt been running high   -SSI   -Hypoglycemia protocol    -Blood glucose checks qAC & qHS   -Carbohydrate controlled diet     Paroxysmal atrial fibrillation (Multi)  HOLD apix for procedure     Hypertension, benign  Continue with home meds lisinopril  Fall  CT of the head on 7/3/2025  no acute finding  Fall precaution  PT OT  Leg weakness, bilateral    Chronic heart failure with preserved ejection fraction (HFpEF, >= 50%)  Ctn home torsemide 40mg dialy   ctn metolazone wed and Sunday        I reviewed:    All new and relevant labs and imaging   New EKGs  Consultant notes   Vitals Signs   Nursing notes                OBESE?: Yes, Class 1 Obesity: BMI of 30.0 to 34.9    DISPOSITION:  Medical Necessity Statement: Patient is not medically ready for discharge due to the following: needs pacemaker, MRI for neurogenic sx may need surgery per neurosurgery   Plan of care discussed with:           Subjective   SUBJECTIVE:  Pt seen and examined.   y    Patient denies CP, SOB, fevers, chills, nausea, or emesis.         Objective      PHYSICAL EXAM: Patient Vitals for the past 24 hrs:   BP Temp Pulse Resp SpO2   07/07/25 1335 (!) 120/99 36.5 °C (97.7 °F) 85 16 97 %   07/07/25 0559 125/77 36.4 °C (97.5 °F) 84 16 94 %   07/06/25 2017 102/58 36.5 °C (97.7 °F) 91 -- 93 %      Physical Exam  Vitals and nursing note reviewed.   Constitutional:       General: He is not in acute distress.     Appearance: Normal appearance. He is not ill-appearing.   HENT:      Head: Normocephalic and atraumatic.   Pulmonary:      Effort: No respiratory distress.   Abdominal:      Tenderness: There is no guarding.   Skin:     Coloration: Skin is not jaundiced or pale.   Neurological:      Mental Status: He is alert and oriented to person, place, and time.   Psychiatric:         Mood and Affect: Mood normal.            MEDICATIONS:   Scheduled medications  Scheduled Medications[1]  Continuous medications  Continuous Medications[2]  PRN medications  PRN Medications[3]       DATA:      Diagnostic tests reviewed for today's visit:     CBC:   Results from last 72 hours   Lab Units 07/07/25  0605   WBC AUTO x10*3/uL 15.5*   HEMATOCRIT % 37.4*   PLATELETS AUTO x10*3/uL 202   MCV fL 99     Coags:     CMP:   Results from last 72 hours   Lab  "Units 07/06/25  1443   SODIUM mmol/L 140   POTASSIUM mmol/L 3.6   CO2 mmol/L 28   BUN mg/dL 49*   ALBUMIN g/dL 4.0   ALK PHOS U/L 78   ALT U/L 24   AST U/L 37      Cardiac Enzymes: No lab exists for component: \"TROP\" .lab  Liver Function, Amylase, Lipase:   Results from last 72 hours   Lab Units 07/06/25  1443   ALT U/L 24   AST U/L 37   ALK PHOS U/L 78      MG/PHOS: [id number](mg,p)@   Renal Panel:    Results from last 72 hours   Lab Units 07/06/25  1443   ALBUMIN g/dL 4.0   BUN mg/dL 49*   POTASSIUM mmol/L 3.6   CO2 mmol/L 28   SODIUM mmol/L 140      Heme:        No lab exists for component: \"RETICP\", \"ABSRETIC\", \"LD\", \"RAFAEL\", \"FE\", \"TRANSFERSAT\"   No components found for: \"UALBCR\"      Medication and Non-Pharmacologic VTE Prophylaxis/Anticoagulants    The patient has received anticoagulants recently:  Heparin is ordered or has been given within the last 24 hours OR  Lovenox has been given in the last 24 hours OR  An anticoagulant other than Heparin or Lovenox is on the home med list OR  An anticoagulant other than Heparin or Lovenox has been given within the last 5 days  Last Anticoag Admin            apixaban (Eliquis) tablet 5 mg    Given 5 mg at 0842    Frequency: 2 times daily         There are additional administrations since 07/04/25 1730 that are not shown.    No unadministered anticoagulant orders found.                   SIGNATURE: Anna Yeh Located within [facility] Medicine    PAGER/CONTACT #: Epic Chat      Disclaimer: Portions of this note may have been generated using Dragon voice recognition software. Reasonable efforts were made to correct any dictation errors that resulted due to the programming of this software but some may still be present.     Portions of this note including HPI, ROS, impression/plan, and examination may have been copied forward from yesterday to July 7, 2025 as to provide important historical information essential in contributing to medical decision making. Documentation has " been reviewed and edited as necessary to support clinical decision making for today's visit and to reflect my own independent evaluation of this patient.       The time of this note does not reflect the time I saw the patient but the time that this note was written        [1] [Held by provider] apixaban, 5 mg, oral, BID  cyanocobalamin, 1,000 mcg, oral, Daily  dexAMETHasone, 4 mg, intravenous, q8h  dilTIAZem CD, 240 mg, oral, BID  glipiZIDE XL, 2.5 mg, oral, Daily with breakfast  insulin glargine, 30 Units, subcutaneous, Nightly  insulin lispro, 0-5 Units, subcutaneous, Before meals & nightly  lidocaine, 1 patch, transdermal, Daily  lisinopril, 10 mg, oral, Daily  [START ON 7/9/2025] metOLazone, 2.5 mg, oral, Once per day on Sunday Wednesday  metoprolol tartrate, 25 mg, oral, BID  pantoprazole, 40 mg, oral, BID AC  polyethylene glycol, 17 g, oral, Daily  potassium chloride CR, 10 mEq, oral, Daily  rosuvastatin, 5 mg, oral, Daily  tamsulosin, 0.8 mg, oral, Daily  torsemide, 40 mg, oral, Daily    [2]    [3] PRN medications: acetaminophen **OR** acetaminophen **OR** acetaminophen, bisacodyl, bisacodyl, dextrose, dextrose, glucagon, glucagon, ondansetron **OR** ondansetron, oxyCODONE, prochlorperazine **OR** prochlorperazine **OR** prochlorperazine

## 2025-07-07 NOTE — ASSESSMENT & PLAN NOTE
-can NOT have MRI until pacemaker replaced, per EP   -Consult to EP, discussed with team   -EP plans replacement tomorrow afternoon  -NPO after MN clears till 5am then NPO   -coags   -T&S   -HOLD apixaban

## 2025-07-07 NOTE — PROGRESS NOTES
07/07/25 1421   Discharge Planning   Living Arrangements Spouse/significant other   Support Systems Spouse/significant other   Assistance Needed Independent for adls   Type of Residence Private residence   Number of Stairs to Enter Residence 3   Number of Stairs Within Residence 0   Home or Post Acute Services In home services  (PT/OT pending)   Type of Home Care Services Home OT;Home PT  (PT/OT pending)   Expected Discharge Disposition Home H  (PT/OT pending)   Does the patient need discharge transport arranged? Yes   [place] [employer] coordination needed? Yes   Has discharge transport been arranged? No   Financial Resource Strain   How hard is it for you to pay for the very basics like food, housing, medical care, and heating? Not hard   Housing Stability   In the last 12 months, was there a time when you were not able to pay the mortgage or rent on time? N   At any time in the past 12 months, were you homeless or living in a shelter (including now)? N   Transportation Needs   In the past 12 months, has lack of transportation kept you from medical appointments or from getting medications? no   In the past 12 months, has lack of transportation kept you from meetings, work, or from getting things needed for daily living? No   Stroke Family Assessment   Stroke Family Assessment Needed No   Intensity of Service   Intensity of Service 0-30 min     Transitional Care Coordination Progress Note:  Patient discussed during interdisciplinary rounds.   Team members present: MD, TCC  Plan per Medical/Surgical team: Leg weakness  Payor: Medicare  Discharge disposition: PT/OT pending  Potential Barriers: none  ADOD: 2 days    Previous Home Care: NA  DME: walker (x 3), BSC, shower chair, glucometer/testing supplies  Pharmacy: [facility]  Falls: 1 fall prior to admit  PCP:  Dr Emmanuel Brwon; last visit 1-2 weeks ago  Met with patient at bedside, provided introduction of self and role. Patient states he lives at home with  wife. Normally independent for adls; safe at home. Patient states he normally drives self to appointments. Patient states no concerns obtaining/affording medications; states no social/financial concerns. Per MD, pending MRI and will need PT/OT eval. Will continue to follow for discharge planning needs.     Sheila ASCENCION, RN  Transitional Care Coordinator (TCC)  [phone]

## 2025-07-07 NOTE — CARE PLAN
Problem: Skin  Goal: Decreased wound size/increased tissue granulation at next dressing change  Flowsheets (Taken 7/6/2025 2316)  Decreased wound size/increased tissue granulation at next dressing change:   Promote sleep for wound healing   Protective dressings over bony prominences   Utilize specialty bed per algorithm  Goal: Participates in plan/prevention/treatment measures  Flowsheets (Taken 7/6/2025 2316)  Participates in plan/prevention/treatment measures:   Discuss with provider PT/OT consult   Elevate heels   Increase activity/out of bed for meals  Goal: Prevent/manage excess moisture  Flowsheets (Taken 7/6/2025 2316)  Prevent/manage excess moisture:   Cleanse incontinence/protect with barrier cream   Monitor for/manage infection if present   Use wicking fabric (obtain order)   Follow provider orders for dressing changes   Moisturize dry skin  Goal: Prevent/minimize sheer/friction injuries  Flowsheets (Taken 7/6/2025 2316)  Prevent/minimize sheer/friction injuries:   Complete micro-shifts as needed if patient unable. Adjust patient position to relieve pressure points, not a full turn   Increase activity/out of bed for meals   Use pull sheet   Turn/reposition every 2 hours/use positioning/transfer devices   Utilize specialty bed per algorithm   HOB 30 degrees or less  Goal: Promote/optimize nutrition  Flowsheets (Taken 7/6/2025 2316)  Promote/optimize nutrition:   Assist with feeding   Monitor/record intake including meals   Offer water/supplements/favorite foods   Consume > 50% meals/supplements   Discuss with provider if NPO > 2 days   Reassess MST if dietician not consulted  Goal: Promote skin healing  Flowsheets (Taken 7/6/2025 2316)  Promote skin healing:   Turn/reposition every 2 hours/use positioning/transfer devices   Protective dressings over bony prominences   Assess skin/pad under line(s)/device(s)   Rotate device position/do not position patient on device   Ensure correct size (line/device) and  apply per  instructions

## 2025-07-07 NOTE — H&P (VIEW-ONLY)
"Reason for Consult: generator exchange    Subjective   Dragan Calvin is a 84 y.o. male with PMH significant for CAD, permanent Afib (on eliquis), DVT s/p IVC filter, HLD, HTN, SSS s/p implanted pacemaker, PUD, GERD, DMT2, peripheral edema, SBO(s), and chronic BLE weakness d/t spinal stenosis. EP consulted for generator exchange.    Patient admitted at [facility] due to falls and transferred to [facility] for further evaluation of spinal stenosis. Device interrogated as part of the MRI prep and noted that achieved IMTRA on 6/24/2025, which is a limitation on getting the MRI completed. Patient follows as outpatient with Dr. Ramicone.    Review of Systems  A comprehensive review of systems was negative.     Objective   Visit Vitals  BP (!) 120/99   Pulse 85   Temp 36.5 °C (97.7 °F)   Resp 16   Ht 1.78 m (5' 10.08\")   Wt 97.5 kg (215 lb)   SpO2 97%   BMI 30.78 kg/m²   Smoking Status Never   BSA 2.2 m²        Physical Exam  General: awake, alert, no acute distress  HEENT: no scleral icterus, no JVD  CV: irregular, no MRG  Resp: CTA b/l, no wheezes, rales, or rhonchi  Abd: soft, NT/ND  LE: no edema, no cyanosis  Neuo: grossly normal  Psych: pleasant      Lab Review   [unfilled]  Lab Results   Component Value Date     07/06/2025    K 3.6 07/06/2025     07/06/2025    CO2 28 07/06/2025    BUN 49 (H) 07/06/2025    CREATININE 1.24 07/06/2025    GLUCOSE 224 (H) 07/06/2025    CALCIUM 9.0 07/06/2025     No results found for: \"CKTOTAL\", \"CKMB\", \"CKMBINDEX\", \"TROPONINI\"  Lab Results   Component Value Date    WBC 15.5 (H) 07/07/2025    HGB 12.4 (L) 07/07/2025    HCT 37.4 (L) 07/07/2025    MCV 99 07/07/2025     07/07/2025     No results found for: \"CHOL\", \"TRIG\", \"HDL\", \"LDLDIRECT\"    Troponin I, High Sensitivity   Date/Time Value Ref Range Status   07/03/2025 09:13 AM 17 0 - 20 ng/L Final   05/10/2025 04:01 PM 16 0 - 20 ng/L Final   05/10/2025 02:57 PM 15 0 - 20 ng/L Final     B TYPE NATRIURETIC PEPTIDE (BNP)   Date/Time " Value Ref Range Status   04/23/2025 09:54  (H) <100 pg/mL Final     Comment:        BNP levels increase with age in the general  population with the highest values seen in  individuals greater than 75 years of age.  Reference: J. Am. Cecily. Cardiol. 2002; 40:976-982.          BNP   Date/Time Value Ref Range Status   07/03/2025 09:13  (H) 0 - 99 pg/mL Final   05/10/2025 02:57  (H) 0 - 99 pg/mL Final   09/28/2024 10:14  (H) 0 - 99 pg/mL Final     LDL   Date/Time Value Ref Range Status   08/28/2023 10:30 AM 43 0 - 99 mg/dL Final     Comment:     .                           NEAR      BORD      AGE      DESIRABLE  OPTIMAL    HIGH     HIGH     VERY HIGH     0-19 Y     0 - 109     ---    110-129   >/= 130     ----    20-24 Y     0 - 119     ---    120-159   >/= 160     ----      >24 Y     0 -  99   100-129  130-159   160-189     >/=190  .   01/09/2023 01:47 PM 54 0 - 99 mg/dL Final     Comment:     .                           NEAR      BORD      AGE      DESIRABLE  OPTIMAL    HIGH     HIGH     VERY HIGH     0-19 Y     0 - 109     ---    110-129   >/= 130     ----    20-24 Y     0 - 119     ---    120-159   >/= 160     ----      >24 Y     0 -  99   100-129  130-159   160-189     >/=190  .       Triglycerides   Date/Time Value Ref Range Status   11/04/2024 10:18  0 - 149 mg/dL Final     Comment:     Age              Desirable        Borderline         High        Very High  SEX:B           mg/dL             mg/dL               mg/dL      mg/dL  <=14D                       ----               ----        ----  15D-365D                    ----               ----        ----  1Y-9Y           0-74               75-99             >=100       ----  10Y-19Y        0-89                            >=130       ----  20Y-24Y        0-114             115-149             >=150      ----  >= 25Y         0-149             150-199             200-499    >=500      Venipuncture immediately after  or during the administration of Metamizole may lead to falsely low results. Testing should be performed immediately prior to Metamizole dosing.   08/28/2023 10:30  (H) 0 - 149 mg/dL Final     Comment:     .      AGE      DESIRABLE   BORDERLINE HIGH   HIGH     VERY HIGH   0 D-90 D    19 - 174         ----         ----        ----  91 D- 9 Y     0 -  74        75 -  99     >/= 100      ----    10-19 Y     0 -  89        90 - 129     >/= 130      ----    20-24 Y     0 - 114       115 - 149     >/= 150      ----         >24 Y     0 - 149       150 - 199    200- 499    >/= 500  .   Venipuncture immediately after or during the    administration of Metamizole may lead to falsely   low results. Testing should be performed immediately   prior to Metamizole dosing.   01/09/2023 01:47  0 - 149 mg/dL Final     Comment:     .      AGE      DESIRABLE   BORDERLINE HIGH   HIGH     VERY HIGH   0 D-90 D    19 - 174         ----         ----        ----  91 D- 9 Y     0 -  74        75 -  99     >/= 100      ----    10-19 Y     0 -  89        90 - 129     >/= 130      ----    20-24 Y     0 - 114       115 - 149     >/= 150      ----         >24 Y     0 - 149       150 - 199    200- 499    >/= 500  .   Venipuncture immediately after or during the    administration of Metamizole may lead to falsely   low results. Testing should be performed immediately   prior to Metamizole dosing.           Echocardiogram:   Recent Labs     06/27/24  1613   EF 55   LVIDD 4.50   RV 26.0   [id number] 11.00   Transthoracic Echo (TTE) Complete With Contrast 06/27/2024    Narrative  [facility], Echo Lab  [address], [place] [address]  Tel [phone] and Fax [phone]    TRANSTHORACIC ECHOCARDIOGRAM REPORT      Patient Name:      CARLOS CABRERA [facility]       Reading Physician:    [id number] Benoit Carlson MD  Study Date:        6/27/2024            Ordering Provider:    [id number] JAMES C RAMICONE  MRN/PID:            [id number]             Fellow:  Accession#:        [id number]         Nurse:                Dorian Abdullahi RN  Date of Birth/Age: 1940 / 83 years Sonographer:          Brian Downs RDCS  Gender:            M                    Additional Staff:  Height:            175.26 cm            Admit Date:  Weight:            93.44 kg             Admission Status:     Outpatient  BSA / BMI:         2.09 m2 / 30.42      Encounter#:           [id number]  kg/m2  Blood Pressure:    112/68 mmHg          Department Location:  [facility]  Echo Lab    Study Type:    TRANSTHORACIC ECHO (TTE) COMPLETE  Diagnosis/ICD: Permanent AFib-I48.21  Indication:    Atrial fibrillation  CPT Code:      Echo Complete w Full Doppler-[id number]    Patient History:  Pertinent History: A-fib; HTN; DVT; s/p PPM.    Study Detail: The following Echo studies were performed: 2D, M-Mode, Doppler and  color flow. Technically challenging study due to body habitus and  poor acoustic windows. Definity used as a contrast agent for  endocardial border definition. Total contrast used for this  procedure was 2.0 mL via IV push.      PHYSICIAN INTERPRETATION:  Left Ventricle: The left ventricular systolic function is normal, with a visually estimated ejection fraction of 55%. There are no regional wall motion abnormalities. The left ventricular cavity size is normal. Left ventricular diastolic filling was indeterminate.  Left Atrium: The left atrium is normal in size.  Right Ventricle: The right ventricle is normal in size. There is normal right ventricular global systolic function.  Right Atrium: The right atrium is normal in size.  Aortic Valve: The aortic valve is trileaflet. There is no evidence of aortic valve regurgitation. The peak instantaneous gradient of the aortic valve is 7.6 mmHg.  Mitral Valve: The mitral valve is normal in structure. There is no evidence of mitral valve regurgitation.  Tricuspid Valve: The tricuspid valve is  structurally normal. There is mild tricuspid regurgitation.  Pulmonic Valve: The pulmonic valve is structurally normal. There is mild pulmonic valve regurgitation.  Pericardium: There is no pericardial effusion noted.  Aorta: The aortic root is normal.      CONCLUSIONS:  1. The left ventricular systolic function is normal, with a visually estimated ejection fraction of 55%.  2. Left ventricular diastolic filling was indeterminate.  3. There is normal right ventricular global systolic function.    QUANTITATIVE DATA SUMMARY:  2D MEASUREMENTS:  Normal Ranges:  Ao Root d:     3.10 cm   (2.0-3.7cm)  LAs:           3.73 cm   (2.7-4.0cm)  IVSd:          0.95 cm   (0.6-1.1cm)  LVPWd:         0.84 cm   (0.6-1.1cm)  LVIDd:         4.50 cm   (3.9-5.9cm)  LVIDs:         3.36 cm  LV Mass Index: 63.1 g/m2  LV % FS        25.5 %    LA VOLUME:  Normal Ranges:  LA Volume Index: 30.6 ml/m2    RA VOLUME BY A/L METHOD:  Normal Ranges:  RA Area A4C: 17.6 cm2    AORTA MEASUREMENTS:  Normal Ranges:  Asc Ao, d: 2.90 cm (2.1-3.4cm)    LV SYSTOLIC FUNCTION BY 2D PLANIMETRY (MOD):  Normal Ranges:  EF-A4C View:    55 % (>=55%)  EF-A2C View:    64 %  EF-Biplane:     60 %  EF-Visual:      55 %  LV EF Reported: 55 %    AORTIC VALVE:  Normal Ranges:  AoV Vmax:      1.38 m/s (<=1.7m/s)  AoV Peak P.6 mmHg (<20mmHg)  LVOT Max Can:  1.11 m/s (<=1.1m/s)  LVOT VTI:      21.26 cm  LVOT Diameter: 1.99 cm  (1.8-2.4cm)  AoV Area,Vmax: 2.50 cm2 (2.5-4.5cm2)      RIGHT VENTRICLE:  RV Basal 4.12 cm  RV s'    0.11 m/s    TRICUSPID VALVE/RVSP:  Normal Ranges:  Peak TR Velocity: 2.40 m/s  RV Syst Pressure: 26.0 mmHg (< 30mmHg)    PULMONIC VALVE:  Normal Ranges:  PV Max Can: 1.0 m/s  (0.6-0.9m/s)  PV Max PG:  3.9 mmHg      [id number] Benoit Carlson MD  Electronically signed on 2024 at 4:43:55 PM        ** Final **    Coronary Angiography: No results found for this or any previous visit from the past 1800 days.    Right Heart Cath: No results found for this or any  previous visit from the past 1800 days.    Cardiac Scoring: No results found for this or any previous visit from the past 1800 days.    Cardiac MRI: No results found for this or any previous visit from the past 1800 days.    Nuclear:No results found for this or any previous visit from the past 1800 days.    Metabolic Stress: No results found for this or any previous visit from the past 1800 days.      Assessment:    Impression and Recommendations:    Dragan Calvin is a 84 y.o. male with PMH significant for CAD, permanent Afib (on eliquis), DVT s/p IVC filter, HLD, HTN, SSS s/p implanted pacemaker, PUD, GERD, DMT2, peripheral edema, SBO(s), and chronic BLE weakness d/t spinal stenosis. EP consulted for generator exchange.    :: device achieved MITRA on 6/24 and is a limitation for having an MRI completed  :: patient in afib w/ vent rate on the 70s  - plan for generator exchange on 7/8 - ~ 15:00  - Patient aware and updated in regards to the plan. In addition, spoke with the patient in regards to having his DNAR reverted for the procedure and patient was agreeable to it.  - Please have the patient NPO @ MN (ok for clear liquids until 5am, per  policy), get coags and type and screen  - Hold any AC including chemical DVT prophylaxis such as enoxaparin and subQ heparin    Santiago Amado MD  PGY-5 Cardiovascular Medicine Fellow    Thank you for the opportunity to contribute to the care of this patient. Above recommendations discussed with Dr. Alexander.     If further questions arise, please page the EP consult pager at [phone] on weekdays 7AM - 6PM and weekends 7AM - 2PM, or at [phone] at all other times. The EP device nurse can be reached at pager [phone] during regular business hours M-F.

## 2025-07-08 ENCOUNTER — APPOINTMENT (OUTPATIENT)
Dept: NEUROSURGERY | Facility: CLINIC | Age: 85
End: 2025-07-08
Payer: MEDICARE

## 2025-07-08 ENCOUNTER — APPOINTMENT (OUTPATIENT)
Dept: CARDIOLOGY | Facility: HOSPITAL | Age: 85
DRG: 259 | End: 2025-07-08
Payer: MEDICARE

## 2025-07-08 DIAGNOSIS — I49.5 SICK SINUS SYNDROME (MULTI): Primary | ICD-10-CM

## 2025-07-08 LAB
ABO GROUP (TYPE) IN BLOOD: NORMAL
ANTIBODY SCREEN: NORMAL
APTT PPP: 24 SECONDS (ref 26–36)
BODY SURFACE AREA: 2.2 M2
ERYTHROCYTE [DISTWIDTH] IN BLOOD BY AUTOMATED COUNT: 12.7 % (ref 11.5–14.5)
GLUCOSE BLD MANUAL STRIP-MCNC: 263 MG/DL (ref 74–99)
GLUCOSE BLD MANUAL STRIP-MCNC: 274 MG/DL (ref 74–99)
GLUCOSE BLD MANUAL STRIP-MCNC: 306 MG/DL (ref 74–99)
GLUCOSE BLD MANUAL STRIP-MCNC: 343 MG/DL (ref 74–99)
GLUCOSE BLD MANUAL STRIP-MCNC: 409 MG/DL (ref 74–99)
GLUCOSE BLD MANUAL STRIP-MCNC: 480 MG/DL (ref 74–99)
HCT VFR BLD AUTO: 35.5 % (ref 41–52)
HGB BLD-MCNC: 12.2 G/DL (ref 13.5–17.5)
INR PPP: 1.3 (ref 0.9–1.1)
MCH RBC QN AUTO: 33.3 PG (ref 26–34)
MCHC RBC AUTO-ENTMCNC: 34.4 G/DL (ref 32–36)
MCV RBC AUTO: 97 FL (ref 80–100)
NRBC BLD-RTO: 0 /100 WBCS (ref 0–0)
PLATELET # BLD AUTO: 193 X10*3/UL (ref 150–450)
PROTHROMBIN TIME: 14.3 SECONDS (ref 9.8–12.4)
RBC # BLD AUTO: 3.66 X10*6/UL (ref 4.5–5.9)
RH FACTOR (ANTIGEN D): NORMAL
WBC # BLD AUTO: 14.1 X10*3/UL (ref 4.4–11.3)

## 2025-07-08 PROCEDURE — 2500000004 HC RX 250 GENERAL PHARMACY W/ HCPCS (ALT 636 FOR OP/ED): Performed by: STUDENT IN AN ORGANIZED HEALTH CARE EDUCATION/TRAINING PROGRAM

## 2025-07-08 PROCEDURE — 0JH606Z INSERTION OF PACEMAKER, DUAL CHAMBER INTO CHEST SUBCUTANEOUS TISSUE AND FASCIA, OPEN APPROACH: ICD-10-PCS | Performed by: STUDENT IN AN ORGANIZED HEALTH CARE EDUCATION/TRAINING PROGRAM

## 2025-07-08 PROCEDURE — 99153 MOD SED SAME PHYS/QHP EA: CPT | Performed by: STUDENT IN AN ORGANIZED HEALTH CARE EDUCATION/TRAINING PROGRAM

## 2025-07-08 PROCEDURE — C1785 PMKR, DUAL, RATE-RESP: HCPCS | Performed by: STUDENT IN AN ORGANIZED HEALTH CARE EDUCATION/TRAINING PROGRAM

## 2025-07-08 PROCEDURE — 1210000001 HC SEMI-PRIVATE ROOM DAILY

## 2025-07-08 PROCEDURE — 85027 COMPLETE CBC AUTOMATED: CPT | Performed by: NURSE PRACTITIONER

## 2025-07-08 PROCEDURE — 33222 RELOCATION POCKET PACEMAKER: CPT | Performed by: STUDENT IN AN ORGANIZED HEALTH CARE EDUCATION/TRAINING PROGRAM

## 2025-07-08 PROCEDURE — 2500000001 HC RX 250 WO HCPCS SELF ADMINISTERED DRUGS (ALT 637 FOR MEDICARE OP): Performed by: NURSE PRACTITIONER

## 2025-07-08 PROCEDURE — 99152 MOD SED SAME PHYS/QHP 5/>YRS: CPT | Performed by: STUDENT IN AN ORGANIZED HEALTH CARE EDUCATION/TRAINING PROGRAM

## 2025-07-08 PROCEDURE — C1781 MESH (IMPLANTABLE): HCPCS | Performed by: STUDENT IN AN ORGANIZED HEALTH CARE EDUCATION/TRAINING PROGRAM

## 2025-07-08 PROCEDURE — 2750000001 HC OR 275 NO HCPCS: Performed by: STUDENT IN AN ORGANIZED HEALTH CARE EDUCATION/TRAINING PROGRAM

## 2025-07-08 PROCEDURE — 2500000002 HC RX 250 W HCPCS SELF ADMINISTERED DRUGS (ALT 637 FOR MEDICARE OP, ALT 636 FOR OP/ED): Performed by: STUDENT IN AN ORGANIZED HEALTH CARE EDUCATION/TRAINING PROGRAM

## 2025-07-08 PROCEDURE — 4B02XSZ MEASUREMENT OF CARDIAC PACEMAKER, EXTERNAL APPROACH: ICD-10-PCS | Performed by: STUDENT IN AN ORGANIZED HEALTH CARE EDUCATION/TRAINING PROGRAM

## 2025-07-08 PROCEDURE — 3E0102A INTRODUCTION OF ANTI-INFECTIVE ENVELOPE INTO SUBCUTANEOUS TISSUE, OPEN APPROACH: ICD-10-PCS | Performed by: STUDENT IN AN ORGANIZED HEALTH CARE EDUCATION/TRAINING PROGRAM

## 2025-07-08 PROCEDURE — 2500000001 HC RX 250 WO HCPCS SELF ADMINISTERED DRUGS (ALT 637 FOR MEDICARE OP): Performed by: STUDENT IN AN ORGANIZED HEALTH CARE EDUCATION/TRAINING PROGRAM

## 2025-07-08 PROCEDURE — 2500000002 HC RX 250 W HCPCS SELF ADMINISTERED DRUGS (ALT 637 FOR MEDICARE OP, ALT 636 FOR OP/ED): Performed by: NURSE PRACTITIONER

## 2025-07-08 PROCEDURE — 2720000007 HC OR 272 NO HCPCS: Performed by: STUDENT IN AN ORGANIZED HEALTH CARE EDUCATION/TRAINING PROGRAM

## 2025-07-08 PROCEDURE — 33228 REMV&REPLC PM GEN DUAL LEAD: CPT | Performed by: STUDENT IN AN ORGANIZED HEALTH CARE EDUCATION/TRAINING PROGRAM

## 2025-07-08 PROCEDURE — 2500000005 HC RX 250 GENERAL PHARMACY W/O HCPCS: Performed by: STUDENT IN AN ORGANIZED HEALTH CARE EDUCATION/TRAINING PROGRAM

## 2025-07-08 PROCEDURE — 97530 THERAPEUTIC ACTIVITIES: CPT | Mod: GP

## 2025-07-08 PROCEDURE — 2780000003 HC OR 278 NO HCPCS: Performed by: STUDENT IN AN ORGANIZED HEALTH CARE EDUCATION/TRAINING PROGRAM

## 2025-07-08 PROCEDURE — 82947 ASSAY GLUCOSE BLOOD QUANT: CPT

## 2025-07-08 PROCEDURE — 86900 BLOOD TYPING SEROLOGIC ABO: CPT | Performed by: STUDENT IN AN ORGANIZED HEALTH CARE EDUCATION/TRAINING PROGRAM

## 2025-07-08 PROCEDURE — 0JPT0PZ REMOVAL OF CARDIAC RHYTHM RELATED DEVICE FROM TRUNK SUBCUTANEOUS TISSUE AND FASCIA, OPEN APPROACH: ICD-10-PCS | Performed by: STUDENT IN AN ORGANIZED HEALTH CARE EDUCATION/TRAINING PROGRAM

## 2025-07-08 PROCEDURE — 97162 PT EVAL MOD COMPLEX 30 MIN: CPT | Mod: GP

## 2025-07-08 PROCEDURE — 33222 RELOCATION POCKET PACEMAKER: CPT | Mod: 59 | Performed by: STUDENT IN AN ORGANIZED HEALTH CARE EDUCATION/TRAINING PROGRAM

## 2025-07-08 PROCEDURE — 36415 COLL VENOUS BLD VENIPUNCTURE: CPT | Performed by: NURSE PRACTITIONER

## 2025-07-08 PROCEDURE — 2500000005 HC RX 250 GENERAL PHARMACY W/O HCPCS: Performed by: NURSE PRACTITIONER

## 2025-07-08 PROCEDURE — 85610 PROTHROMBIN TIME: CPT | Performed by: STUDENT IN AN ORGANIZED HEALTH CARE EDUCATION/TRAINING PROGRAM

## 2025-07-08 DEVICE — IPG W1DR01 AZURE XT DR MRI WL USA BCP
Type: IMPLANTABLE DEVICE | Site: CHEST  WALL | Status: FUNCTIONAL
Brand: AZURE™ XT DR MRI SURESCAN™

## 2025-07-08 RX ORDER — BUPIVACAINE HYDROCHLORIDE 5 MG/ML
INJECTION, SOLUTION EPIDURAL; INTRACAUDAL; PERINEURAL AS NEEDED
Status: DISCONTINUED | OUTPATIENT
Start: 2025-07-08 | End: 2025-07-08 | Stop reason: HOSPADM

## 2025-07-08 RX ORDER — CEFAZOLIN 1 G/1
INJECTION, POWDER, FOR SOLUTION INTRAVENOUS AS NEEDED
Status: DISCONTINUED | OUTPATIENT
Start: 2025-07-08 | End: 2025-07-08 | Stop reason: HOSPADM

## 2025-07-08 RX ORDER — SODIUM CHLORIDE 9 MG/ML
INJECTION, SOLUTION INTRAVENOUS CONTINUOUS PRN
Status: COMPLETED | OUTPATIENT
Start: 2025-07-08 | End: 2025-07-08

## 2025-07-08 RX ORDER — VANCOMYCIN HYDROCHLORIDE 1 G/20ML
INJECTION, POWDER, LYOPHILIZED, FOR SOLUTION INTRAVENOUS AS NEEDED
Status: DISCONTINUED | OUTPATIENT
Start: 2025-07-08 | End: 2025-07-08 | Stop reason: HOSPADM

## 2025-07-08 RX ORDER — MIDAZOLAM HYDROCHLORIDE 1 MG/ML
INJECTION, SOLUTION INTRAMUSCULAR; INTRAVENOUS AS NEEDED
Status: DISCONTINUED | OUTPATIENT
Start: 2025-07-08 | End: 2025-07-08 | Stop reason: HOSPADM

## 2025-07-08 RX ORDER — MIDODRINE HYDROCHLORIDE 5 MG/1
5 TABLET ORAL ONCE
Status: COMPLETED | OUTPATIENT
Start: 2025-07-08 | End: 2025-07-08

## 2025-07-08 RX ORDER — FENTANYL CITRATE 50 UG/ML
INJECTION, SOLUTION INTRAMUSCULAR; INTRAVENOUS AS NEEDED
Status: DISCONTINUED | OUTPATIENT
Start: 2025-07-08 | End: 2025-07-08 | Stop reason: HOSPADM

## 2025-07-08 RX ORDER — INSULIN LISPRO 100 [IU]/ML
8 INJECTION, SOLUTION INTRAVENOUS; SUBCUTANEOUS ONCE
Status: COMPLETED | OUTPATIENT
Start: 2025-07-08 | End: 2025-07-08

## 2025-07-08 RX ADMIN — ROSUVASTATIN 5 MG: 5 TABLET, FILM COATED ORAL at 20:49

## 2025-07-08 RX ADMIN — LISINOPRIL 10 MG: 10 TABLET ORAL at 08:23

## 2025-07-08 RX ADMIN — INSULIN LISPRO 5 UNITS: 100 INJECTION, SOLUTION INTRAVENOUS; SUBCUTANEOUS at 23:59

## 2025-07-08 RX ADMIN — INSULIN LISPRO 8 UNITS: 100 INJECTION, SOLUTION INTRAVENOUS; SUBCUTANEOUS at 21:10

## 2025-07-08 RX ADMIN — INSULIN GLARGINE 18 UNITS: 100 INJECTION, SOLUTION SUBCUTANEOUS at 20:50

## 2025-07-08 RX ADMIN — OXYCODONE HYDROCHLORIDE 5 MG: 5 TABLET ORAL at 18:20

## 2025-07-08 RX ADMIN — PANTOPRAZOLE SODIUM 40 MG: 40 TABLET, DELAYED RELEASE ORAL at 18:20

## 2025-07-08 RX ADMIN — TORSEMIDE 40 MG: 20 TABLET ORAL at 08:23

## 2025-07-08 RX ADMIN — DEXAMETHASONE 4 MG: 2 TABLET ORAL at 22:41

## 2025-07-08 RX ADMIN — LIDOCAINE 4% 1 PATCH: 40 PATCH TOPICAL at 08:33

## 2025-07-08 RX ADMIN — CYANOCOBALAMIN TAB 1000 MCG 1000 MCG: 1000 TAB at 08:23

## 2025-07-08 RX ADMIN — TAMSULOSIN HYDROCHLORIDE 0.8 MG: 0.4 CAPSULE ORAL at 08:23

## 2025-07-08 RX ADMIN — DILTIAZEM HYDROCHLORIDE 240 MG: 240 CAPSULE, EXTENDED RELEASE ORAL at 20:49

## 2025-07-08 RX ADMIN — METOPROLOL TARTRATE 25 MG: 25 TABLET, FILM COATED ORAL at 08:23

## 2025-07-08 RX ADMIN — INSULIN LISPRO 4 UNITS: 100 INJECTION, SOLUTION INTRAVENOUS; SUBCUTANEOUS at 13:23

## 2025-07-08 RX ADMIN — POTASSIUM CHLORIDE 10 MEQ: 750 TABLET, FILM COATED, EXTENDED RELEASE ORAL at 08:23

## 2025-07-08 RX ADMIN — DILTIAZEM HYDROCHLORIDE 240 MG: 240 CAPSULE, EXTENDED RELEASE ORAL at 08:22

## 2025-07-08 RX ADMIN — INSULIN LISPRO 4 UNITS: 100 INJECTION, SOLUTION INTRAVENOUS; SUBCUTANEOUS at 18:20

## 2025-07-08 RX ADMIN — PANTOPRAZOLE SODIUM 40 MG: 40 TABLET, DELAYED RELEASE ORAL at 06:38

## 2025-07-08 RX ADMIN — DEXAMETHASONE 4 MG: 2 TABLET ORAL at 06:38

## 2025-07-08 RX ADMIN — SODIUM CHLORIDE 500 ML: 0.9 INJECTION, SOLUTION INTRAVENOUS at 21:09

## 2025-07-08 RX ADMIN — MIDODRINE HYDROCHLORIDE 5 MG: 5 TABLET ORAL at 21:10

## 2025-07-08 RX ADMIN — DEXAMETHASONE 4 MG: 2 TABLET ORAL at 14:41

## 2025-07-08 ASSESSMENT — COGNITIVE AND FUNCTIONAL STATUS - GENERAL
WALKING IN HOSPITAL ROOM: A LOT
CLIMB 3 TO 5 STEPS WITH RAILING: A LOT
MOVING TO AND FROM BED TO CHAIR: A LOT
STANDING UP FROM CHAIR USING ARMS: A LOT
MOBILITY SCORE: 12
MOVING FROM LYING ON BACK TO SITTING ON SIDE OF FLAT BED WITH BEDRAILS: A LOT
TURNING FROM BACK TO SIDE WHILE IN FLAT BAD: A LOT

## 2025-07-08 ASSESSMENT — PAIN SCALES - WONG BAKER: WONGBAKER_NUMERICALRESPONSE: NO HURT

## 2025-07-08 ASSESSMENT — PAIN - FUNCTIONAL ASSESSMENT
PAIN_FUNCTIONAL_ASSESSMENT: 0-10

## 2025-07-08 ASSESSMENT — PAIN SCALES - GENERAL
PAINLEVEL_OUTOF10: 0 - NO PAIN
PAINLEVEL_OUTOF10: 3
PAINLEVEL_OUTOF10: 0 - NO PAIN

## 2025-07-08 NOTE — PROGRESS NOTES
Physical Therapy    Physical Therapy Evaluation & Treatment    Patient Name: Dragan Calvin  MRN: [id number]  Department: [place] [address]  Room: [address] [place], [place] [address]  Today's Date: 7/8/2025   Time Calculation  Start Time: 1043  Stop Time: 1108  Time Calculation (min): 25 min    Assessment/Plan   PT Assessment  PT Assessment Results: Decreased strength, Decreased range of motion, Decreased endurance, Impaired balance, Decreased mobility  Rehab Prognosis: Excellent  Barriers to Discharge Home: Caregiver assistance, Physical needs  Caregiver Assistance: Caregiver assistance needed per identified barriers - however, level of patient's required assistance exceeds assistance available at home  Physical Needs: 24hr mobility assistance needed, 24hr ADL assistance needed, Weight bearing precautions unable to be safely maintained, In-home setup navigation limited by function/safety  End of Session Communication: Bedside nurse  Assessment Comment: 84 y.o. M admitted for further work up of BLE and spinal stenosis currently demonstrating impaired strength, balance, and function. Pt will benefit from continued PT in house and after discharge at MOD intensity to maximize functional return.  End of Session Patient Position: Bed, 3 rail up, Alarm on   IP OR SWING BED PT PLAN  Inpatient or Swing Bed: Inpatient  PT Plan  Treatment/Interventions: Bed mobility, Transfer training, Gait training, Balance training, Neuromuscular re-education, Strengthening, Endurance training, Therapeutic exercise, Therapeutic activity  PT Plan: Ongoing PT  PT Frequency: 3 times per week (while in hospital)  PT Discharge Recommendations: Moderate intensity level of continued care (Based on current functional status and rehab potential, patient is anticipated to tolerate and benefit from 5 or more days per week of skilled rehabilitative therapy after discharge from this acute inpatient hospitalization.)  PT Recommended Transfer Status: Assist x1  PT - OK to Discharge:  Yes      Subjective     PT Visit Info:  PT Received On: 07/08/25  General Visit Information:  General  Reason for Referral: BLE weakness  Past Medical History Relevant to Rehab: 84 y.o. male with PMH significant for CAD, permanent Afib, DVT s/p IVC filter, HLD, HTN, SSS s/p implanted pacemaker, PUD, GERD, DMT2, peripheral edema, SBO(s), and chronic BLE weakness d/t spinal stenosis who presents for further work up/treatment.  Prior to Session Communication: Bedside nurse  Patient Position Received: Bed, 3 rail up, Alarm on  Preferred Learning Style: verbal, auditory  General Comment: Pt resting in bed upon entry. Pleasant and cooperative. Willing to work with PT. Continues to endorse BLE weakness though willing to attempt PT.  Home Living:  Home Living  Type of Home: House  Lives With: Spouse  Home Adaptive Equipment: Walker rolling or standard, Cane  Home Layout: One level  Home Access: Stairs to enter with rails  Entrance Stairs-Number of Steps: 3  Prior Level of Function:  Prior Function Per Pt/Caregiver Report  Level of Louann: Independent with ADLs and functional transfers (though with progressive decline in function in last 1-2 months with multiple falls leading to admission.)  Receives Help From:  (Wife assists as she can though unable to provide sufficient assist w mobility.)  Ambulatory Assistance: Independent (Formerly independent household distances with a wheeled walker though with increased falls at home from legs giving out)  Prior Function Comments: Pt with BLE weakness from stenosis (reason for admit) limiting function in past several months.  Precautions:  Precautions  Medical Precautions: Fall precautions (Pt reports having had 5-10 falls in last 1-2 months from LE weakness leading to admission.)    Objective   Pain:  Pain Assessment  Pain Assessment: 0-10  0-10 (Numeric) Pain Score: 3  Pain Type: Chronic pain  Pain Location: Sacrum  Cognition:  Cognition  Overall Cognitive Status: Within  Functional Limits  Orientation Level: Oriented X4  Insight: Mild, Within function limits  Impulsive: Within functional limits    General Assessments:     Activity Tolerance  Endurance: Decreased tolerance for upright activites    Sensation  Light Touch: No apparent deficits    Strength  Strength Comments: BLE grossly >4-,4/5 throughout  Strength  Strength Comments: BLE grossly >4-,4/5 throughout    Perception  Inattention/Neglect: Appears intact    Coordination  Movements are Fluid and Coordinated: Yes    Postural Control  Postural Control: Impaired  Posture Comment: flexed posture    Static Sitting Balance  Static Sitting-Balance Support: Feet supported  Static Sitting-Level of Assistance: Close supervision  Static Sitting-Comment/Number of Minutes: EOB approximately 10 minutes total    Static Standing Balance  Static Standing-Balance Support: Bilateral upper extremity supported (on a wheeled walker)  Static Standing-Level of Assistance: Minimum assistance  Functional Assessments:       Bed Mobility  Bed Mobility: Yes  Bed Mobility 1  Bed Mobility 1: Supine to sitting, Sitting to supine  Level of Assistance 1: Moderate assistance    Transfers  Transfer: Yes  Transfer 1  Transfer From 1: Sit to, Stand to  Transfer to 1: Stand, Sit  Transfer Device 1: Walker  Transfer Level of Assistance 1: Minimum assistance, Minimal verbal cues    Ambulation/Gait Training  Ambulation/Gait Training Performed: Yes  Ambulation/Gait Training 1  Surface 1: Level tile  Device 1: Rolling walker  Assistance 1: Minimum assistance, Minimal verbal cues, Minimal tactile cues  Quality of Gait 1: Diminished heel strike, Inconsistent stride length, Decreased step length, Shuffling gait, Forward flexed posture  Comments/Distance (ft) 1: 5ft fwd/retro (Several small/short shuffling steps. Flexed posture. At high falls risk without assist.)    Stairs  Stairs: No    Treatments:     Bed Mobility  Bed Mobility: Yes  Bed Mobility 1  Bed Mobility 1:  Supine to sitting, Sitting to supine  Level of Assistance 1: Moderate assistance    Ambulation/Gait Training  Ambulation/Gait Training Performed: Yes  Ambulation/Gait Training 1  Surface 1: Level tile  Device 1: Rolling walker  Assistance 1: Minimum assistance, Minimal verbal cues, Minimal tactile cues  Quality of Gait 1: Diminished heel strike, Inconsistent stride length, Decreased step length, Shuffling gait, Forward flexed posture  Comments/Distance (ft) 1: 5ft fwd/retro (Several small/short shuffling steps. Flexed posture. At high falls risk without assist.)  Transfers  Transfer: Yes  Transfer 1  Transfer From 1: Sit to, Stand to  Transfer to 1: Stand, Sit  Transfer Device 1: Walker  Transfer Level of Assistance 1: Minimum assistance, Minimal verbal cues    Stairs  Stairs: No     Outcome Measures:  [facility] Basic Mobility  Turning from your back to your side while in a flat bed without using bedrails: A lot  Moving from lying on your back to sitting on the side of a flat bed without using bedrails: A lot  Moving to and from bed to chair (including a wheelchair): A lot  Standing up from a chair using your arms (e.g. wheelchair or bedside chair): A lot  To walk in hospital room: A lot  Climbing 3-5 steps with railing: A lot  Basic Mobility - Total Score: 12    Encounter Problems       Encounter Problems (Active)       Mobility       STG - Patient will ambulate >20ft, wheeled walker, min assist       Start:  07/08/25    Expected End:  07/22/25               PT Transfers       STG - Patient to transfer to and from sit to supine min assist       Start:  07/08/25    Expected End:  07/22/25            STG - Patient will transfer sit to and from stand min assist       Start:  07/08/25    Expected End:  07/22/25                   Education Documentation  Precautions, taught by Kd Morgan, PT at 7/8/2025 11:47 AM.  Learner: Patient  Readiness: Acceptance  Method: Explanation  Response: Verbalizes Understanding  Comment:  Up with assist/use of call light. Need for continued PT after DC.    Mobility Training, taught by Kd Morgan, PT at 7/8/2025 11:47 AM.  Learner: Patient  Readiness: Acceptance  Method: Explanation  Response: Verbalizes Understanding  Comment: Up with assist/use of call light. Need for continued PT after DC.    Education Comments  No comments found.

## 2025-07-08 NOTE — NURSING NOTE
Pt. Returned to unit from EP lab approx. 1750 this shift. Initially drowsy, but is now A&O X4 and following commands. Dressing to L chest is dry and intact and VSS. Pt. Is having dinner at this time.

## 2025-07-08 NOTE — CARE PLAN
The patient's goals for the shift include      The clinical goals for the shift include Free of fallsa and injury    Over the shift, the patient continues to make progress toward the following goals. Has been free of falls and injury this shift.

## 2025-07-08 NOTE — CARE PLAN
The patient's goals for the shift include      The clinical goals for the shift include pt will remain hDS    Over the shift, the patient did not make progress toward the following goals.   Problem: Pain - Adult  Goal: Verbalizes/displays adequate comfort level or baseline comfort level  Outcome: Progressing     Problem: Safety - Adult  Goal: Free from fall injury  Outcome: Progressing     Problem: Discharge Planning  Goal: Discharge to home or other facility with appropriate resources  Outcome: Progressing     Problem: Chronic Conditions and Co-morbidities  Goal: Patient's chronic conditions and co-morbidity symptoms are monitored and maintained or improved  Outcome: Progressing     Problem: Nutrition  Goal: Nutrient intake appropriate for maintaining nutritional needs  Outcome: Progressing     Problem: Skin  Goal: Decreased wound size/increased tissue granulation at next dressing change  Outcome: Progressing  Goal: Participates in plan/prevention/treatment measures  Outcome: Progressing  Goal: Prevent/manage excess moisture  Outcome: Progressing  Goal: Prevent/minimize sheer/friction injuries  Outcome: Progressing  Goal: Promote/optimize nutrition  Outcome: Progressing  Goal: Promote skin healing  Outcome: Progressing

## 2025-07-08 NOTE — ASSESSMENT & PLAN NOTE
-can NOT have MRI until pacemaker replaced, per EP   -Consult to EP, discussed with team   -EP replacement today  -NPO after MN clears till 5am then NPO   -coags   -T&S   -HOLD apixaban, discuss timing for restarting with EP

## 2025-07-08 NOTE — PROGRESS NOTES
[facility]   HOSPITAL MEDICINE     PROGRESS NOTE       PATIENT NAME: Dragan Calvin   MRN: [id number]   SERVICE DATE: 25   SERVICE TIME: 1:12 PM      Hospital Medicine/Primary Attending: Blade Lorenz DO   LENGTH OF STAY: 2     Assessment      84 y.o. male with PMH significant for CAD, permanent Afib (on eliquis), DVT s/p IVC filter, HLD, HTN, SSS s/p implanted pacemaker, PUD, GERD, DMT2, peripheral edema, SBO(s), and chronic BLE weakness d/t spinal stenosis who presented to [facility] ED from home with complaints of weakness . Seen by NSG at [facility] who rec MRI. MRI department allegedly refused to do MRI 2/2 PPM (despite alleged EP hilda).  During device check at our hospital, it was discovered that the device his battery had .  EP was consulted and plan to do an exchange on 2025.  Subsequently, hopefully patient can get MRI.  Please notify neurosurgery when MRI is complete.     CHIEF COMPLAINT: Leg weakness, bilateral    Assessment & Plan  Pacemaker at end of battery life  Presence of cardiac pacemaker  -can NOT have MRI until pacemaker replaced, per EP   -Consult to EP, discussed with team   -EP replacement today  -NPO after MN clears till 5am then NPO   -coags   -T&S   -HOLD apixaban, discuss timing for restarting with EP  Lumbar spinal stenosis  Weakness of both lower extremities  Cervical stenosis of spine  - MRI for lumbar and cervical per NSG   -pain control   -please notify NSG when MRI done   -ctn dexamethasone 4mg q8 but switch to oral     Type 2 diabetes mellitus, with long-term current use of insulin  HOLD glipizide  home med  #Type 2 diabetes mellitus   -Lantus 30units >18 units while NPO as pt been running high   -SSI   -Hypoglycemia protocol    -Blood glucose checks qAC & qHS   -Carbohydrate controlled diet     Paroxysmal atrial fibrillation (Multi)  HOLD apix for procedure     Hypertension, benign  Continue with home meds lisinopril  Fall  CT of  the head on 7/3/2025 no acute finding  Fall precaution  PT OT  Leg weakness, bilateral    Chronic heart failure with preserved ejection fraction (HFpEF, >= 50%)  Ctn home torsemide 40mg dialy   ctn metolazone wed and Sunday        I reviewed:    All new and relevant labs and imaging   New EKGs  Consultant notes   Vitals Signs   Nursing notes                OBESE?: Yes, Class 1 Obesity: BMI of 30.0 to 34.9    DISPOSITION:  Medical Necessity Statement: Patient is not medically ready for discharge due to the following: needs pacemaker, MRI for neurogenic sx may need surgery per neurosurgery   Plan of care discussed with:           Subjective   SUBJECTIVE:  Pt seen and examined at bedside, awaiting procedure today, still having some LE discomfort but improved.  Patient denies CP, SOB, fevers, chills, nausea, or emesis.         Objective      PHYSICAL EXAM: Patient Vitals for the past 24 hrs:   BP Temp Pulse Resp SpO2   07/08/25 0827 115/79 -- -- -- --   07/08/25 0535 111/66 36.6 °C (97.9 °F) 80 -- 93 %   07/07/25 1942 108/57 36.6 °C (97.9 °F) 77 -- 95 %   07/07/25 1335 (!) 120/99 36.5 °C (97.7 °F) 85 16 97 %      Physical Exam  Vitals and nursing note reviewed.   Constitutional:       General: He is not in acute distress.     Appearance: Normal appearance. He is not ill-appearing.   HENT:      Head: Normocephalic and atraumatic.   Pulmonary:      Effort: No respiratory distress.   Abdominal:      Tenderness: There is no guarding.   Skin:     Coloration: Skin is not jaundiced or pale.   Neurological:      Mental Status: He is alert and oriented to person, place, and time.   Psychiatric:         Mood and Affect: Mood normal.            MEDICATIONS:   Scheduled medications  Scheduled Medications[1]  Continuous medications  Continuous Medications[2]  PRN medications  PRN Medications[3]       DATA:      Diagnostic tests reviewed for today's visit:     CBC:   Results from last 72 hours   Lab Units 07/08/25  0542   WBC AUTO  "x10*3/uL 14.1*   HEMATOCRIT % 35.5*   PLATELETS AUTO x10*3/uL 193   MCV fL 97     Coags:   Results from last 72 hours   Lab Units 07/08/25  0543   INR  1.3*   APTT seconds 24*     CMP:   Results from last 72 hours   Lab Units 07/06/25  1443   SODIUM mmol/L 140   POTASSIUM mmol/L 3.6   CO2 mmol/L 28   BUN mg/dL 49*   ALBUMIN g/dL 4.0   ALK PHOS U/L 78   ALT U/L 24   AST U/L 37      Cardiac Enzymes: No lab exists for component: \"TROP\" .lab  Liver Function, Amylase, Lipase:   Results from last 72 hours   Lab Units 07/06/25  1443   ALT U/L 24   AST U/L 37   ALK PHOS U/L 78      MG/PHOS: [id number](mg,p)@   Renal Panel:    Results from last 72 hours   Lab Units 07/06/25  1443   ALBUMIN g/dL 4.0   BUN mg/dL 49*   POTASSIUM mmol/L 3.6   CO2 mmol/L 28   SODIUM mmol/L 140      Heme:        No lab exists for component: \"RETICP\", \"ABSRETIC\", \"LD\", \"RAFAEL\", \"FE\", \"TRANSFERSAT\"   No components found for: \"UALBCR\"      Medication and Non-Pharmacologic VTE Prophylaxis/Anticoagulants    The patient has received anticoagulants recently:  Heparin is ordered or has been given within the last 24 hours OR  Lovenox has been given in the last 24 hours OR  An anticoagulant other than Heparin or Lovenox is on the home med list OR  An anticoagulant other than Heparin or Lovenox has been given within the last 5 days  Last Anticoag Admin            apixaban (Eliquis) tablet 5 mg    Given 5 mg at 0842    Frequency: 2 times daily         There are additional administrations since 07/04/25 1730 that are not shown.    No unadministered anticoagulant orders found.                   SIGNATURE: Blade Lorenz, [facility]    PAGER/CONTACT #: Epic Chat      Disclaimer: Portions of this note may have been generated using Dragon voice recognition software. Reasonable efforts were made to correct any dictation errors that resulted due to the programming of this software but some may still be present.     Portions of this note including HPI, " ROS, impression/plan, and examination may have been copied forward from yesterday to July 8, 2025 as to provide important historical information essential in contributing to medical decision making. Documentation has been reviewed and edited as necessary to support clinical decision making for today's visit and to reflect my own independent evaluation of this patient.       The time of this note does not reflect the time I saw the patient but the time that this note was written          [1] [Held by provider] apixaban, 5 mg, oral, BID  cyanocobalamin, 1,000 mcg, oral, Daily  dexAMETHasone, 4 mg, oral, q8h SAM  dilTIAZem CD, 240 mg, oral, BID  [Held by provider] glipiZIDE XL, 2.5 mg, oral, Daily with breakfast  insulin glargine, 18 Units, subcutaneous, Nightly  insulin lispro, 0-5 Units, subcutaneous, Before meals & nightly  lidocaine, 1 patch, transdermal, Daily  lisinopril, 10 mg, oral, Daily  [START ON 7/9/2025] metOLazone, 2.5 mg, oral, Once per day on Sunday Wednesday  metoprolol tartrate, 25 mg, oral, BID  pantoprazole, 40 mg, oral, BID AC  polyethylene glycol, 17 g, oral, Daily  potassium chloride CR, 10 mEq, oral, Daily  rosuvastatin, 5 mg, oral, Daily  tamsulosin, 0.8 mg, oral, Daily  torsemide, 40 mg, oral, Daily     [2]    [3] PRN medications: acetaminophen **OR** acetaminophen **OR** acetaminophen, bisacodyl, bisacodyl, dextrose, dextrose, glucagon, glucagon, ondansetron **OR** ondansetron, oxyCODONE, prochlorperazine **OR** prochlorperazine **OR** prochlorperazine

## 2025-07-08 NOTE — CARE PLAN
The patient's goals for the shift include    Problem: Pain - Adult  Goal: Verbalizes/displays adequate comfort level or baseline comfort level  7/8/2025 0642 by Trudi Rivera LPN  Outcome: Progressing  7/8/2025 0641 by Trudi Rivera LPN  Outcome: Progressing     Problem: Safety - Adult  Goal: Free from fall injury  7/8/2025 0642 by Trudi Rivera LPN  Outcome: Progressing  7/8/2025 0641 by Trudi Rivera LPN  Outcome: Progressing     Problem: Discharge Planning  Goal: Discharge to home or other facility with appropriate resources  7/8/2025 0642 by Trudi Rivera LPN  Outcome: Progressing  7/8/2025 0641 by Trudi Rivera LPN  Outcome: Progressing     Problem: Chronic Conditions and Co-morbidities  Goal: Patient's chronic conditions and co-morbidity symptoms are monitored and maintained or improved  7/8/2025 0642 by Trudi Rivera LPN  Outcome: Progressing  7/8/2025 0641 by Trudi Rivera LPN  Outcome: Progressing     Problem: Nutrition  Goal: Nutrient intake appropriate for maintaining nutritional needs  7/8/2025 0642 by Trudi Rivera LPN  Outcome: Progressing  7/8/2025 0641 by Trudi Rivera LPN  Outcome: Progressing     Problem: Skin  Goal: Decreased wound size/increased tissue granulation at next dressing change  7/8/2025 0642 by Trudi Rivera LPN  Outcome: Progressing  7/8/2025 0641 by Trudi Rivera LPN  Outcome: Progressing  Goal: Participates in plan/prevention/treatment measures  7/8/2025 0642 by Trudi Rivera LPN  Outcome: Progressing  7/8/2025 0641 by Trudi Rivera LPN  Outcome: Progressing  Goal: Prevent/manage excess moisture  7/8/2025 0642 by Trudi Rivera LPN  Outcome: Progressing  7/8/2025 0641 by Trudi Rivera LPN  Outcome: Progressing  Goal: Prevent/minimize sheer/friction injuries  7/8/2025 0642 by  Trudi Omolola Arojojoye, LPN  Outcome: Progressing  7/8/2025 0641 by Trudi Omolola Arojojoye, LPN  Outcome: Progressing  Goal: Promote/optimize nutrition  7/8/2025 0642 by Trudi Omolola Arojojoye, LPN  Outcome: Progressing  7/8/2025 0641 by Trudi Omolola Arojojoye, LPN  Outcome: Progressing  Goal: Promote skin healing  7/8/2025 0642 by Trudi Omolola Arojojoye, LPN  Outcome: Progressing  7/8/2025 0641 by Trudi Omolola Arojojoye, LPN  Outcome: Progressing       The clinical goals for the shift include pt will remain hDS

## 2025-07-08 NOTE — CARE PLAN
The patient's goals for the shift include      The clinical goals for the shift include Free of fallsa and injury    Over the shift, the patient did not make progress toward the following goals. Barriers to progression include ***. Recommendations to address these barriers include ***.

## 2025-07-08 NOTE — DISCHARGE INSTRUCTIONS
Discharge Instructions for your Cardiac Device   CARDIAC DEVICE CLINIC  [phone]                Incision:   1.  Keep your incision clean and dry for 1 week.  2. May shower 7 days after the procedure. Do not submerge the incision in a tub, pool, hot tub, or lake for 4 weeks.  3. Your incision should look better each day. If you notice unusual swelling, redness, drainage or fever greater than 100 degrees, please call the Device Clinic or your Doctor's office.  4. Avoid using deodorants, powders, creams, lotions, etc on your incision for 4 weeks.   5. There are no stitches to be removed.  If you received a “glue” closure this may appear purple-gray and does not get removed but wears away slowly on its own.  Steri-strips (small white bandages) may be removed in one week or they may fall off on their own earlier.    Pain:  1. It is normal for the area around the incision to be tender for a few weeks following surgery. Pain relievers such as Tylenol or Motrin (whichever you can take) are usually sufficient for pain relief. If the pain gets worse instead of better than please call the Device Clinic or your Doctor.    Activity:  1. If you have a new device and new leads placed then avoid raising your arm above shoulder level for 4 weeks. Do no  anything weighing more than 15 pounds.   2. Avoid exercising with the arm on the side of your device.  So no golf, swimming, tennis, bowling, fishing, etc for 4 weeks.  3. Driving: If you were driving prior to your procedure, you may resume driving in 1 week. If you experienced a loss of consciousness prior to your procedure, you should verify with your Doctor when you are able to resume driving.    ID CARD:  1. It is important to carry your device ID card with you at all times.   2. Inform doctors and health care providers that you have a pacemaker or defibrillator.    Electromagnetic Interference:  1. Microwave ovens are safe to use.  2. Cellular phones should be held  to the opposite ear from your device. Do not carry your phone in your shirt pocket. Some i-phones that self-charge can interfere with your device so be sure to keep it away from your pacemaker/defibrillator.   3. Read the Patient Booklet for more information. You may call either the Device Clinic [phone]  or the patient services of the device  with questions about specific electrical appliances and interference problems.    IT IS YOUR RESPONSIBILITY TO MAKE AND KEEP APPOINTMENTS.   Please refer to your Device clinic handout.

## 2025-07-09 LAB
ERYTHROCYTE [DISTWIDTH] IN BLOOD BY AUTOMATED COUNT: 12.6 % (ref 11.5–14.5)
GLUCOSE BLD MANUAL STRIP-MCNC: 261 MG/DL (ref 74–99)
GLUCOSE BLD MANUAL STRIP-MCNC: 279 MG/DL (ref 74–99)
GLUCOSE BLD MANUAL STRIP-MCNC: 280 MG/DL (ref 74–99)
GLUCOSE BLD MANUAL STRIP-MCNC: 363 MG/DL (ref 74–99)
GLUCOSE BLD MANUAL STRIP-MCNC: 375 MG/DL (ref 74–99)
GLUCOSE BLD MANUAL STRIP-MCNC: 377 MG/DL (ref 74–99)
GLUCOSE BLD MANUAL STRIP-MCNC: 394 MG/DL (ref 74–99)
HCT VFR BLD AUTO: 36.2 % (ref 41–52)
HGB BLD-MCNC: 12.2 G/DL (ref 13.5–17.5)
MCH RBC QN AUTO: 32.8 PG (ref 26–34)
MCHC RBC AUTO-ENTMCNC: 33.7 G/DL (ref 32–36)
MCV RBC AUTO: 97 FL (ref 80–100)
NRBC BLD-RTO: 0 /100 WBCS (ref 0–0)
PLATELET # BLD AUTO: 179 X10*3/UL (ref 150–450)
RBC # BLD AUTO: 3.72 X10*6/UL (ref 4.5–5.9)
WBC # BLD AUTO: 16.4 X10*3/UL (ref 4.4–11.3)

## 2025-07-09 PROCEDURE — 85027 COMPLETE CBC AUTOMATED: CPT | Performed by: NURSE PRACTITIONER

## 2025-07-09 PROCEDURE — 2500000004 HC RX 250 GENERAL PHARMACY W/ HCPCS (ALT 636 FOR OP/ED): Performed by: NURSE PRACTITIONER

## 2025-07-09 PROCEDURE — 2500000001 HC RX 250 WO HCPCS SELF ADMINISTERED DRUGS (ALT 637 FOR MEDICARE OP): Performed by: NURSE PRACTITIONER

## 2025-07-09 PROCEDURE — 2500000002 HC RX 250 W HCPCS SELF ADMINISTERED DRUGS (ALT 637 FOR MEDICARE OP, ALT 636 FOR OP/ED): Performed by: STUDENT IN AN ORGANIZED HEALTH CARE EDUCATION/TRAINING PROGRAM

## 2025-07-09 PROCEDURE — 82947 ASSAY GLUCOSE BLOOD QUANT: CPT

## 2025-07-09 PROCEDURE — 2500000004 HC RX 250 GENERAL PHARMACY W/ HCPCS (ALT 636 FOR OP/ED): Performed by: STUDENT IN AN ORGANIZED HEALTH CARE EDUCATION/TRAINING PROGRAM

## 2025-07-09 PROCEDURE — 36415 COLL VENOUS BLD VENIPUNCTURE: CPT | Performed by: NURSE PRACTITIONER

## 2025-07-09 PROCEDURE — 2500000002 HC RX 250 W HCPCS SELF ADMINISTERED DRUGS (ALT 637 FOR MEDICARE OP, ALT 636 FOR OP/ED): Performed by: NURSE PRACTITIONER

## 2025-07-09 PROCEDURE — 2500000005 HC RX 250 GENERAL PHARMACY W/O HCPCS: Performed by: NURSE PRACTITIONER

## 2025-07-09 PROCEDURE — 1210000001 HC SEMI-PRIVATE ROOM DAILY

## 2025-07-09 PROCEDURE — 99024 POSTOP FOLLOW-UP VISIT: CPT | Performed by: STUDENT IN AN ORGANIZED HEALTH CARE EDUCATION/TRAINING PROGRAM

## 2025-07-09 RX ORDER — INSULIN GLARGINE 100 [IU]/ML
30 INJECTION, SOLUTION SUBCUTANEOUS NIGHTLY
Status: DISCONTINUED | OUTPATIENT
Start: 2025-07-09 | End: 2025-07-10

## 2025-07-09 RX ORDER — INSULIN LISPRO 100 [IU]/ML
4 INJECTION, SOLUTION INTRAVENOUS; SUBCUTANEOUS ONCE
Status: COMPLETED | OUTPATIENT
Start: 2025-07-09 | End: 2025-07-09

## 2025-07-09 RX ORDER — INSULIN LISPRO 100 [IU]/ML
5 INJECTION, SOLUTION INTRAVENOUS; SUBCUTANEOUS ONCE
Status: COMPLETED | OUTPATIENT
Start: 2025-07-09 | End: 2025-07-09

## 2025-07-09 RX ADMIN — METOLAZONE 2.5 MG: 2.5 TABLET ORAL at 08:30

## 2025-07-09 RX ADMIN — INSULIN LISPRO 3 UNITS: 100 INJECTION, SOLUTION INTRAVENOUS; SUBCUTANEOUS at 21:28

## 2025-07-09 RX ADMIN — TAMSULOSIN HYDROCHLORIDE 0.8 MG: 0.4 CAPSULE ORAL at 08:30

## 2025-07-09 RX ADMIN — PANTOPRAZOLE SODIUM 40 MG: 40 TABLET, DELAYED RELEASE ORAL at 06:29

## 2025-07-09 RX ADMIN — INSULIN LISPRO 5 UNITS: 100 INJECTION, SOLUTION INTRAVENOUS; SUBCUTANEOUS at 14:30

## 2025-07-09 RX ADMIN — METOPROLOL TARTRATE 25 MG: 25 TABLET, FILM COATED ORAL at 21:26

## 2025-07-09 RX ADMIN — METOPROLOL TARTRATE 25 MG: 25 TABLET, FILM COATED ORAL at 08:30

## 2025-07-09 RX ADMIN — DILTIAZEM HYDROCHLORIDE 240 MG: 240 CAPSULE, EXTENDED RELEASE ORAL at 21:26

## 2025-07-09 RX ADMIN — POLYETHYLENE GLYCOL 3350 17 G: 17 POWDER, FOR SOLUTION ORAL at 08:31

## 2025-07-09 RX ADMIN — LISINOPRIL 10 MG: 10 TABLET ORAL at 08:30

## 2025-07-09 RX ADMIN — DEXAMETHASONE 4 MG: 2 TABLET ORAL at 06:29

## 2025-07-09 RX ADMIN — LIDOCAINE 4% 1 PATCH: 40 PATCH TOPICAL at 08:31

## 2025-07-09 RX ADMIN — INSULIN GLARGINE 30 UNITS: 100 INJECTION, SOLUTION SUBCUTANEOUS at 23:38

## 2025-07-09 RX ADMIN — DEXAMETHASONE 4 MG: 2 TABLET ORAL at 14:30

## 2025-07-09 RX ADMIN — ROSUVASTATIN 5 MG: 5 TABLET, FILM COATED ORAL at 21:28

## 2025-07-09 RX ADMIN — TORSEMIDE 40 MG: 20 TABLET ORAL at 08:31

## 2025-07-09 RX ADMIN — DILTIAZEM HYDROCHLORIDE 240 MG: 240 CAPSULE, EXTENDED RELEASE ORAL at 08:30

## 2025-07-09 RX ADMIN — CYANOCOBALAMIN TAB 1000 MCG 1000 MCG: 1000 TAB at 08:31

## 2025-07-09 RX ADMIN — DEXAMETHASONE 4 MG: 2 TABLET ORAL at 21:26

## 2025-07-09 RX ADMIN — INSULIN LISPRO 4 UNITS: 100 INJECTION, SOLUTION INTRAVENOUS; SUBCUTANEOUS at 01:06

## 2025-07-09 RX ADMIN — PANTOPRAZOLE SODIUM 40 MG: 40 TABLET, DELAYED RELEASE ORAL at 17:00

## 2025-07-09 RX ADMIN — INSULIN LISPRO 3 UNITS: 100 INJECTION, SOLUTION INTRAVENOUS; SUBCUTANEOUS at 08:28

## 2025-07-09 RX ADMIN — INSULIN LISPRO 5 UNITS: 100 INJECTION, SOLUTION INTRAVENOUS; SUBCUTANEOUS at 17:22

## 2025-07-09 RX ADMIN — INSULIN LISPRO 5 UNITS: 100 INJECTION, SOLUTION INTRAVENOUS; SUBCUTANEOUS at 17:21

## 2025-07-09 ASSESSMENT — COGNITIVE AND FUNCTIONAL STATUS - GENERAL
DRESSING REGULAR LOWER BODY CLOTHING: A LITTLE
TOILETING: A LITTLE
MOVING TO AND FROM BED TO CHAIR: A LITTLE
DRESSING REGULAR UPPER BODY CLOTHING: A LITTLE
MOVING TO AND FROM BED TO CHAIR: A LITTLE
MOVING FROM LYING ON BACK TO SITTING ON SIDE OF FLAT BED WITH BEDRAILS: A LITTLE
WALKING IN HOSPITAL ROOM: A LOT
PERSONAL GROOMING: A LITTLE
STANDING UP FROM CHAIR USING ARMS: A LITTLE
DAILY ACTIVITIY SCORE: 20
DRESSING REGULAR LOWER BODY CLOTHING: A LITTLE
TURNING FROM BACK TO SIDE WHILE IN FLAT BAD: A LITTLE
HELP NEEDED FOR BATHING: A LITTLE
STANDING UP FROM CHAIR USING ARMS: A LITTLE
MOBILITY SCORE: 16
MOVING FROM LYING ON BACK TO SITTING ON SIDE OF FLAT BED WITH BEDRAILS: A LITTLE
MOBILITY SCORE: 17
WALKING IN HOSPITAL ROOM: A LITTLE
DAILY ACTIVITIY SCORE: 19
HELP NEEDED FOR BATHING: A LITTLE
CLIMB 3 TO 5 STEPS WITH RAILING: A LOT
DRESSING REGULAR UPPER BODY CLOTHING: A LITTLE
TURNING FROM BACK TO SIDE WHILE IN FLAT BAD: A LITTLE
TOILETING: A LITTLE
CLIMB 3 TO 5 STEPS WITH RAILING: A LOT

## 2025-07-09 ASSESSMENT — PAIN SCALES - GENERAL
PAINLEVEL_OUTOF10: 0 - NO PAIN
PAINLEVEL_OUTOF10: 0 - NO PAIN

## 2025-07-09 ASSESSMENT — PAIN - FUNCTIONAL ASSESSMENT
PAIN_FUNCTIONAL_ASSESSMENT: 0-10
PAIN_FUNCTIONAL_ASSESSMENT: 0-10

## 2025-07-09 NOTE — PROGRESS NOTES
"Dragan Calvin is a 84 y.o. male on day 3 of admission presenting with Leg weakness, bilateral.    Subjective   - Patient with complains of R shoulder pain, positional after having the generator replaced yesterday  - Otherwise generator site examined and without fluctuation, hematoma or concerning findings  - Pending device interrogation       Objective     Physical Exam    General: awake, alert, no acute distress  HEENT: no scleral icterus, no JVD  CV: irregular, no MRG  Resp: CTA b/l, no wheezes, rales, or rhonchi  Abd: soft, NT/ND  LE: no edema, no cyanosis  Neuo: grossly normal  Psych: pleasant     Last Recorded Vitals  Blood pressure 116/74, pulse 75, temperature 36.4 °C (97.5 °F), resp. rate 17, height 1.78 m (5' 10.08\"), weight 97.5 kg (215 lb), SpO2 96%.  Intake/Output last 3 Shifts:  I/O last 3 completed shifts:  In: 240 (2.5 mL/kg) [P.O.:240]  Out: 1910 (19.6 mL/kg) [Urine:1900 (0.5 mL/kg/hr); Blood:10]  Weight: 97.5 kg     Relevant Results    Scheduled medications  Scheduled Medications[1]  Continuous medications  Continuous Medications[2]  PRN medications  PRN Medications[3]    Electrophysiology procedure  Result Date: 7/8/2025  Images from the original result were not included.   Dual Pacemaker Pulse Generator replacement (Left sided, Medtronic). Pacing System pulse generator replacement Procedures: Dual chamber PPM generator replacement (26692) and PPM Pocket Revision (00894-92) Patient history: Please refer to the detailed history and physical on the patient's medical chart. Procedure narrative: The patient was in the fasting state. The patient was set up for continuous monitoring of surface 12 lead ECG and pulse oximetry. Blood pressure was monitored. The procedure was performed under IV conscious sedation supplemented with intermittent moderate sedation. The Left upper chest was prepped and draped in the usual sterile fashion. Local anesthesia: After preoperative IV antibiotic was completely " infused, subcutaneous tissues just medial to the Left deltopectoral area, were infiltrated with Lidocaine 1% for local anesthesia.  The device was analyzed and reprogrammed to VVI. Using a #15 scalpel, the pocket was meticulously opened over the existing device. The PPM pocket was revised. The existing leads were disconnected from the generator and visually inspected. The leads appeared intact, pacing and sensing thresholds were measured and were found to be satisfactory. A pacemaker pulse generator was detached from the leads and explanted. A new Dual chamber pacemaker was attached to the leads and implanted. The device was interrogated and its parameters recorded; telemetered electrograms and pacing and sensing thresholds were measured and noted to be unchanged Antibiotic pouch Tyrx was used within the pocket. The pocket was flushed with Vancomycin and saline solution. Wound hemostasis was obtained with electrocautery and FlowSeal. The wound was closed in 2 layers using #0 and #3-0 Vicryl. The skin was approximated with subcuticular suture (#4-0 Vicryl) and skin adhesive. Steri-strips were applied, and the incision covered with a sterile dressing. Manual pressure was applied. Final Implant Settings: : [employer] Post implant device parameters scanned into the system Summary: Successful replacement of a Medtronic left sided Dual chamber pacemaker No Heparin/Lovenox/Anticoagulation until cleared by EP A 12 lead ECG should be performed prior to discharge from the hospital. Hold Apixaban for 24-48 hrs more, resume once cleared by EP service. Resume rest of home medications Discharge home with PO Abx Cefadroxil 500mg BID x7 days Ok to get MRI now Patient Instructions: Please do not lift left arm above shoulder level for 2-3 weeks No repetitive motion or lifting heavy weight for 2-3 weeks No Driving for 1 week No alcohol or making legal decisions for 24 hours. Ok to take tylenol for any discomfort after the  procedure Keep wound completely dry for 7 days; may shower but keep bandage as dry as possible. No soaking in hot tubs or baths for 10 days. May sponge bath Keep bandage on incision until seen in the office in 1 week. Allow steristrips underneath to fall off naturally.  Please call our office Dr. Ramicone ([place]: [phone]) if you notice any discharge or swelling around incision or fever. Follow up: The patient should be alert for bleeding, swelling, or signs of infection. The patient should call the electrophysiologist immediately if symptoms recur, or for any problems. Follow up in Clinic in 1 weeks for wound check. Follow up in the office for regularly scheduled visit. See complete procedural log and parameters.      Results for orders placed or performed during the hospital encounter of 07/06/25 (from the past 24 hours)   Cardiac Device Check - Inpatient   Result Value Ref Range    BSA 2.2 m2   POCT GLUCOSE   Result Value Ref Range    POCT Glucose 343 (H) 74 - 99 mg/dL   POCT GLUCOSE   Result Value Ref Range    POCT Glucose 480 (H) 74 - 99 mg/dL   POCT GLUCOSE   Result Value Ref Range    POCT Glucose 409 (H) 74 - 99 mg/dL   POCT GLUCOSE   Result Value Ref Range    POCT Glucose 375 (H) 74 - 99 mg/dL   POCT GLUCOSE   Result Value Ref Range    POCT Glucose 363 (H) 74 - 99 mg/dL   POCT GLUCOSE   Result Value Ref Range    POCT Glucose 280 (H) 74 - 99 mg/dL   CBC   Result Value Ref Range    WBC 16.4 (H) 4.4 - 11.3 x10*3/uL    nRBC 0.0 0.0 - 0.0 /100 WBCs    RBC 3.72 (L) 4.50 - 5.90 x10*6/uL    Hemoglobin 12.2 (L) 13.5 - 17.5 g/dL    Hematocrit 36.2 (L) 41.0 - 52.0 %    MCV 97 80 - 100 fL    MCH 32.8 26.0 - 34.0 pg    MCHC 33.7 32.0 - 36.0 g/dL    RDW 12.6 11.5 - 14.5 %    Platelets 179 150 - 450 x10*3/uL   POCT GLUCOSE   Result Value Ref Range    POCT Glucose 261 (H) 74 - 99 mg/dL       Assessment & Plan    Dragan Greenc is a 84 y.o. male with PMH significant for CAD, permanent Afib (on eliquis), DVT s/p IVC  filter, HLD, HTN, SSS s/p implanted pacemaker, PUD, GERD, DMT2, peripheral edema, SBO(s), and chronic BLE weakness d/t spinal stenosis. EP consulted for generator exchange, now s/p exchanged on 7/8     :: device achieved MITRA on 6/24 and is a limitation for having an MRI completed  :: patient in afib w/ vent rate on the 70s  :: now s/p generator exchange on 7/8  - No Heparin/Lovenox/Anticoagulation until cleared by EP  - A 12 lead ECG should be performed prior to discharge from the hospital.   - Hold Apixaban for 24-48 hrs more, resume once cleared by EP service. Resume rest of home medications  - PO Abx Cefadroxil 500mg BID x7 days  - Ok to get MRI now  - Pending device interrogation. If within parameters, EP will sign off     Santiago Amado MD  PGY-5 Cardiovascular Medicine Fellow      If further questions arise, please page the EP consult pager at [phone] on weekdays 7AM - 6PM and weekends 7AM - 2PM, or at [phone] at all other times. The EP device nurse can be reached at pager [phone] during regular business hours M-F.     I saw and evaluated the patient. I personally obtained the key and critical portions of the history and physical exam or was physically present for key and critical portions performed by the resident/fellow. I reviewed the resident/fellow's documentation and discussed the patient with the resident/fellow. I agree with the resident/fellow's medical decision making as documented in the note, and my edits (bold and italic) have been made to the document as needed.     Max Alexander MD [facility]  Cardiac Electrophysiology    **Disclaimer: This note was dictated by speech recognition, and every effort has been made to prevent any error in transcription, however minor errors may be present**                  [1] [Held by provider] apixaban, 5 mg, oral, BID  cyanocobalamin, 1,000 mcg, oral, Daily  dexAMETHasone, 4 mg, oral, q8h SAM  dilTIAZem CD, 240 mg, oral, BID  glipiZIDE XL, 2.5 mg, oral, Daily with  breakfast  insulin glargine, 18 Units, subcutaneous, Nightly  insulin lispro, 0-5 Units, subcutaneous, Before meals & nightly  lidocaine, 1 patch, transdermal, Daily  lisinopril, 10 mg, oral, Daily  metOLazone, 2.5 mg, oral, Once per day on Sunday Wednesday  metoprolol tartrate, 25 mg, oral, BID  pantoprazole, 40 mg, oral, BID AC  polyethylene glycol, 17 g, oral, Daily  potassium chloride CR, 10 mEq, oral, Daily  rosuvastatin, 5 mg, oral, Daily  tamsulosin, 0.8 mg, oral, Daily  torsemide, 40 mg, oral, Daily  [2]    [3] PRN medications: acetaminophen **OR** acetaminophen **OR** acetaminophen, bisacodyl, bisacodyl, dextrose, dextrose, glucagon, glucagon, ondansetron **OR** ondansetron, oxyCODONE, prochlorperazine **OR** prochlorperazine **OR** prochlorperazine

## 2025-07-09 NOTE — PROGRESS NOTES
[facility]   HOSPITAL MEDICINE     PROGRESS NOTE       PATIENT NAME: Dragan Calvin   MRN: [id number]   SERVICE DATE: 25   SERVICE TIME: 2:50 PM      Hospital Medicine/Primary Attending: Blade Lorenz DO   LENGTH OF STAY: 3     Assessment      84 y.o. male with PMH significant for CAD, permanent Afib (on eliquis), DVT s/p IVC filter, HLD, HTN, SSS s/p implanted pacemaker, PUD, GERD, DMT2, peripheral edema, SBO(s), and chronic BLE weakness d/t spinal stenosis who presented to [facility] ED from home with complaints of weakness . Seen by NSG at [facility] who rec MRI. MRI department allegedly refused to do MRI 2/2 PPM (despite alleged EP hilda).  During device check at our hospital, it was discovered that the device his battery had .  EP was consulted and plan to do an exchange on 2025.  Subsequently, hopefully patient can get MRI.  Please notify neurosurgery when MRI is complete.     CHIEF COMPLAINT: Leg weakness, bilateral    Assessment & Plan  Pacemaker at end of battery life  Presence of cardiac pacemaker  -can NOT have MRI until pacemaker replaced, per EP - done 25  -Consult to EP, discussed with team   -EP replacement 25  -HOLD apixaban, discuss timing for restarting with EP  Lumbar spinal stenosis  Weakness of both lower extremities  Cervical stenosis of spine  - MRI for lumbar and cervical per NSG - per MRI earliest it can be done is 25  -pain control   -please notify NSG when MRI done   -ctn dexamethasone 4mg q8 but switch to oral     Type 2 diabetes mellitus, with long-term current use of insulin  Restart home glipizide  with blood sugars elevated due to steroid use  #Type 2 diabetes mellitus   -Lantus back up to 30units now no longer NPO  -SSI   -Hypoglycemia protocol    -Blood glucose checks qAC & qHS   -Carbohydrate controlled diet     Paroxysmal atrial fibrillation (Multi)  HOLD apix for procedure     Hypertension, benign  Continue  with home meds lisinopril  Fall  CT of the head on 7/3/2025 no acute finding  Fall precaution  PT OT  Leg weakness, bilateral    Chronic heart failure with preserved ejection fraction (HFpEF, >= 50%)  Ctn home torsemide 40mg dialy   ctn metolazone wed and Sunday        I reviewed:    All new and relevant labs and imaging   New EKGs  Consultant notes   Vitals Signs   Nursing notes                OBESE?: Yes, Class 1 Obesity: BMI of 30.0 to 34.9    DISPOSITION:  Medical Necessity Statement: Patient is not medically ready for discharge due to the following: needs pacemaker, MRI for neurogenic sx may need surgery per neurosurgery   Plan of care discussed with:           Subjective   SUBJECTIVE:  Pt seen and examined at bedside, spoke with wife on speaker phone. He reports feeling well, no bleeding or bruising from site, pain well controlled, legs still weak but unchanged.  Patient denies CP, SOB, fevers, chills, nausea, or emesis.         Objective      PHYSICAL EXAM: Patient Vitals for the past 24 hrs:   BP Temp Pulse Resp SpO2   07/09/25 0544 116/74 36.4 °C (97.5 °F) 75 -- 96 %   07/08/25 2336 100/54 -- -- -- --   07/08/25 2239 103/54 -- -- -- --   07/08/25 2034 92/60 36.5 °C (97.7 °F) 77 -- 97 %   07/08/25 1807 100/67 36.5 °C (97.7 °F) 78 17 98 %      Physical Exam  Vitals and nursing note reviewed.   Constitutional:       General: He is not in acute distress.     Appearance: Normal appearance. He is not ill-appearing.   HENT:      Head: Normocephalic and atraumatic.   Pulmonary:      Effort: No respiratory distress.   Chest:      Comments: Large dressing in place over left chest  Abdominal:      Tenderness: There is no guarding.   Skin:     Coloration: Skin is not jaundiced or pale.   Neurological:      Mental Status: He is alert and oriented to person, place, and time.   Psychiatric:         Mood and Affect: Mood normal.            MEDICATIONS:   Scheduled medications  Scheduled Medications[1]  Continuous  "medications  Continuous Medications[2]  PRN medications  PRN Medications[3]       DATA:      Diagnostic tests reviewed for today's visit:     CBC:   Results from last 72 hours   Lab Units 07/09/25  0548   WBC AUTO x10*3/uL 16.4*   HEMATOCRIT % 36.2*   PLATELETS AUTO x10*3/uL 179   MCV fL 97     Coags:   Results from last 72 hours   Lab Units 07/08/25  0543   INR  1.3*   APTT seconds 24*     CMP:         No lab exists for component: \"CREAT\", \"GLUC\", \"TPROT\", \"CA\", \"TBILI\", \"ANION\"     Cardiac Enzymes: No lab exists for component: \"TROP\" .lab  Liver Function, Amylase, Lipase:         No lab exists for component: \"TPROT\", \"ALB\", \"TBILI\"     MG/PHOS: [id number](mg,p)@   Renal Panel:          No lab exists for component: \"CREAT\", \"GLUC\", \"CA\", \"P\"     Heme:        No lab exists for component: \"RETICP\", \"ABSRETIC\", \"LD\", \"RAFAEL\", \"FE\", \"TRANSFERSAT\"   No components found for: \"UALBCR\"      Medication and Non-Pharmacologic VTE Prophylaxis/Anticoagulants    The patient has received anticoagulants recently:  Heparin is ordered or has been given within the last 24 hours OR  Lovenox has been given in the last 24 hours OR  An anticoagulant other than Heparin or Lovenox is on the home med list OR  An anticoagulant other than Heparin or Lovenox has been given within the last 5 days  Last Anticoag Admin            apixaban (Eliquis) tablet 5 mg    Given 5 mg at 0842    Frequency: 2 times daily         There are additional administrations since 07/04/25 1730 that are not shown.    No unadministered anticoagulant orders found.                   SIGNATURE: Blade Lorenz, [facility]    PAGER/CONTACT #: Epic Chat      Disclaimer: Portions of this note may have been generated using Dragon voice recognition software. Reasonable efforts were made to correct any dictation errors that resulted due to the programming of this software but some may still be present.     Portions of this note including HPI, ROS, impression/plan, " and examination may have been copied forward from yesterday to July 9, 2025 as to provide important historical information essential in contributing to medical decision making. Documentation has been reviewed and edited as necessary to support clinical decision making for today's visit and to reflect my own independent evaluation of this patient.       The time of this note does not reflect the time I saw the patient but the time that this note was written            [1] [Held by provider] apixaban, 5 mg, oral, BID  cyanocobalamin, 1,000 mcg, oral, Daily  dexAMETHasone, 4 mg, oral, q8h SAM  dilTIAZem CD, 240 mg, oral, BID  glipiZIDE XL, 2.5 mg, oral, Daily with breakfast  insulin glargine, 18 Units, subcutaneous, Nightly  insulin lispro, 0-5 Units, subcutaneous, Before meals & nightly  lidocaine, 1 patch, transdermal, Daily  lisinopril, 10 mg, oral, Daily  metOLazone, 2.5 mg, oral, Once per day on Sunday Wednesday  metoprolol tartrate, 25 mg, oral, BID  pantoprazole, 40 mg, oral, BID AC  polyethylene glycol, 17 g, oral, Daily  potassium chloride CR, 10 mEq, oral, Daily  rosuvastatin, 5 mg, oral, Daily  tamsulosin, 0.8 mg, oral, Daily  torsemide, 40 mg, oral, Daily     [2]    [3] PRN medications: acetaminophen **OR** acetaminophen **OR** acetaminophen, bisacodyl, bisacodyl, dextrose, dextrose, glucagon, glucagon, ondansetron **OR** ondansetron, oxyCODONE, prochlorperazine **OR** prochlorperazine **OR** prochlorperazine

## 2025-07-09 NOTE — ASSESSMENT & PLAN NOTE
-can NOT have MRI until pacemaker replaced, per EP - done 7/8/25  -Consult to EP, discussed with team   -EP replacement 7/8/25  -HOLD apixaban, discuss timing for restarting with EP

## 2025-07-09 NOTE — ASSESSMENT & PLAN NOTE
- MRI for lumbar and cervical per NSG - per MRI earliest it can be done is Friday 7/11/25  -pain control   -please notify NSG when MRI done   -ctn dexamethasone 4mg q8 but switch to oral

## 2025-07-09 NOTE — ASSESSMENT & PLAN NOTE
Restart home glipizide  with blood sugars elevated due to steroid use  #Type 2 diabetes mellitus   -Lantus back up to 30units now no longer NPO  -SSI   -Hypoglycemia protocol    -Blood glucose checks qAC & qHS   -Carbohydrate controlled diet

## 2025-07-09 NOTE — CARE PLAN
The patient's goals for the shift include      The clinical goals for the shift include Pt to stay safe during shift

## 2025-07-10 ENCOUNTER — APPOINTMENT (OUTPATIENT)
Dept: CARDIOLOGY | Facility: CLINIC | Age: 85
End: 2025-07-10
Payer: MEDICARE

## 2025-07-10 LAB
ALBUMIN SERPL BCP-MCNC: 3.6 G/DL (ref 3.4–5)
ANION GAP SERPL CALC-SCNC: 17 MMOL/L (ref 10–20)
BASOPHILS # BLD AUTO: 0.06 X10*3/UL (ref 0–0.1)
BASOPHILS NFR BLD AUTO: 0.3 %
BUN SERPL-MCNC: 80 MG/DL (ref 6–23)
CALCIUM SERPL-MCNC: 8.7 MG/DL (ref 8.6–10.6)
CHLORIDE SERPL-SCNC: 99 MMOL/L (ref 98–107)
CO2 SERPL-SCNC: 28 MMOL/L (ref 21–32)
CREAT SERPL-MCNC: 1.77 MG/DL (ref 0.5–1.3)
EGFRCR SERPLBLD CKD-EPI 2021: 37 ML/MIN/1.73M*2
EOSINOPHIL # BLD AUTO: 0.02 X10*3/UL (ref 0–0.4)
EOSINOPHIL NFR BLD AUTO: 0.1 %
ERYTHROCYTE [DISTWIDTH] IN BLOOD BY AUTOMATED COUNT: 12.5 % (ref 11.5–14.5)
GLUCOSE BLD MANUAL STRIP-MCNC: 256 MG/DL (ref 74–99)
GLUCOSE BLD MANUAL STRIP-MCNC: 460 MG/DL (ref 74–99)
GLUCOSE BLD MANUAL STRIP-MCNC: 533 MG/DL (ref 74–99)
GLUCOSE BLD MANUAL STRIP-MCNC: 539 MG/DL (ref 74–99)
GLUCOSE BLD MANUAL STRIP-MCNC: 572 MG/DL (ref 74–99)
GLUCOSE SERPL-MCNC: 274 MG/DL (ref 74–99)
HCT VFR BLD AUTO: 36.2 % (ref 41–52)
HGB BLD-MCNC: 12.7 G/DL (ref 13.5–17.5)
IMM GRANULOCYTES # BLD AUTO: 0.38 X10*3/UL (ref 0–0.5)
IMM GRANULOCYTES NFR BLD AUTO: 2.1 % (ref 0–0.9)
LYMPHOCYTES # BLD AUTO: 0.58 X10*3/UL (ref 0.8–3)
LYMPHOCYTES NFR BLD AUTO: 3.2 %
MCH RBC QN AUTO: 33.1 PG (ref 26–34)
MCHC RBC AUTO-ENTMCNC: 35.1 G/DL (ref 32–36)
MCV RBC AUTO: 94 FL (ref 80–100)
MONOCYTES # BLD AUTO: 0.52 X10*3/UL (ref 0.05–0.8)
MONOCYTES NFR BLD AUTO: 2.9 %
NEUTROPHILS # BLD AUTO: 16.59 X10*3/UL (ref 1.6–5.5)
NEUTROPHILS NFR BLD AUTO: 91.4 %
NRBC BLD-RTO: 0 /100 WBCS (ref 0–0)
PHOSPHATE SERPL-MCNC: 4.9 MG/DL (ref 2.5–4.9)
PLATELET # BLD AUTO: 185 X10*3/UL (ref 150–450)
POTASSIUM SERPL-SCNC: 3.1 MMOL/L (ref 3.5–5.3)
RBC # BLD AUTO: 3.84 X10*6/UL (ref 4.5–5.9)
SODIUM SERPL-SCNC: 141 MMOL/L (ref 136–145)
WBC # BLD AUTO: 18.2 X10*3/UL (ref 4.4–11.3)

## 2025-07-10 PROCEDURE — 2500000001 HC RX 250 WO HCPCS SELF ADMINISTERED DRUGS (ALT 637 FOR MEDICARE OP): Performed by: STUDENT IN AN ORGANIZED HEALTH CARE EDUCATION/TRAINING PROGRAM

## 2025-07-10 PROCEDURE — 2500000001 HC RX 250 WO HCPCS SELF ADMINISTERED DRUGS (ALT 637 FOR MEDICARE OP): Performed by: NURSE PRACTITIONER

## 2025-07-10 PROCEDURE — 2500000004 HC RX 250 GENERAL PHARMACY W/ HCPCS (ALT 636 FOR OP/ED): Performed by: STUDENT IN AN ORGANIZED HEALTH CARE EDUCATION/TRAINING PROGRAM

## 2025-07-10 PROCEDURE — 80069 RENAL FUNCTION PANEL: CPT | Performed by: STUDENT IN AN ORGANIZED HEALTH CARE EDUCATION/TRAINING PROGRAM

## 2025-07-10 PROCEDURE — 85025 COMPLETE CBC W/AUTO DIFF WBC: CPT | Performed by: STUDENT IN AN ORGANIZED HEALTH CARE EDUCATION/TRAINING PROGRAM

## 2025-07-10 PROCEDURE — 2500000004 HC RX 250 GENERAL PHARMACY W/ HCPCS (ALT 636 FOR OP/ED): Performed by: NURSE PRACTITIONER

## 2025-07-10 PROCEDURE — 2500000005 HC RX 250 GENERAL PHARMACY W/O HCPCS: Performed by: NURSE PRACTITIONER

## 2025-07-10 PROCEDURE — 97530 THERAPEUTIC ACTIVITIES: CPT | Mod: GP

## 2025-07-10 PROCEDURE — 1210000001 HC SEMI-PRIVATE ROOM DAILY

## 2025-07-10 PROCEDURE — 97165 OT EVAL LOW COMPLEX 30 MIN: CPT | Mod: GO

## 2025-07-10 PROCEDURE — 2500000002 HC RX 250 W HCPCS SELF ADMINISTERED DRUGS (ALT 637 FOR MEDICARE OP, ALT 636 FOR OP/ED): Performed by: STUDENT IN AN ORGANIZED HEALTH CARE EDUCATION/TRAINING PROGRAM

## 2025-07-10 PROCEDURE — 2500000002 HC RX 250 W HCPCS SELF ADMINISTERED DRUGS (ALT 637 FOR MEDICARE OP, ALT 636 FOR OP/ED): Performed by: NURSE PRACTITIONER

## 2025-07-10 PROCEDURE — 97535 SELF CARE MNGMENT TRAINING: CPT | Mod: GO

## 2025-07-10 PROCEDURE — 99024 POSTOP FOLLOW-UP VISIT: CPT | Performed by: STUDENT IN AN ORGANIZED HEALTH CARE EDUCATION/TRAINING PROGRAM

## 2025-07-10 PROCEDURE — 36415 COLL VENOUS BLD VENIPUNCTURE: CPT | Performed by: STUDENT IN AN ORGANIZED HEALTH CARE EDUCATION/TRAINING PROGRAM

## 2025-07-10 PROCEDURE — 82947 ASSAY GLUCOSE BLOOD QUANT: CPT

## 2025-07-10 RX ORDER — INSULIN GLARGINE 100 [IU]/ML
40 INJECTION, SOLUTION SUBCUTANEOUS NIGHTLY
Status: DISCONTINUED | OUTPATIENT
Start: 2025-07-10 | End: 2025-07-14 | Stop reason: HOSPADM

## 2025-07-10 RX ORDER — DEXAMETHASONE 2 MG/1
4 TABLET ORAL EVERY 12 HOURS SCHEDULED
Status: DISCONTINUED | OUTPATIENT
Start: 2025-07-10 | End: 2025-07-12

## 2025-07-10 RX ORDER — INSULIN LISPRO 100 [IU]/ML
0-15 INJECTION, SOLUTION INTRAVENOUS; SUBCUTANEOUS
Status: DISCONTINUED | OUTPATIENT
Start: 2025-07-10 | End: 2025-07-14 | Stop reason: HOSPADM

## 2025-07-10 RX ORDER — INSULIN LISPRO 100 [IU]/ML
10 INJECTION, SOLUTION INTRAVENOUS; SUBCUTANEOUS ONCE
Status: COMPLETED | OUTPATIENT
Start: 2025-07-10 | End: 2025-07-10

## 2025-07-10 RX ORDER — CEFADROXIL 500 MG/1
500 CAPSULE ORAL EVERY 12 HOURS SCHEDULED
Status: DISCONTINUED | OUTPATIENT
Start: 2025-07-10 | End: 2025-07-12

## 2025-07-10 RX ADMIN — INSULIN GLARGINE 40 UNITS: 100 INJECTION, SOLUTION SUBCUTANEOUS at 21:25

## 2025-07-10 RX ADMIN — LISINOPRIL 10 MG: 10 TABLET ORAL at 09:59

## 2025-07-10 RX ADMIN — PANTOPRAZOLE SODIUM 40 MG: 40 TABLET, DELAYED RELEASE ORAL at 06:13

## 2025-07-10 RX ADMIN — DILTIAZEM HYDROCHLORIDE 240 MG: 240 CAPSULE, EXTENDED RELEASE ORAL at 20:54

## 2025-07-10 RX ADMIN — INSULIN LISPRO 15 UNITS: 100 INJECTION, SOLUTION INTRAVENOUS; SUBCUTANEOUS at 17:18

## 2025-07-10 RX ADMIN — POLYETHYLENE GLYCOL 3350 17 G: 17 POWDER, FOR SOLUTION ORAL at 09:59

## 2025-07-10 RX ADMIN — POTASSIUM CHLORIDE 10 MEQ: 750 TABLET, FILM COATED, EXTENDED RELEASE ORAL at 09:59

## 2025-07-10 RX ADMIN — TORSEMIDE 40 MG: 20 TABLET ORAL at 09:59

## 2025-07-10 RX ADMIN — CYANOCOBALAMIN TAB 1000 MCG 1000 MCG: 1000 TAB at 09:59

## 2025-07-10 RX ADMIN — TAMSULOSIN HYDROCHLORIDE 0.8 MG: 0.4 CAPSULE ORAL at 09:59

## 2025-07-10 RX ADMIN — CEFADROXIL 500 MG: 500 CAPSULE ORAL at 20:54

## 2025-07-10 RX ADMIN — DILTIAZEM HYDROCHLORIDE 240 MG: 240 CAPSULE, EXTENDED RELEASE ORAL at 09:59

## 2025-07-10 RX ADMIN — METOPROLOL TARTRATE 25 MG: 25 TABLET, FILM COATED ORAL at 09:59

## 2025-07-10 RX ADMIN — DEXAMETHASONE 4 MG: 2 TABLET ORAL at 20:54

## 2025-07-10 RX ADMIN — METOPROLOL TARTRATE 25 MG: 25 TABLET, FILM COATED ORAL at 20:54

## 2025-07-10 RX ADMIN — PANTOPRAZOLE SODIUM 40 MG: 40 TABLET, DELAYED RELEASE ORAL at 17:17

## 2025-07-10 RX ADMIN — INSULIN LISPRO 10 UNITS: 100 INJECTION, SOLUTION INTRAVENOUS; SUBCUTANEOUS at 20:54

## 2025-07-10 RX ADMIN — CEFADROXIL 500 MG: 500 CAPSULE ORAL at 09:59

## 2025-07-10 RX ADMIN — LIDOCAINE 4% 1 PATCH: 40 PATCH TOPICAL at 09:59

## 2025-07-10 RX ADMIN — INSULIN LISPRO 10 UNITS: 100 INJECTION, SOLUTION INTRAVENOUS; SUBCUTANEOUS at 14:54

## 2025-07-10 RX ADMIN — GLIPIZIDE 2.5 MG: 2.5 TABLET, EXTENDED RELEASE ORAL at 09:59

## 2025-07-10 RX ADMIN — DEXAMETHASONE 4 MG: 2 TABLET ORAL at 06:13

## 2025-07-10 RX ADMIN — INSULIN LISPRO 3 UNITS: 100 INJECTION, SOLUTION INTRAVENOUS; SUBCUTANEOUS at 09:59

## 2025-07-10 RX ADMIN — ROSUVASTATIN 5 MG: 5 TABLET, FILM COATED ORAL at 20:54

## 2025-07-10 RX ADMIN — INSULIN LISPRO 5 UNITS: 100 INJECTION, SOLUTION INTRAVENOUS; SUBCUTANEOUS at 13:14

## 2025-07-10 ASSESSMENT — COGNITIVE AND FUNCTIONAL STATUS - GENERAL
MOVING TO AND FROM BED TO CHAIR: A LITTLE
DAILY ACTIVITIY SCORE: 19
DRESSING REGULAR UPPER BODY CLOTHING: A LITTLE
TURNING FROM BACK TO SIDE WHILE IN FLAT BAD: A LOT
MOVING TO AND FROM BED TO CHAIR: A LOT
HELP NEEDED FOR BATHING: A LOT
TURNING FROM BACK TO SIDE WHILE IN FLAT BAD: A LITTLE
DRESSING REGULAR LOWER BODY CLOTHING: A LITTLE
CLIMB 3 TO 5 STEPS WITH RAILING: A LOT
TOILETING: A LITTLE
MOVING FROM LYING ON BACK TO SITTING ON SIDE OF FLAT BED WITH BEDRAILS: A LITTLE
DRESSING REGULAR LOWER BODY CLOTHING: A LOT
HELP NEEDED FOR BATHING: A LITTLE
STANDING UP FROM CHAIR USING ARMS: A LOT
STANDING UP FROM CHAIR USING ARMS: A LITTLE
WALKING IN HOSPITAL ROOM: A LOT
DAILY ACTIVITIY SCORE: 16
MOBILITY SCORE: 12
PERSONAL GROOMING: A LITTLE
CLIMB 3 TO 5 STEPS WITH RAILING: A LOT
TOILETING: A LOT
WALKING IN HOSPITAL ROOM: A LITTLE
MOVING FROM LYING ON BACK TO SITTING ON SIDE OF FLAT BED WITH BEDRAILS: A LOT
MOBILITY SCORE: 17
DRESSING REGULAR UPPER BODY CLOTHING: A LITTLE
PERSONAL GROOMING: A LITTLE

## 2025-07-10 ASSESSMENT — ACTIVITIES OF DAILY LIVING (ADL)
HOME_MANAGEMENT_TIME_ENTRY: 16
BATHING_ASSISTANCE: MODERATE

## 2025-07-10 ASSESSMENT — PAIN - FUNCTIONAL ASSESSMENT
PAIN_FUNCTIONAL_ASSESSMENT: 0-10
PAIN_FUNCTIONAL_ASSESSMENT: 0-10

## 2025-07-10 ASSESSMENT — PAIN SCALES - GENERAL
PAINLEVEL_OUTOF10: 0 - NO PAIN
PAINLEVEL_OUTOF10: 0 - NO PAIN

## 2025-07-10 NOTE — PROGRESS NOTES
[facility]   HOSPITAL MEDICINE     PROGRESS NOTE       PATIENT NAME: Dragan Calvin   MRN: [id number]   SERVICE DATE: 07/10/25   SERVICE TIME: 11:55 AM      Hospital Medicine/Primary Attending: Blade Lorenz DO   LENGTH OF STAY: 4     Assessment      84 y.o. male with PMH significant for CAD, permanent Afib (on eliquis), DVT s/p IVC filter, HLD, HTN, SSS s/p implanted pacemaker, PUD, GERD, DMT2, peripheral edema, SBO(s), and chronic BLE weakness d/t spinal stenosis who presented to [facility] ED from home with complaints of weakness . Seen by NSG at [facility] who rec MRI. MRI department allegedly refused to do MRI 2/2 PPM (despite alleged EP hilda).  During device check at our hospital, it was discovered that the device his battery had .  EP was consulted and performed an exchange on 2025.  Now patient awaiting MRI w/ NSGY eval post imaging.     CHIEF COMPLAINT: Leg weakness, bilateral    Assessment & Plan  Pacemaker at end of battery life  Presence of cardiac pacemaker  -can NOT have MRI until pacemaker replaced, per EP - done 25  -Consult to EP, discussed with team   -EP replacement 25  -HOLD apixaban, discuss timing for restarting with EP - 24-48hr post, await their input  Leg weakness, bilateral  Lumbar spinal stenosis  Weakness of both lower extremities  Cervical stenosis of spine  - MRI for lumbar and cervical per NSG - per MRI earliest it can be done is 25 but still awaiting EP clearance   -pain control   -please notify NSG when MRI done   -ctn dexamethasone 4mg q8 but switch to oral     Type 2 diabetes mellitus, with long-term current use of insulin  Restart home glipizide  with blood sugars elevated due to steroid use  #Type 2 diabetes mellitus   -Lantus back up to 30units now no longer NPO  -SSI   -Hypoglycemia protocol    -Blood glucose checks qAC & qHS   -Carbohydrate controlled diet     Paroxysmal atrial fibrillation (Multi)  HOLD apix for  procedure - see above    Hypertension, benign  Continue with home meds lisinopril  Fall  CT of the head on 7/3/2025 no acute finding  Fall precaution  PT OT  Chronic heart failure with preserved ejection fraction (HFpEF, >= 50%)  Ctn home torsemide 40mg dialy   ctn metolazone wed and Sunday        I reviewed:    All new and relevant labs and imaging   New EKGs  Consultant notes   Vitals Signs   Nursing notes                OBESE?: Yes, Class 1 Obesity: BMI of 30.0 to 34.9    DISPOSITION:  Medical Necessity Statement: Patient is not medically ready for discharge due to the following: needs pacemaker, MRI for neurogenic sx may need surgery per neurosurgery   Plan of care discussed with:           Subjective   SUBJECTIVE:  Pt seen and examined at bedside, continues to feel well although with some R shoulder discomfort. Dressing over chest still in place. He reports feeling well, no bleeding or bruising from site, pain well controlled, legs still weak but unchanged.  Patient denies CP, SOB, fevers, chills, nausea, or emesis.         Objective      PHYSICAL EXAM: Patient Vitals for the past 24 hrs:   BP Temp Temp src Pulse Resp SpO2   07/10/25 1043 104/54 36.2 °C (97.2 °F) -- 87 -- 94 %   07/10/25 0427 110/73 36.4 °C (97.5 °F) Temporal 84 17 95 %   07/09/25 2004 104/68 36.5 °C (97.7 °F) Temporal 85 16 96 %   07/09/25 1510 119/77 36.5 °C (97.7 °F) -- 84 16 95 %      Physical Exam  Vitals and nursing note reviewed.   Constitutional:       General: He is not in acute distress.     Appearance: Normal appearance. He is not ill-appearing.   HENT:      Head: Normocephalic and atraumatic.   Pulmonary:      Effort: No respiratory distress.   Chest:      Comments: Large dressing in place over left chest  Abdominal:      Tenderness: There is no guarding.   Skin:     Coloration: Skin is not jaundiced or pale.   Neurological:      Mental Status: He is alert and oriented to person, place, and time.   Psychiatric:         Mood and  "Affect: Mood normal.            MEDICATIONS:   Scheduled medications  Scheduled Medications[1]  Continuous medications  Continuous Medications[2]  PRN medications  PRN Medications[3]       DATA:      Diagnostic tests reviewed for today's visit:     CBC:   Results from last 72 hours   Lab Units 07/10/25  0538   WBC AUTO x10*3/uL 18.2*   HEMATOCRIT % 36.2*   PLATELETS AUTO x10*3/uL 185   MCV fL 94     Coags:   Results from last 72 hours   Lab Units 07/08/25  0543   INR  1.3*   APTT seconds 24*     CMP:   Results from last 72 hours   Lab Units 07/10/25  0538   SODIUM mmol/L 141   POTASSIUM mmol/L 3.1*   CO2 mmol/L 28   BUN mg/dL 80*   ALBUMIN g/dL 3.6        Cardiac Enzymes: No lab exists for component: \"TROP\" .lab  Liver Function, Amylase, Lipase:         No lab exists for component: \"TPROT\", \"ALB\", \"TBILI\"     MG/PHOS: [id number](mg,p)@   Renal Panel:    Results from last 72 hours   Lab Units 07/10/25  0538   ALBUMIN g/dL 3.6   BUN mg/dL 80*   POTASSIUM mmol/L 3.1*   CO2 mmol/L 28   SODIUM mmol/L 141        Heme:        No lab exists for component: \"RETICP\", \"ABSRETIC\", \"LD\", \"RAFAEL\", \"FE\", \"TRANSFERSAT\"   No components found for: \"UALBCR\"      Medication and Non-Pharmacologic VTE Prophylaxis/Anticoagulants    The patient has received anticoagulants recently:  Heparin is ordered or has been given within the last 24 hours OR  Lovenox has been given in the last 24 hours OR  An anticoagulant other than Heparin or Lovenox is on the home med list OR  An anticoagulant other than Heparin or Lovenox has been given within the last 5 days  Last Anticoag Admin            apixaban (Eliquis) tablet 5 mg    Given 5 mg at 0842    Frequency: 2 times daily         There are additional administrations since 07/04/25 1730 that are not shown.    No unadministered anticoagulant orders found.                   SIGNATURE: Blade Lorenz DO   [facility] Medicine    PAGER/CONTACT #: Epic Chat      Disclaimer: Portions of this note may have " been generated using Dragon voice recognition software. Reasonable efforts were made to correct any dictation errors that resulted due to the programming of this software but some may still be present.     Portions of this note including HPI, ROS, impression/plan, and examination may have been copied forward from yesterday to July 10, 2025 as to provide important historical information essential in contributing to medical decision making. Documentation has been reviewed and edited as necessary to support clinical decision making for today's visit and to reflect my own independent evaluation of this patient.       The time of this note does not reflect the time I saw the patient but the time that this note was written              [1] [Held by provider] apixaban, 5 mg, oral, BID  cefadroxil, 500 mg, oral, q12h SAM  cyanocobalamin, 1,000 mcg, oral, Daily  dexAMETHasone, 4 mg, oral, q8h SAM  dilTIAZem CD, 240 mg, oral, BID  glipiZIDE XL, 2.5 mg, oral, Daily with breakfast  insulin glargine, 30 Units, subcutaneous, Nightly  insulin lispro, 0-5 Units, subcutaneous, Before meals & nightly  lidocaine, 1 patch, transdermal, Daily  lisinopril, 10 mg, oral, Daily  metOLazone, 2.5 mg, oral, Once per day on Sunday Wednesday  metoprolol tartrate, 25 mg, oral, BID  pantoprazole, 40 mg, oral, BID AC  polyethylene glycol, 17 g, oral, Daily  potassium chloride CR, 10 mEq, oral, Daily  rosuvastatin, 5 mg, oral, Daily  tamsulosin, 0.8 mg, oral, Daily  torsemide, 40 mg, oral, Daily     [2]    [3] PRN medications: acetaminophen **OR** acetaminophen **OR** acetaminophen, bisacodyl, bisacodyl, dextrose, dextrose, glucagon, glucagon, ondansetron **OR** ondansetron, oxyCODONE, prochlorperazine **OR** prochlorperazine **OR** prochlorperazine

## 2025-07-10 NOTE — PROGRESS NOTES
Subjective Data:  Feels ok, no bleeding/discharge from CIED site         Objective Data:  Last Recorded Vitals:  Vitals:    07/10/25 0427 07/10/25 1043 07/10/25 1136 07/10/25 1446   BP: 110/73 104/54 99/61 89/56   BP Location: Right arm  Right arm    Patient Position: Lying      Pulse: 84 87 74 73   Resp: 17      Temp: 36.4 °C (97.5 °F) 36.2 °C (97.2 °F)  36.3 °C (97.3 °F)   TempSrc: Temporal      SpO2: 95% 94% 98% 98%   Weight:       Height:           Last Labs:  CBC - 7/10/2025:  5:38 AM  18.2 12.7 185    36.2      CMP - 7/10/2025:  5:38 AM  8.7 6.3 37 --- 0.8   4.9 3.6 24 78      PTT - 7/8/2025:  5:43 AM  1.3   14.3 24     TROPHS   Date/Time Value Ref Range Status   07/03/2025 09:13 AM 17 0 - 20 ng/L Final   05/10/2025 04:01 PM 16 0 - 20 ng/L Final   05/10/2025 02:57 PM 15 0 - 20 ng/L Final     BNP   Date/Time Value Ref Range Status   07/03/2025 09:13  0 - 99 pg/mL Final   05/10/2025 02:57  0 - 99 pg/mL Final   04/23/2025 09:54  <100 pg/mL Final     Comment:        BNP levels increase with age in the general  population with the highest values seen in  individuals greater than 75 years of age.  Reference: J. Am. Cecily. Cardiol. 2002; 40:976-982.          HGBA1C   Date/Time Value Ref Range Status   07/06/2025 06:34 AM 7.9 See comment % Final   06/09/2025 09:29 AM 8.4 4.2 - 6.5 % Final   03/05/2025 09:10 AM 8.2 4.2 - 6.5 % Final     LDLCALC   Date/Time Value Ref Range Status   11/04/2024 10:18 AM 37 <=99 mg/dL Final     Comment:                                 Near   Borderline      AGE      Desirable  Optimal    High     High     Very High     0-19 Y     0 - 109     ---    110-129   >/= 130     ----    20-24 Y     0 - 119     ---    120-159   >/= 160     ----      >24 Y     0 -  99   100-129  130-159   160-189     >/=190       VLDL   Date/Time Value Ref Range Status   11/04/2024 10:18 AM 29 0 - 40 mg/dL Final   08/28/2023 10:30 AM 31 0 - 40 mg/dL Final   01/09/2023 01:47 PM 28 0 - 40 mg/dL Final    07/07/2022 01:11 PM 34 0 - 40 mg/dL Final      Last I/O:  I/O last 3 completed shifts:  In: - (0 mL/kg)   Out: 1950 (20 mL/kg) [Urine:1950 (0.6 mL/kg/hr)]  Weight: 97.5 kg     Past Cardiology Tests (Last 3 Years):  EKG:  ECG 12 Lead 07/03/2025 (Preliminary)      ECG 12 lead 05/10/2025      ECG 12 lead 11/24/2023      ECG 12 Lead     Echo:  Transthoracic echo (TTE) complete 06/27/2024    Ejection Fractions:  EF   Date/Time Value Ref Range Status   06/27/2024 04:13 PM 55 %      Cath:  No results found for this or any previous visit from the past 1095 days.    Stress Test:  No results found for this or any previous visit from the past 1095 days.    Cardiac Imaging:  No results found for this or any previous visit from the past 1095 days.      Inpatient Medications:  Scheduled Medications[1]  PRN Medications[2]  Continuous Medications[3]    Physical Exam:      CIED incision: wound edges well approximated. No evidence of pocket hematoma or purulent discharge or dehiscence. Steri strips in place.      Constitutional: well appearing, no distress  Eyes: no conjunctival injection  ENT: moist mucous membranes, no apparent injury  Respiratory/Thorax: normal work of breathing  Cardiovascular: normal rate, regular rhythm, extremities warm, JVP not elevated  Extremities: warm, intact          Assessment/Plan       84M hx AF on AC, DVT s/p IVC filter, HLD, HTN, SSS s/p MDT dcPPM. EP c/s for PPM at [facility] prohibiting MRI, and he is now s/p gen change 7/8/2025 with Dr Alexander for [facility].     CIED generator change  was without complication. Post-implant evaluation showed normal wound appearance and device interrogation showed appropriate device parameters. Can be discharged from EP standpoint and obtain MRI per [facility] protocol.      Recommendations:  -cefadroxil 500mg bid x7d post-gen change  -activity restrictions added to discharge instructions  -no heparin products for 7 days  -OK to restart apixaban 7/10 PM  -device clinic wound check and  device check to be scheduled by device clinic  -will sign off, please call if questions        Thank you for the opportunity to contribute to the care of this patient. Above recommendations discussed with Dr. Alexander. If further questions arise, please page the EP consult pager at [phone] on weekdays 7AM - 6PM and weekends 7AM - 2PM, or at [phone] at all other times. The EP device nurse can be reached at pager [phone] during regular business hours M-F.    Peripheral IV 07/09/25 20 G Anterior;Left Forearm (Active)   Site Assessment Intact;Dry;Clean 07/09/25 2100   Dressing Status Clean;Dry 07/09/25 2100   Number of days: 1       Code Status:  DNR    I spent 25 minutes in the professional and overall care of this patient.        Mina Tompkins MD       [1]   Scheduled medications   Medication Dose Route Frequency    [Held by provider] apixaban  5 mg oral BID    cefadroxil  500 mg oral q12h SAM    cyanocobalamin  1,000 mcg oral Daily    dexAMETHasone  4 mg oral q12h SAM    dilTIAZem CD  240 mg oral BID    glipiZIDE XL  2.5 mg oral Daily with breakfast    insulin glargine  40 Units subcutaneous Nightly    insulin lispro  0-15 Units subcutaneous TID AC    insulin lispro  10 Units subcutaneous Once    lidocaine  1 patch transdermal Daily    lisinopril  10 mg oral Daily    metOLazone  2.5 mg oral Once per day on Sunday Wednesday    metoprolol tartrate  25 mg oral BID    pantoprazole  40 mg oral BID AC    polyethylene glycol  17 g oral Daily    potassium chloride CR  10 mEq oral Daily    rosuvastatin  5 mg oral Daily    tamsulosin  0.8 mg oral Daily    torsemide  40 mg oral Daily   [2]   PRN medications   Medication    acetaminophen    Or    acetaminophen    Or    acetaminophen    bisacodyl    bisacodyl    dextrose    dextrose    glucagon    glucagon    ondansetron    Or    ondansetron    oxyCODONE    prochlorperazine    Or    prochlorperazine    Or    prochlorperazine   [3]   Continuous Medications   Medication Dose Last Rate

## 2025-07-10 NOTE — ASSESSMENT & PLAN NOTE
- MRI for lumbar and cervical per NSG - per MRI earliest it can be done is Friday 7/11/25 but still awaiting EP clearance   -pain control   -please notify NSG when MRI done   -ctn dexamethasone 4mg q8 but switch to oral

## 2025-07-10 NOTE — ASSESSMENT & PLAN NOTE
-can NOT have MRI until pacemaker replaced, per EP - done 7/8/25  -Consult to EP, discussed with team   -EP replacement 7/8/25  -HOLD apixaban, discuss timing for restarting with EP - 24-48hr post, await their input

## 2025-07-10 NOTE — CARE PLAN
The patient's goals for the shift include  Patient will remain safe during shift    The clinical goals for the shift include Patient will remain HDS throughout shift      Problem: Pain - Adult  Goal: Verbalizes/displays adequate comfort level or baseline comfort level  Outcome: Progressing     Problem: Safety - Adult  Goal: Free from fall injury  Outcome: Progressing     Problem: Discharge Planning  Goal: Discharge to home or other facility with appropriate resources  Outcome: Progressing     Problem: Chronic Conditions and Co-morbidities  Goal: Patient's chronic conditions and co-morbidity symptoms are monitored and maintained or improved  Outcome: Progressing     Problem: Nutrition  Goal: Nutrient intake appropriate for maintaining nutritional needs  Outcome: Progressing     Problem: Skin  Goal: Decreased wound size/increased tissue granulation at next dressing change  Outcome: Progressing  Goal: Participates in plan/prevention/treatment measures  Outcome: Progressing  Goal: Prevent/manage excess moisture  Outcome: Progressing  Goal: Prevent/minimize sheer/friction injuries  Outcome: Progressing  Goal: Promote/optimize nutrition  Outcome: Progressing  Goal: Promote skin healing  Outcome: Progressing     Problem: Heart Failure  Goal: Improved gas exchange this shift  Outcome: Progressing  Goal: Improved urinary output this shift  Outcome: Progressing  Goal: Reduction in peripheral edema within 24 hours  Outcome: Progressing  Goal: Report improvement of dyspnea/breathlessness this shift  Outcome: Progressing  Goal: Weight from fluid excess reduced over 2-3 days, then stabilize  Outcome: Progressing  Goal: Increase self care and/or family involvement in 24 hours  Outcome: Progressing     Problem: Fall/Injury  Goal: Not fall by end of shift  Outcome: Progressing  Goal: Be free from injury by end of the shift  Outcome: Progressing  Goal: Verbalize understanding of personal risk factors for fall in the  hospital  Outcome: Progressing  Goal: Verbalize understanding of risk factor reduction measures to prevent injury from fall in the home  Outcome: Progressing  Goal: Use assistive devices by end of the shift  Outcome: Progressing  Goal: Pace activities to prevent fatigue by end of the shift  Outcome: Progressing

## 2025-07-10 NOTE — PROGRESS NOTES
Occupational Therapy    Evaluation    Patient Name: Dragan Calvin  MRN: [id number]  Today's Date: 7/10/2025  Room: [address] [place] [address]  Time Calculation  Start Time: 1136  Stop Time: 1208  Time Calculation (min): 32 min    Assessment  IP OT Assessment  Prognosis: Good  Barriers to Discharge Home: Caregiver assistance, Physical needs  Caregiver Assistance: Caregiver assistance needed per identified barriers - however, level of patient's required assistance exceeds assistance available at home  Physical Needs: Stair navigation into home limited by function/safety, In-home setup navigation limited by function/safety, Intermittent mobility assistance needed, Intermittent ADL assistance needed, High falls risk due to function or environment  Evaluation/Treatment Tolerance: Patient limited by fatigue  Medical Staff Made Aware: Yes  End of Session Communication: Bedside nurse  End of Session Patient Position: Alarm on, Up in chair (CAREGIVER PRESENT)  Plan:  Inpatient Plan  Treatment Interventions: ADL retraining, Functional transfer training, UE strengthening/ROM, Endurance training, Patient/family training, Equipment evaluation/education, Compensatory technique education, Cognitive reorientation  OT Frequency: 3 times per week (DURING THIS ACUTE INPATIENT HOSPITALIZATION)  OT Discharge Recommendations:  (Based on current functional status and rehab potential, patient is anticipated to tolerate and benefit from 5 or more days per week of skilled rehabilitative therapy after discharge from this acute inpatient hospitalization.)  Equipment Recommended upon Discharge:  (TBD AT NEXT LEVEL OF CARE FOR [facility] AND DME)  OT Recommended Transfer Status: Maximum assist  OT - OK to Discharge: Yes  OT Assessment  OT Assessment Results: Decreased ADL status, Decreased upper extremity strength, Decreased endurance, Decreased functional mobility (DECREASED ORIENTATION)  Prognosis: Good  Evaluation/Treatment Tolerance: Patient limited by  fatigue  Medical Staff Made Aware: Yes  Strengths: Attitude of self, Housing layout, Insight into problems, Living arrangement secure, Premorbid level of function, Support of Caregivers  Barriers to Participation: Comorbidities    Subjective   Current Problem:  1. Leg weakness, bilateral  Cardiac device check - MRI    Cardiac device check - MRI      2. Generalized weakness        3. Presence of cardiac pacemaker  Case Request EP Lab: PPM Generator Change    Case Request EP Lab: PPM Generator Change    Electrophysiology procedure    Electrophysiology procedure      4. Sick sinus syndrome (Multi)  Cardiac Device Check - Inpatient    Cardiac Device Check - Inpatient        General:  Reason for Referral: Patient presented to the hospital d/t BLE weakness and fall. He was transferred from [facility] ED to [facility] for further workup.  Past Medical History Relevant to Rehab: 84 y.o. male with PMH significant for CAD, permanent Afib, DVT s/p IVC filter, HLD, HTN, SSS s/p implanted pacemaker, PUD, GERD, DMT2, peripheral edema, SBO(s), and chronic BLE weakness d/t spinal stenosis who presents for further work up/treatment.  Prior to Session Communication: Bedside nurse  Patient Position Received: Alarm on, Bed, 3 rail up  Family/Caregiver Present: No  General Comment: PATIENT MET SEATED IN BED. HE EXPRESSED HIGH MOTIVATION FOR FULL PARTICIPATION AS TOLERATED.   Precautions:  Hearing/Visual Limitations: Andreafski B-HA/ WFL  Medical Precautions: Fall precautions  Precautions Comment: HIGH FALL PRECAUTION RISK 2/2 MULTIPLE (5-10) RECENT FALLS  Vital Signs:   Date/Time Vitals Session Patient Position Pulse Resp SpO2 BP MAP (mmHg)    07/10/25 14:46:20 --  --  73  --  98 %  89/56  67           Pain:  Pain Assessment  Pain Assessment: 0-10  0-10 (Numeric) Pain Score: 0 - No pain  Lines/Tubes/Drains:         Objective   Cognition:  Overall Cognitive Status: Within Functional Limits  Orientation Level:  (ALERT AND O xPERSON, Yanni,  xSITUATION, AND TO TIME FOR YEAR AND DAY OF THE WEEK/MOD VISUAL AND VC TO ID AND RECALL MONTH AND DATE.)  Attention: Within Functional Limits   Home Living:  Type of Home: House (ONE LEVEL)  Lives With: Spouse  Home Adaptive Equipment: Cane (x2 WH W AND x1 ROLLATOR)  Home Layout: One level  Home Access: Stairs to enter with rails  Entrance Stairs-Rails: Both  Entrance Stairs-Number of Steps: 3  Bathroom Shower/Tub: Walk-in shower (VS WALK-IN TUB(?))  Bathroom Toilet: Handicapped height  Bathroom Equipment: Shower chair with back, Grab bars in shower (x3)  Home Living Comments: FULL BED AND BATH/ABLE TO FULLY FUNCTION ON MAIN LEVEL   Prior Function:  Level of Lyman:  (PERFROMANCE HAS DECREASED TO MOD I FOR ADL AND IADL TASKS 2/2 DECLINE IN FUNCTION/INCREASED EFFORT/DECREASED BALANCE.)  Receives Help From: Family  ADL Assistance:  (MOD I)  Homemaking Assistance:  (MOD I)  Ambulatory Assistance:  (MOD I 2/2 FALLS/SAFETY/WEAKNESS)  Vocational: Retired ()  Leisure: TV/SPORTS/BASEBALL + [employer]  Hand Dominance: Right  IADL History:  Homemaking Responsibilities: No  Current License: Yes  Mode of Transportation: Car  ADL:  Eating Assistance: Independent  Grooming Assistance: Stand by  Grooming Deficit: Setup, Wash/dry hands  Bathing Assistance: Moderate  Bathing Deficit:  (ANTICIPATED)  UE Dressing Assistance: Minimal  UE Dressing Deficit: Pull around back  LE Dressing Assistance: Maximal  LE Dressing Deficit: Thread LLE into pants, Thread RLE into pants, Pull up over hips  Toileting Assistance with Device: Maximal  Toileting Deficit: Clothing management up, Clothing management down, Perineal hygiene  Activity Tolerance:  Endurance: Tolerates 10 - 20 min exercise with multiple rests  Balance:  Dynamic Sitting Balance  Dynamic Sitting-Balance Support: Right upper extremity supported, Left upper extremity supported, Feet supported  Dynamic Sitting-Level of Assistance: Contact guard  Dynamic  Sitting-Balance: Forward lean, Reaching for objects  Static Sitting Balance  Static Sitting-Balance Support: Bilateral upper extremity supported, Feet supported  Static Sitting-Level of Assistance: Contact guard  Static Sitting-Comment/Number of Minutes: >/= 2 MINUTES  Static Standing Balance  Static Standing-Balance Support: Bilateral upper extremity supported  Bed Mobility/Transfers: Bed Mobility  Bed Mobility: Yes  Bed Mobility 1  Bed Mobility 1: Supine to sitting  Level of Assistance 1: Moderate assistance  Bed Mobility Comments 1: HOB 60* + BED RAIL  Functional Mobility  Functional Mobility Performed: No  Transfers  Transfer: Yes  Transfer 1  Technique 1: Sit to stand, Stand to sit  Transfer Device 1: Walker  Transfer Level of Assistance 1: Moderate assistance  Trials/Comments 1: EOB > WH W  Transfers 2  Transfer From 2: Bed to  Transfer to 2: Chair with arms  Technique 2: Stand pivot  Transfer Device 2: Walker  Transfer Level of Assistance 2: Minimum assistance, Moderate verbal cues  Trials/Comments 2: TF + WALKER PRECAUTIONS WITH MOD TACTILE + VC FOR PROPER CARRYOVER  IADL's:   Homemaking Responsibilities: No  Current License: Yes  Mode of Transportation: Car  Vision: Vision - Basic Assessment  Current Vision: Wears glasses all the time  Sensation:  Light Touch: No apparent deficits  Strength:  Strength Comments: BUE >/= 4- /5 AS EVIDENCED BY COMPLETION OF FXAL TASKS  Perception:  Inattention/Neglect: Appears intact  Initiation: Appears intact  Motor Planning: Appears intact  Perseveration: Not present  Coordination:  Movements are Fluid and Coordinated: Yes   Hand Function:  Hand Function  Gross Grasp: Functional  Coordination: Functional  Extremities: RUE   RUE : Within Functional Limits, LUE   LUE: Within Functional Limits  Outcome Measures: [facility] Daily Activity  Putting on and taking off regular lower body clothing: A lot  Bathing (including washing, rinsing, drying): A lot  Putting on and taking off  regular upper body clothing: A little  Toileting, which includes using toilet, bedpan or urinal: A lot  Taking care of personal grooming such as brushing teeth: A little  Eating Meals: None  Daily Activity - Total Score: 16  Brief Confusion Assessment Method (bCAM)  CAM Result: CAM -  OT Adult Other Outcome Measures  4AT: 0    Education Documentation  Precautions, taught by Keshia Delcid OT at 7/10/2025  3:55 PM.  Learner: Patient  Readiness: Eager  Method: Explanation  Response: Verbalizes Understanding    Home Exercise Program, taught by Keshia Delcid OT at 7/10/2025  3:55 PM.  Learner: Patient  Readiness: Eager  Method: Explanation  Response: Verbalizes Understanding    Body Mechanics, taught by Keshia Delcid OT at 7/10/2025  3:55 PM.  Learner: Patient  Readiness: Eager  Method: Explanation  Response: Verbalizes Understanding    ADL Training, taught by Keshia Delcid OT at 7/10/2025  3:55 PM.  Learner: Patient  Readiness: Eager  Method: Explanation  Response: Verbalizes Understanding    Education Comments  No comments found.        Goals:     Encounter Problems       Encounter Problems (Active)       ADLs       Patient will perform UB and LB bathing with modified independent level of assistance and shower chair and long-handled sponge.       Start:  07/10/25    Expected End:  07/24/25            Patient with complete upper body dressing with modified independent level of assistance donning and doffing all UE clothes with PRN adaptive equipment while edge of bed  and standing       Start:  07/10/25    Expected End:  07/24/25            Patient with complete lower body dressing with modified independent level of assistance donning and doffing all LE clothes  with PRN adaptive equipment while supported sitting and standing       Start:  07/10/25    Expected End:  07/24/25            Patient will complete daily grooming tasks brushing teeth and washing face/hair with modified independent  level of assistance and PRN adaptive equipment while supported sitting and standing.       Start:  07/10/25    Expected End:  07/24/25            Patient will complete toileting including hygiene clothing management/hygiene with modified independent level of assistance.       Start:  07/10/25    Expected End:  07/24/25               COGNITION/SAFETY       Patient will demonstrated orientation x 4 with visual cues.       Start:  07/10/25    Expected End:  07/24/25       ORIENTATION            EXERCISE/STRENGTHENING       Patient will be educated on BUE HEP for increased ADL performance.       Start:  07/10/25    Expected End:  07/24/25               MOBILITY       Patient will perform Functional mobility mod  Household distances/Community Distances with modified independent level of assistance and least restrictive device in order to improve safety and functional mobility.       Start:  07/10/25    Expected End:  07/24/25               TRANSFERS       Patient will perform bed mobility modified independent level of assistance In order to improve safety and independence with mobility       Start:  07/10/25    Expected End:  07/24/25            Patient will complete all functional transfers with least restrictive device with modified independent level of assistance.       Start:  07/10/25    Expected End:  07/24/25                   07/10/25 at 3:56 PM   Keshia Delcid, OT   Rehab Office: [phone]

## 2025-07-10 NOTE — PROGRESS NOTES
Physical Therapy    Physical Therapy Treatment    Patient Name: Dragan Calvin  MRN: [id number]  Department: [place] [id number]  Room: [address] [place], [place] [address]  Today's Date: 7/10/2025  Time Calculation  Start Time: 1321  Stop Time: 1333  Time Calculation (min): 12 min         Assessment/Plan   PT Assessment  PT Assessment Results: Decreased strength, Decreased range of motion, Decreased endurance, Impaired balance, Decreased mobility  Rehab Prognosis: Excellent  Barriers to Discharge Home: Caregiver assistance, Physical needs  Caregiver Assistance: Caregiver assistance needed per identified barriers - however, level of patient's required assistance exceeds assistance available at home  Physical Needs: 24hr mobility assistance needed, 24hr ADL assistance needed, Weight bearing precautions unable to be safely maintained, In-home setup navigation limited by function/safety  Evaluation/Treatment Tolerance: Patient tolerated treatment well  End of Session Communication: Bedside nurse  Assessment Comment: Pt continues to demonstrate impaired strength, balance, and function to that of baseline. Pt will benefit from continued PT in house and after discharge at MOD intensity to maximize functional return as wife unable to assist with patient in current physical state.  End of Session Patient Position: Up in chair, Alarm on  PT Plan  Inpatient/Swing Bed or Outpatient: Inpatient  PT Plan  Treatment/Interventions: Bed mobility, Transfer training, Gait training, Balance training, Strengthening, Endurance training, Therapeutic exercise, Therapeutic activity  PT Plan: Ongoing PT  PT Frequency: 3 times per week (during hospitalization)  PT Discharge Recommendations: Moderate intensity level of continued care (Based on current functional status and rehab potential, patient is anticipated to tolerate and benefit from 5 or more days per week of skilled rehabilitative therapy after discharge from this acute inpatient hospitalization.)  PT Recommended Transfer  Status: Assist x1  PT - OK to Discharge: Yes    PT Visit Info:  PT Received On: 07/10/25     General Visit Information:   General  Reason for Referral: BLE weakness/spinal stenosis work up.  Family/Caregiver Present: No  Patient Position Received: Up in chair, Alarm on  Preferred Learning Style: verbal, auditory  General Comment: Pt seated in chair upon entry eating lunch. Pt willing to take intermittent break from lunch to work with PT.    Subjective   Precautions:  Precautions  Medical Precautions: Fall precautions    Objective   Pain:  Pain Assessment  Pain Assessment: 0-10  0-10 (Numeric) Pain Score: 0 - No pain  Cognition:  Cognition  Overall Cognitive Status: Within Functional Limits  Orientation Level: Oriented X4 (Pt awake alert and appropriate during PT visit.)  Insight: Mild, Within function limits  Coordination:  Movements are Fluid and Coordinated: Yes  Postural Control:  Postural Control  Postural Control: Impaired  Posture Comment: kyphotic/flexed  Static Sitting Balance  Static Sitting-Balance Support: Feet supported  Static Sitting-Level of Assistance: Close supervision  Static Standing Balance  Static Standing-Balance Support: Bilateral upper extremity supported (on a wheeled walker)  Static Standing-Level of Assistance: Minimum assistance    Activity Tolerance:   Decreased for upright activities   Treatments:     Therapeutic Activity  Therapeutic Activity Performed: Yes  Therapeutic Activity 1: transfers/gait    Bed Mobility  Bed Mobility: No  Bed Mobility 1  Bed Mobility Comments 1: Pt OOB in chair pre/post visit. Wanting to eat lunch.    Ambulation/Gait Training  Ambulation/Gait Training Performed: Yes  Ambulation/Gait Training 1  Surface 1: Level tile  Device 1: Rolling walker  Assistance 1: Minimum assistance, Minimal verbal cues, Minimal tactile cues  Quality of Gait 1: Inconsistent stride length, Decreased step length, Shuffling gait, Forward flexed posture  Comments/Distance (ft) 1: 5-6ft  fwd/retro  Transfers  Transfer: Yes  Transfer 1  Transfer From 1: Sit to, Stand to  Transfer to 1: Stand, Sit  Transfer Device 1: Walker  Transfer Level of Assistance 1: Moderate assistance, Minimal verbal cues (Pt feet sliding anteriorly in front of patient. Mod assist for blocking R foot and providing assist from chair.)      Outcome Measures:  [facility] Basic Mobility  Turning from your back to your side while in a flat bed without using bedrails: A lot  Moving from lying on your back to sitting on the side of a flat bed without using bedrails: A lot  Moving to and from bed to chair (including a wheelchair): A lot  Standing up from a chair using your arms (e.g. wheelchair or bedside chair): A lot  To walk in hospital room: A lot  Climbing 3-5 steps with railing: A lot  Basic Mobility - Total Score: 12    Education Documentation  Precautions, taught by Kd Morgan, PT at 7/10/2025  2:14 PM.  Learner: Patient  Readiness: Acceptance  Method: Explanation  Response: Verbalizes Understanding  Comment: Up with assist, call light, continued PT after discharge.    Mobility Training, taught by Kd Morgan, PT at 7/10/2025  2:14 PM.  Learner: Patient  Readiness: Acceptance  Method: Explanation  Response: Verbalizes Understanding  Comment: Up with assist, call light, continued PT after discharge.    Education Comments  No comments found.      Encounter Problems       Encounter Problems (Active)       Mobility       STG - Patient will ambulate >20ft, wheeled walker, min assist (Progressing)       Start:  07/08/25    Expected End:  07/22/25               PT Transfers       STG - Patient to transfer to and from sit to supine min assist (Progressing)       Start:  07/08/25    Expected End:  07/22/25            STG - Patient will transfer sit to and from stand min assist (Progressing)       Start:  07/08/25    Expected End:  07/22/25

## 2025-07-11 ENCOUNTER — APPOINTMENT (OUTPATIENT)
Dept: RADIOLOGY | Facility: HOSPITAL | Age: 85
DRG: 259 | End: 2025-07-11
Payer: MEDICARE

## 2025-07-11 LAB
GLUCOSE BLD MANUAL STRIP-MCNC: 204 MG/DL (ref 74–99)
GLUCOSE BLD MANUAL STRIP-MCNC: 253 MG/DL (ref 74–99)
GLUCOSE BLD MANUAL STRIP-MCNC: 289 MG/DL (ref 74–99)
GLUCOSE BLD MANUAL STRIP-MCNC: 383 MG/DL (ref 74–99)

## 2025-07-11 PROCEDURE — 72141 MRI NECK SPINE W/O DYE: CPT | Performed by: RADIOLOGY

## 2025-07-11 PROCEDURE — 2500000002 HC RX 250 W HCPCS SELF ADMINISTERED DRUGS (ALT 637 FOR MEDICARE OP, ALT 636 FOR OP/ED): Performed by: NURSE PRACTITIONER

## 2025-07-11 PROCEDURE — 72148 MRI LUMBAR SPINE W/O DYE: CPT | Performed by: RADIOLOGY

## 2025-07-11 PROCEDURE — 82947 ASSAY GLUCOSE BLOOD QUANT: CPT

## 2025-07-11 PROCEDURE — 2500000001 HC RX 250 WO HCPCS SELF ADMINISTERED DRUGS (ALT 637 FOR MEDICARE OP): Performed by: NURSE PRACTITIONER

## 2025-07-11 PROCEDURE — 72141 MRI NECK SPINE W/O DYE: CPT

## 2025-07-11 PROCEDURE — 2500000002 HC RX 250 W HCPCS SELF ADMINISTERED DRUGS (ALT 637 FOR MEDICARE OP, ALT 636 FOR OP/ED): Performed by: STUDENT IN AN ORGANIZED HEALTH CARE EDUCATION/TRAINING PROGRAM

## 2025-07-11 PROCEDURE — 1210000001 HC SEMI-PRIVATE ROOM DAILY

## 2025-07-11 PROCEDURE — 2500000005 HC RX 250 GENERAL PHARMACY W/O HCPCS: Performed by: NURSE PRACTITIONER

## 2025-07-11 PROCEDURE — 72148 MRI LUMBAR SPINE W/O DYE: CPT

## 2025-07-11 PROCEDURE — 2500000004 HC RX 250 GENERAL PHARMACY W/ HCPCS (ALT 636 FOR OP/ED): Performed by: STUDENT IN AN ORGANIZED HEALTH CARE EDUCATION/TRAINING PROGRAM

## 2025-07-11 PROCEDURE — 2500000001 HC RX 250 WO HCPCS SELF ADMINISTERED DRUGS (ALT 637 FOR MEDICARE OP): Performed by: STUDENT IN AN ORGANIZED HEALTH CARE EDUCATION/TRAINING PROGRAM

## 2025-07-11 PROCEDURE — 2500000004 HC RX 250 GENERAL PHARMACY W/ HCPCS (ALT 636 FOR OP/ED): Performed by: NURSE PRACTITIONER

## 2025-07-11 RX ADMIN — PANTOPRAZOLE SODIUM 40 MG: 40 TABLET, DELAYED RELEASE ORAL at 18:11

## 2025-07-11 RX ADMIN — CYANOCOBALAMIN TAB 1000 MCG 1000 MCG: 1000 TAB at 09:45

## 2025-07-11 RX ADMIN — LISINOPRIL 10 MG: 10 TABLET ORAL at 09:00

## 2025-07-11 RX ADMIN — APIXABAN 5 MG: 5 TABLET, FILM COATED ORAL at 10:20

## 2025-07-11 RX ADMIN — APIXABAN 5 MG: 5 TABLET, FILM COATED ORAL at 21:18

## 2025-07-11 RX ADMIN — DEXAMETHASONE 4 MG: 2 TABLET ORAL at 09:45

## 2025-07-11 RX ADMIN — TAMSULOSIN HYDROCHLORIDE 0.8 MG: 0.4 CAPSULE ORAL at 09:45

## 2025-07-11 RX ADMIN — CEFADROXIL 500 MG: 500 CAPSULE ORAL at 09:45

## 2025-07-11 RX ADMIN — INSULIN LISPRO 9 UNITS: 100 INJECTION, SOLUTION INTRAVENOUS; SUBCUTANEOUS at 18:11

## 2025-07-11 RX ADMIN — ROSUVASTATIN 5 MG: 5 TABLET, FILM COATED ORAL at 21:19

## 2025-07-11 RX ADMIN — INSULIN LISPRO 9 UNITS: 100 INJECTION, SOLUTION INTRAVENOUS; SUBCUTANEOUS at 09:08

## 2025-07-11 RX ADMIN — GLIPIZIDE 2.5 MG: 2.5 TABLET, EXTENDED RELEASE ORAL at 09:45

## 2025-07-11 RX ADMIN — DEXAMETHASONE 4 MG: 2 TABLET ORAL at 21:18

## 2025-07-11 RX ADMIN — CEFADROXIL 500 MG: 500 CAPSULE ORAL at 21:18

## 2025-07-11 RX ADMIN — INSULIN GLARGINE 40 UNITS: 100 INJECTION, SOLUTION SUBCUTANEOUS at 22:53

## 2025-07-11 RX ADMIN — POTASSIUM CHLORIDE 10 MEQ: 750 TABLET, FILM COATED, EXTENDED RELEASE ORAL at 09:45

## 2025-07-11 RX ADMIN — PANTOPRAZOLE SODIUM 40 MG: 40 TABLET, DELAYED RELEASE ORAL at 10:20

## 2025-07-11 RX ADMIN — LIDOCAINE 4% 1 PATCH: 40 PATCH TOPICAL at 09:45

## 2025-07-11 RX ADMIN — INSULIN LISPRO 15 UNITS: 100 INJECTION, SOLUTION INTRAVENOUS; SUBCUTANEOUS at 12:17

## 2025-07-11 ASSESSMENT — COGNITIVE AND FUNCTIONAL STATUS - GENERAL
DRESSING REGULAR LOWER BODY CLOTHING: A LOT
DRESSING REGULAR UPPER BODY CLOTHING: A LITTLE
MOBILITY SCORE: 14
HELP NEEDED FOR BATHING: A LOT
STANDING UP FROM CHAIR USING ARMS: A LOT
PERSONAL GROOMING: A LITTLE
DAILY ACTIVITIY SCORE: 16
WALKING IN HOSPITAL ROOM: A LOT
CLIMB 3 TO 5 STEPS WITH RAILING: TOTAL
MOVING FROM LYING ON BACK TO SITTING ON SIDE OF FLAT BED WITH BEDRAILS: A LITTLE
MOVING TO AND FROM BED TO CHAIR: A LITTLE
TURNING FROM BACK TO SIDE WHILE IN FLAT BAD: A LITTLE
TOILETING: A LOT

## 2025-07-11 ASSESSMENT — PAIN - FUNCTIONAL ASSESSMENT: PAIN_FUNCTIONAL_ASSESSMENT: 0-10

## 2025-07-11 ASSESSMENT — PAIN SCALES - GENERAL: PAINLEVEL_OUTOF10: 0 - NO PAIN

## 2025-07-11 NOTE — ASSESSMENT & PLAN NOTE
-can NOT have MRI until pacemaker replaced, per EP - done 7/8/25  -Consult to EP, discussed with team   -EP replacement 7/8/25  -apixaban restarted 7/11, ok per EP

## 2025-07-11 NOTE — CARE PLAN
The patient's goals for the shift include      The clinical goals for the shift include Patient will remain HDS throughout shift      Problem: Pain - Adult  Goal: Verbalizes/displays adequate comfort level or baseline comfort level  Outcome: Progressing     Problem: Safety - Adult  Goal: Free from fall injury  Outcome: Progressing     Problem: Discharge Planning  Goal: Discharge to home or other facility with appropriate resources  Outcome: Progressing     Problem: Chronic Conditions and Co-morbidities  Goal: Patient's chronic conditions and co-morbidity symptoms are monitored and maintained or improved  Outcome: Progressing     Problem: Nutrition  Goal: Nutrient intake appropriate for maintaining nutritional needs  Outcome: Progressing     Problem: Skin  Goal: Decreased wound size/increased tissue granulation at next dressing change  Outcome: Progressing  Goal: Participates in plan/prevention/treatment measures  Outcome: Progressing  Goal: Prevent/manage excess moisture  Outcome: Progressing  Goal: Prevent/minimize sheer/friction injuries  Outcome: Progressing  Goal: Promote/optimize nutrition  Outcome: Progressing  Goal: Promote skin healing  Outcome: Progressing     Problem: Heart Failure  Goal: Improved gas exchange this shift  Outcome: Progressing  Goal: Improved urinary output this shift  Outcome: Progressing  Goal: Reduction in peripheral edema within 24 hours  Outcome: Progressing  Goal: Report improvement of dyspnea/breathlessness this shift  Outcome: Progressing  Goal: Weight from fluid excess reduced over 2-3 days, then stabilize  Outcome: Progressing  Goal: Increase self care and/or family involvement in 24 hours  Outcome: Progressing     Problem: Fall/Injury  Goal: Not fall by end of shift  Outcome: Progressing  Goal: Be free from injury by end of the shift  Outcome: Progressing  Goal: Verbalize understanding of personal risk factors for fall in the hospital  Outcome: Progressing  Goal: Verbalize  understanding of risk factor reduction measures to prevent injury from fall in the home  Outcome: Progressing  Goal: Use assistive devices by end of the shift  Outcome: Progressing  Goal: Pace activities to prevent fatigue by end of the shift  Outcome: Progressing     Problem: Diabetes  Goal: Achieve decreasing blood glucose levels by end of shift  Outcome: Progressing  Goal: Increase stability of blood glucose readings by end of shift  Outcome: Progressing  Goal: Decrease in ketones present in urine by end of shift  Outcome: Progressing  Goal: Maintain electrolyte levels within acceptable range throughout shift  Outcome: Progressing  Goal: Maintain glucose levels >70mg/dl to <250mg/dl throughout shift  Outcome: Progressing  Goal: No changes in neurological exam by end of shift  Outcome: Progressing  Goal: Learn about and adhere to nutrition recommendations by end of shift  Outcome: Progressing  Goal: Vital signs within normal range for age by end of shift  Outcome: Progressing  Goal: Increase self care and/or family involovement by end of shift  Outcome: Progressing  Goal: Receive DSME education by end of shift  Outcome: Progressing

## 2025-07-11 NOTE — PROGRESS NOTES
Transitional Care Coordination Progress Note:  Patient discussed during interdisciplinary rounds.   Team members present: MD, TCC  Plan per Medical/Surgical team: Leg weakness  Payor: Medicare  Discharge disposition: new AR  Potential Barriers: medical  ADOD: 1-2 days  Per MD, MRI scheduled for today at 1pm. Will need recs from neurosurgery following MRI. Spoke with patient at bedside to discuss PT recommendations for moderate intensity therapy at discharge. Discussed differences between acute rehab, skilled nursing facility, home health care and outpatient therapy. Patient states he would like to go to [facility] as his wife has been there in the past and it is close to their home. [facility] Patient will not require precert. Will continue to follow for discharge planning needs.     Sheila JAMES, RN  Transitional Care Coordinator (TCC)  [phone]

## 2025-07-11 NOTE — CARE PLAN
The patient's goals for the shift include      The clinical goals for the shift include remain free from fralls      Problem: Pain - Adult  Goal: Verbalizes/displays adequate comfort level or baseline comfort level  7/11/2025 0108 by Jazzmine Little RN  Outcome: Progressing  7/11/2025 0107 by Jazzmine Little RN  Outcome: Progressing     Problem: Safety - Adult  Goal: Free from fall injury  7/11/2025 0108 by Jazzmine Little RN  Outcome: Progressing  7/11/2025 0107 by Jazzmine Little RN  Outcome: Progressing     Problem: Discharge Planning  Goal: Discharge to home or other facility with appropriate resources  7/11/2025 0108 by Jazzmine Little RN  Outcome: Progressing  7/11/2025 0107 by Jazzmine Little RN  Outcome: Progressing     Problem: Chronic Conditions and Co-morbidities  Goal: Patient's chronic conditions and co-morbidity symptoms are monitored and maintained or improved  7/11/2025 0108 by Jazzmine Little RN  Outcome: Progressing  7/11/2025 0107 by Jazzmine Little RN  Outcome: Progressing     Problem: Nutrition  Goal: Nutrient intake appropriate for maintaining nutritional needs  7/11/2025 0108 by Jazzmine Little RN  Outcome: Progressing  7/11/2025 0107 by Jazzmine Little RN  Outcome: Progressing     Problem: Skin  Goal: Decreased wound size/increased tissue granulation at next dressing change  7/11/2025 0108 by Jazzmine Little RN  Outcome: Progressing  7/11/2025 0107 by Jazzmine Little RN  Outcome: Progressing  Goal: Participates in plan/prevention/treatment measures  7/11/2025 0108 by Jazzmine Little RN  Outcome: Progressing  7/11/2025 0107 by Jazzmine Little RN  Outcome: Progressing  Goal: Prevent/manage excess moisture  7/11/2025 0108 by Jazzmine Little RN  Outcome: Progressing  7/11/2025 0107 by Jazzmine Little RN  Outcome: Progressing  Goal: Prevent/minimize sheer/friction injuries  7/11/2025 0108 by Jazzmine Little RN  Outcome: Progressing  7/11/2025 0107 by Jazzmine Little RN  Outcome: Progressing  Goal: Promote/optimize nutrition  7/11/2025 0108 by Jazzmine  MEMO Little  Outcome: Progressing  7/11/2025 0107 by Jazzmine Little RN  Outcome: Progressing  Goal: Promote skin healing  7/11/2025 0108 by Jazzmine Little RN  Outcome: Progressing  7/11/2025 0107 by Jazzmine Little RN  Outcome: Progressing     Problem: Heart Failure  Goal: Improved gas exchange this shift  7/11/2025 0108 by Jazzmine Little RN  Outcome: Progressing  7/11/2025 0107 by Jazzmine Little RN  Outcome: Progressing  Goal: Improved urinary output this shift  7/11/2025 0108 by Jazzmine Little RN  Outcome: Progressing  7/11/2025 0107 by Jazzmine Little RN  Outcome: Progressing  Goal: Reduction in peripheral edema within 24 hours  7/11/2025 0108 by Jazzmine Little RN  Outcome: Progressing  7/11/2025 0107 by Jazzmine Little RN  Outcome: Progressing  Goal: Report improvement of dyspnea/breathlessness this shift  7/11/2025 0108 by Jazzmine Little RN  Outcome: Progressing  7/11/2025 0107 by Jazzmine Little RN  Outcome: Progressing  Goal: Weight from fluid excess reduced over 2-3 days, then stabilize  7/11/2025 0108 by Jazzmine Little RN  Outcome: Progressing  7/11/2025 0107 by Jazzmine Little RN  Outcome: Progressing  Goal: Increase self care and/or family involvement in 24 hours  7/11/2025 0108 by Jazzmine Little RN  Outcome: Progressing  7/11/2025 0107 by Jazzmine Little RN  Outcome: Progressing     Problem: Fall/Injury  Goal: Not fall by end of shift  7/11/2025 0108 by Jazzmine Little RN  Outcome: Progressing  7/11/2025 0107 by Jazzmine Little RN  Outcome: Progressing  Goal: Be free from injury by end of the shift  7/11/2025 0108 by Jazzmine Little RN  Outcome: Progressing  7/11/2025 0107 by Jazzmine Little RN  Outcome: Progressing  Goal: Verbalize understanding of personal risk factors for fall in the hospital  7/11/2025 0108 by Jazzmine Little RN  Outcome: Progressing  7/11/2025 0107 by Jazzmine Little RN  Outcome: Progressing  Goal: Verbalize understanding of risk factor reduction measures to prevent injury from fall in the home  7/11/2025 0108 by Jazzmine Little,  RN  Outcome: Progressing  7/11/2025 0107 by Jazzmine Little RN  Outcome: Progressing  Goal: Use assistive devices by end of the shift  7/11/2025 0108 by Jazzmine Little RN  Outcome: Progressing  7/11/2025 0107 by Jazzmine Little RN  Outcome: Progressing  Goal: Pace activities to prevent fatigue by end of the shift  7/11/2025 0108 by Jazzmine Little RN  Outcome: Progressing  7/11/2025 0107 by Jazzmine Little RN  Outcome: Progressing     Problem: Diabetes  Goal: Achieve decreasing blood glucose levels by end of shift  7/11/2025 0108 by Jazzmine Little RN  Outcome: Progressing  7/11/2025 0107 by Jazzmine Little RN  Outcome: Progressing  Goal: Increase stability of blood glucose readings by end of shift  7/11/2025 0108 by Jazzmine Little RN  Outcome: Progressing  7/11/2025 0107 by Jazzmine Little RN  Outcome: Progressing  Goal: Decrease in ketones present in urine by end of shift  7/11/2025 0108 by Jazzmine Little RN  Outcome: Progressing  7/11/2025 0107 by Jazzmine Little RN  Outcome: Progressing  Goal: Maintain electrolyte levels within acceptable range throughout shift  7/11/2025 0108 by Jazzmine Little RN  Outcome: Progressing  7/11/2025 0107 by Jazzmine Little RN  Outcome: Progressing  Goal: Maintain glucose levels >70mg/dl to <250mg/dl throughout shift  7/11/2025 0108 by Jazzmine Little RN  Outcome: Progressing  7/11/2025 0107 by Jazzmine Little RN  Outcome: Progressing  Goal: No changes in neurological exam by end of shift  7/11/2025 0108 by Jazzmine Little RN  Outcome: Progressing  7/11/2025 0107 by Jazzmine Little RN  Outcome: Progressing  Goal: Learn about and adhere to nutrition recommendations by end of shift  7/11/2025 0108 by Jazzmine Little RN  Outcome: Progressing  7/11/2025 0107 by Jazzmine Little RN  Outcome: Progressing  Goal: Vital signs within normal range for age by end of shift  7/11/2025 0108 by Jazzmine Sidney, RN  Outcome: Progressing  7/11/2025 0107 by Jazzmine Little, RN  Outcome: Progressing  Goal: Increase self care and/or family involovement by end of  shift  7/11/2025 0108 by Jazzmine Little RN  Outcome: Progressing  7/11/2025 0107 by Jazzmine Little RN  Outcome: Progressing  Goal: Receive DSME education by end of shift  7/11/2025 0108 by Jazzmine Little RN  Outcome: Progressing  7/11/2025 0107 by Jazzmine Little RN  Outcome: Progressing

## 2025-07-11 NOTE — NURSING NOTE
Patient completed MRI and the MRI tech used the Loom rosalino to checked patient's pacemaker and set pacemaker back into the routine that it was before MRI.

## 2025-07-11 NOTE — CONSULTS
Inpatient consult to Neurosurgery  Consult performed by: Chauncey Freitas MD  Consult ordered by: Blade Lorenz DO  Reason for consult: MRI imaging follow-up        NEUROSURGERY CONSULT NOTE        Date of Service:  2025 Attending Provider:  Blade Lorenz DO     Reason for Consultation:  Dragan Calvin is being seen today for a consult requested by Blade Lorenz DO for MRI imaging follow-up.      Subjective   History of Present Illness:  Dragan is a 84 y.o. male with h/o HTN, HLD, DM, CAD, afib (on eliquis; LD  AM), DVT s/p IVC filter, SSS s/p implanted pacemaker, PUD, GERD, peripheral edema, SBO(s),  and chronic BLE weakness p/f MRI scan.    Seen by NSGY at [facility] who recommended . [facility] refused to do MRI 2/2 PPM (EP cleared).  At  it was discovered that the device battery had .  EP consulted and performed an exchange on 2025.    Patient denied any  numbness, vision changes, fevers or chills.    Imaging was reviewed:    MRI C/L spine chronic degenerative changes, no significant stenosis.    Review of Systems   10 point ROS is obtained and negative except the ones mentioned in the HPI    Social History  He reports that he has never smoked. He has never used smokeless tobacco. He reports that he does not drink alcohol and does not use drugs.    Medical History  Medical History[1]    Surgical History  Surgical History[2]     Objective     Vitals:  Vitals:    25 1537   BP: 83/52   Pulse: 80   Resp:    Temp:    SpO2: 98%         Exam:  Constitutional: No acute distress  Resp: breathing comfortably  Neuro:  Awake, A&Ox3  RLE 4/5 (chronic)  LLE 2/5 (chronic)  Sensation: SILT throughout all extremities  DTRS: Bilateral vizcaino',s 2+ Throughout, no Clonus            Medications  Current Outpatient Medications   Medication Instructions    acetaminophen (TYLENOL 8 HOUR) 650 mg, Every 8 hours PRN    apixaban (ELIQUIS) 5 mg, oral, 2 times daily    blood sugar diagnostic (Blood Glucose Test)  "strip Use to test blood sugar once daily    cyanocobalamin (Vitamin B-12) 1,000 mcg tablet 1 tablet, Daily    dilTIAZem CD (CARDIZEM CD) 240 mg, oral, 2 times daily    fosinopril (MONOPRIL) 5 mg, oral, Daily    glipiZIDE XL (GLUCOTROL XL) 2.5 mg, oral, Daily with breakfast, Do not crush, chew, or split.    insulin degludec (TRESIBA FLEXTOUCH U-100) 26 Units, subcutaneous, Nightly, Take as directed per insulin instructions.    lancets misc One touch ultra test strips 33G LANCETS Tests once daily    lancets misc Use to test blood sugar once a day    lidocaine 4 % patch 1 patch, transdermal, Daily, Remove & discard patch within 12 hours or as directed by MD.    metOLazone (ZAROXOLYN) 2.5 mg, oral, Continuous, Every Sunday and wednesday    metoprolol tartrate (LOPRESSOR) 25 mg, oral, 2 times daily    multivitamin tablet 1 tablet, Daily    OneTouch Ultra Test USE TO TEST BLOOD SUGAR ONCE DAILY    pen needle, diabetic (BD Ultra-Fine Tami Pen Needle) 32 gauge x 5/32\" needle 1 Needle, miscellaneous, 4 times daily    potassium chloride CR (Klor-Con) 10 mEq ER tablet 10 mEq, oral, Daily, Do not crush, chew, or split.    RABEprazole (ACIPHEX) 20 mg, oral, Daily    rosuvastatin (CRESTOR) 5 mg, oral, Daily    tamsulosin (FLOMAX) 0.8 mg, Daily    torsemide (Demadex) 20 mg tablet TAKE 3 TABLETS(60 MG) BY MOUTH DAILY        Diagnostic Results:    Lab Results   Component Value Date    WBC 18.2 (H) 07/10/2025    HGB 12.7 (L) 07/10/2025    HCT 36.2 (L) 07/10/2025    MCV 94 07/10/2025     07/10/2025     Lab Results   Component Value Date    CREATININE 1.77 (H) 07/10/2025    BUN 80 (H) 07/10/2025     07/10/2025    K 3.1 (L) 07/10/2025    CL 99 07/10/2025    CO2 28 07/10/2025     Lab Results   Component Value Date    INR 1.3 (H) 07/08/2025    INR 1.3 (H) 05/10/2025    INR 1.3 (H) 04/04/2023    PROTIME 14.3 (H) 07/08/2025    PROTIME 14.5 (H) 05/10/2025    PROTIME 14.9 (H) 04/04/2023       Imaging Results:    Electrophysiology " procedure   Final Result      Cardiac Device Check - Inpatient         Cardiac Device Check - Inpatient         MR cervical spine wo IV contrast    (Results Pending)   MR lumbar spine wo IV contrast    (Results Pending)             Assessment/Plan   Assessment:  h/o HTN,. HLD, DM, CAD, afib (on eliquis), DVT s/p IVC filter, SSS s/p implanted pacemaker, PUD, GERD, peripheral edema, SBO(s), chronic BLE weakness 7/11 MRI C/L spine chronic degenerative changes, no significant stenosis.    Plan:  - Hospitalist primary,  - Outpatient follow-up visit with neurosurgery        Chauncey Freitas MD  Department of Neurosurgery   [facility]   Note authored by resident on neurosurgery team, with all questions or to contact team please page at [phone]  Plan not finalized until note signed by attending          [1]   Past Medical History:  Diagnosis Date    Deep vein thrombosis (DVT) of lower extremity 09/17/2023    s/p IVC filter and has post-phlebotic syndrome    History of falling     History of fall    Hyperlipidemia 08/28/2023    Dr. Zee follows    Hypertension, benign 08/23/2017    Leg edema 09/05/2023    Overweight     Overweight    Paroxysmal atrial fibrillation (Multi) 08/28/2023    PAF. On Eliquis. 7 second pauses therefore PPM placed. Freq Pafib on PPM. Chads 2 vasc 4    Permanent atrial fibrillation (Multi) 10/17/2023    On Eliquis. 7 second pauses therefore PPM placed. Freq Pafib on PPM. Chads 2 vasc 4    Personal history of other diseases of the digestive system     History of small bowel obstruction    Personal history of other infectious and parasitic diseases     History of herpes zoster    Personal history of other specified conditions     History of bradycardia    Personal history of other specified conditions     History of edema    Personal history of peptic ulcer disease     History of peptic ulcer    Personal history of pneumonia (recurrent)     History of pneumonia    Personal  history of urinary calculi     History of kidney stones    Presence of cardiac pacemaker 08/25/2017    10/24/2016: [employer] Advisa MRI DR model #[id number]    Sick sinus syndrome (Multi) 09/17/2023    s/p PPM 10/2016    Stage 3a chronic kidney disease (Multi) 07/18/2024    Type 2 diabetes mellitus, with long-term current use of insulin 08/28/2023   [2]   Past Surgical History:  Procedure Laterality Date    APPENDECTOMY  07/25/2018    Appendectomy    CARDIAC ELECTROPHYSIOLOGY PROCEDURE N/A 7/8/2025    Procedure: PPM Generator Change;  Surgeon: Max Alexander MD;  Location: [place] [address] Cardiac Cath Lab;  Service: Electrophysiology;  Laterality: N/A;    CARDIAC PACEMAKER PLACEMENT  04/05/2018    Pacemaker Placement    CHOLECYSTECTOMY  04/05/2018    Cholecystectomy

## 2025-07-11 NOTE — NURSING NOTE
Patient is an in-house patient and has a pacemaker. At approximately 2:31 the MRI tech used the SureScan rosalino to checked patient's pacemaker, interrogated it and the SureScan rosalino set pacemaker at  BPM.  Patient stated that he's feeling fine and no signs of discomfort or distress noted by this nurse.

## 2025-07-11 NOTE — ASSESSMENT & PLAN NOTE
Restart home glipizide  with blood sugars elevated due to steroid use  #Type 2 diabetes mellitus   -Lantus 30units - inc  to 45 7/11  -SSI   -Hypoglycemia protocol    -Blood glucose checks qAC & qHS   -Carbohydrate controlled diet

## 2025-07-11 NOTE — ASSESSMENT & PLAN NOTE
- MRI for lumbar and cervical per NSG - MRI 7/11, pending NSGY eval  -pain control   -please notify NSG when MRI done   -ctn dexamethasone 4mg q8 but switch to oral - dec to 4mg BID today d/t ongoing hyperglycemia

## 2025-07-11 NOTE — PROGRESS NOTES
[facility]   HOSPITAL MEDICINE     PROGRESS NOTE       PATIENT NAME: Dragan Calvin   MRN: [id number]   SERVICE DATE: 25   SERVICE TIME: 4:18 PM      Hospital Medicine/Primary Attending: Blade Lorenz DO   LENGTH OF STAY: 5     Assessment      84 y.o. male with PMH significant for CAD, permanent Afib (on eliquis), DVT s/p IVC filter, HLD, HTN, SSS s/p implanted pacemaker, PUD, GERD, DMT2, peripheral edema, SBO(s), and chronic BLE weakness d/t spinal stenosis who presented to [facility] ED from home with complaints of weakness . Seen by NSG at [facility] who rec MRI. MRI department allegedly refused to do MRI 2/2 PPM (despite alleged EP hilda).  During device check at our hospital, it was discovered that the device his battery had .  EP was consulted and performed an exchange on 2025.  Now patient awaiting MRI w/ NSGY eval post imaging.     CHIEF COMPLAINT: Leg weakness, bilateral    Assessment & Plan  Pacemaker at end of battery life  Presence of cardiac pacemaker  -can NOT have MRI until pacemaker replaced, per EP - done 25  -Consult to EP, discussed with team   -EP replacement 25  -apixaban restarted , ok per EP  Leg weakness, bilateral  Lumbar spinal stenosis  Weakness of both lower extremities  Cervical stenosis of spine  - MRI for lumbar and cervical per NSG - MRI , pending NSGY eval  -pain control   -please notify NSG when MRI done   -ctn dexamethasone 4mg q8 but switch to oral - dec to 4mg BID today d/t ongoing hyperglycemia    Type 2 diabetes mellitus, with long-term current use of insulin  Restart home glipizide  with blood sugars elevated due to steroid use  #Type 2 diabetes mellitus   -Lantus 30units - inc  to 45   -SSI   -Hypoglycemia protocol    -Blood glucose checks qAC & qHS   -Carbohydrate controlled diet     Paroxysmal atrial fibrillation (Multi)  apix held sherice EP op    Hypertension, benign  Continue with home meds  lisinopril  Fall  CT of the head on 7/3/2025 no acute finding  Fall precaution  PT OT  Chronic heart failure with preserved ejection fraction (HFpEF, >= 50%)  Ctn home torsemide 40mg dialy   ctn metolazone wed and Sunday        I reviewed:    All new and relevant labs and imaging   New EKGs  Consultant notes   Vitals Signs   Nursing notes                OBESE?: Yes, Class 1 Obesity: BMI of 30.0 to 34.9    DISPOSITION:  Medical Necessity Statement: Patient is not medically ready for discharge due to the following: needs pacemaker, MRI for neurogenic sx may need surgery per neurosurgery   Plan of care discussed with:           Subjective   SUBJECTIVE:  Pt seen and examined at bedside, continues to feel well although with some R shoulder discomfort. Dressing over chest still in place. He reports feeling well, no bleeding or bruising from site, pain well controlled, legs still weak but unchanged. Excited for MRI today!  Patient denies CP, SOB, fevers, chills, nausea, or emesis.         Objective      PHYSICAL EXAM: Patient Vitals for the past 24 hrs:   BP Temp Pulse Resp SpO2   07/11/25 1537 83/52 -- 80 -- 98 %   07/11/25 1527 93/59 -- 99 -- 97 %   07/11/25 1517 95/58 -- 100 -- 97 %   07/11/25 1507 88/55 -- 99 -- 97 %   07/11/25 1457 90/59 -- 100 -- 97 %   07/11/25 1447 98/60 -- 100 -- 97 %   07/11/25 1437 80/51 -- 98 -- 99 %   07/11/25 1428 (!) 91/47 -- 82 -- 97 %   07/11/25 1319 92/56 36.6 °C (97.9 °F) (!) 46 16 95 %   07/11/25 1221 (!) 80/48 -- -- -- --   07/11/25 1046 (!) 80/44 36.2 °C (97.2 °F) 60 -- 97 %   07/11/25 1040 (!) 80/43 36.4 °C (97.5 °F) 74 -- 97 %   07/11/25 0601 91/50 36.3 °C (97.3 °F) 66 18 97 %   07/10/25 1930 93/52 36.4 °C (97.5 °F) 65 18 95 %      Physical Exam  Vitals and nursing note reviewed.   Constitutional:       General: He is not in acute distress.     Appearance: Normal appearance. He is not ill-appearing.   HENT:      Head: Normocephalic and atraumatic.   Pulmonary:      Effort: No  "respiratory distress.   Chest:      Comments: Large dressing in place over left chest  Abdominal:      Tenderness: There is no guarding.   Skin:     Coloration: Skin is not jaundiced or pale.   Neurological:      Mental Status: He is alert and oriented to person, place, and time.   Psychiatric:         Mood and Affect: Mood normal.            MEDICATIONS:   Scheduled medications  Scheduled Medications[1]  Continuous medications  Continuous Medications[2]  PRN medications  PRN Medications[3]       DATA:      Diagnostic tests reviewed for today's visit:     CBC:   Results from last 72 hours   Lab Units 07/10/25  0538   WBC AUTO x10*3/uL 18.2*   HEMATOCRIT % 36.2*   PLATELETS AUTO x10*3/uL 185   MCV fL 94     Coags:         CMP:   Results from last 72 hours   Lab Units 07/10/25  0538   SODIUM mmol/L 141   POTASSIUM mmol/L 3.1*   CO2 mmol/L 28   BUN mg/dL 80*   ALBUMIN g/dL 3.6        Cardiac Enzymes: No lab exists for component: \"TROP\" .lab  Liver Function, Amylase, Lipase:         No lab exists for component: \"TPROT\", \"ALB\", \"TBILI\"     MG/PHOS: [id number](mg,p)@   Renal Panel:    Results from last 72 hours   Lab Units 07/10/25  0538   ALBUMIN g/dL 3.6   BUN mg/dL 80*   POTASSIUM mmol/L 3.1*   CO2 mmol/L 28   SODIUM mmol/L 141        Heme:        No lab exists for component: \"RETICP\", \"ABSRETIC\", \"LD\", \"RAFAEL\", \"FE\", \"TRANSFERSAT\"   No components found for: \"UALBCR\"      Medication and Non-Pharmacologic VTE Prophylaxis/Anticoagulants    The patient has received anticoagulants recently:  Heparin is ordered or has been given within the last 24 hours OR  Lovenox has been given in the last 24 hours OR  An anticoagulant other than Heparin or Lovenox is on the home med list OR  An anticoagulant other than Heparin or Lovenox has been given within the last 5 days  Last Anticoag Admin            apixaban (Eliquis) tablet 5 mg    Given 5 mg at 0842    Frequency: 2 times daily         There are additional administrations since " 07/04/25 1730 that are not shown.    No unadministered anticoagulant orders found.                   SIGNATURE: Blade Lorenz, [facility]    PAGER/CONTACT #: Epic Tameka      Disclaimer: Portions of this note may have been generated using Dragon voice recognition software. Reasonable efforts were made to correct any dictation errors that resulted due to the programming of this software but some may still be present.     Portions of this note including HPI, ROS, impression/plan, and examination may have been copied forward from yesterday to July 11, 2025 as to provide important historical information essential in contributing to medical decision making. Documentation has been reviewed and edited as necessary to support clinical decision making for today's visit and to reflect my own independent evaluation of this patient.       The time of this note does not reflect the time I saw the patient but the time that this note was written                [1] apixaban, 5 mg, oral, BID  cefadroxil, 500 mg, oral, q12h SAM  cyanocobalamin, 1,000 mcg, oral, Daily  dexAMETHasone, 4 mg, oral, q12h SAM  dilTIAZem CD, 240 mg, oral, BID  glipiZIDE XL, 2.5 mg, oral, Daily with breakfast  insulin glargine, 40 Units, subcutaneous, Nightly  insulin lispro, 0-15 Units, subcutaneous, TID AC  lidocaine, 1 patch, transdermal, Daily  lisinopril, 10 mg, oral, Daily  metOLazone, 2.5 mg, oral, Once per day on Sunday Wednesday  metoprolol tartrate, 25 mg, oral, BID  pantoprazole, 40 mg, oral, BID AC  polyethylene glycol, 17 g, oral, Daily  potassium chloride CR, 10 mEq, oral, Daily  rosuvastatin, 5 mg, oral, Daily  tamsulosin, 0.8 mg, oral, Daily  torsemide, 40 mg, oral, Daily     [2]    [3] PRN medications: acetaminophen **OR** acetaminophen **OR** acetaminophen, bisacodyl, bisacodyl, dextrose, dextrose, glucagon, glucagon, ondansetron **OR** ondansetron, oxyCODONE, prochlorperazine **OR** prochlorperazine **OR** prochlorperazine

## 2025-07-12 LAB
GLUCOSE BLD MANUAL STRIP-MCNC: 175 MG/DL (ref 74–99)
GLUCOSE BLD MANUAL STRIP-MCNC: 186 MG/DL (ref 74–99)
GLUCOSE BLD MANUAL STRIP-MCNC: 244 MG/DL (ref 74–99)
GLUCOSE BLD MANUAL STRIP-MCNC: 280 MG/DL (ref 74–99)

## 2025-07-12 PROCEDURE — 99233 SBSQ HOSP IP/OBS HIGH 50: CPT | Performed by: INTERNAL MEDICINE

## 2025-07-12 PROCEDURE — 1210000001 HC SEMI-PRIVATE ROOM DAILY

## 2025-07-12 PROCEDURE — 2500000002 HC RX 250 W HCPCS SELF ADMINISTERED DRUGS (ALT 637 FOR MEDICARE OP, ALT 636 FOR OP/ED): Performed by: NURSE PRACTITIONER

## 2025-07-12 PROCEDURE — 2500000004 HC RX 250 GENERAL PHARMACY W/ HCPCS (ALT 636 FOR OP/ED): Performed by: STUDENT IN AN ORGANIZED HEALTH CARE EDUCATION/TRAINING PROGRAM

## 2025-07-12 PROCEDURE — 2500000001 HC RX 250 WO HCPCS SELF ADMINISTERED DRUGS (ALT 637 FOR MEDICARE OP): Performed by: NURSE PRACTITIONER

## 2025-07-12 PROCEDURE — 2500000001 HC RX 250 WO HCPCS SELF ADMINISTERED DRUGS (ALT 637 FOR MEDICARE OP): Performed by: INTERNAL MEDICINE

## 2025-07-12 PROCEDURE — 2500000002 HC RX 250 W HCPCS SELF ADMINISTERED DRUGS (ALT 637 FOR MEDICARE OP, ALT 636 FOR OP/ED): Performed by: STUDENT IN AN ORGANIZED HEALTH CARE EDUCATION/TRAINING PROGRAM

## 2025-07-12 PROCEDURE — 2500000001 HC RX 250 WO HCPCS SELF ADMINISTERED DRUGS (ALT 637 FOR MEDICARE OP): Performed by: STUDENT IN AN ORGANIZED HEALTH CARE EDUCATION/TRAINING PROGRAM

## 2025-07-12 PROCEDURE — 82947 ASSAY GLUCOSE BLOOD QUANT: CPT

## 2025-07-12 PROCEDURE — 2500000004 HC RX 250 GENERAL PHARMACY W/ HCPCS (ALT 636 FOR OP/ED): Performed by: NURSE PRACTITIONER

## 2025-07-12 PROCEDURE — 2500000005 HC RX 250 GENERAL PHARMACY W/O HCPCS: Performed by: NURSE PRACTITIONER

## 2025-07-12 RX ADMIN — METOPROLOL TARTRATE 25 MG: 25 TABLET, FILM COATED ORAL at 09:30

## 2025-07-12 RX ADMIN — DILTIAZEM HYDROCHLORIDE 240 MG: 240 CAPSULE, EXTENDED RELEASE ORAL at 09:30

## 2025-07-12 RX ADMIN — DEXAMETHASONE 4 MG: 2 TABLET ORAL at 09:30

## 2025-07-12 RX ADMIN — TAMSULOSIN HYDROCHLORIDE 0.8 MG: 0.4 CAPSULE ORAL at 09:30

## 2025-07-12 RX ADMIN — LISINOPRIL 10 MG: 10 TABLET ORAL at 09:30

## 2025-07-12 RX ADMIN — POTASSIUM CHLORIDE 10 MEQ: 750 TABLET, FILM COATED, EXTENDED RELEASE ORAL at 09:30

## 2025-07-12 RX ADMIN — INSULIN LISPRO 9 UNITS: 100 INJECTION, SOLUTION INTRAVENOUS; SUBCUTANEOUS at 17:28

## 2025-07-12 RX ADMIN — CEFADROXIL 500 MG: 500 CAPSULE ORAL at 09:30

## 2025-07-12 RX ADMIN — INSULIN LISPRO 3 UNITS: 100 INJECTION, SOLUTION INTRAVENOUS; SUBCUTANEOUS at 09:28

## 2025-07-12 RX ADMIN — INSULIN GLARGINE 40 UNITS: 100 INJECTION, SOLUTION SUBCUTANEOUS at 21:45

## 2025-07-12 RX ADMIN — TORSEMIDE 40 MG: 20 TABLET ORAL at 09:30

## 2025-07-12 RX ADMIN — APIXABAN 5 MG: 5 TABLET, FILM COATED ORAL at 09:30

## 2025-07-12 RX ADMIN — APIXABAN 5 MG: 5 TABLET, FILM COATED ORAL at 21:51

## 2025-07-12 RX ADMIN — DILTIAZEM HYDROCHLORIDE 240 MG: 240 CAPSULE, EXTENDED RELEASE ORAL at 21:45

## 2025-07-12 RX ADMIN — METOPROLOL TARTRATE 25 MG: 25 TABLET, FILM COATED ORAL at 21:47

## 2025-07-12 RX ADMIN — ACETAMINOPHEN 650 MG: 325 TABLET ORAL at 21:51

## 2025-07-12 RX ADMIN — LIDOCAINE 4% 1 PATCH: 40 PATCH TOPICAL at 09:30

## 2025-07-12 RX ADMIN — GLIPIZIDE 2.5 MG: 2.5 TABLET, EXTENDED RELEASE ORAL at 09:30

## 2025-07-12 RX ADMIN — ROSUVASTATIN 5 MG: 5 TABLET, FILM COATED ORAL at 21:44

## 2025-07-12 RX ADMIN — POLYETHYLENE GLYCOL 3350 17 G: 17 POWDER, FOR SOLUTION ORAL at 09:30

## 2025-07-12 RX ADMIN — PANTOPRAZOLE SODIUM 40 MG: 40 TABLET, DELAYED RELEASE ORAL at 17:29

## 2025-07-12 RX ADMIN — CYANOCOBALAMIN TAB 1000 MCG 1000 MCG: 1000 TAB at 09:30

## 2025-07-12 RX ADMIN — PANTOPRAZOLE SODIUM 40 MG: 40 TABLET, DELAYED RELEASE ORAL at 06:11

## 2025-07-12 RX ADMIN — INSULIN LISPRO 6 UNITS: 100 INJECTION, SOLUTION INTRAVENOUS; SUBCUTANEOUS at 12:21

## 2025-07-12 ASSESSMENT — COGNITIVE AND FUNCTIONAL STATUS - GENERAL
HELP NEEDED FOR BATHING: A LOT
EATING MEALS: A LITTLE
DRESSING REGULAR LOWER BODY CLOTHING: A LOT
PERSONAL GROOMING: A LOT
MOVING FROM LYING ON BACK TO SITTING ON SIDE OF FLAT BED WITH BEDRAILS: A LITTLE
MOVING TO AND FROM BED TO CHAIR: A LOT
MOVING FROM LYING ON BACK TO SITTING ON SIDE OF FLAT BED WITH BEDRAILS: A LITTLE
DAILY ACTIVITIY SCORE: 13
MOBILITY SCORE: 13
MOBILITY SCORE: 13
CLIMB 3 TO 5 STEPS WITH RAILING: A LOT
DRESSING REGULAR UPPER BODY CLOTHING: A LOT
TURNING FROM BACK TO SIDE WHILE IN FLAT BAD: A LOT
STANDING UP FROM CHAIR USING ARMS: A LOT
TOILETING: A LOT
WALKING IN HOSPITAL ROOM: A LOT
TURNING FROM BACK TO SIDE WHILE IN FLAT BAD: A LOT
MOVING TO AND FROM BED TO CHAIR: A LOT
DRESSING REGULAR UPPER BODY CLOTHING: A LOT
EATING MEALS: A LITTLE
DRESSING REGULAR LOWER BODY CLOTHING: A LOT
CLIMB 3 TO 5 STEPS WITH RAILING: A LOT
DAILY ACTIVITIY SCORE: 13
STANDING UP FROM CHAIR USING ARMS: A LOT
PERSONAL GROOMING: A LOT
WALKING IN HOSPITAL ROOM: A LOT
HELP NEEDED FOR BATHING: A LOT
TOILETING: A LOT

## 2025-07-12 ASSESSMENT — PAIN SCALES - GENERAL
PAINLEVEL_OUTOF10: 0 - NO PAIN
PAINLEVEL_OUTOF10: 0 - NO PAIN

## 2025-07-12 ASSESSMENT — PAIN SCALES - WONG BAKER: WONGBAKER_NUMERICALRESPONSE: NO HURT

## 2025-07-12 ASSESSMENT — PAIN - FUNCTIONAL ASSESSMENT: PAIN_FUNCTIONAL_ASSESSMENT: 0-10

## 2025-07-12 NOTE — CARE PLAN
Problem: Pain - Adult  Goal: Verbalizes/displays adequate comfort level or baseline comfort level  Outcome: Progressing     Problem: Discharge Planning  Goal: Discharge to home or other facility with appropriate resources  Outcome: Progressing     Problem: Nutrition  Goal: Nutrient intake appropriate for maintaining nutritional needs  Outcome: Progressing     Problem: Skin  Goal: Promote/optimize nutrition  Outcome: Progressing     Problem: Fall/Injury  Goal: Not fall by end of shift  Outcome: Progressing  Goal: Be free from injury by end of the shift  Outcome: Progressing  Goal: Verbalize understanding of risk factor reduction measures to prevent injury from fall in the home  Outcome: Progressing     Problem: Diabetes  Goal: Increase stability of blood glucose readings by end of shift  Outcome: Progressing  Goal: Maintain electrolyte levels within acceptable range throughout shift  Outcome: Progressing  Goal: Maintain glucose levels >70mg/dl to <250mg/dl throughout shift  Outcome: Progressing  Goal: Increase self care and/or family involovement by end of shift  Outcome: Progressing   The patient's goals for the shift include      The clinical goals for the shift include Pt will remain safe and free from falls

## 2025-07-12 NOTE — CARE PLAN
The patient's goals for the shift include      The clinical goals for the shift include remain free from fralls        Problem: Skin  Goal: Decreased wound size/increased tissue granulation at next dressing change  7/12/2025 1821 by Elena Hung RN  Flowsheets (Taken 7/12/2025 1821)  Decreased wound size/increased tissue granulation at next dressing change: Promote sleep for wound healing  7/12/2025 1820 by Elena Hung RN  Outcome: Progressing  Goal: Participates in plan/prevention/treatment measures  Outcome: Progressing  Goal: Prevent/manage excess moisture  Outcome: Progressing  Goal: Prevent/minimize sheer/friction injuries  Outcome: Progressing  Goal: Promote/optimize nutrition  Outcome: Progressing  Goal: Promote skin healing  Outcome: Progressing

## 2025-07-12 NOTE — CARE PLAN
The patient's goals for the shift include      The clinical goals for the shift include remain free from fralls      Problem: Pain - Adult  Goal: Verbalizes/displays adequate comfort level or baseline comfort level  Outcome: Progressing     Problem: Safety - Adult  Goal: Free from fall injury  Outcome: Progressing     Problem: Discharge Planning  Goal: Discharge to home or other facility with appropriate resources  Outcome: Progressing     Problem: Chronic Conditions and Co-morbidities  Goal: Patient's chronic conditions and co-morbidity symptoms are monitored and maintained or improved  Outcome: Progressing     Problem: Nutrition  Goal: Nutrient intake appropriate for maintaining nutritional needs  Outcome: Progressing

## 2025-07-12 NOTE — PROGRESS NOTES
Per MD, patient medically ready for discharge today. Message sent to  [facility] requesting update on acceptance. Updated PT/OT notes requested.   Update 1300: Met with patient and family at bedside to provide update that we are still awaiting update on acceptance from  [facility]. Discussed skilled nursing facilities with patient and family if [facility] unable to accept and all in agreement. Facility list provided for review.   Sheila JAMES, RN  Transitional Care Coordinator (TCC)  [phone]

## 2025-07-12 NOTE — NURSING NOTE
Spoke with Dr. Adame on arrival to assess patient concerning blood pressure and holding of medication. Doctor stated to give metoprolol 25 mg diltiazem 240 mg and hold lisinopril and torsemide. Safety maintained.

## 2025-07-12 NOTE — PROGRESS NOTES
"Dragan Calvin is a 84 y.o. male on day 6 of admission presenting with Leg weakness, bilateral.    Subjective   No acute events overnight, resting comfortably in bed.        Objective     Physical Exam  No acute distress  On room air, breathing comfortably   Awake, mildly confused   BUE 5/5  RLE 4/5 (chronic)  LLE 3/5 (chronic)  SILT       Last Recorded Vitals  Blood pressure 127/76, pulse 90, temperature 36.5 °C (97.7 °F), resp. rate 18, height 1.78 m (5' 10.08\"), weight 97.5 kg (215 lb), SpO2 95%.  Intake/Output last 3 Shifts:  I/O last 3 completed shifts:  In: - (0 mL/kg)   Out: 975 (10 mL/kg) [Urine:975 (0.3 mL/kg/hr)]  Weight: 97.5 kg     Relevant Results  Results for orders placed or performed during the hospital encounter of 07/06/25 (from the past 24 hours)   POCT GLUCOSE   Result Value Ref Range    POCT Glucose 253 (H) 74 - 99 mg/dL   POCT GLUCOSE   Result Value Ref Range    POCT Glucose 383 (H) 74 - 99 mg/dL   POCT GLUCOSE   Result Value Ref Range    POCT Glucose 289 (H) 74 - 99 mg/dL   POCT GLUCOSE   Result Value Ref Range    POCT Glucose 204 (H) 74 - 99 mg/dL          Assessment & Plan  Leg weakness, bilateral    Type 2 diabetes mellitus, with long-term current use of insulin    Paroxysmal atrial fibrillation (Multi)    Hypertension, benign    Presence of cardiac pacemaker    Cervical stenosis of spine    Chronic heart failure with preserved ejection fraction (HFpEF, >= 50%)    Fall    Weakness of both lower extremities    Lumbar spinal stenosis    Pacemaker at end of battery life    Pt w h/o HTN,. HLD, DM, CAD, afib (on eliquis), DVT s/p IVC filter, SSS s/p implanted pacemaker, PUD, GERD, peripheral edema, SBO(s), chronic BLE weakness. 7/11 MRI C/L spine multilevel degen, moderate C4-5, C5-6, C6-7 central canal stenosis, L5-S1 spondy, multlevel foraminal stenosis. No worsening weakness. No acute neurosurgical intervention. Ok for dvt ppx. Recommend multimodal pain management. Dex per primary. Ok to work " with PT and OT. Will arrange a 6 week outpatient follow up. Please place in patient's discharge profile when it populates in visit history. Thank you for allowing us to participate in the care of this patient. Will sign off at this time. Please do not hesitate to reach out to [phone] with any questions or concerns. Thank you.

## 2025-07-12 NOTE — PROGRESS NOTES
Dragan Calvin is a 84 y.o. male on day 6 of admission presenting with Leg weakness, bilateral.    Medical History[1]  Surgical History[2]  Social History     Socioeconomic History    Marital status:      Spouse name: Not on file    Number of children: Not on file    Years of education: Not on file    Highest education level: Not on file   Occupational History    Not on file   Tobacco Use    Smoking status: Never    Smokeless tobacco: Never   Vaping Use    Vaping status: Never Used   Substance and Sexual Activity    Alcohol use: Never    Drug use: Never    Sexual activity: Not on file   Other Topics Concern    Not on file   Social History Narrative    Not on file     Social Drivers of Health     Financial Resource Strain: Low Risk  (7/7/2025)    Overall Financial Resource Strain (CARDIA)     Difficulty of Paying Living Expenses: Not hard at all   Food Insecurity: No Food Insecurity (7/3/2025)    Hunger Vital Sign     Worried About Running Out of Food in the Last Year: Never true     Ran Out of Food in the Last Year: Never true   Transportation Needs: No Transportation Needs (7/7/2025)    PRAPARE - Transportation     Lack of Transportation (Medical): No     Lack of Transportation (Non-Medical): No   Physical Activity: Not on file   Stress: No Stress Concern Present (7/3/2025)    [employer] - Occupational Stress Questionnaire     Feeling of Stress : Not at all   Social Connections: Not on file   Intimate Partner Violence: Not At Risk (7/3/2025)    Humiliation, Afraid, Rape, and Kick questionnaire     Fear of Current or Ex-Partner: No     Emotionally Abused: No     Physically Abused: No     Sexually Abused: No   Housing Stability: Low Risk  (7/7/2025)    Housing Stability Vital Sign     Unable to Pay for Housing in the Last Year: No     Number of Times Moved in the Last Year: 1     Homeless in the Last Year: No     Allergies[3]    Dietary Orders (From admission, onward)                Adult diet Consistent Carb; CCD 60 gm/meal  Diet effective now        Question Answer Comment   Diet type Consistent Carb    Carb diet selection: CCD 60 gm/meal            May Participate in Room Service  Once        Question:  .  Answer:  Yes                      Objective     Vitals  Temp:  [36.1 °C (97 °F)-36.6 °C (97.9 °F)] 36.5 °C (97.7 °F)  Heart Rate:  [] 90  Resp:  [16-18] 18  BP: ()/(43-76) 127/76                 Peripheral IV 07/09/25 20 G Anterior;Left Forearm (Active)   Number of days: 3       Relevant Results  Scheduled medications  Scheduled Medications[4]  Continuous medications  Continuous Medications[5]  PRN medications  PRN Medications[6]      S/  Seen and examined  No new complaints no new event over the night   ROS: Negative    O/   General Appearance: Alert, Oriented X3, Cooperative, Not in Acute Distress  HEENT: no eye redness, not pale, no jaundice, Throat: not congested, no ulcers    Chest: Good AE bilateral, No crackles, no ronchies, no wheezes.   Heart: RHR, Normal heart sounds S1, S2, no murmur or gallop,   Abdomen: Soft, lax, no hepatosplenomegaly, intact hernia orifices, negative moore sign, no tenderness,   Genitalia: no abnormal discharge, no ulcers, no swelling.  Lymphatics: no lymphadenopathy, supraclavicular, inguinal trochlear, or axilla.   Neurology: Intact cranial nerves 2 to 12, intact sensation, intact motor function (Power, tone and reflexes). Normal DTR reflexes.   Extremities: no signs of PAD, no swelling no ulcers   Back: no deformity, no SI tenderness, no ulcers.   Skin: no signs of dehydration, no Rash, Bruises, or purpura.      AS:  Mr. Dragan Calvin is an 84 y.o. male with PMH significant for CAD, permanent Afib (on eliquis), DVT s/p IVC filter, HLD, HTN, SSS s/p implanted pacemaker, PUD, GERD, DMT2, peripheral edema, SBO(s), and chronic BLE weakness d/t spinal stenosis who presented to [facility] ED from home with complaints of weakness . Seen by NSG at  [facility] who rec MRI. During device check it was discovered that the device his battery had . EP was consulted and performed an exchange on 2025.  MRI no significant stenosis  NSGY re c outpt followup.   Medically stable  Pending AR   Medically stable to dc     #. Back pain and weakness- stable   - MRI no significant stenosis  - NSG rec outpatient followup  - c/w pain control  - PT/OT   - s/p decadron   - dc aBx no indication at this time   #. Pacemaker out of batter  - battery exchanged 2025  #. DM II  - SSI  - home emds  - monitoring  - hypoglycemia protocol   #. Afib  - c/w eliquis    #. HTN  #. HF  - resume home emds as tolerated \" Low BP held aCE, Amlo, and diuretics   - monitoring   #. Andres/CKD  - creat >>. 1.7   - likley due to diuretics  - will continue monitoring for ATN/JAIME   - hold diuretics   - avoid nephrotoxic   - adjust meds to GFR    Code Status: Full  DVT ppx:  eliquis   Disp: PT/ot  rec [facility]     I reviewed the resident/fellow's documentation and discussed the patient with the resident/fellow. I agree with the resident/fellow's medical decision making as documented in the note.  I saw and evaluated the patient.  I personally obtained the key and critical portions of the history and physical exam or was physically present for key and critical portions performed by the resident. I reviewed the resident's documentation and discussed the patient with the resident.  I agree with the resident's medical decision making as documented in the note.   spoke to the pt, explained the medical and social conditions and the reason for this admission explained our plan and recommendations.  Time spent on the assessment of patient, gathering and interpreting data, review of medical record/patient history, personally reviewing radiographic imaging. With greater than 50% spent in personal discussion with patient.  Disclaimer:   Portions of this note may have been generated using Dragon voice recognition " software. Reasonable efforts were made to correct any dictation errors that resulted due to the programming of this software but some may still be present.   Portions of this note including HPI, ROS, impression/plan, and examination may have been copied forward from previous notes as to provide important historical information essential in contributing to medical decision making. Documentation has been reviewed and edited as necessary to support clinical decision making for today's visit and to reflect my own independent evaluation of this patient.    The time of this note does not reflect the time I saw the patient but the time that this note was written   Time > 50 min         Chucho Adame MD         [1]   Past Medical History:  Diagnosis Date    Deep vein thrombosis (DVT) of lower extremity 09/17/2023    s/p IVC filter and has post-phlebotic syndrome    History of falling     History of fall    Hyperlipidemia 08/28/2023    Dr. Zee follows    Hypertension, benign 08/23/2017    Leg edema 09/05/2023    Overweight     Overweight    Paroxysmal atrial fibrillation (Multi) 08/28/2023    PAF. On Eliquis. 7 second pauses therefore PPM placed. Freq Pafib on PPM. Chads 2 vasc 4    Permanent atrial fibrillation (Multi) 10/17/2023    On Eliquis. 7 second pauses therefore PPM placed. Freq Pafib on PPM. Chads 2 vasc 4    Personal history of other diseases of the digestive system     History of small bowel obstruction    Personal history of other infectious and parasitic diseases     History of herpes zoster    Personal history of other specified conditions     History of bradycardia    Personal history of other specified conditions     History of edema    Personal history of peptic ulcer disease     History of peptic ulcer    Personal history of pneumonia (recurrent)     History of pneumonia    Personal history of urinary calculi     History of kidney stones    Presence of cardiac pacemaker 08/25/2017    10/24/2016:  [employer] MRI DR model #A2DR01    Sick sinus syndrome (Multi) 09/17/2023    s/p PPM 10/2016    Stage 3a chronic kidney disease (Multi) 07/18/2024    Type 2 diabetes mellitus, with long-term current use of insulin 08/28/2023   [2]   Past Surgical History:  Procedure Laterality Date    APPENDECTOMY  07/25/2018    Appendectomy    CARDIAC ELECTROPHYSIOLOGY PROCEDURE N/A 7/8/2025    Procedure: PPM Generator Change;  Surgeon: Max Alexander MD;  Location: [place] [address] [facility];  Service: Electrophysiology;  Laterality: N/A;    CARDIAC PACEMAKER PLACEMENT  04/05/2018    Pacemaker Placement    CHOLECYSTECTOMY  04/05/2018    Cholecystectomy   [3] No Known Allergies  [4] apixaban, 5 mg, oral, BID  cefadroxil, 500 mg, oral, q12h SAM  cyanocobalamin, 1,000 mcg, oral, Daily  dexAMETHasone, 4 mg, oral, q12h SAM  dilTIAZem CD, 240 mg, oral, BID  glipiZIDE XL, 2.5 mg, oral, Daily with breakfast  insulin glargine, 40 Units, subcutaneous, Nightly  insulin lispro, 0-15 Units, subcutaneous, TID AC  lidocaine, 1 patch, transdermal, Daily  lisinopril, 10 mg, oral, Daily  metOLazone, 2.5 mg, oral, Once per day on Sunday Wednesday  metoprolol tartrate, 25 mg, oral, BID  pantoprazole, 40 mg, oral, BID AC  polyethylene glycol, 17 g, oral, Daily  potassium chloride CR, 10 mEq, oral, Daily  rosuvastatin, 5 mg, oral, Daily  tamsulosin, 0.8 mg, oral, Daily  torsemide, 40 mg, oral, Daily  [5]    [6] PRN medications: acetaminophen **OR** acetaminophen **OR** acetaminophen, bisacodyl, bisacodyl, dextrose, dextrose, glucagon, glucagon, ondansetron **OR** ondansetron, oxyCODONE, prochlorperazine **OR** prochlorperazine **OR** prochlorperazine

## 2025-07-13 VITALS
WEIGHT: 215 LBS | OXYGEN SATURATION: 98 % | SYSTOLIC BLOOD PRESSURE: 107 MMHG | TEMPERATURE: 97.9 F | DIASTOLIC BLOOD PRESSURE: 63 MMHG | RESPIRATION RATE: 18 BRPM | BODY MASS INDEX: 30.78 KG/M2 | HEART RATE: 83 BPM | HEIGHT: 70 IN

## 2025-07-13 LAB
ALBUMIN SERPL BCP-MCNC: 3.2 G/DL (ref 3.4–5)
ANION GAP SERPL CALC-SCNC: 12 MMOL/L (ref 10–20)
BUN SERPL-MCNC: 71 MG/DL (ref 6–23)
CALCIUM SERPL-MCNC: 8.6 MG/DL (ref 8.6–10.6)
CHLORIDE SERPL-SCNC: 103 MMOL/L (ref 98–107)
CO2 SERPL-SCNC: 29 MMOL/L (ref 21–32)
CREAT SERPL-MCNC: 1.42 MG/DL (ref 0.5–1.3)
EGFRCR SERPLBLD CKD-EPI 2021: 49 ML/MIN/1.73M*2
ERYTHROCYTE [DISTWIDTH] IN BLOOD BY AUTOMATED COUNT: 12.5 % (ref 11.5–14.5)
GLUCOSE BLD MANUAL STRIP-MCNC: 108 MG/DL (ref 74–99)
GLUCOSE BLD MANUAL STRIP-MCNC: 153 MG/DL (ref 74–99)
GLUCOSE BLD MANUAL STRIP-MCNC: 247 MG/DL (ref 74–99)
GLUCOSE BLD MANUAL STRIP-MCNC: 255 MG/DL (ref 74–99)
GLUCOSE SERPL-MCNC: 90 MG/DL (ref 74–99)
HCT VFR BLD AUTO: 39.8 % (ref 41–52)
HGB BLD-MCNC: 13.8 G/DL (ref 13.5–17.5)
MCH RBC QN AUTO: 33.3 PG (ref 26–34)
MCHC RBC AUTO-ENTMCNC: 34.7 G/DL (ref 32–36)
MCV RBC AUTO: 96 FL (ref 80–100)
NRBC BLD-RTO: 0 /100 WBCS (ref 0–0)
PHOSPHATE SERPL-MCNC: 3.6 MG/DL (ref 2.5–4.9)
PLATELET # BLD AUTO: 154 X10*3/UL (ref 150–450)
POTASSIUM SERPL-SCNC: 3.2 MMOL/L (ref 3.5–5.3)
RBC # BLD AUTO: 4.14 X10*6/UL (ref 4.5–5.9)
SODIUM SERPL-SCNC: 141 MMOL/L (ref 136–145)
WBC # BLD AUTO: 21.1 X10*3/UL (ref 4.4–11.3)

## 2025-07-13 PROCEDURE — 36415 COLL VENOUS BLD VENIPUNCTURE: CPT | Performed by: INTERNAL MEDICINE

## 2025-07-13 PROCEDURE — 82947 ASSAY GLUCOSE BLOOD QUANT: CPT

## 2025-07-13 PROCEDURE — 1210000001 HC SEMI-PRIVATE ROOM DAILY

## 2025-07-13 PROCEDURE — 2500000001 HC RX 250 WO HCPCS SELF ADMINISTERED DRUGS (ALT 637 FOR MEDICARE OP): Performed by: NURSE PRACTITIONER

## 2025-07-13 PROCEDURE — 2500000005 HC RX 250 GENERAL PHARMACY W/O HCPCS: Performed by: NURSE PRACTITIONER

## 2025-07-13 PROCEDURE — 99233 SBSQ HOSP IP/OBS HIGH 50: CPT | Performed by: INTERNAL MEDICINE

## 2025-07-13 PROCEDURE — 2500000004 HC RX 250 GENERAL PHARMACY W/ HCPCS (ALT 636 FOR OP/ED): Performed by: NURSE PRACTITIONER

## 2025-07-13 PROCEDURE — 2500000001 HC RX 250 WO HCPCS SELF ADMINISTERED DRUGS (ALT 637 FOR MEDICARE OP): Performed by: STUDENT IN AN ORGANIZED HEALTH CARE EDUCATION/TRAINING PROGRAM

## 2025-07-13 PROCEDURE — 2500000002 HC RX 250 W HCPCS SELF ADMINISTERED DRUGS (ALT 637 FOR MEDICARE OP, ALT 636 FOR OP/ED): Performed by: STUDENT IN AN ORGANIZED HEALTH CARE EDUCATION/TRAINING PROGRAM

## 2025-07-13 PROCEDURE — 2500000001 HC RX 250 WO HCPCS SELF ADMINISTERED DRUGS (ALT 637 FOR MEDICARE OP): Performed by: INTERNAL MEDICINE

## 2025-07-13 PROCEDURE — 85027 COMPLETE CBC AUTOMATED: CPT | Performed by: INTERNAL MEDICINE

## 2025-07-13 PROCEDURE — 2500000002 HC RX 250 W HCPCS SELF ADMINISTERED DRUGS (ALT 637 FOR MEDICARE OP, ALT 636 FOR OP/ED): Performed by: NURSE PRACTITIONER

## 2025-07-13 PROCEDURE — 80069 RENAL FUNCTION PANEL: CPT | Performed by: INTERNAL MEDICINE

## 2025-07-13 RX ADMIN — TAMSULOSIN HYDROCHLORIDE 0.8 MG: 0.4 CAPSULE ORAL at 09:12

## 2025-07-13 RX ADMIN — LIDOCAINE 4% 1 PATCH: 40 PATCH TOPICAL at 09:12

## 2025-07-13 RX ADMIN — CYANOCOBALAMIN TAB 1000 MCG 1000 MCG: 1000 TAB at 09:12

## 2025-07-13 RX ADMIN — APIXABAN 5 MG: 5 TABLET, FILM COATED ORAL at 09:12

## 2025-07-13 RX ADMIN — GLIPIZIDE 2.5 MG: 2.5 TABLET, EXTENDED RELEASE ORAL at 09:12

## 2025-07-13 RX ADMIN — POLYETHYLENE GLYCOL 3350 17 G: 17 POWDER, FOR SOLUTION ORAL at 09:12

## 2025-07-13 RX ADMIN — DILTIAZEM HYDROCHLORIDE 240 MG: 240 CAPSULE, EXTENDED RELEASE ORAL at 22:40

## 2025-07-13 RX ADMIN — APIXABAN 5 MG: 5 TABLET, FILM COATED ORAL at 22:40

## 2025-07-13 RX ADMIN — PANTOPRAZOLE SODIUM 40 MG: 40 TABLET, DELAYED RELEASE ORAL at 06:29

## 2025-07-13 RX ADMIN — INSULIN LISPRO 9 UNITS: 100 INJECTION, SOLUTION INTRAVENOUS; SUBCUTANEOUS at 17:57

## 2025-07-13 RX ADMIN — ROSUVASTATIN 5 MG: 5 TABLET, FILM COATED ORAL at 22:40

## 2025-07-13 RX ADMIN — INSULIN LISPRO 3 UNITS: 100 INJECTION, SOLUTION INTRAVENOUS; SUBCUTANEOUS at 12:49

## 2025-07-13 RX ADMIN — METOPROLOL TARTRATE 25 MG: 25 TABLET, FILM COATED ORAL at 09:12

## 2025-07-13 RX ADMIN — PANTOPRAZOLE SODIUM 40 MG: 40 TABLET, DELAYED RELEASE ORAL at 17:57

## 2025-07-13 RX ADMIN — DILTIAZEM HYDROCHLORIDE 240 MG: 240 CAPSULE, EXTENDED RELEASE ORAL at 09:12

## 2025-07-13 RX ADMIN — INSULIN GLARGINE 40 UNITS: 100 INJECTION, SOLUTION SUBCUTANEOUS at 22:48

## 2025-07-13 RX ADMIN — METOPROLOL TARTRATE 25 MG: 25 TABLET, FILM COATED ORAL at 22:59

## 2025-07-13 RX ADMIN — POTASSIUM CHLORIDE 10 MEQ: 750 TABLET, FILM COATED, EXTENDED RELEASE ORAL at 09:12

## 2025-07-13 ASSESSMENT — COGNITIVE AND FUNCTIONAL STATUS - GENERAL
DAILY ACTIVITIY SCORE: 17
PERSONAL GROOMING: A LITTLE
MOVING TO AND FROM BED TO CHAIR: A LITTLE
CLIMB 3 TO 5 STEPS WITH RAILING: A LOT
DRESSING REGULAR UPPER BODY CLOTHING: A LITTLE
DRESSING REGULAR LOWER BODY CLOTHING: A LOT
TURNING FROM BACK TO SIDE WHILE IN FLAT BAD: A LITTLE
HELP NEEDED FOR BATHING: A LOT
TOILETING: A LITTLE
MOBILITY SCORE: 16
STANDING UP FROM CHAIR USING ARMS: A LITTLE
MOVING FROM LYING ON BACK TO SITTING ON SIDE OF FLAT BED WITH BEDRAILS: A LITTLE
WALKING IN HOSPITAL ROOM: A LOT

## 2025-07-13 ASSESSMENT — PAIN SCALES - GENERAL: PAINLEVEL_OUTOF10: 0 - NO PAIN

## 2025-07-13 ASSESSMENT — PAIN SCALES - WONG BAKER: WONGBAKER_NUMERICALRESPONSE: NO HURT

## 2025-07-13 NOTE — CARE PLAN
Problem: Pain - Adult  Goal: Verbalizes/displays adequate comfort level or baseline comfort level  Outcome: Progressing     Problem: Safety - Adult  Goal: Free from fall injury  Outcome: Progressing     Problem: Discharge Planning  Goal: Discharge to home or other facility with appropriate resources  Outcome: Progressing     Problem: Chronic Conditions and Co-morbidities  Goal: Patient's chronic conditions and co-morbidity symptoms are monitored and maintained or improved  Outcome: Progressing     Problem: Heart Failure  Goal: Increase self care and/or family involvement in 24 hours  Outcome: Progressing     Problem: Fall/Injury  Goal: Not fall by end of shift  Outcome: Progressing  Goal: Be free from injury by end of the shift  Outcome: Progressing  Goal: Verbalize understanding of personal risk factors for fall in the hospital  Outcome: Progressing     Problem: Diabetes  Goal: Increase stability of blood glucose readings by end of shift  Outcome: Progressing  Goal: Maintain electrolyte levels within acceptable range throughout shift  Outcome: Progressing  Goal: Maintain glucose levels >70mg/dl to <250mg/dl throughout shift  Outcome: Progressing  Goal: No changes in neurological exam by end of shift  Outcome: Progressing  Goal: Learn about and adhere to nutrition recommendations by end of shift  Outcome: Progressing  Goal: Vital signs within normal range for age by end of shift  Outcome: Progressing  Goal: Increase self care and/or family involovement by end of shift  Outcome: Progressing   The patient's goals for the shift include      The clinical goals for the shift include Pt will remain HDS this shift

## 2025-07-13 NOTE — PROGRESS NOTES
Transitional Care Coordinator Progress Note:   Pt is medically ready for discharge. [facility] not able [facility] at this time, pt and family were notified.  Pt  reviewed the SNF list and provided the following SNF choices: [facility].  Referrals were sent via [facility].  TCC will follow up on acceptances.  Pt will review the list again this evening for additional SNF choices.  Care coordinator will continue to follow for discharge planning needs.     Opal Flowers MSN, RN-BC  Transitional Care Coordinator (TCC)  [phone]

## 2025-07-13 NOTE — CARE PLAN
The patient's goals for the shift include      The clinical goals for the shift include maintain saftey

## 2025-07-13 NOTE — PROGRESS NOTES
Dragan Calvin is a 84 y.o. male on day 7 of admission presenting with Leg weakness, bilateral.    Medical History[1]  Surgical History[2]  Social History     Socioeconomic History    Marital status:      Spouse name: Not on file    Number of children: Not on file    Years of education: Not on file    Highest education level: Not on file   Occupational History    Not on file   Tobacco Use    Smoking status: Never    Smokeless tobacco: Never   Vaping Use    Vaping status: Never Used   Substance and Sexual Activity    Alcohol use: Never    Drug use: Never    Sexual activity: Not on file   Other Topics Concern    Not on file   Social History Narrative    Not on file     Social Drivers of Health     Financial Resource Strain: Low Risk  (7/7/2025)    Overall Financial Resource Strain (CARDIA)     Difficulty of Paying Living Expenses: Not hard at all   Food Insecurity: No Food Insecurity (7/3/2025)    Hunger Vital Sign     Worried About Running Out of Food in the Last Year: Never true     Ran Out of Food in the Last Year: Never true   Transportation Needs: No Transportation Needs (7/7/2025)    PRAPARE - Transportation     Lack of Transportation (Medical): No     Lack of Transportation (Non-Medical): No   Physical Activity: Not on file   Stress: No Stress Concern Present (7/3/2025)    [employer] - Occupational Stress Questionnaire     Feeling of Stress : Not at all   Social Connections: Not on file   Intimate Partner Violence: Not At Risk (7/3/2025)    Humiliation, Afraid, Rape, and Kick questionnaire     Fear of Current or Ex-Partner: No     Emotionally Abused: No     Physically Abused: No     Sexually Abused: No   Housing Stability: Low Risk  (7/7/2025)    Housing Stability Vital Sign     Unable to Pay for Housing in the Last Year: No     Number of Times Moved in the Last Year: 1     Homeless in the Last Year: No     Allergies[3]    Dietary Orders (From admission, onward)                Adult diet Consistent Carb; CCD 60 gm/meal  Diet effective now        Question Answer Comment   Diet type Consistent Carb    Carb diet selection: CCD 60 gm/meal            May Participate in Room Service  Once        Question:  .  Answer:  Yes                      Objective     Vitals  Temp:  [36.2 °C (97.2 °F)-36.4 °C (97.5 °F)] 36.4 °C (97.5 °F)  Heart Rate:  [73-84] 84  Resp:  [16-18] 18  BP: ()/(62-65) 102/65       0-10 (Numeric) Pain Score: 0 - No pain  Amaya-Baker FACES Pain Rating: No hurt         Peripheral IV 07/09/25 20 G Anterior;Left Forearm (Active)   Number of days: 3       Relevant Results  Scheduled medications  Scheduled Medications[4]  Continuous medications  Continuous Medications[5]  PRN medications  PRN Medications[6]      S/  Seen and examined  No new complaints no new event over the night   ROS: Negative    O/   General Appearance: Alert, Oriented X3, Cooperative, Not in Acute Distress  HEENT: no eye redness, not pale, no jaundice, Throat: not congested, no ulcers    Chest: Good AE bilateral, No crackles, no ronchies, no wheezes.   Heart: RHR, Normal heart sounds S1, S2, no murmur or gallop,   Abdomen: Soft, lax, no hepatosplenomegaly, intact hernia orifices, negative moore sign, no tenderness,   Genitalia: no abnormal discharge, no ulcers, no swelling.  Lymphatics: no lymphadenopathy, supraclavicular, inguinal trochlear, or axilla.   Neurology: Intact cranial nerves 2 to 12, intact sensation, intact motor function (Power, tone and reflexes). Normal DTR reflexes.   Extremities: no signs of PAD, no swelling no ulcers   Back: no deformity, no SI tenderness, no ulcers.   Skin: no signs of dehydration, no Rash, Bruises, or purpura.      AS:  Mr. Dragan Calvin is an 84 y.o. male with PMH significant for CAD, permanent Afib (on eliquis), DVT s/p IVC filter, HLD, HTN, SSS s/p implanted pacemaker, PUD, GERD, DMT2, peripheral edema, SBO(s), and chronic BLE weakness d/t spinal stenosis who  "presented to [facility] ED from home with complaints of weakness . Seen by NSG at [facility] who rec MRI. During device check it was discovered that the device his battery had . EP was consulted and performed an exchange on 2025.  MRI no significant new stenosis NSGY re c outpt followup.   Medically stable  Pending AR   Medically stable to dc     #. Back pain and weakness- stable   - MRI no significant stenosis  - NSG rec outpatient followup  - c/w pain control  - PT/OT   - s/p decadron   - dc aBx no indication at this time   #. Pacemaker out of batter  - battery exchanged 2025  #. DM II  - SSI  - home emds  - monitoring  - hypoglycemia protocol   #. Afib  - c/w eliquis    #. HTN  #. HF  - resume home emds as tolerated \" Low BP held aCE, Amlo, and diuretics   - monitoring   #. YESICA/CKD  - creat >>. 1.7 >>. Improving 1.4   - likley due to diuretics  - will continue monitoring for ATN/JAIME   - hold diuretics   - avoid nephrotoxic   - adjust meds to GFR  #. Leucocytosis:   - 15>> 21 >>>   - he was on decadron   Code Status: Full  DVT ppx:  eliquis   Disp: PT/ot  rec AR     I reviewed the resident/fellow's documentation and discussed the patient with the resident/fellow. I agree with the resident/fellow's medical decision making as documented in the note.  I saw and evaluated the patient.  I personally obtained the key and critical portions of the history and physical exam or was physically present for key and critical portions performed by the resident. I reviewed the resident's documentation and discussed the patient with the resident.  I agree with the resident's medical decision making as documented in the note.   spoke to the pt, explained the medical and social conditions and the reason for this admission explained our plan and recommendations.  Time spent on the assessment of patient, gathering and interpreting data, review of medical record/patient history, personally reviewing radiographic imaging. With " greater than 50% spent in personal discussion with patient.  Disclaimer:   Portions of this note may have been generated using Dragon voice recognition software. Reasonable efforts were made to correct any dictation errors that resulted due to the programming of this software but some may still be present.   Portions of this note including HPI, ROS, impression/plan, and examination may have been copied forward from previous notes as to provide important historical information essential in contributing to medical decision making. Documentation has been reviewed and edited as necessary to support clinical decision making for today's visit and to reflect my own independent evaluation of this patient.    The time of this note does not reflect the time I saw the patient but the time that this note was written   Time > 40 min         Chucho Adame MD           [1]   Past Medical History:  Diagnosis Date    Deep vein thrombosis (DVT) of lower extremity 09/17/2023    s/p IVC filter and has post-phlebotic syndrome    History of falling     History of fall    Hyperlipidemia 08/28/2023    Dr. Zee follows    Hypertension, benign 08/23/2017    Leg edema 09/05/2023    Overweight     Overweight    Paroxysmal atrial fibrillation (Multi) 08/28/2023    PAF. On Eliquis. 7 second pauses therefore PPM placed. Freq Pafib on PPM. Chads 2 vasc 4    Permanent atrial fibrillation (Multi) 10/17/2023    On Eliquis. 7 second pauses therefore PPM placed. Freq Pafib on PPM. Chads 2 vasc 4    Personal history of other diseases of the digestive system     History of small bowel obstruction    Personal history of other infectious and parasitic diseases     History of herpes zoster    Personal history of other specified conditions     History of bradycardia    Personal history of other specified conditions     History of edema    Personal history of peptic ulcer disease     History of peptic ulcer    Personal history of pneumonia (recurrent)      History of pneumonia    Personal history of urinary calculi     History of kidney stones    Presence of cardiac pacemaker 08/25/2017    10/24/2016: [employer] Advisa MRI DR model #[id number]    Sick sinus syndrome (Multi) 09/17/2023    s/p PPM 10/2016    Stage 3a chronic kidney disease (Multi) 07/18/2024    Type 2 diabetes mellitus, with long-term current use of insulin 08/28/2023   [2]   Past Surgical History:  Procedure Laterality Date    APPENDECTOMY  07/25/2018    Appendectomy    CARDIAC ELECTROPHYSIOLOGY PROCEDURE N/A 7/8/2025    Procedure: PPM Generator Change;  Surgeon: Max Alexander MD;  Location: [place] [address] [facility];  Service: Electrophysiology;  Laterality: N/A;    CARDIAC PACEMAKER PLACEMENT  04/05/2018    Pacemaker Placement    CHOLECYSTECTOMY  04/05/2018    Cholecystectomy   [3] No Known Allergies  [4] apixaban, 5 mg, oral, BID  cyanocobalamin, 1,000 mcg, oral, Daily  dilTIAZem CD, 240 mg, oral, BID  glipiZIDE XL, 2.5 mg, oral, Daily with breakfast  insulin glargine, 40 Units, subcutaneous, Nightly  insulin lispro, 0-15 Units, subcutaneous, TID AC  lidocaine, 1 patch, transdermal, Daily  [Held by provider] lisinopril, 10 mg, oral, Daily  [Held by provider] metOLazone, 2.5 mg, oral, Once per day on Sunday Wednesday  metoprolol tartrate, 25 mg, oral, BID  pantoprazole, 40 mg, oral, BID AC  polyethylene glycol, 17 g, oral, Daily  potassium chloride CR, 10 mEq, oral, Daily  rosuvastatin, 5 mg, oral, Daily  tamsulosin, 0.8 mg, oral, Daily  [Held by provider] torsemide, 40 mg, oral, Daily     [5]    [6] PRN medications: acetaminophen **OR** acetaminophen **OR** acetaminophen, bisacodyl, bisacodyl, dextrose, dextrose, glucagon, glucagon, ondansetron **OR** ondansetron, oxyCODONE, prochlorperazine **OR** prochlorperazine **OR** prochlorperazine

## 2025-07-14 VITALS
BODY MASS INDEX: 30.78 KG/M2 | WEIGHT: 215 LBS | HEART RATE: 70 BPM | DIASTOLIC BLOOD PRESSURE: 70 MMHG | OXYGEN SATURATION: 97 % | TEMPERATURE: 97.3 F | SYSTOLIC BLOOD PRESSURE: 117 MMHG | RESPIRATION RATE: 17 BRPM | HEIGHT: 70 IN

## 2025-07-14 LAB
ALBUMIN SERPL BCP-MCNC: 3 G/DL (ref 3.4–5)
ANION GAP SERPL CALC-SCNC: 13 MMOL/L (ref 10–20)
BUN SERPL-MCNC: 55 MG/DL (ref 6–23)
CALCIUM SERPL-MCNC: 8 MG/DL (ref 8.6–10.6)
CHLORIDE SERPL-SCNC: 103 MMOL/L (ref 98–107)
CO2 SERPL-SCNC: 25 MMOL/L (ref 21–32)
CREAT SERPL-MCNC: 1.18 MG/DL (ref 0.5–1.3)
EGFRCR SERPLBLD CKD-EPI 2021: 61 ML/MIN/1.73M*2
ERYTHROCYTE [DISTWIDTH] IN BLOOD BY AUTOMATED COUNT: 12.5 % (ref 11.5–14.5)
GLUCOSE BLD MANUAL STRIP-MCNC: 246 MG/DL (ref 74–99)
GLUCOSE BLD MANUAL STRIP-MCNC: 289 MG/DL (ref 74–99)
GLUCOSE BLD MANUAL STRIP-MCNC: 80 MG/DL (ref 74–99)
GLUCOSE SERPL-MCNC: 71 MG/DL (ref 74–99)
HCT VFR BLD AUTO: 39.4 % (ref 41–52)
HGB BLD-MCNC: 13 G/DL (ref 13.5–17.5)
MCH RBC QN AUTO: 32.3 PG (ref 26–34)
MCHC RBC AUTO-ENTMCNC: 33 G/DL (ref 32–36)
MCV RBC AUTO: 98 FL (ref 80–100)
NRBC BLD-RTO: 0 /100 WBCS (ref 0–0)
PHOSPHATE SERPL-MCNC: 2.8 MG/DL (ref 2.5–4.9)
PLATELET # BLD AUTO: 134 X10*3/UL (ref 150–450)
POTASSIUM SERPL-SCNC: 3 MMOL/L (ref 3.5–5.3)
RBC # BLD AUTO: 4.02 X10*6/UL (ref 4.5–5.9)
SODIUM SERPL-SCNC: 138 MMOL/L (ref 136–145)
WBC # BLD AUTO: 23.4 X10*3/UL (ref 4.4–11.3)

## 2025-07-14 PROCEDURE — 2500000001 HC RX 250 WO HCPCS SELF ADMINISTERED DRUGS (ALT 637 FOR MEDICARE OP): Performed by: STUDENT IN AN ORGANIZED HEALTH CARE EDUCATION/TRAINING PROGRAM

## 2025-07-14 PROCEDURE — 97110 THERAPEUTIC EXERCISES: CPT | Mod: GO

## 2025-07-14 PROCEDURE — 85027 COMPLETE CBC AUTOMATED: CPT | Performed by: INTERNAL MEDICINE

## 2025-07-14 PROCEDURE — 2500000002 HC RX 250 W HCPCS SELF ADMINISTERED DRUGS (ALT 637 FOR MEDICARE OP, ALT 636 FOR OP/ED): Performed by: NURSE PRACTITIONER

## 2025-07-14 PROCEDURE — 2500000002 HC RX 250 W HCPCS SELF ADMINISTERED DRUGS (ALT 637 FOR MEDICARE OP, ALT 636 FOR OP/ED): Performed by: STUDENT IN AN ORGANIZED HEALTH CARE EDUCATION/TRAINING PROGRAM

## 2025-07-14 PROCEDURE — 2500000001 HC RX 250 WO HCPCS SELF ADMINISTERED DRUGS (ALT 637 FOR MEDICARE OP): Performed by: NURSE PRACTITIONER

## 2025-07-14 PROCEDURE — 97110 THERAPEUTIC EXERCISES: CPT | Mod: GP,CQ

## 2025-07-14 PROCEDURE — 82947 ASSAY GLUCOSE BLOOD QUANT: CPT

## 2025-07-14 PROCEDURE — 97535 SELF CARE MNGMENT TRAINING: CPT | Mod: GO

## 2025-07-14 PROCEDURE — 97116 GAIT TRAINING THERAPY: CPT | Mod: GP,CQ

## 2025-07-14 PROCEDURE — 80069 RENAL FUNCTION PANEL: CPT | Performed by: INTERNAL MEDICINE

## 2025-07-14 PROCEDURE — 2500000005 HC RX 250 GENERAL PHARMACY W/O HCPCS: Performed by: NURSE PRACTITIONER

## 2025-07-14 PROCEDURE — 2500000001 HC RX 250 WO HCPCS SELF ADMINISTERED DRUGS (ALT 637 FOR MEDICARE OP): Performed by: INTERNAL MEDICINE

## 2025-07-14 PROCEDURE — 99239 HOSP IP/OBS DSCHRG MGMT >30: CPT | Performed by: INTERNAL MEDICINE

## 2025-07-14 PROCEDURE — 36415 COLL VENOUS BLD VENIPUNCTURE: CPT | Performed by: INTERNAL MEDICINE

## 2025-07-14 RX ORDER — BISACODYL 5 MG
10 TABLET, DELAYED RELEASE (ENTERIC COATED) ORAL DAILY PRN
Start: 2025-07-14

## 2025-07-14 RX ORDER — OXYCODONE HYDROCHLORIDE 5 MG/1
5 TABLET ORAL EVERY 8 HOURS PRN
Qty: 15 TABLET | Refills: 0 | Status: SHIPPED | OUTPATIENT
Start: 2025-07-14 | End: 2025-07-19

## 2025-07-14 RX ORDER — POLYETHYLENE GLYCOL 3350 17 G/17G
17 POWDER, FOR SOLUTION ORAL DAILY
Start: 2025-07-14

## 2025-07-14 RX ORDER — INSULIN LISPRO 100 [IU]/ML
0-15 INJECTION, SOLUTION INTRAVENOUS; SUBCUTANEOUS
Start: 2025-07-14

## 2025-07-14 RX ADMIN — INSULIN LISPRO 9 UNITS: 100 INJECTION, SOLUTION INTRAVENOUS; SUBCUTANEOUS at 13:58

## 2025-07-14 RX ADMIN — PANTOPRAZOLE SODIUM 40 MG: 40 TABLET, DELAYED RELEASE ORAL at 06:52

## 2025-07-14 RX ADMIN — LIDOCAINE 4% 1 PATCH: 40 PATCH TOPICAL at 09:44

## 2025-07-14 RX ADMIN — CYANOCOBALAMIN TAB 1000 MCG 1000 MCG: 1000 TAB at 09:43

## 2025-07-14 RX ADMIN — TAMSULOSIN HYDROCHLORIDE 0.8 MG: 0.4 CAPSULE ORAL at 09:42

## 2025-07-14 RX ADMIN — DILTIAZEM HYDROCHLORIDE 240 MG: 240 CAPSULE, EXTENDED RELEASE ORAL at 09:43

## 2025-07-14 RX ADMIN — PANTOPRAZOLE SODIUM 40 MG: 40 TABLET, DELAYED RELEASE ORAL at 15:59

## 2025-07-14 RX ADMIN — POTASSIUM CHLORIDE 10 MEQ: 750 TABLET, FILM COATED, EXTENDED RELEASE ORAL at 09:42

## 2025-07-14 RX ADMIN — APIXABAN 5 MG: 5 TABLET, FILM COATED ORAL at 09:42

## 2025-07-14 RX ADMIN — METOPROLOL TARTRATE 25 MG: 25 TABLET, FILM COATED ORAL at 09:43

## 2025-07-14 RX ADMIN — GLIPIZIDE 2.5 MG: 2.5 TABLET, EXTENDED RELEASE ORAL at 09:44

## 2025-07-14 RX ADMIN — OXYCODONE HYDROCHLORIDE 5 MG: 5 TABLET ORAL at 05:40

## 2025-07-14 ASSESSMENT — PAIN - FUNCTIONAL ASSESSMENT
PAIN_FUNCTIONAL_ASSESSMENT: 0-10

## 2025-07-14 ASSESSMENT — COGNITIVE AND FUNCTIONAL STATUS - GENERAL
TOILETING: A LOT
DRESSING REGULAR UPPER BODY CLOTHING: A LITTLE
MOBILITY SCORE: 17
DAILY ACTIVITIY SCORE: 19
STANDING UP FROM CHAIR USING ARMS: A LOT
DAILY ACTIVITIY SCORE: 15
MOBILITY SCORE: 12
DRESSING REGULAR UPPER BODY CLOTHING: A LITTLE
MOVING TO AND FROM BED TO CHAIR: A LOT
TURNING FROM BACK TO SIDE WHILE IN FLAT BAD: A LITTLE
DRESSING REGULAR LOWER BODY CLOTHING: A LITTLE
CLIMB 3 TO 5 STEPS WITH RAILING: TOTAL
MOVING FROM LYING ON BACK TO SITTING ON SIDE OF FLAT BED WITH BEDRAILS: A LITTLE
STANDING UP FROM CHAIR USING ARMS: A LITTLE
DRESSING REGULAR LOWER BODY CLOTHING: A LOT
PERSONAL GROOMING: A LITTLE
PERSONAL GROOMING: A LITTLE
TURNING FROM BACK TO SIDE WHILE IN FLAT BAD: A LOT
HELP NEEDED FOR BATHING: A LOT
MOVING FROM LYING ON BACK TO SITTING ON SIDE OF FLAT BED WITH BEDRAILS: A LOT
HELP NEEDED FOR BATHING: A LITTLE
WALKING IN HOSPITAL ROOM: A LITTLE
EATING MEALS: A LITTLE
WALKING IN HOSPITAL ROOM: A LITTLE
TOILETING: A LITTLE
MOVING TO AND FROM BED TO CHAIR: A LITTLE
CLIMB 3 TO 5 STEPS WITH RAILING: A LOT

## 2025-07-14 ASSESSMENT — PAIN SCALES - GENERAL
PAINLEVEL_OUTOF10: 0 - NO PAIN
PAINLEVEL_OUTOF10: 0 - NO PAIN
PAINLEVEL_OUTOF10: 7

## 2025-07-14 ASSESSMENT — ACTIVITIES OF DAILY LIVING (ADL): HOME_MANAGEMENT_TIME_ENTRY: 16

## 2025-07-14 NOTE — PROGRESS NOTES
Occupational Therapy    OT Treatment    Patient Name: Dragan Calvin  MRN: [id number]  Department: [place] [id number]  Room: [address] [place], [place] [address]  Today's Date: 7/14/2025  Time Calculation  Start Time: 1104  Stop Time: 1130  Time Calculation (min): 26 min      Assessment:  Prognosis: Good  Barriers to Discharge Home: Caregiver assistance, Physical needs  Caregiver Assistance: Caregiver assistance needed per identified barriers - however, level of patient's required assistance exceeds assistance available at home  Physical Needs: Stair navigation into home limited by function/safety, In-home setup navigation limited by function/safety, Intermittent mobility assistance needed, Intermittent ADL assistance needed, High falls risk due to function or environment  Evaluation/Treatment Tolerance: Patient tolerated treatment well  Medical Staff Made Aware: Yes  End of Session Communication: Bedside nurse  End of Session Patient Position: Bed, 3 rail up, Alarm on  OT Assessment Results: Decreased ADL status, Decreased upper extremity strength, Decreased functional mobility  Prognosis: Good  Evaluation/Treatment Tolerance: Patient tolerated treatment well  Medical Staff Made Aware: Yes  Strengths: Attitude of self, Coping skills  Barriers to Participation: Comorbidities    Plan:  Treatment Interventions: ADL retraining, Functional transfer training, UE strengthening/ROM, Endurance training  OT Frequency: 3 times per week (during acute stay)  OT Discharge Recommendations: Moderate intensity level of continued care  Equipment Recommended upon Discharge:  (bedside commode, shower chair)  OT Recommended Transfer Status: Assist of 1  OT - OK to Discharge: Yes (OT eval complete, refer to discharge recomendations provided)  Treatment Interventions: ADL retraining, Functional transfer training, UE strengthening/ROM, Endurance training    Subjective   OT Visit Info:  OT Received On: 07/14/25    General Visit Info:  General  Reason for Referral: BLE  weakness/spinal stenosis work up.  Past Medical History Relevant to Rehab: PMH significant for CAD, permanent Afib, DVT s/p IVC filter, HLD, HTN, SSS s/p implanted pacemaker, PUD, GERD, DMT2, peripheral edema, SBO(s), and chronic BLE weakness d/t spinal stenosis who presents for further work up/treatment.  Family/Caregiver Present: No  Prior to Session Communication: Bedside nurse  Patient Position Received: Bed, 3 rail up, Alarm off, not on at start of session  Preferred Learning Style: visual, verbal  General Comment: Pt supine upon arrival, agreeable to participate in therapy.    Precautions:  Hearing/Visual Limitations: [place], bilateral hearing aides, glasses  Medical Precautions: Fall precautions    Pain:  Pain Assessment  Pain Assessment: 0-10  0-10 (Numeric) Pain Score: 0 - No pain    Objective    Cognition:  Cognition  Overall Cognitive Status: Within Functional Limits  Arousal/Alertness: Appropriate responses to stimuli  Orientation Level: Oriented X4  Following Commands: Follows one step commands with repetition  Problem Solving: Assistance required to identify errors made  Attention: Within Functional Limits  Memory: Within Funtional Limits  Insight: Mild  Impulsive: Within functional limits  Processing Speed: Within funtional limits    Coordination:  Movements are Fluid and Coordinated: No  Upper Body Coordination: ataxic    Activities of Daily Living:    Grooming  Grooming Level of Assistance: Close supervision, Setup  Grooming Where Assessed: Edge of bed  Grooming Comments: apply deodorant    UE Bathing  UE Bathing Level of Assistance: Minimum assistance  UE Bathing Where Assessed: Edge of bed  UE Bathing Comments: assist to wash backside         UE Dressing  UE Dressing Level of Assistance: Minimum assistance  UE Dressing Where Assessed: Edge of bed  UE Dressing Comments: don/doff gown    LE Dressing  LE Dressing: Yes  Pants Level of Assistance: Maximum assistance  Sock Level of Assistance: Maximum  assistance  LE Dressing Where Assessed: Edge of bed       Bed Mobility/Transfers: Bed Mobility  Bed Mobility: Yes  Bed Mobility 1  Bed Mobility 1: Supine to sitting, Sitting to supine  Level of Assistance 1: Moderate assistance, Moderate verbal cues  Bed Mobility 2  Bed Mobility  2: Scooting  Level of Assistance 2: Minimum assistance  Bed Mobility Comments 2: lateral scooting at EOB    Transfers  Transfer: Yes  Transfer 1  Transfer From 1: Bed to, Stand to  Transfer to 1: Stand, Bed  Technique 1: Sit to stand, Stand to sit  Transfer Device 1: Walker  Transfer Level of Assistance 1: Moderate assistance, Moderate verbal cues  Trials/Comments 1: x2 trials, bed height elevated- cues for positioning with FWW    Functional Mobility:  Functional Mobility  Functional Mobility Performed: Yes  Functional Mobility 1  Surface 1: Level tile  Device 1: Rolling walker  Assistance 1: Moderate assistance, Moderate verbal cues  Comments 1: lateral side steps towards HOB    Sitting Balance:  Static Sitting Balance  Static Sitting-Balance Support: Feet supported (unilateral upper extremity supported)  Static Sitting-Level of Assistance: Contact guard  Static Sitting-Comment/Number of Minutes: sitting EOB ~15 minutes during engagment in ADLs and therapeutic exercise  Dynamic Sitting Balance  Dynamic Sitting-Balance Support: Feet supported (unilateral upper-extremity support)  Dynamic Sitting-Level of Assistance: Minimum assistance  Dynamic Sitting-Comments: left lateral/posterior lean, cues to correct    Standing Balance:  Static Standing Balance  Static Standing-Balance Support: Bilateral upper extremity supported (FWW)  Static Standing-Level of Assistance: Minimum assistance  Dynamic Standing Balance  Dynamic Standing-Balance Support: Bilateral upper extremity supported (FWW)  Dynamic Standing-Level of Assistance: Moderate assistance     Therapy/Activity: Therapeutic Exercise  Therapeutic Exercise Performed: Yes (rest breaks provided  between exercises to support reduced activity tolerance)  Therapeutic Exercise Activity 1: shoulder flexion x 10 bilaterally  Therapeutic Exercise Activity 2: bilateral back rows x 10  Therapeutic Exercise Activity 3: forward/backard lean over RANDY x 0  Therapeutic Exercise Activity 4: lateral lean onto forearm and push-up into midline posture x 10 each side  Therapeutic Exercise Activity 5: seated cross crawls with midline crossing x 10    Therapeutic Activity  Therapeutic Activity Performed: Yes  Therapeutic Activity 1: transfer training to promote independence with bed mobility and functional mobility in prep for completin of ADLs OOB     Strength:  Strength Comments: BUCONSUELO grossly 4-/5  Other Activity:       RUE   RUE : Within Functional Limits and LUE   LUE: Within Functional Limits    Outcome Measures:[facility] Daily Activity  Putting on and taking off regular lower body clothing: A lot  Bathing (including washing, rinsing, drying): A lot  Putting on and taking off regular upper body clothing: A little  Toileting, which includes using toilet, bedpan or urinal: A lot  Taking care of personal grooming such as brushing teeth: A little  Eating Meals: A little  Daily Activity - Total Score: 15      Education Documentation  Precautions, taught by Caprice Chan OT at 7/14/2025  1:25 PM.  Learner: Patient  Readiness: Acceptance  Method: Explanation  Response: Needs Reinforcement    Home Exercise Program, taught by Caprice Chan OT at 7/14/2025  1:25 PM.  Learner: Patient  Readiness: Acceptance  Method: Explanation  Response: Needs Reinforcement    Body Mechanics, taught by Caprice Chan OT at 7/14/2025  1:25 PM.  Learner: Patient  Readiness: Acceptance  Method: Explanation  Response: Needs Reinforcement    ADL Training, taught by Caprice Chan OT at 7/14/2025  1:25 PM.  Learner: Patient  Readiness: Acceptance  Method: Explanation  Response: Needs Reinforcement    Education Comments  No comments found.            Goals:  Encounter Problems       Encounter Problems (Active)       ADLs       Patient will perform UB and LB bathing with modified independent level of assistance and shower chair and long-handled sponge. (Progressing)       Start:  07/10/25    Expected End:  07/24/25            Patient with complete upper body dressing with modified independent level of assistance donning and doffing all UE clothes with PRN adaptive equipment while edge of bed  and standing (Progressing)       Start:  07/10/25    Expected End:  07/24/25            Patient with complete lower body dressing with modified independent level of assistance donning and doffing all LE clothes  with PRN adaptive equipment while supported sitting and standing (Progressing)       Start:  07/10/25    Expected End:  07/24/25            Patient will complete daily grooming tasks brushing teeth and washing face/hair with modified independent level of assistance and PRN adaptive equipment while supported sitting and standing.       Start:  07/10/25    Expected End:  07/24/25            Patient will complete toileting including hygiene clothing management/hygiene with modified independent level of assistance.       Start:  07/10/25    Expected End:  07/24/25               COGNITION/SAFETY       Patient will demonstrated orientation x 4 with visual cues. (Progressing)       Start:  07/10/25    Expected End:  07/24/25       ORIENTATION            EXERCISE/STRENGTHENING       Patient will be educated on BUE HEP for increased ADL performance. (Progressing)       Start:  07/10/25    Expected End:  07/24/25               MOBILITY       Patient will perform Functional mobility mod  Household distances/Community Distances with modified independent level of assistance and least restrictive device in order to improve safety and functional mobility. (Progressing)       Start:  07/10/25    Expected End:  07/24/25               TRANSFERS       Patient will perform bed  mobility modified independent level of assistance In order to improve safety and independence with mobility (Progressing)       Start:  07/10/25    Expected End:  07/24/25            Patient will complete all functional transfers with least restrictive device with modified independent level of assistance. (Progressing)       Start:  07/10/25    Expected End:  07/24/25

## 2025-07-14 NOTE — NURSING NOTE
Attempted to call report to [facility] and no answer. Report was given to community care transport.

## 2025-07-14 NOTE — CARE PLAN
The patient's goals for the shift include Pt wishes to discharge timely    The clinical goals for the shift include Pt will remain safe    Over the shift, the patient made progress towards both goals.

## 2025-07-14 NOTE — PROGRESS NOTES
07/14/25 1100   Discharge Planning   Home or Post Acute Services Post acute facilities (Rehab/SNF/etc)   Type of Post Acute Facility Services Skilled nursing   Expected Discharge Disposition SNF   Does the patient need discharge transport arranged? Yes   [facility] coordination needed? Yes   Has discharge transport been arranged? Yes   What day is the transport expected? 07/14/25   What time is the transport expected? 1545     Patient to discharge to [facility] today. [facility] for 345pm. Report #: [phone]. Patient, MD, Mathew LEONARDN aware. Left messages for wife Toya and daughter Prema to provide update. Blue slip delivered to unit secretary.  Sheila ASCENCION, RN  Transitional Care Coordinator (TCC)  [phone]

## 2025-07-14 NOTE — DISCHARGE SUMMARY
Discharge Diagnosis  Leg weakness, bilateral   Spinal stenosis  Issues Requiring Follow-Up  Orthospine/Neurospine for spinal stenosis     Discharge Meds     Medication List      START taking these medications     bisacodyl 5 mg EC tablet; Commonly known as: Dulcolax; Take 2 tablets   (10 mg) by mouth once daily as needed for constipation. Do not crush,   chew, or split.   insulin lispro 100 unit/mL injection; Inject 0-15 Units under the skin 3   times a day before meals. Take as directed per insulin instructions.   oxyCODONE 5 mg immediate release tablet; Commonly known as: Roxicodone;   Take 1 tablet (5 mg) by mouth every 8 hours if needed for severe pain (7 -   10) for up to 5 days. Avoid if sleepy or RASS less 00. Avoid if RR less 12   bpm. Avoid if HR less 60 bpm. Avoid if SBP less 90 mmhg.   polyethylene glycol 17 gram packet; Commonly known as: Glycolax,   Miralax; Take 17 g by mouth once daily. Hold if have more 1 BM     CHANGE how you take these medications     torsemide 40 mg tablet; Take 40 mg by mouth once daily as needed (if LL   swelling/oedema or Dyspnea signs of Heart failure).; What changed:   medication strength, See the new instructions.     CONTINUE taking these medications     acetaminophen 650 mg ER tablet; Commonly known as: Tylenol 8 HOUR   apixaban 5 mg tablet; Commonly known as: Eliquis; Take 1 tablet (5 mg)   by mouth 2 times a day.   * Blood Glucose Test; Generic drug: blood sugar diagnostic; Use to test   blood sugar once daily   * OneTouch Ultra Test; Generic drug: blood sugar diagnostic; USE TO TEST   BLOOD SUGAR ONCE DAILY   cyanocobalamin 1,000 mcg tablet; Commonly known as: Vitamin B-12   dilTIAZem  mg 24 hr capsule; Commonly known as: Cardizem CD; Take   1 capsule (240 mg) by mouth 2 times a day.   glipiZIDE XL 2.5 mg 24 hr tablet; Commonly known as: Glucotrol XL; Take   1 tablet (2.5 mg) by mouth once daily with breakfast. Do not crush, chew,   or split.   insulin degludec  "100 unit/mL (3 mL) pen; Commonly known as: Tresiba   FlexTouch U-100; Inject 26 Units under the skin once daily at bedtime.   Take as directed per insulin instructions.   * lancets misc; One touch ultra test strips 33G LANCETS Tests once daily   * lancets misc; Use to test blood sugar once a day   lidocaine 4 % patch; Place 1 patch over 12 hours on the skin once daily.   Remove & discard patch within 12 hours or as directed by MD.   metoprolol tartrate 25 mg tablet; Commonly known as: Lopressor; TAKE 1   TABLET(25 MG) BY MOUTH TWICE DAILY   multivitamin tablet   pen needle, diabetic 32 gauge x 5/32\" needle; Commonly known as: BD   Ultra-Fine Tami Pen Needle; 1 Needle 4 times a day.   potassium chloride CR 10 mEq ER tablet; Commonly known as: Klor-Con;   Take 1 tablet (10 mEq) by mouth once daily. Do not crush, chew, or split.   RABEprazole EC tablet; Commonly known as: Aciphex; Take 1 tablet (20 mg)   by mouth once daily.   rosuvastatin 5 mg tablet; Commonly known as: Crestor; TAKE 1 TABLET(5   MG) BY MOUTH DAILY   tamsulosin 0.4 mg 24 hr capsule; Commonly known as: Flomax  * This list has 4 medication(s) that are the same as other medications   prescribed for you. Read the directions carefully, and ask your doctor or   other care provider to review them with you.     STOP taking these medications     fosinopril 10 mg tablet; Commonly known as: Monopril   metOLazone 2.5 mg tablet; Commonly known as: Zaroxolyn       Test Results Pending At Discharge  Pending Labs       No current pending labs.            Hospital Course  84 y.o. male with PMH significant for CAD, permanent Afib (on eliquis), DVT s/p IVC filter, HLD, HTN, SSS s/p implanted pacemaker, PUD, GERD, DMT2, peripheral edema, SBO(s), and chronic BLE weakness d/t spinal stenosis who presented to [facility] ED from home with complaints of weakness . Seen by NSG at [place] who rec MRI. MRI department allegedly refused to do MRI 2/2 PPM (despite alleged EP hilda). "  During device check at our hospital, it was discovered that the device his battery had .  EP was consulted and performed an exchange on 2025.  Now patient awaiting MRI w/ NSGY eval post imaging. O/   General Appearance: Alert, Oriented X3, Cooperative, Not in Acute Distress  HEENT: no eye redness, not pale, no jaundice, Throat: not congested, no ulcers    Chest: Good AE bilateral, No crackles, no ronchies, no wheezes.   Heart: RHR, Normal heart sounds S1, S2, no murmur or gallop,   Abdomen: Soft, lax, no hepatosplenomegaly, intact hernia orifices, negative moore sign, no tenderness,   Genitalia: no abnormal discharge, no ulcers, no swelling.  Lymphatics: no lymphadenopathy, supraclavicular, inguinal trochlear, or axilla.   Neurology: Intact cranial nerves 2 to 12, intact sensation, intact motor function (Power, tone and reflexes). Normal DTR reflexes.   Extremities: no signs of PAD, no swelling no ulcers   Back: no deformity, no SI tenderness, no ulcers.   Skin: no signs of dehydration, no Rash, Bruises, or purpura.        AS:  Mr. Dragan Calvin is an 84 y.o. male with PMH significant for CAD, permanent Afib (on eliquis), DVT s/p IVC filter, HLD, HTN, SSS s/p implanted pacemaker, PUD, GERD, DMT2, peripheral edema, SBO(s), and chronic BLE weakness d/t spinal stenosis who presented to [facility] ED from home with complaints of weakness . Seen by NSG at [facility] who rec MRI. During device check it was discovered that the device his battery had . EP was consulted and performed an exchange on 2025.  MRI no significant new stenosis NSGY rec outpt followup.   Medically stable  Pending AR/SNF   Medically stable to dc      #. Back pain and weakness- stable   - MRI no significant stenosis  - NSG rec outpatient followup  - c/w pain control  - PT/OT   - s/p decadron   - dc aBx no indication at this time   #. Pacemaker out of batter  - battery exchanged 2025  #. DM II  - SSI  - home emds  - monitoring  -  "hypoglycemia protocol   #. Afib  - c/w eliquis    #. HTN  #. HF  - resume home emds as tolerated \" Low BP held aCE, Amlo, and diuretics   - monitoring   #. YESICA/CKD  - creat >>. 1.7 >>. Improving 1.4   - likley due to diuretics  - will continue monitoring for ATN/JAIME   - hold diuretics   - avoid nephrotoxic   - adjust meds to GFR  #. Leucocytosis:   - 15>> 21 >>>   - he was on decadron   Code Status: DNR  DVT ppx:  eliquis   Disp: PT/ot  rec AR/SNF      I reviewed the resident/fellow's documentation and discussed the patient with the resident/fellow. I agree with the resident/fellow's medical decision making as documented in the note.  I saw and evaluated the patient.  I personally obtained the key and critical portions of the history and physical exam or was physically present for key and critical portions performed by the resident. I reviewed the resident's documentation and discussed the patient with the resident.  I agree with the resident's medical decision making as documented in the note.   spoke to the pt, explained the medical and social conditions and the reason for this admission explained our plan and recommendations.  Time spent on the assessment of patient, gathering and interpreting data, review of medical record/patient history, personally reviewing radiographic imaging. With greater than 50% spent in personal discussion with patient.  Disclaimer:   Portions of this note may have been generated using Dragon voice recognition software. Reasonable efforts were made to correct any dictation errors that resulted due to the programming of this software but some may still be present.   Portions of this note including HPI, ROS, impression/plan, and examination may have been copied forward from previous notes as to provide important historical information essential in contributing to medical decision making. Documentation has been reviewed and edited as necessary to support clinical decision making for " today's visit and to reflect my own independent evaluation of this patient.    The time of this note does not reflect the time I saw the patient but the time that this note was written   Time > 40 min        Pertinent Physical Exam At Time of Discharge  Physical Exam    Outpatient Follow-Up  Future Appointments   Date Time Provider Department Center   10/6/2025  9:45 AM DO Wilfredo [id number] [place]   10/15/2025  9:15 AM Benoit Carlson MD [id number] [place]         Chucho Adame MD

## 2025-07-14 NOTE — PROGRESS NOTES
Physical Therapy    Physical Therapy Treatment    Patient Name: Dragan Calvin  MRN: [id number]  Department: [place] [address]  Room: [address] [place], [place] [address]  Today's Date: 7/14/2025  Time Calculation  Start Time: 0844  Stop Time: 0909  Time Calculation (min): 25 min    Assessment/Plan   PT Assessment  Barriers to Discharge Home: Caregiver assistance, Physical needs  Caregiver Assistance: Caregiver assistance needed per identified barriers - however, level of patient's required assistance exceeds assistance available at home  Physical Needs: 24hr mobility assistance needed, 24hr ADL assistance needed, Weight bearing precautions unable to be safely maintained, In-home setup navigation limited by function/safety  End of Session Communication: Bedside nurse  Assessment Comment: Pt tolerated PT session well, continues to require increased assist x1 to complete bed mobility, transfers and gait. Pt continues to remain appropriate for Moderate intensity PT upon D/C from hospital.  End of Session Patient Position: Bed, 3 rail up, Alarm off, not on at start of session  PT Plan  Inpatient/Swing Bed or Outpatient: Inpatient  PT Plan  Treatment/Interventions: Bed mobility, Transfer training, Gait training, Balance training, Strengthening, Endurance training, Therapeutic exercise, Therapeutic activity  PT Plan: Ongoing PT  PT Frequency: 3 times per week (during hospitalization)  PT Discharge Recommendations: Moderate intensity level of continued care (Based on current functional status and rehab potential, patient is anticipated to tolerate and benefit from 5 or more days per week of skilled rehabilitative therapy after discharge from this acute inpatient hospitalization.)  PT Recommended Transfer Status: Assist x1, Assistive device  PT - OK to Discharge: Yes    PT Visit Info:  PT Received On: 07/14/25     General Visit Information:   General  Family/Caregiver Present: No  Prior to Session Communication: Bedside nurse  Patient Position Received: Bed, 3  rail up, Alarm off, not on at start of session  General Comment: Pt supine in bed, agreeable to work with PT.    Subjective   Precautions:  Precautions  Hearing/Visual Limitations: Yocha Dehe B-HA/ WFL  Medical Precautions: Fall precautions      Objective   Pain:  Pain Assessment  Pain Assessment: 0-10  0-10 (Numeric) Pain Score: 7  Pain Location: Shoulder  Pain Orientation: Right  Cognition:  Cognition  Overall Cognitive Status: Within Functional Limits  Orientation Level: Oriented X4    Activity Tolerance:  Activity Tolerance  Endurance: Tolerates 10 - 20 min exercise with multiple rests  Treatments:  Therapeutic Exercise  Therapeutic Exercise Performed: Yes  Therapeutic Exercise Activity 1: Supine: AP, QS, GS, HS, SAQ, Hip ABD x10 BLE    Bed Mobility  Bed Mobility: Yes  Bed Mobility 1  Bed Mobility 1: Supine to sitting  Level of Assistance 1: Moderate assistance  Bed Mobility Comments 1: Assist with trunk  Bed Mobility 2  Bed Mobility  2: Sitting to supine  Level of Assistance 2: Minimum assistance, Minimal verbal cues    Ambulation/Gait Training  Ambulation/Gait Training Performed: Yes  Ambulation/Gait Training 1  Surface 1: Level tile  Device 1: Rolling walker  Assistance 1: Minimum assistance, Minimal verbal cues  Quality of Gait 1: Inconsistent stride length, Decreased step length, Shuffling gait, Forward flexed posture  Comments/Distance (ft) 1: 3 trials x4ft lateral steps EOB. verbal cues and assist for walker management.    Transfers  Transfer: Yes  Transfer 1  Transfer From 1: Sit to  Transfer to 1: Stand  Technique 1: Sit to stand, Stand to sit  Transfer Device 1: Walker  Transfer Level of Assistance 1: Moderate assistance, Minimal verbal cues  Trials/Comments 1: x1 trial, cues for safe sequencing and safe hand placement.    Stairs  Stairs: No    Outcome Measures:  [facility] Basic Mobility  Turning from your back to your side while in a flat bed without using bedrails: A lot  Moving from lying on your back to  sitting on the side of a flat bed without using bedrails: A lot  Moving to and from bed to chair (including a wheelchair): A lot  Standing up from a chair using your arms (e.g. wheelchair or bedside chair): A lot  To walk in hospital room: A little  Climbing 3-5 steps with railing: Total  Basic Mobility - Total Score: 12    Education Documentation  Precautions, taught by Divya Schumacher PTA at 7/14/2025 12:56 PM.  Learner: Patient  Readiness: Acceptance  Method: Explanation  Response: Verbalizes Understanding, Needs Reinforcement  Comment: LE HEP, safe transfer techniques.    Mobility Training, taught by Divya Schumacher PTA at 7/14/2025 12:56 PM.  Learner: Patient  Readiness: Acceptance  Method: Explanation  Response: Verbalizes Understanding, Needs Reinforcement  Comment: LE HEP, safe transfer techniques.    Education Comments  No comments found.      Encounter Problems       Encounter Problems (Active)       Mobility       STG - Patient will ambulate >20ft, wheeled walker, min assist (Progressing)       Start:  07/08/25    Expected End:  07/22/25               PT Transfers       STG - Patient to transfer to and from sit to supine min assist (Progressing)       Start:  07/08/25    Expected End:  07/22/25            STG - Patient will transfer sit to and from stand min assist (Progressing)       Start:  07/08/25    Expected End:  07/22/25 07/14/25 at 12:59 PM   Divya Schumacher PTA   Rehab Office: [phone]

## 2025-07-14 NOTE — PROGRESS NOTES
Dragan Calvin is a 84 y.o. male on day 8 of admission presenting with Leg weakness, bilateral.    Medical History[1]  Surgical History[2]  Social History     Socioeconomic History    Marital status:      Spouse name: Not on file    Number of children: Not on file    Years of education: Not on file    Highest education level: Not on file   Occupational History    Not on file   Tobacco Use    Smoking status: Never    Smokeless tobacco: Never   Vaping Use    Vaping status: Never Used   Substance and Sexual Activity    Alcohol use: Never    Drug use: Never    Sexual activity: Not on file   Other Topics Concern    Not on file   Social History Narrative    Not on file     Social Drivers of Health     Financial Resource Strain: Low Risk  (7/7/2025)    Overall Financial Resource Strain (CARDIA)     Difficulty of Paying Living Expenses: Not hard at all   Food Insecurity: No Food Insecurity (7/3/2025)    Hunger Vital Sign     Worried About Running Out of Food in the Last Year: Never true     Ran Out of Food in the Last Year: Never true   Transportation Needs: No Transportation Needs (7/7/2025)    PRAPARE - Transportation     Lack of Transportation (Medical): No     Lack of Transportation (Non-Medical): No   Physical Activity: Not on file   Stress: No Stress Concern Present (7/3/2025)    [employer] [place] [place] [employer] - Occupational Stress Questionnaire     Feeling of Stress : Not at all   Social Connections: Not on file   Intimate Partner Violence: Not At Risk (7/3/2025)    Humiliation, Afraid, Rape, and Kick questionnaire     Fear of Current or Ex-Partner: No     Emotionally Abused: No     Physically Abused: No     Sexually Abused: No   Housing Stability: Low Risk  (7/7/2025)    Housing Stability Vital Sign     Unable to Pay for Housing in the Last Year: No     Number of Times Moved in the Last Year: 1     Homeless in the Last Year: No     Allergies[3]    Dietary Orders (From admission, onward)                Adult diet Consistent Carb; CCD 60 gm/meal  Diet effective now        Question Answer Comment   Diet type Consistent Carb    Carb diet selection: CCD 60 gm/meal            May Participate in Room Service  Once        Question:  .  Answer:  Yes                      Objective     Vitals  Temp:  [36.1 °C (97 °F)-36.6 °C (97.9 °F)] 36.5 °C (97.7 °F)  Heart Rate:  [62-83] 62  Resp:  [16-18] 18  BP: ()/(52-63) 104/58       0-10 (Numeric) Pain Score: 0 - No pain  Amyaa-Baker FACES Pain Rating: No hurt         Peripheral IV 07/09/25 20 G Anterior;Left Forearm (Active)   Number of days: 3       Relevant Results  Scheduled medications  Scheduled Medications[4]  Continuous medications  Continuous Medications[5]  PRN medications  PRN Medications[6]      S/  Seen and examined  No new complaints no new event over the night   ROS: Negative    O/   General Appearance: Alert, Oriented X3, Cooperative, Not in Acute Distress  HEENT: no eye redness, not pale, no jaundice, Throat: not congested, no ulcers    Chest: Good AE bilateral, No crackles, no ronchies, no wheezes.   Heart: RHR, Normal heart sounds S1, S2, no murmur or gallop,   Abdomen: Soft, lax, no hepatosplenomegaly, intact hernia orifices, negative moore sign, no tenderness,   Genitalia: no abnormal discharge, no ulcers, no swelling.  Lymphatics: no lymphadenopathy, supraclavicular, inguinal trochlear, or axilla.   Neurology: Intact cranial nerves 2 to 12, intact sensation, intact motor function (Power, tone and reflexes). Normal DTR reflexes.   Extremities: no signs of PAD, no swelling no ulcers   Back: no deformity, no SI tenderness, no ulcers.   Skin: no signs of dehydration, no Rash, Bruises, or purpura.      AS:  Mr. Dragan Calvin is an 84 y.o. male with PMH significant for CAD, permanent Afib (on eliquis), DVT s/p IVC filter, HLD, HTN, SSS s/p implanted pacemaker, PUD, GERD, DMT2, peripheral edema, SBO(s), and chronic BLE weakness d/t spinal stenosis who presented  "to [facility] ED from home with complaints of weakness . Seen by NSG at [facility] who rec MRI. During device check it was discovered that the device his battery had . EP was consulted and performed an exchange on 2025.  MRI no significant new stenosis NSGY re c outpt followup.   Medically stable  Pending AR/SNF   Medically stable to dc     #. Back pain and weakness- stable   - MRI no significant stenosis  - NSG rec outpatient followup  - c/w pain control  - PT/OT   - s/p decadron   - dc aBx no indication at this time   #. Pacemaker out of batter  - battery exchanged 2025  #. DM II  - SSI  - home emds  - monitoring  - hypoglycemia protocol   #. Afib  - c/w eliquis    #. HTN  #. HF  - resume home emds as tolerated \" Low BP held aCE, Amlo, and diuretics   - monitoring   #. YESICA/CKD  - creat >>. 1.7 >>. Improving 1.4   - likley due to diuretics  - will continue monitoring for ATN/JAIME   - hold diuretics   - avoid nephrotoxic   - adjust meds to GFR  #. Leucocytosis:   - 15>> 21 >>>   - he was on decadron   Code Status:  DNR  DVT ppx:  eliquis   Disp: PT/ot  rec AR/SNF     I reviewed the resident/fellow's documentation and discussed the patient with the resident/fellow. I agree with the resident/fellow's medical decision making as documented in the note.  I saw and evaluated the patient.  I personally obtained the key and critical portions of the history and physical exam or was physically present for key and critical portions performed by the resident. I reviewed the resident's documentation and discussed the patient with the resident.  I agree with the resident's medical decision making as documented in the note.   spoke to the pt, explained the medical and social conditions and the reason for this admission explained our plan and recommendations.  Time spent on the assessment of patient, gathering and interpreting data, review of medical record/patient history, personally reviewing radiographic imaging. With " greater than 50% spent in personal discussion with patient.  Disclaimer:   Portions of this note may have been generated using Dragon voice recognition software. Reasonable efforts were made to correct any dictation errors that resulted due to the programming of this software but some may still be present.   Portions of this note including HPI, ROS, impression/plan, and examination may have been copied forward from previous notes as to provide important historical information essential in contributing to medical decision making. Documentation has been reviewed and edited as necessary to support clinical decision making for today's visit and to reflect my own independent evaluation of this patient.    The time of this note does not reflect the time I saw the patient but the time that this note was written   Time > 40 min         Chucho Adame MD             [1]   Past Medical History:  Diagnosis Date    Deep vein thrombosis (DVT) of lower extremity 09/17/2023    s/p IVC filter and has post-phlebotic syndrome    History of falling     History of fall    Hyperlipidemia 08/28/2023    Dr. Zee follows    Hypertension, benign 08/23/2017    Leg edema 09/05/2023    Overweight     Overweight    Paroxysmal atrial fibrillation (Multi) 08/28/2023    PAF. On Eliquis. 7 second pauses therefore PPM placed. Freq Pafib on PPM. Chads 2 vasc 4    Permanent atrial fibrillation (Multi) 10/17/2023    On Eliquis. 7 second pauses therefore PPM placed. Freq Pafib on PPM. Chads 2 vasc 4    Personal history of other diseases of the digestive system     History of small bowel obstruction    Personal history of other infectious and parasitic diseases     History of herpes zoster    Personal history of other specified conditions     History of bradycardia    Personal history of other specified conditions     History of edema    Personal history of peptic ulcer disease     History of peptic ulcer    Personal history of pneumonia (recurrent)      History of pneumonia    Personal history of urinary calculi     History of kidney stones    Presence of cardiac pacemaker 08/25/2017    10/24/2016: [employer] Advisa MRI DR model #A2DR01    Sick sinus syndrome (Multi) 09/17/2023    s/p PPM 10/2016    Stage 3a chronic kidney disease (Multi) 07/18/2024    Type 2 diabetes mellitus, with long-term current use of insulin 08/28/2023   [2]   Past Surgical History:  Procedure Laterality Date    APPENDECTOMY  07/25/2018    Appendectomy    CARDIAC ELECTROPHYSIOLOGY PROCEDURE N/A 7/8/2025    Procedure: PPM Generator Change;  Surgeon: Max Alexander MD;  Location: [place] [address] Cardiac Cath Lab;  Service: Electrophysiology;  Laterality: N/A;    CARDIAC PACEMAKER PLACEMENT  04/05/2018    Pacemaker Placement    CHOLECYSTECTOMY  04/05/2018    Cholecystectomy   [3] No Known Allergies  [4] apixaban, 5 mg, oral, BID  cyanocobalamin, 1,000 mcg, oral, Daily  dilTIAZem CD, 240 mg, oral, BID  glipiZIDE XL, 2.5 mg, oral, Daily with breakfast  insulin glargine, 40 Units, subcutaneous, Nightly  insulin lispro, 0-15 Units, subcutaneous, TID AC  lidocaine, 1 patch, transdermal, Daily  [Held by provider] lisinopril, 10 mg, oral, Daily  [Held by provider] metOLazone, 2.5 mg, oral, Once per day on Sunday Wednesday  metoprolol tartrate, 25 mg, oral, BID  pantoprazole, 40 mg, oral, BID AC  polyethylene glycol, 17 g, oral, Daily  potassium chloride CR, 10 mEq, oral, Daily  rosuvastatin, 5 mg, oral, Daily  tamsulosin, 0.8 mg, oral, Daily  [Held by provider] torsemide, 40 mg, oral, Daily     [5]    [6] PRN medications: acetaminophen **OR** acetaminophen **OR** acetaminophen, bisacodyl, bisacodyl, dextrose, dextrose, glucagon, glucagon, ondansetron **OR** ondansetron, oxyCODONE, prochlorperazine **OR** prochlorperazine **OR** prochlorperazine

## 2025-07-16 ENCOUNTER — APPOINTMENT (OUTPATIENT)
Dept: CARDIOLOGY | Facility: CLINIC | Age: 85
End: 2025-07-16
Payer: MEDICARE

## 2025-07-17 ENCOUNTER — NURSING HOME VISIT (OUTPATIENT)
Dept: POST ACUTE CARE | Facility: EXTERNAL LOCATION | Age: 85
End: 2025-07-17
Payer: MEDICARE

## 2025-07-17 DIAGNOSIS — I48.21 PERMANENT ATRIAL FIBRILLATION (MULTI): Chronic | ICD-10-CM

## 2025-07-17 DIAGNOSIS — E11.42 TYPE 2 DIABETES MELLITUS WITH DIABETIC POLYNEUROPATHY, WITH LONG-TERM CURRENT USE OF INSULIN: Chronic | ICD-10-CM

## 2025-07-17 DIAGNOSIS — Z79.4 TYPE 2 DIABETES MELLITUS WITH DIABETIC POLYNEUROPATHY, WITH LONG-TERM CURRENT USE OF INSULIN: Chronic | ICD-10-CM

## 2025-07-17 DIAGNOSIS — Z95.0 PRESENCE OF CARDIAC PACEMAKER: Chronic | ICD-10-CM

## 2025-07-17 DIAGNOSIS — M47.812 OSTEOARTHRITIS OF CERVICAL SPINE, UNSPECIFIED SPINAL OSTEOARTHRITIS COMPLICATION STATUS: ICD-10-CM

## 2025-07-17 DIAGNOSIS — Z45.010 PACEMAKER AT END OF BATTERY LIFE: ICD-10-CM

## 2025-07-17 DIAGNOSIS — I25.10 CORONARY ARTERY DISEASE INVOLVING NATIVE CORONARY ARTERY OF NATIVE HEART WITHOUT ANGINA PECTORIS: ICD-10-CM

## 2025-07-17 DIAGNOSIS — I10 HYPERTENSION, BENIGN: Chronic | ICD-10-CM

## 2025-07-17 DIAGNOSIS — R29.898 WEAKNESS OF BOTH LOWER EXTREMITIES: Primary | ICD-10-CM

## 2025-07-17 DIAGNOSIS — Z86.718 HX OF DEEP VENOUS THROMBOSIS: ICD-10-CM

## 2025-07-17 DIAGNOSIS — M48.061 SPINAL STENOSIS OF LUMBAR REGION, UNSPECIFIED WHETHER NEUROGENIC CLAUDICATION PRESENT: ICD-10-CM

## 2025-07-17 PROCEDURE — 99306 1ST NF CARE HIGH MDM 50: CPT | Performed by: INTERNAL MEDICINE

## 2025-07-17 PROCEDURE — 99497 ADVNCD CARE PLAN 30 MIN: CPT | Performed by: INTERNAL MEDICINE

## 2025-07-17 NOTE — LETTER
Patient: Dragan Calvin  : 1940    Encounter Date: 2025    Nursing Home  History & Physical Visit    Name: Dragan Calvin  MRN: [id number]  YOB: 1940  Date of Service: 2025      History of Present Illness  Pt was seen , available records reviewed. Hx from pt and chart. He was admitted to hosp with fall, leg weakness  . Also noted to have very low battery on his pacemaker so it was changed. Pt had MRI of spine done. 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 He was evaluated by Neurosurg and has follow up with them in few months also. No surgery was done during this hospitalization .   Also has hx of a fib, hx of DVT s/P IVC , DM .    Patient denies any shortness of breath, PND, orthopnea, chest pain , palpitation, syncope or edema in legs  patient denies any abdominal pain, tenderness, nausea, vomiting, change in bowel habits or blood in stool.        Review of Systems  REVIEW OF SYSTEMS:   All other systems have been reviewed and are negative in relation to patient's complaint and other than what is mentioned in History of present illness.     Vital Signs  Temperature  98.3 °F  2025 01:57  Pulse  100 per minute  2025 10:49  Respirations  15 per minute  2025 01:57  Blood Pressure  145 / 66 mmHg  2025 10:49  O2 Saturation  98 %  2025 01:57  Blood Sugar  205 mg/dL  2025 07:53  Weight  198.2 lbs / Routine BMI: 30.13  07/15/2025 14:25  Physical Exam  Vitals noted , obese  [place]   Not in acute distress  Conj Pink, No icterus  Neck: No Cervical LN enlargement, No Thyroid enlargement   Lungs: good air entry bilaterally, no rales or rhonchi  CVS: S1 S2 + , no S3. No loud heart murmur heard.   Abdomen: Soft, non tender , BS +. no organomegaly , no CVA tenderness  CNS: Pt is alert, moving all 4 extremities. Has motor weakness in both lower limb with power 3/5 . DTR +/- both knees. No  abnormal movements noted on gross examination.  No  spine tenderness  Extremities: No edema, No calf tenderness, Jennifer's sign negative.     Assessment/Plan:    1. Weakness of both lower extremities ? Due to spinal stenosis most likely . Will get therapy    2. Spinal stenosis of lumbar region, unspecified whether neurogenic claudication present    3. Osteoarthritis of cervical spine, unspecified spinal osteoarthritis complication status    4. Permanent atrial fibrillation (Multi) -has pacemaker . Rate controlled   5. Presence of cardiac pacemaker battery was changed during hosp stay    6. Hx of deep venous thrombosis s/p IVC   7. Type 2 diabetes mellitus with diabetic polyneuropathy, with long-term current insulin  se of insulin -will monitor sugar   8. Hypertension, benign -hx of.    9. Coronary artery disease involving native coronary artery of native heart witho  ut angina pectoris    10. Pacemaker at end of battery life -was replaced        Plan:     Available medical records reviewed . Patient was examined and detailed History and physical done . All questions answered to patient's satisfaction . Total time for chart reviewing , detailed examination and coordinating care with patient was > 35 minutes.   During visit today , I asked patient as well as looked in records  in regards to advanced directive , POA etc. Pt wants to be Full code . Questions related to it answered to pt's satisfaction . Wife is his medical POA.   Patient is doing well.   Continue OT/PT Rehab.   Current medications are effective. advised to continue current medications.  Will continue to follow   Patient felt satisfied with plan      Electronically Signed By: Dimitry Short MD   7/17/25  1:02 PM

## 2025-07-17 NOTE — PROGRESS NOTES
Nursing Home  History & Physical Visit    Name: Dragan Calvin  MRN: [id number]  YOB: 1940  Date of Service: 7/17/2025      History of Present Illness  Pt was seen , available records reviewed. Hx from pt and chart. He was admitted to hosp with fall, leg weakness  . Also noted to have very low battery on his pacemaker so it was changed. Pt had MRI of spine done. 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 He was evaluated by Neurosurg and has follow up with them in few months also. No surgery was done during this hospitalization .   Also has hx of a fib, hx of DVT s/P IVC , DM .    Patient denies any shortness of breath, PND, orthopnea, chest pain , palpitation, syncope or edema in legs  patient denies any abdominal pain, tenderness, nausea, vomiting, change in bowel habits or blood in stool.        Review of Systems  REVIEW OF SYSTEMS:   All other systems have been reviewed and are negative in relation to patient's complaint and other than what is mentioned in History of present illness.     Vital Signs  Temperature  98.3 °F  07/17/2025 01:57  Pulse  100 per minute  07/17/2025 10:49  Respirations  15 per minute  07/17/2025 01:57  Blood Pressure  145 / 66 mmHg  07/17/2025 10:49  O2 Saturation  98 %  07/17/2025 01:57  Blood Sugar  205 mg/dL  07/17/2025 07:53  Weight  198.2 lbs / Routine BMI: 30.13  07/15/2025 14:25  Physical Exam  Vitals noted , obese  [place]   Not in acute distress  Conj Pink, No icterus  Neck: No Cervical LN enlargement, No Thyroid enlargement   Lungs: good air entry bilaterally, no rales or rhonchi  CVS: S1 S2 + , no S3. No loud heart murmur heard.   Abdomen: Soft, non tender , BS +. no organomegaly , no CVA tenderness  CNS: Pt is alert, moving all 4 extremities. Has motor weakness in both lower limb with power 3/5 . DTR +/- both knees. No  abnormal movements noted on gross examination.  No spine tenderness  Extremities: No edema, No calf tenderness, Jennifer's  sign negative.     Assessment/Plan:    1. Weakness of both lower extremities ? Due to spinal stenosis most likely . Will get therapy    2. Spinal stenosis of lumbar region, unspecified whether neurogenic claudication present    3. Osteoarthritis of cervical spine, unspecified spinal osteoarthritis complication status    4. Permanent atrial fibrillation (Multi) -has pacemaker . Rate controlled   5. Presence of cardiac pacemaker battery was changed during hosp stay    6. Hx of deep venous thrombosis s/p IVC   7. Type 2 diabetes mellitus with diabetic polyneuropathy, with long-term current insulin  se of insulin -will monitor sugar   8. Hypertension, benign -hx of.    9. Coronary artery disease involving native coronary artery of native heart witho  ut angina pectoris    10. Pacemaker at end of battery life -was replaced        Plan:     Available medical records reviewed . Patient was examined and detailed History and physical done . All questions answered to patient's satisfaction . Total time for chart reviewing , detailed examination and coordinating care with patient was > 35 minutes.   During visit today , I asked patient as well as looked in records  in regards to advanced directive , POA etc. Pt wants to be Full code . Questions related to it answered to pt's satisfaction . Wife is his medical POA.   Patient is doing well.   Continue OT/PT Rehab.   Current medications are effective. advised to continue current medications.  Will continue to follow   Patient felt satisfied with plan

## 2025-07-22 DIAGNOSIS — I25.10 CORONARY ARTERY DISEASE INVOLVING NATIVE CORONARY ARTERY OF NATIVE HEART WITHOUT ANGINA PECTORIS: ICD-10-CM

## 2025-07-22 RX ORDER — METOLAZONE 2.5 MG/1
2.5 TABLET ORAL DAILY
Qty: 90 TABLET | Refills: 3 | Status: SHIPPED | OUTPATIENT
Start: 2025-07-22 | End: 2026-07-22

## 2025-07-22 RX ORDER — METOLAZONE 2.5 MG/1
2.5 TABLET ORAL DAILY
COMMUNITY
End: 2025-07-22 | Stop reason: SDUPTHER

## 2025-07-24 ENCOUNTER — NURSING HOME VISIT (OUTPATIENT)
Dept: POST ACUTE CARE | Facility: EXTERNAL LOCATION | Age: 85
End: 2025-07-24
Payer: MEDICARE

## 2025-07-24 DIAGNOSIS — R29.898 LEG WEAKNESS, BILATERAL: Primary | ICD-10-CM

## 2025-07-24 DIAGNOSIS — Z95.0 PACEMAKER: ICD-10-CM

## 2025-07-24 DIAGNOSIS — M79.644 FINGER PAIN, RIGHT: ICD-10-CM

## 2025-07-24 DIAGNOSIS — R53.81 PHYSICAL DECONDITIONING: ICD-10-CM

## 2025-07-24 LAB
Q ONSET: 220 MS
QRS COUNT: 16 BEATS
QRS DURATION: 100 MS
QT INTERVAL: 404 MS
QTC CALCULATION(BAZETT): 502 MS
QTC FREDERICIA: 467 MS
R AXIS: 66 DEGREES
T AXIS: 29 DEGREES
T OFFSET: 422 MS
VENTRICULAR RATE: 93 BPM

## 2025-07-24 PROCEDURE — 99309 SBSQ NF CARE MODERATE MDM 30: CPT | Performed by: INTERNAL MEDICINE

## 2025-07-24 NOTE — LETTER
Patient: Dragan Calvin  : 1940    Encounter Date: 2025        Nursing home follow up visit  Name: Dragan Calvin  MRN: [id number]  YOB: 1940  Date of Service: 2025      Pt was evaluated during nursing home visit today  Patient is doing OK. Participating in therapy well  No sob, cp, PND, orthopnea  Eating ok, sleeping ok   Moving BM ok.   C/o R middle finger pain and redness. No hx of RA .   Tolerating medications well    Objective :Temperature  97.9 °F  2025 00:22  Pulse  100 per minute  2025 10:19  Respirations  18 per minute  2025 00:22  Blood Pressure  121 / 58 mmHg  2025 10:19  O2 Saturation  97 %  2025 00:22  Blood Sugar  332 mg/dL  2025 10:21  Weight  210.2 lbs / Routine BMI: 31.96  2025 10:47  Obese   Vitals were noted  Patient is not any acute distress  Conjunctiva- Pink, no icterus   No cervical LN enlargement   Lungs clear, s1s2 +   Has redness and some tenderness over R middle finger interphallengeal joint . Some trouble extending that finger .   ++  edema , No calf tenderness     Assessment:    1. Leg weakness, bilateral -as per OT he was more weaker today with some decline form before. Will monitor it.    2. Physical deconditioning -therapy    3. Finger pain, right -? OA flare up vs other etiology like gout -prednisone was started yesterday    4. Pacemaker -battery was changed recently .         Plan:  Patient is doing well.   Continue OT/PT Rehab.   Xray finger and uric acid has been done . Pt was started on prednisone yesterday . Will monitor it.   I have reviewed all active medications patient is currently on . Questions related to medication answered to patient's satisfaction.  Current medications are effective. advised to continue current medications.  Will continue to follow   Patient felt satisfied with plan            Electronically Signed By: Dimitry Short MD   25 12:21 PM

## 2025-07-24 NOTE — PROGRESS NOTES
Nursing home follow up visit  Name: Dragan Calvin  MRN: [id number]  YOB: 1940  Date of Service: 7/24/2025      Pt was evaluated during nursing home visit today  Patient is doing OK. Participating in therapy well  No sob, cp, PND, orthopnea  Eating ok, sleeping ok   Moving BM ok.   C/o R middle finger pain and redness. No hx of RA .   Tolerating medications well    Objective :Temperature  97.9 °F  07/18/2025 00:22  Pulse  100 per minute  07/24/2025 10:19  Respirations  18 per minute  07/18/2025 00:22  Blood Pressure  121 / 58 mmHg  07/24/2025 10:19  O2 Saturation  97 %  07/18/2025 00:22  Blood Sugar  332 mg/dL  07/24/2025 10:21  Weight  210.2 lbs / Routine BMI: 31.96  07/24/2025 10:47  Obese   Vitals were noted  Patient is not any acute distress  Conjunctiva- Pink, no icterus   No cervical LN enlargement   Lungs clear, s1s2 +   Has redness and some tenderness over R middle finger interphallengeal joint . Some trouble extending that finger .   ++  edema , No calf tenderness     Assessment:    1. Leg weakness, bilateral -as per OT he was more weaker today with some decline form before. Will monitor it.    2. Physical deconditioning -therapy    3. Finger pain, right -? OA flare up vs other etiology like gout -prednisone was started yesterday    4. Pacemaker -battery was changed recently .         Plan:  Patient is doing well.   Continue OT/PT Rehab.   Xray finger and uric acid has been done . Pt was started on prednisone yesterday . Will monitor it.   I have reviewed all active medications patient is currently on . Questions related to medication answered to patient's satisfaction.  Current medications are effective. advised to continue current medications.  Will continue to follow   Patient felt satisfied with plan

## 2025-07-28 ENCOUNTER — NURSING HOME VISIT (OUTPATIENT)
Dept: POST ACUTE CARE | Facility: EXTERNAL LOCATION | Age: 85
End: 2025-07-28
Payer: MEDICARE

## 2025-07-28 DIAGNOSIS — Z95.0 PACEMAKER: ICD-10-CM

## 2025-07-28 DIAGNOSIS — R53.81 PHYSICAL DECONDITIONING: ICD-10-CM

## 2025-07-28 DIAGNOSIS — M48.061 SPINAL STENOSIS OF LUMBAR REGION, UNSPECIFIED WHETHER NEUROGENIC CLAUDICATION PRESENT: ICD-10-CM

## 2025-07-28 DIAGNOSIS — R29.898 LEG WEAKNESS, BILATERAL: Primary | ICD-10-CM

## 2025-07-28 PROCEDURE — 99308 SBSQ NF CARE LOW MDM 20: CPT | Performed by: INTERNAL MEDICINE

## 2025-07-28 NOTE — PROGRESS NOTES
Nursing home follow up visit  Name: Dragan Calvin  MRN: [id number]  YOB: 1940  Date of Service: 7/28/2025      Pt was evaluated during nursing home visit today  Patient is doing OK. Participating in therapy well. He is able to walk with walker in therapy . As per wife after standing for few min he feels weakness in legs.   No sob, cp, PND, orthopnea  Eating ok, sleeping ok   Moving BM ok.   Tolerating medications well    Objective :  Vitals were noted, stable  obese  Patient is not any acute distress  Conjunctiva- Pink, no icterus   No cervical LN enlargement   Lungs clear, s1s2 +   No edema , No calf tenderness   Power 3-4 /5 both lower legs.     Assessment:    1. Leg weakness, bilateral ? Due to spinal stenosis. Getting therapy , improving with it.    2. Physical deconditioning -getting better    3. Pacemaker -hx of   4. Spinal stenosis of lumbar region, unspecified whether neurogenic claudication present         Plan:  Patient is doing well.   Continue OT/PT Rehab.   I have reviewed all active medications patient is currently on . Questions related to medication answered to patient's satisfaction.  Current medications are effective. advised to continue current medications.  Will continue to follow   Patient felt satisfied with plan

## 2025-07-28 NOTE — LETTER
Patient: Dragan Calvin  : 1940    Encounter Date: 2025        Nursing home follow up visit  Name: Dragan Calvin  MRN: [id number]  YOB: 1940  Date of Service: 2025      Pt was evaluated during nursing home visit today  Patient is doing OK. Participating in therapy well. He is able to walk with walker in therapy . As per wife after standing for few min he feels weakness in legs.   No sob, cp, PND, orthopnea  Eating ok, sleeping ok   Moving BM ok.   Tolerating medications well    Objective :  Vitals were noted, stable  obese  Patient is not any acute distress  Conjunctiva- Pink, no icterus   No cervical LN enlargement   Lungs clear, s1s2 +   No edema , No calf tenderness   Power 3-4 /5 both lower legs.     Assessment:    1. Leg weakness, bilateral ? Due to spinal stenosis. Getting therapy , improving with it.    2. Physical deconditioning -getting better    3. Pacemaker -hx of   4. Spinal stenosis of lumbar region, unspecified whether neurogenic claudication present         Plan:  Patient is doing well.   Continue OT/PT Rehab.   I have reviewed all active medications patient is currently on . Questions related to medication answered to patient's satisfaction.  Current medications are effective. advised to continue current medications.  Will continue to follow   Patient felt satisfied with plan            Electronically Signed By: Dimitry Short MD   25 12:16 PM

## 2025-08-04 ENCOUNTER — HOSPITAL ENCOUNTER (INPATIENT)
Facility: HOSPITAL | Age: 85
LOS: 1 days | Discharge: NURSING, REHAB OR LONG-TERM CARE | End: 2025-08-06
Attending: EMERGENCY MEDICINE
Payer: MEDICARE

## 2025-08-04 ENCOUNTER — APPOINTMENT (OUTPATIENT)
Dept: RADIOLOGY | Facility: HOSPITAL | Age: 85
End: 2025-08-04
Payer: MEDICARE

## 2025-08-04 ENCOUNTER — APPOINTMENT (OUTPATIENT)
Dept: CARDIOLOGY | Facility: HOSPITAL | Age: 85
End: 2025-08-04
Payer: MEDICARE

## 2025-08-04 DIAGNOSIS — I50.40 UNSPECIFIED COMBINED SYSTOLIC (CONGESTIVE) AND DIASTOLIC (CONGESTIVE) HEART FAILURE: ICD-10-CM

## 2025-08-04 DIAGNOSIS — I50.33 ACUTE ON CHRONIC HEART FAILURE WITH PRESERVED EJECTION FRACTION: ICD-10-CM

## 2025-08-04 DIAGNOSIS — J96.01 ACUTE RESPIRATORY FAILURE WITH HYPOXIA: Primary | ICD-10-CM

## 2025-08-04 DIAGNOSIS — J18.9 PNEUMONIA DUE TO INFECTIOUS ORGANISM, UNSPECIFIED LATERALITY, UNSPECIFIED PART OF LUNG: ICD-10-CM

## 2025-08-04 LAB
ALBUMIN SERPL BCP-MCNC: 2.9 G/DL (ref 3.4–5)
ALP SERPL-CCNC: 103 U/L (ref 33–136)
ALT SERPL W P-5'-P-CCNC: 14 U/L (ref 10–52)
ANION GAP BLDV CALCULATED.4IONS-SCNC: 13 MMOL/L (ref 10–25)
ANION GAP SERPL CALC-SCNC: 16 MMOL/L (ref 10–20)
AST SERPL W P-5'-P-CCNC: 12 U/L (ref 9–39)
BASE EXCESS BLDV CALC-SCNC: -3.6 MMOL/L (ref -2–3)
BASOPHILS # BLD AUTO: 0.02 X10*3/UL (ref 0–0.1)
BASOPHILS NFR BLD AUTO: 0.2 %
BILIRUB SERPL-MCNC: 1.1 MG/DL (ref 0–1.2)
BNP SERPL-MCNC: 280 PG/ML (ref 0–99)
BODY TEMPERATURE: 37 DEGREES CELSIUS
BUN SERPL-MCNC: 15 MG/DL (ref 6–23)
CA-I BLDV-SCNC: 1.1 MMOL/L (ref 1.1–1.33)
CALCIUM SERPL-MCNC: 8.1 MG/DL (ref 8.6–10.3)
CARDIAC TROPONIN I PNL SERPL HS: 21 NG/L (ref 0–20)
CHLORIDE BLDV-SCNC: 109 MMOL/L (ref 98–107)
CHLORIDE SERPL-SCNC: 108 MMOL/L (ref 98–107)
CO2 SERPL-SCNC: 19 MMOL/L (ref 21–32)
CREAT SERPL-MCNC: 1.07 MG/DL (ref 0.5–1.3)
EGFRCR SERPLBLD CKD-EPI 2021: 68 ML/MIN/1.73M*2
EOSINOPHIL # BLD AUTO: 0.01 X10*3/UL (ref 0–0.4)
EOSINOPHIL NFR BLD AUTO: 0.1 %
ERYTHROCYTE [DISTWIDTH] IN BLOOD BY AUTOMATED COUNT: 14.7 % (ref 11.5–14.5)
GLUCOSE BLDV-MCNC: 245 MG/DL (ref 74–99)
GLUCOSE SERPL-MCNC: 238 MG/DL (ref 74–99)
HCO3 BLDV-SCNC: 19.2 MMOL/L (ref 22–26)
HCT VFR BLD AUTO: 32.2 % (ref 41–52)
HCT VFR BLD EST: 32 % (ref 41–52)
HGB BLD-MCNC: 10.3 G/DL (ref 13.5–17.5)
HGB BLDV-MCNC: 10.7 G/DL (ref 13.5–17.5)
IMM GRANULOCYTES # BLD AUTO: 0.08 X10*3/UL (ref 0–0.5)
IMM GRANULOCYTES NFR BLD AUTO: 0.8 % (ref 0–0.9)
INHALED O2 CONCENTRATION: 21 %
LACTATE BLDV-SCNC: 2.6 MMOL/L (ref 0.4–2)
LIPASE SERPL-CCNC: 18 U/L (ref 9–82)
LYMPHOCYTES # BLD AUTO: 0.38 X10*3/UL (ref 0.8–3)
LYMPHOCYTES NFR BLD AUTO: 3.8 %
MAGNESIUM SERPL-MCNC: 1.46 MG/DL (ref 1.6–2.4)
MCH RBC QN AUTO: 32.9 PG (ref 26–34)
MCHC RBC AUTO-ENTMCNC: 32 G/DL (ref 32–36)
MCV RBC AUTO: 103 FL (ref 80–100)
MONOCYTES # BLD AUTO: 0.17 X10*3/UL (ref 0.05–0.8)
MONOCYTES NFR BLD AUTO: 1.7 %
NEUTROPHILS # BLD AUTO: 9.27 X10*3/UL (ref 1.6–5.5)
NEUTROPHILS NFR BLD AUTO: 93.4 %
NRBC BLD-RTO: 0 /100 WBCS (ref 0–0)
OXYHGB MFR BLDV: 86.3 % (ref 45–75)
PCO2 BLDV: 27 MM HG (ref 41–51)
PH BLDV: 7.46 PH (ref 7.33–7.43)
PLATELET # BLD AUTO: 173 X10*3/UL (ref 150–450)
PO2 BLDV: 55 MM HG (ref 35–45)
POTASSIUM BLDV-SCNC: 3.9 MMOL/L (ref 3.5–5.3)
POTASSIUM SERPL-SCNC: 3.8 MMOL/L (ref 3.5–5.3)
PROT SERPL-MCNC: 5.3 G/DL (ref 6.4–8.2)
RBC # BLD AUTO: 3.13 X10*6/UL (ref 4.5–5.9)
SAO2 % BLDV: 88 % (ref 45–75)
SODIUM BLDV-SCNC: 137 MMOL/L (ref 136–145)
SODIUM SERPL-SCNC: 139 MMOL/L (ref 136–145)
WBC # BLD AUTO: 9.9 X10*3/UL (ref 4.4–11.3)

## 2025-08-04 PROCEDURE — 84484 ASSAY OF TROPONIN QUANT: CPT | Performed by: EMERGENCY MEDICINE

## 2025-08-04 PROCEDURE — 84132 ASSAY OF SERUM POTASSIUM: CPT | Performed by: EMERGENCY MEDICINE

## 2025-08-04 PROCEDURE — 71045 X-RAY EXAM CHEST 1 VIEW: CPT | Performed by: SURGERY

## 2025-08-04 PROCEDURE — 93005 ELECTROCARDIOGRAM TRACING: CPT

## 2025-08-04 PROCEDURE — 36415 COLL VENOUS BLD VENIPUNCTURE: CPT | Performed by: EMERGENCY MEDICINE

## 2025-08-04 PROCEDURE — 2500000004 HC RX 250 GENERAL PHARMACY W/ HCPCS (ALT 636 FOR OP/ED): Performed by: EMERGENCY MEDICINE

## 2025-08-04 PROCEDURE — 83690 ASSAY OF LIPASE: CPT | Performed by: EMERGENCY MEDICINE

## 2025-08-04 PROCEDURE — 73140 X-RAY EXAM OF FINGER(S): CPT | Mod: RT

## 2025-08-04 PROCEDURE — 83880 ASSAY OF NATRIURETIC PEPTIDE: CPT | Performed by: EMERGENCY MEDICINE

## 2025-08-04 PROCEDURE — 83735 ASSAY OF MAGNESIUM: CPT | Performed by: EMERGENCY MEDICINE

## 2025-08-04 PROCEDURE — 74177 CT ABD & PELVIS W/CONTRAST: CPT | Performed by: SURGERY

## 2025-08-04 PROCEDURE — 71045 X-RAY EXAM CHEST 1 VIEW: CPT

## 2025-08-04 PROCEDURE — 73140 X-RAY EXAM OF FINGER(S): CPT | Mod: RIGHT SIDE | Performed by: RADIOLOGY

## 2025-08-04 PROCEDURE — 71275 CT ANGIOGRAPHY CHEST: CPT | Performed by: SURGERY

## 2025-08-04 PROCEDURE — 2550000001 HC RX 255 CONTRASTS: Performed by: EMERGENCY MEDICINE

## 2025-08-04 PROCEDURE — 74177 CT ABD & PELVIS W/CONTRAST: CPT

## 2025-08-04 PROCEDURE — 99285 EMERGENCY DEPT VISIT HI MDM: CPT | Mod: 25 | Performed by: EMERGENCY MEDICINE

## 2025-08-04 PROCEDURE — 71275 CT ANGIOGRAPHY CHEST: CPT

## 2025-08-04 PROCEDURE — 85025 COMPLETE CBC W/AUTO DIFF WBC: CPT | Performed by: EMERGENCY MEDICINE

## 2025-08-04 RX ORDER — ONDANSETRON HYDROCHLORIDE 2 MG/ML
4 INJECTION, SOLUTION INTRAVENOUS ONCE
Status: COMPLETED | OUTPATIENT
Start: 2025-08-04 | End: 2025-08-04

## 2025-08-04 RX ADMIN — ONDANSETRON 4 MG: 2 INJECTION INTRAMUSCULAR; INTRAVENOUS at 20:33

## 2025-08-04 RX ADMIN — IOHEXOL 75 ML: 350 INJECTION, SOLUTION INTRAVENOUS at 23:30

## 2025-08-04 ASSESSMENT — PAIN SCALES - GENERAL
PAINLEVEL_OUTOF10: 0 - NO PAIN
PAINLEVEL_OUTOF10: 0 - NO PAIN

## 2025-08-04 ASSESSMENT — PAIN - FUNCTIONAL ASSESSMENT: PAIN_FUNCTIONAL_ASSESSMENT: 0-10

## 2025-08-05 ENCOUNTER — APPOINTMENT (OUTPATIENT)
Dept: CARDIOLOGY | Facility: HOSPITAL | Age: 85
End: 2025-08-05
Payer: MEDICARE

## 2025-08-05 PROBLEM — J18.9 PNEUMONIA DUE TO INFECTIOUS ORGANISM: Status: ACTIVE | Noted: 2025-08-05

## 2025-08-05 PROBLEM — E83.42 HYPOMAGNESEMIA: Status: ACTIVE | Noted: 2025-08-05

## 2025-08-05 PROBLEM — J96.01 ACUTE RESPIRATORY FAILURE WITH HYPOXIA: Status: ACTIVE | Noted: 2025-08-05

## 2025-08-05 PROBLEM — I48.91 ATRIAL FIBRILLATION WITH RAPID VENTRICULAR RESPONSE (MULTI): Status: ACTIVE | Noted: 2025-08-05

## 2025-08-05 LAB
ANION GAP SERPL CALC-SCNC: 16 MMOL/L (ref 10–20)
BUN SERPL-MCNC: 14 MG/DL (ref 6–23)
CALCIUM SERPL-MCNC: 8 MG/DL (ref 8.6–10.3)
CARDIAC TROPONIN I PNL SERPL HS: 33 NG/L (ref 0–20)
CHLORIDE SERPL-SCNC: 106 MMOL/L (ref 98–107)
CO2 SERPL-SCNC: 23 MMOL/L (ref 21–32)
CREAT SERPL-MCNC: 1 MG/DL (ref 0.5–1.3)
EGFRCR SERPLBLD CKD-EPI 2021: 74 ML/MIN/1.73M*2
ERYTHROCYTE [DISTWIDTH] IN BLOOD BY AUTOMATED COUNT: 14.9 % (ref 11.5–14.5)
GLUCOSE BLD MANUAL STRIP-MCNC: 164 MG/DL (ref 74–99)
GLUCOSE BLD MANUAL STRIP-MCNC: 168 MG/DL (ref 74–99)
GLUCOSE BLD MANUAL STRIP-MCNC: 189 MG/DL (ref 74–99)
GLUCOSE BLD MANUAL STRIP-MCNC: 195 MG/DL (ref 74–99)
GLUCOSE BLD MANUAL STRIP-MCNC: 220 MG/DL (ref 74–99)
GLUCOSE BLD MANUAL STRIP-MCNC: 228 MG/DL (ref 74–99)
GLUCOSE SERPL-MCNC: 179 MG/DL (ref 74–99)
HCT VFR BLD AUTO: 32.5 % (ref 41–52)
HGB BLD-MCNC: 10.3 G/DL (ref 13.5–17.5)
MAGNESIUM SERPL-MCNC: 2.1 MG/DL (ref 1.6–2.4)
MCH RBC QN AUTO: 33.1 PG (ref 26–34)
MCHC RBC AUTO-ENTMCNC: 31.7 G/DL (ref 32–36)
MCV RBC AUTO: 105 FL (ref 80–100)
MRSA DNA SPEC QL NAA+PROBE: NOT DETECTED
NRBC BLD-RTO: 0 /100 WBCS (ref 0–0)
PLATELET # BLD AUTO: 147 X10*3/UL (ref 150–450)
POTASSIUM SERPL-SCNC: 3.9 MMOL/L (ref 3.5–5.3)
PROCALCITONIN SERPL-MCNC: 9.56 NG/ML
RBC # BLD AUTO: 3.11 X10*6/UL (ref 4.5–5.9)
SODIUM SERPL-SCNC: 141 MMOL/L (ref 136–145)
WBC # BLD AUTO: 13.4 X10*3/UL (ref 4.4–11.3)

## 2025-08-05 PROCEDURE — 1200000002 HC GENERAL ROOM WITH TELEMETRY DAILY

## 2025-08-05 PROCEDURE — 87641 MR-STAPH DNA AMP PROBE: CPT | Performed by: EMERGENCY MEDICINE

## 2025-08-05 PROCEDURE — 82947 ASSAY GLUCOSE BLOOD QUANT: CPT

## 2025-08-05 PROCEDURE — 85027 COMPLETE CBC AUTOMATED: CPT

## 2025-08-05 PROCEDURE — 97161 PT EVAL LOW COMPLEX 20 MIN: CPT | Mod: GP

## 2025-08-05 PROCEDURE — 2500000001 HC RX 250 WO HCPCS SELF ADMINISTERED DRUGS (ALT 637 FOR MEDICARE OP)

## 2025-08-05 PROCEDURE — 2500000004 HC RX 250 GENERAL PHARMACY W/ HCPCS (ALT 636 FOR OP/ED)

## 2025-08-05 PROCEDURE — 36415 COLL VENOUS BLD VENIPUNCTURE: CPT

## 2025-08-05 PROCEDURE — 80048 BASIC METABOLIC PNL TOTAL CA: CPT

## 2025-08-05 PROCEDURE — 99223 1ST HOSP IP/OBS HIGH 75: CPT

## 2025-08-05 PROCEDURE — 2500000002 HC RX 250 W HCPCS SELF ADMINISTERED DRUGS (ALT 637 FOR MEDICARE OP, ALT 636 FOR OP/ED)

## 2025-08-05 PROCEDURE — 87449 NOS EACH ORGANISM AG IA: CPT | Mod: PARLAB

## 2025-08-05 PROCEDURE — 99223 1ST HOSP IP/OBS HIGH 75: CPT | Performed by: STUDENT IN AN ORGANIZED HEALTH CARE EDUCATION/TRAINING PROGRAM

## 2025-08-05 PROCEDURE — 96365 THER/PROPH/DIAG IV INF INIT: CPT

## 2025-08-05 PROCEDURE — 2500000004 HC RX 250 GENERAL PHARMACY W/ HCPCS (ALT 636 FOR OP/ED): Performed by: STUDENT IN AN ORGANIZED HEALTH CARE EDUCATION/TRAINING PROGRAM

## 2025-08-05 PROCEDURE — 2500000002 HC RX 250 W HCPCS SELF ADMINISTERED DRUGS (ALT 637 FOR MEDICARE OP, ALT 636 FOR OP/ED): Performed by: EMERGENCY MEDICINE

## 2025-08-05 PROCEDURE — G0316 PR PROLONGED HOSPITAL INPATIENT OR OBSERVATION CARE EVALUATION AND MANAGEMENT SERVICE(S) BEYOND THE TOTAL TIME FOR THE PRIMARY SERVICE (WHEN THE PRIMARY SERVICE HAS BEEN SELECTED USING TIME: HCPCS

## 2025-08-05 PROCEDURE — 93005 ELECTROCARDIOGRAM TRACING: CPT

## 2025-08-05 PROCEDURE — 2500000004 HC RX 250 GENERAL PHARMACY W/ HCPCS (ALT 636 FOR OP/ED): Performed by: EMERGENCY MEDICINE

## 2025-08-05 PROCEDURE — 84145 PROCALCITONIN (PCT): CPT | Mod: PARLAB

## 2025-08-05 PROCEDURE — 97165 OT EVAL LOW COMPLEX 30 MIN: CPT | Mod: GO

## 2025-08-05 PROCEDURE — 2500000005 HC RX 250 GENERAL PHARMACY W/O HCPCS: Performed by: NURSE PRACTITIONER

## 2025-08-05 PROCEDURE — 94640 AIRWAY INHALATION TREATMENT: CPT

## 2025-08-05 PROCEDURE — 83735 ASSAY OF MAGNESIUM: CPT

## 2025-08-05 PROCEDURE — 2500000001 HC RX 250 WO HCPCS SELF ADMINISTERED DRUGS (ALT 637 FOR MEDICARE OP): Performed by: EMERGENCY MEDICINE

## 2025-08-05 PROCEDURE — 96375 TX/PRO/DX INJ NEW DRUG ADDON: CPT

## 2025-08-05 PROCEDURE — 87899 AGENT NOS ASSAY W/OPTIC: CPT | Mod: PARLAB

## 2025-08-05 RX ORDER — DIGOXIN 0.25 MG/ML
500 INJECTION INTRAMUSCULAR; INTRAVENOUS ONCE
Status: DISCONTINUED | OUTPATIENT
Start: 2025-08-05 | End: 2025-08-05

## 2025-08-05 RX ORDER — DIGOXIN 0.25 MG/ML
250 INJECTION INTRAMUSCULAR; INTRAVENOUS ONCE
Status: DISCONTINUED | OUTPATIENT
Start: 2025-08-05 | End: 2025-08-05

## 2025-08-05 RX ORDER — INSULIN LISPRO 100 [IU]/ML
0-15 INJECTION, SOLUTION INTRAVENOUS; SUBCUTANEOUS
Status: DISCONTINUED | OUTPATIENT
Start: 2025-08-05 | End: 2025-08-06 | Stop reason: HOSPADM

## 2025-08-05 RX ORDER — DIGOXIN 0.25 MG/ML
250 INJECTION INTRAMUSCULAR; INTRAVENOUS ONCE
Status: COMPLETED | OUTPATIENT
Start: 2025-08-05 | End: 2025-08-05

## 2025-08-05 RX ORDER — IPRATROPIUM BROMIDE AND ALBUTEROL SULFATE 2.5; .5 MG/3ML; MG/3ML
3 SOLUTION RESPIRATORY (INHALATION)
Status: DISCONTINUED | OUTPATIENT
Start: 2025-08-05 | End: 2025-08-06 | Stop reason: HOSPADM

## 2025-08-05 RX ORDER — IPRATROPIUM BROMIDE AND ALBUTEROL SULFATE 2.5; .5 MG/3ML; MG/3ML
3 SOLUTION RESPIRATORY (INHALATION)
Status: DISCONTINUED | OUTPATIENT
Start: 2025-08-05 | End: 2025-08-05

## 2025-08-05 RX ORDER — DEXTROSE 50 % IN WATER (D50W) INTRAVENOUS SYRINGE
25
Status: DISCONTINUED | OUTPATIENT
Start: 2025-08-05 | End: 2025-08-05 | Stop reason: SDUPTHER

## 2025-08-05 RX ORDER — ONDANSETRON 4 MG/1
4 TABLET, FILM COATED ORAL EVERY 8 HOURS PRN
Status: DISCONTINUED | OUTPATIENT
Start: 2025-08-05 | End: 2025-08-06 | Stop reason: HOSPADM

## 2025-08-05 RX ORDER — LIDOCAINE 560 MG/1
1 PATCH PERCUTANEOUS; TOPICAL; TRANSDERMAL DAILY
Status: DISCONTINUED | OUTPATIENT
Start: 2025-08-05 | End: 2025-08-06 | Stop reason: HOSPADM

## 2025-08-05 RX ORDER — ACETAMINOPHEN 160 MG/5ML
650 SOLUTION ORAL EVERY 4 HOURS PRN
Status: DISCONTINUED | OUTPATIENT
Start: 2025-08-05 | End: 2025-08-06 | Stop reason: HOSPADM

## 2025-08-05 RX ORDER — INSULIN GLARGINE 100 [IU]/ML
20 INJECTION, SOLUTION SUBCUTANEOUS NIGHTLY
Status: DISCONTINUED | OUTPATIENT
Start: 2025-08-05 | End: 2025-08-06 | Stop reason: HOSPADM

## 2025-08-05 RX ORDER — ALBUTEROL SULFATE 0.83 MG/ML
3 SOLUTION RESPIRATORY (INHALATION) EVERY 4 HOURS PRN
Status: DISCONTINUED | OUTPATIENT
Start: 2025-08-05 | End: 2025-08-05

## 2025-08-05 RX ORDER — VANCOMYCIN HYDROCHLORIDE 1.5 G/300ML
1500 INJECTION, SOLUTION INTRAVITREAL ONCE
Status: COMPLETED | OUTPATIENT
Start: 2025-08-05 | End: 2025-08-05

## 2025-08-05 RX ORDER — ACETAMINOPHEN 325 MG/1
650 TABLET ORAL EVERY 4 HOURS PRN
Status: DISCONTINUED | OUTPATIENT
Start: 2025-08-05 | End: 2025-08-06 | Stop reason: HOSPADM

## 2025-08-05 RX ORDER — FUROSEMIDE 10 MG/ML
20 INJECTION INTRAMUSCULAR; INTRAVENOUS ONCE
Status: COMPLETED | OUTPATIENT
Start: 2025-08-05 | End: 2025-08-05

## 2025-08-05 RX ORDER — SPIRONOLACTONE 25 MG/1
25 TABLET ORAL DAILY
Status: DISCONTINUED | OUTPATIENT
Start: 2025-08-05 | End: 2025-08-06 | Stop reason: HOSPADM

## 2025-08-05 RX ORDER — DEXTROSE 50 % IN WATER (D50W) INTRAVENOUS SYRINGE
12.5
Status: DISCONTINUED | OUTPATIENT
Start: 2025-08-05 | End: 2025-08-06 | Stop reason: HOSPADM

## 2025-08-05 RX ORDER — ACETAMINOPHEN 325 MG/1
650 TABLET ORAL ONCE
Status: COMPLETED | OUTPATIENT
Start: 2025-08-05 | End: 2025-08-05

## 2025-08-05 RX ORDER — DEXTROSE 50 % IN WATER (D50W) INTRAVENOUS SYRINGE
25
Status: DISCONTINUED | OUTPATIENT
Start: 2025-08-05 | End: 2025-08-06 | Stop reason: HOSPADM

## 2025-08-05 RX ORDER — METOPROLOL TARTRATE 25 MG/1
25 TABLET, FILM COATED ORAL 2 TIMES DAILY
Status: DISCONTINUED | OUTPATIENT
Start: 2025-08-05 | End: 2025-08-06 | Stop reason: HOSPADM

## 2025-08-05 RX ORDER — ALBUTEROL SULFATE 0.83 MG/ML
3 SOLUTION RESPIRATORY (INHALATION) EVERY 2 HOUR PRN
Status: DISCONTINUED | OUTPATIENT
Start: 2025-08-05 | End: 2025-08-06 | Stop reason: HOSPADM

## 2025-08-05 RX ORDER — ACETAMINOPHEN 650 MG/1
650 SUPPOSITORY RECTAL EVERY 4 HOURS PRN
Status: DISCONTINUED | OUTPATIENT
Start: 2025-08-05 | End: 2025-08-06 | Stop reason: HOSPADM

## 2025-08-05 RX ORDER — DIGOXIN 0.25 MG/ML
500 INJECTION INTRAMUSCULAR; INTRAVENOUS ONCE
Status: COMPLETED | OUTPATIENT
Start: 2025-08-05 | End: 2025-08-05

## 2025-08-05 RX ORDER — MAGNESIUM SULFATE HEPTAHYDRATE 40 MG/ML
2 INJECTION, SOLUTION INTRAVENOUS ONCE
Status: COMPLETED | OUTPATIENT
Start: 2025-08-05 | End: 2025-08-05

## 2025-08-05 RX ORDER — DEXTROSE 50 % IN WATER (D50W) INTRAVENOUS SYRINGE
12.5
Status: DISCONTINUED | OUTPATIENT
Start: 2025-08-05 | End: 2025-08-05 | Stop reason: SDUPTHER

## 2025-08-05 RX ORDER — FUROSEMIDE 10 MG/ML
20 INJECTION INTRAMUSCULAR; INTRAVENOUS
Status: DISCONTINUED | OUTPATIENT
Start: 2025-08-05 | End: 2025-08-06

## 2025-08-05 RX ORDER — ONDANSETRON HYDROCHLORIDE 2 MG/ML
4 INJECTION, SOLUTION INTRAVENOUS EVERY 8 HOURS PRN
Status: DISCONTINUED | OUTPATIENT
Start: 2025-08-05 | End: 2025-08-06 | Stop reason: HOSPADM

## 2025-08-05 RX ORDER — VANCOMYCIN HYDROCHLORIDE 1.5 G/300ML
1500 INJECTION, SOLUTION INTRAVITREAL EVERY 24 HOURS
Status: DISCONTINUED | OUTPATIENT
Start: 2025-08-06 | End: 2025-08-05

## 2025-08-05 RX ORDER — VANCOMYCIN HYDROCHLORIDE 1 G/20ML
INJECTION, POWDER, LYOPHILIZED, FOR SOLUTION INTRAVENOUS DAILY PRN
Status: DISCONTINUED | OUTPATIENT
Start: 2025-08-05 | End: 2025-08-05

## 2025-08-05 RX ORDER — INSULIN LISPRO 100 [IU]/ML
0-10 INJECTION, SOLUTION INTRAVENOUS; SUBCUTANEOUS
Status: DISCONTINUED | OUTPATIENT
Start: 2025-08-05 | End: 2025-08-05

## 2025-08-05 RX ORDER — DILTIAZEM HYDROCHLORIDE 240 MG/1
240 CAPSULE, COATED, EXTENDED RELEASE ORAL 2 TIMES DAILY
Status: DISCONTINUED | OUTPATIENT
Start: 2025-08-05 | End: 2025-08-06 | Stop reason: HOSPADM

## 2025-08-05 RX ADMIN — APIXABAN 5 MG: 5 TABLET, FILM COATED ORAL at 21:06

## 2025-08-05 RX ADMIN — PIPERACILLIN SODIUM AND TAZOBACTAM SODIUM 3.38 G: 3; .375 INJECTION, SOLUTION INTRAVENOUS at 06:24

## 2025-08-05 RX ADMIN — Medication: at 07:01

## 2025-08-05 RX ADMIN — APIXABAN 5 MG: 5 TABLET, FILM COATED ORAL at 09:46

## 2025-08-05 RX ADMIN — INSULIN GLARGINE 20 UNITS: 100 INJECTION, SOLUTION SUBCUTANEOUS at 03:55

## 2025-08-05 RX ADMIN — INSULIN LISPRO 3 UNITS: 100 INJECTION, SOLUTION INTRAVENOUS; SUBCUTANEOUS at 09:55

## 2025-08-05 RX ADMIN — IPRATROPIUM BROMIDE AND ALBUTEROL SULFATE 3 ML: .5; 3 SOLUTION RESPIRATORY (INHALATION) at 13:49

## 2025-08-05 RX ADMIN — METOPROLOL TARTRATE 25 MG: 25 TABLET, FILM COATED ORAL at 09:46

## 2025-08-05 RX ADMIN — DIGOXIN 250 MCG: 0.25 INJECTION INTRAMUSCULAR; INTRAVENOUS at 17:10

## 2025-08-05 RX ADMIN — PIPERACILLIN SODIUM AND TAZOBACTAM SODIUM 3.38 G: 3; .375 INJECTION, SOLUTION INTRAVENOUS at 13:23

## 2025-08-05 RX ADMIN — DILTIAZEM HYDROCHLORIDE 240 MG: 240 CAPSULE, COATED, EXTENDED RELEASE ORAL at 09:47

## 2025-08-05 RX ADMIN — DIGOXIN 250 MCG: 0.25 INJECTION INTRAMUSCULAR; INTRAVENOUS at 10:35

## 2025-08-05 RX ADMIN — AZITHROMYCIN MONOHYDRATE 500 MG: 500 INJECTION, POWDER, LYOPHILIZED, FOR SOLUTION INTRAVENOUS at 02:11

## 2025-08-05 RX ADMIN — FUROSEMIDE 20 MG: 10 INJECTION, SOLUTION INTRAMUSCULAR; INTRAVENOUS at 08:19

## 2025-08-05 RX ADMIN — VANCOMYCIN HYDROCHLORIDE 1.5 G: 1.5 INJECTION, SOLUTION INTRAVITREAL at 03:24

## 2025-08-05 RX ADMIN — PIPERACILLIN SODIUM AND TAZOBACTAM SODIUM 4.5 G: 4; .5 INJECTION, SOLUTION INTRAVENOUS at 00:46

## 2025-08-05 RX ADMIN — DILTIAZEM HYDROCHLORIDE 240 MG: 240 CAPSULE, COATED, EXTENDED RELEASE ORAL at 02:21

## 2025-08-05 RX ADMIN — INSULIN GLARGINE 20 UNITS: 100 INJECTION, SOLUTION SUBCUTANEOUS at 21:05

## 2025-08-05 RX ADMIN — ACETAMINOPHEN 650 MG: 325 TABLET ORAL at 01:00

## 2025-08-05 RX ADMIN — SPIRONOLACTONE 25 MG: 25 TABLET, FILM COATED ORAL at 16:10

## 2025-08-05 RX ADMIN — FUROSEMIDE 20 MG: 10 INJECTION, SOLUTION INTRAMUSCULAR; INTRAVENOUS at 00:46

## 2025-08-05 RX ADMIN — IPRATROPIUM BROMIDE AND ALBUTEROL SULFATE 3 ML: .5; 3 SOLUTION RESPIRATORY (INHALATION) at 07:01

## 2025-08-05 RX ADMIN — APIXABAN 5 MG: 5 TABLET, FILM COATED ORAL at 02:21

## 2025-08-05 RX ADMIN — METOPROLOL TARTRATE 25 MG: 25 TABLET, FILM COATED ORAL at 21:06

## 2025-08-05 RX ADMIN — LIDOCAINE 1 PATCH: 4 PATCH TOPICAL at 13:35

## 2025-08-05 RX ADMIN — METOPROLOL TARTRATE 25 MG: 25 TABLET, FILM COATED ORAL at 02:21

## 2025-08-05 RX ADMIN — INSULIN LISPRO 6 UNITS: 100 INJECTION, SOLUTION INTRAVENOUS; SUBCUTANEOUS at 13:24

## 2025-08-05 RX ADMIN — DILTIAZEM HYDROCHLORIDE 240 MG: 240 CAPSULE, COATED, EXTENDED RELEASE ORAL at 21:06

## 2025-08-05 RX ADMIN — DIGOXIN 500 MCG: 0.25 INJECTION INTRAMUSCULAR; INTRAVENOUS at 03:36

## 2025-08-05 RX ADMIN — PIPERACILLIN SODIUM AND TAZOBACTAM SODIUM 3.38 G: 3; .375 INJECTION, SOLUTION INTRAVENOUS at 17:44

## 2025-08-05 RX ADMIN — MAGNESIUM SULFATE HEPTAHYDRATE 2 G: 40 INJECTION, SOLUTION INTRAVENOUS at 02:20

## 2025-08-05 RX ADMIN — INSULIN LISPRO 6 UNITS: 100 INJECTION, SOLUTION INTRAVENOUS; SUBCUTANEOUS at 16:50

## 2025-08-05 RX ADMIN — FUROSEMIDE 20 MG: 10 INJECTION, SOLUTION INTRAMUSCULAR; INTRAVENOUS at 17:09

## 2025-08-05 RX ADMIN — PSYLLIUM HUSK 1 PACKET: 3.4 POWDER ORAL at 09:46

## 2025-08-05 SDOH — SOCIAL STABILITY: SOCIAL INSECURITY: WITHIN THE LAST YEAR, HAVE YOU BEEN HUMILIATED OR EMOTIONALLY ABUSED IN OTHER WAYS BY YOUR PARTNER OR EX-PARTNER?: NO

## 2025-08-05 SDOH — ECONOMIC STABILITY: FOOD INSECURITY: WITHIN THE PAST 12 MONTHS, THE FOOD YOU BOUGHT JUST DIDN'T LAST AND YOU DIDN'T HAVE MONEY TO GET MORE.: NEVER TRUE

## 2025-08-05 SDOH — ECONOMIC STABILITY: INCOME INSECURITY: IN THE PAST 12 MONTHS HAS THE ELECTRIC, GAS, OIL, OR WATER COMPANY THREATENED TO SHUT OFF SERVICES IN YOUR HOME?: NO

## 2025-08-05 SDOH — SOCIAL STABILITY: SOCIAL INSECURITY: WITHIN THE LAST YEAR, HAVE YOU BEEN AFRAID OF YOUR PARTNER OR EX-PARTNER?: NO

## 2025-08-05 SDOH — SOCIAL STABILITY: SOCIAL INSECURITY: HAS ANYONE EVER THREATENED TO HURT YOUR FAMILY OR YOUR PETS?: NO

## 2025-08-05 SDOH — ECONOMIC STABILITY: FOOD INSECURITY: WITHIN THE PAST 12 MONTHS, YOU WORRIED THAT YOUR FOOD WOULD RUN OUT BEFORE YOU GOT THE MONEY TO BUY MORE.: NEVER TRUE

## 2025-08-05 SDOH — SOCIAL STABILITY: SOCIAL INSECURITY: HAVE YOU HAD THOUGHTS OF HARMING ANYONE ELSE?: NO

## 2025-08-05 SDOH — SOCIAL STABILITY: SOCIAL INSECURITY: DO YOU FEEL UNSAFE GOING BACK TO THE PLACE WHERE YOU ARE LIVING?: NO

## 2025-08-05 SDOH — SOCIAL STABILITY: SOCIAL INSECURITY: ARE YOU OR HAVE YOU BEEN THREATENED OR ABUSED PHYSICALLY, EMOTIONALLY, OR SEXUALLY BY ANYONE?: NO

## 2025-08-05 SDOH — SOCIAL STABILITY: SOCIAL INSECURITY: DOES ANYONE TRY TO KEEP YOU FROM HAVING/CONTACTING OTHER FRIENDS OR DOING THINGS OUTSIDE YOUR HOME?: NO

## 2025-08-05 SDOH — SOCIAL STABILITY: SOCIAL INSECURITY: WERE YOU ABLE TO COMPLETE ALL THE BEHAVIORAL HEALTH SCREENINGS?: YES

## 2025-08-05 SDOH — SOCIAL STABILITY: SOCIAL INSECURITY: DO YOU FEEL ANYONE HAS EXPLOITED OR TAKEN ADVANTAGE OF YOU FINANCIALLY OR OF YOUR PERSONAL PROPERTY?: NO

## 2025-08-05 SDOH — SOCIAL STABILITY: SOCIAL INSECURITY: ARE THERE ANY APPARENT SIGNS OF INJURIES/BEHAVIORS THAT COULD BE RELATED TO ABUSE/NEGLECT?: NO

## 2025-08-05 ASSESSMENT — COGNITIVE AND FUNCTIONAL STATUS - GENERAL
MOVING FROM LYING ON BACK TO SITTING ON SIDE OF FLAT BED WITH BEDRAILS: A LOT
DAILY ACTIVITIY SCORE: 19
DAILY ACTIVITIY SCORE: 12
DRESSING REGULAR UPPER BODY CLOTHING: A LITTLE
TOILETING: A LITTLE
HELP NEEDED FOR BATHING: TOTAL
MOVING TO AND FROM BED TO CHAIR: A LOT
WALKING IN HOSPITAL ROOM: A LOT
MOBILITY SCORE: 15
TURNING FROM BACK TO SIDE WHILE IN FLAT BAD: A LOT
DRESSING REGULAR LOWER BODY CLOTHING: A LITTLE
CLIMB 3 TO 5 STEPS WITH RAILING: A LOT
TURNING FROM BACK TO SIDE WHILE IN FLAT BAD: A LITTLE
STANDING UP FROM CHAIR USING ARMS: A LOT
PERSONAL GROOMING: A LITTLE
WALKING IN HOSPITAL ROOM: A LOT
PATIENT BASELINE BEDBOUND: NO
HELP NEEDED FOR BATHING: A LITTLE
CLIMB 3 TO 5 STEPS WITH RAILING: TOTAL
MOVING FROM LYING ON BACK TO SITTING ON SIDE OF FLAT BED WITH BEDRAILS: A LITTLE
MOVING TO AND FROM BED TO CHAIR: A LITTLE
PERSONAL GROOMING: A LITTLE
DRESSING REGULAR UPPER BODY CLOTHING: A LOT
TOILETING: TOTAL
DRESSING REGULAR LOWER BODY CLOTHING: TOTAL
MOBILITY SCORE: 11
STANDING UP FROM CHAIR USING ARMS: A LOT

## 2025-08-05 ASSESSMENT — LIFESTYLE VARIABLES
HOW OFTEN DO YOU HAVE A DRINK CONTAINING ALCOHOL: NEVER
AUDIT-C TOTAL SCORE: 0
HOW MANY STANDARD DRINKS CONTAINING ALCOHOL DO YOU HAVE ON A TYPICAL DAY: PATIENT DOES NOT DRINK
AUDIT-C TOTAL SCORE: 0
SKIP TO QUESTIONS 9-10: 1
HOW OFTEN DO YOU HAVE 6 OR MORE DRINKS ON ONE OCCASION: NEVER

## 2025-08-05 ASSESSMENT — ACTIVITIES OF DAILY LIVING (ADL)
HEARING - LEFT EAR: HEARING AID
JUDGMENT_ADEQUATE_SAFELY_COMPLETE_DAILY_ACTIVITIES: YES
FEEDING YOURSELF: INDEPENDENT
DRESSING YOURSELF: NEEDS ASSISTANCE
BATHING: NEEDS ASSISTANCE
WALKS IN HOME: NEEDS ASSISTANCE
PATIENT'S MEMORY ADEQUATE TO SAFELY COMPLETE DAILY ACTIVITIES?: YES
GROOMING: NEEDS ASSISTANCE
ADEQUATE_TO_COMPLETE_ADL: YES
HEARING - RIGHT EAR: HEARING AID
TOILETING: NEEDS ASSISTANCE
LACK_OF_TRANSPORTATION: NO

## 2025-08-05 ASSESSMENT — PAIN - FUNCTIONAL ASSESSMENT
PAIN_FUNCTIONAL_ASSESSMENT: 0-10
PAIN_FUNCTIONAL_ASSESSMENT: 0-10

## 2025-08-05 ASSESSMENT — PATIENT HEALTH QUESTIONNAIRE - PHQ9
1. LITTLE INTEREST OR PLEASURE IN DOING THINGS: NOT AT ALL
SUM OF ALL RESPONSES TO PHQ9 QUESTIONS 1 & 2: 0
2. FEELING DOWN, DEPRESSED OR HOPELESS: NOT AT ALL

## 2025-08-05 ASSESSMENT — PAIN SCALES - GENERAL
PAINLEVEL_OUTOF10: 2
PAINLEVEL_OUTOF10: 0 - NO PAIN
PAINLEVEL_OUTOF10: 0 - NO PAIN

## 2025-08-05 NOTE — PROGRESS NOTES
Occupational Therapy    Evaluation    Patient Name: Dragan Calvin  MRN: [id number]  Department: [facility]  Room: [address] [place] [address]  Today's Date: 8/5/2025  Time Calculation  Start Time: 1005  Stop Time: 1022  Time Calculation (min): 17 min        Assessment:  OT Assessment: patient grossly MOD A x 2; would benefit from continued OT at a MOD intensity level to increase safety/independence with ADLs, transfers, and mobility  Prognosis: Fair  End of Session Communication: Bedside nurse  End of Session Patient Position: Bed, 2 rail up, Alarm on (call light in reach, all needs met)  OT Assessment Results: Decreased ADL status, Decreased upper extremity strength, Decreased endurance, Decreased functional mobility, Decreased gross motor control  Prognosis: Fair  Strengths: Rehab experience  Barriers to Participation: Comorbidities  Plan:  Treatment Interventions: ADL retraining, Functional transfer training, UE strengthening/ROM, Endurance training, Patient/family training, Compensatory technique education  OT Frequency: 3 times per week (during this acute inpatient hospitalization)  OT Discharge Recommendations: Moderate intensity level of continued care (Based on current functional status and rehab potential, patient is anticipated to tolerate and benefit from 5 or more days per week of skilled rehabilitative therapy after discharge from this acute inpatient hospitalization.)  OT - OK to Discharge: Yes (once medically appropriate to next level of care)  Treatment Interventions: ADL retraining, Functional transfer training, UE strengthening/ROM, Endurance training, Patient/family training, Compensatory technique education    Subjective   Current Problem:  1. Acute respiratory failure with hypoxia        2. Pneumonia due to infectious organism, unspecified laterality, unspecified part of lung        3. Acute on chronic heart failure with preserved ejection fraction  Transthoracic Echo Complete    Transthoracic Echo Complete        OT  Visit Info:  OT Received On: 08/05/25  General:  General  Reason for Referral: OT eval and tx; ADLs. dx; Acute respiratory failure with hypoxia, Pneumonia due to infectious organism, Atrial fibrillation with rapid ventricular response , Hypomagnesemia. Xray fingers right: widening of scapholunate ligament suggesting tear. CTA chest for PE: no evidence of acute PE. CT A&P: colonic diverticulosis  Referred By: Kevin  Past Medical History Relevant to Rehab: 84 y.o. male with history of paroxysmal A-fib, T2DM, heart failure, and presence of pacemaker presented to the ED from SNF for evaluation of worsening shortness of breath over the past 3-4 weeks and the shakes since yesterday.  States he was treated with Tylenol for the shakes.  Reports shortness of breath is with activity, denies it at rest or in supine.  Has required new oxygen requirement.  Does admit to being more deconditioned lately.  Denies associated myalgias, chest discomfort, cough, mucus production.  Reports increased leg swelling which are weeping, had them Ace wrapped at SNF yesterday.  Family states swelling has been worse in dependent positions, possibly improved with elevation.  Reports compliance with diuretic.  Denies dizziness/lightheadedness, palpitations, abdominal pain, diarrhea, constipation, hematochezia.  Co-Treatment: PT  Co-Treatment Reason: for safety and to maximize therapeutic performance  Prior to Session Communication: Bedside nurse  Patient Position Received: Bed, 2 rail up, Alarm on  General Comment: patient pleasant and cooperative to participate  Precautions:  Medical Precautions: Fall precautions (alarms, tele, aspiration precautions, OOB with assist)        Pain:  Pain Assessment  Pain Assessment:  (denies pain)    Objective   Cognition:  Overall Cognitive Status: Within Functional Limits           Home Living:  Type of Home:  (patient reports has been at [facility] for 2 weeks for rehab. working on ambulation with PT and ADLs with  OT. reports using WW. assist for ADLs/toileting from staff currently. reports 5 recent falls. presenting as a questionable historian)       ADL:  UE Dressing Assistance:  (MAX A to doff/subhash gown seated EOB)  LE Dressing Assistance:  (dependent assistance to subhash non skid socks at bed level)  Activity Tolerance:  Endurance: Decreased tolerance for upright activites  Bed Mobility/Transfers: Bed Mobility  Bed Mobility:  (completed supine to sit with MOD A x 1 and sit to supine with MOD A x 2)    Transfers  Transfer:  (completed STS with MOD A x 2 with WW)      Functional Mobility:  Functional Mobility  Functional Mobility Performed:  (completed lateral side steps with MOD A x 2 with WW)     Sensation:  Light Touch: No apparent deficits  Strength:  Strength Comments: BUE ROM/MMT WFL for patient age as seen through transfers/mobility this date        Outcome Measures:[facility] Daily Activity  Putting on and taking off regular lower body clothing: Total  Bathing (including washing, rinsing, drying): Total  Putting on and taking off regular upper body clothing: A lot  Toileting, which includes using toilet, bedpan or urinal: Total  Taking care of personal grooming such as brushing teeth: A little  Eating Meals: None  Daily Activity - Total Score: 12        Education Documentation  Body Mechanics, taught by Tamika Pratt OT at 8/5/2025  2:14 PM.  Learner: Patient  Readiness: Acceptance  Method: Explanation  Response: Verbalizes Understanding, Needs Reinforcement    ADL Training, taught by Tamika Pratt OT at 8/5/2025  2:14 PM.  Learner: Patient  Readiness: Acceptance  Method: Explanation  Response: Verbalizes Understanding, Needs Reinforcement    Education Comments  No comments found.        OP EDUCATION:       Goals:  Encounter Problems       Encounter Problems (Active)       OT Goals       STG- patient will complete LB dressing at MIN A with use of ae/ad/dme prn  (Progressing)       Start:  08/05/25    Expected End:   08/19/25            STG- patient will complete toileting at MIN A with use of ae/ad/dme prn  (Progressing)       Start:  08/05/25    Expected End:  08/19/25            STG- patient will complete transfers to/from bed, chair, commode at MIN A with use of ae/ad/dme prn  (Progressing)       Start:  08/05/25    Expected End:  08/19/25            STG- patient will complete simple mobility at MIN A with use of ae/ad/dme prn  (Progressing)       Start:  08/05/25    Expected End:  08/19/25            STG- patient will complete grooming with CGA with use of ae/ad/dme prn  (Progressing)       Start:  08/05/25    Expected End:  08/19/25

## 2025-08-05 NOTE — CONSULTS
"Inpatient consult to Cardiology  Consult performed by: Olinda Peña DO  Consult ordered by: Wilfredo Francois PA-C          CONSULT     SUBJECTIVE     Dragan Calvin is a 84 y.o. male presented to the ED with the chief complaint of Acute respiratory failure with hypoxia.  Patient has a significant PMHx of Medical History[1].  Cardiology consult was placed for possible new-onset heart failure.  Patient states that he was brought into the ED via EMS due to dyspnea.  He reports that he had some chills before being sent in with weeping of his BLE.  Patient was in atrial fibrillation with rapid ventricular response upon arrival.  In the ED, patient had a chest CT that revealed pneumonia vs CHF, so patient was given Lasix and started on antibiotics to cover both.  Patient states that he does not have a history of heart failure, just atrial fibrillation that he follows with Dr. Aldo cartagena.  BNP was 280.  Chest x-ray did not show any acute cardiopulmonary disease.      Patient was seen and examined at bedside and stated that he was feeling well.  He no longer had the chills since starting the antibiotic in the ED yesterday.  Patient denied dyspnea, increase in leg swelling, orthopnea, increase in cough and chest pain.      Past Medical History:  Medical History[2]    Past Surgical History:  Surgical History[3]     Family History:  Family History[4]     Social History:   reports that he has never smoked. He has never used smokeless tobacco. He reports that he does not drink alcohol and does not use drugs.    OBJECTIVE   Vitals  Vitals:    08/05/25 0522 08/05/25 0701 08/05/25 0737 08/05/25 0818   BP:   102/55 109/69   BP Location:   Right arm    Patient Position:   Lying    Pulse:   79 92   Resp:   16    Temp:   35.9 °C (96.6 °F)    TempSrc:   Temporal    SpO2:  94% 99%    Weight: 99.4 kg (219 lb 2.2 oz)      Height: 1.727 m (5' 8\")           Labs  Results for orders placed or performed during the hospital encounter of " 08/04/25 (from the past 24 hours)   ECG 12 lead   Result Value Ref Range    Ventricular Rate 131 BPM    QRS Duration 82 ms    QT Interval 324 ms    QTC Calculation(Bazett) 478 ms    R Axis 76 degrees    T Axis 2 degrees    QRS Count 21 beats    Q Onset 219 ms    T Offset 381 ms    QTC Fredericia 420 ms   CBC and Auto Differential   Result Value Ref Range    WBC 9.9 4.4 - 11.3 x10*3/uL    nRBC 0.0 0.0 - 0.0 /100 WBCs    RBC 3.13 (L) 4.50 - 5.90 x10*6/uL    Hemoglobin 10.3 (L) 13.5 - 17.5 g/dL    Hematocrit 32.2 (L) 41.0 - 52.0 %     (H) 80 - 100 fL    MCH 32.9 26.0 - 34.0 pg    MCHC 32.0 32.0 - 36.0 g/dL    RDW 14.7 (H) 11.5 - 14.5 %    Platelets 173 150 - 450 x10*3/uL    Neutrophils % 93.4 40.0 - 80.0 %    Immature Granulocytes %, Automated 0.8 0.0 - 0.9 %    Lymphocytes % 3.8 13.0 - 44.0 %    Monocytes % 1.7 2.0 - 10.0 %    Eosinophils % 0.1 0.0 - 6.0 %    Basophils % 0.2 0.0 - 2.0 %    Neutrophils Absolute 9.27 (H) 1.60 - 5.50 x10*3/uL    Immature Granulocytes Absolute, Automated 0.08 0.00 - 0.50 x10*3/uL    Lymphocytes Absolute 0.38 (L) 0.80 - 3.00 x10*3/uL    Monocytes Absolute 0.17 0.05 - 0.80 x10*3/uL    Eosinophils Absolute 0.01 0.00 - 0.40 x10*3/uL    Basophils Absolute 0.02 0.00 - 0.10 x10*3/uL   Comprehensive metabolic panel   Result Value Ref Range    Glucose 238 (H) 74 - 99 mg/dL    Sodium 139 136 - 145 mmol/L    Potassium 3.8 3.5 - 5.3 mmol/L    Chloride 108 (H) 98 - 107 mmol/L    Bicarbonate 19 (L) 21 - 32 mmol/L    Anion Gap 16 10 - 20 mmol/L    Urea Nitrogen 15 6 - 23 mg/dL    Creatinine 1.07 0.50 - 1.30 mg/dL    eGFR 68 >60 mL/min/1.73m*2    Calcium 8.1 (L) 8.6 - 10.3 mg/dL    Albumin 2.9 (L) 3.4 - 5.0 g/dL    Alkaline Phosphatase 103 33 - 136 U/L    Total Protein 5.3 (L) 6.4 - 8.2 g/dL    AST 12 9 - 39 U/L    Bilirubin, Total 1.1 0.0 - 1.2 mg/dL    ALT 14 10 - 52 U/L   Magnesium   Result Value Ref Range    Magnesium 1.46 (L) 1.60 - 2.40 mg/dL   B-Type Natriuretic Peptide   Result Value Ref  Range     (H) 0 - 99 pg/mL   BLOOD GAS VENOUS FULL PANEL   Result Value Ref Range    POCT pH, Venous 7.46 (H) 7.33 - 7.43 pH    POCT pCO2, Venous 27 (L) 41 - 51 mm Hg    POCT pO2, Venous 55 (H) 35 - 45 mm Hg    POCT SO2, Venous 88 (H) 45 - 75 %    POCT Oxy Hemoglobin, Venous 86.3 (H) 45.0 - 75.0 %    POCT Hematocrit Calculated, Venous 32.0 (L) 41.0 - 52.0 %    POCT Sodium, Venous 137 136 - 145 mmol/L    POCT Potassium, Venous 3.9 3.5 - 5.3 mmol/L    POCT Chloride, Venous 109 (H) 98 - 107 mmol/L    POCT Ionized Calicum, Venous 1.10 1.10 - 1.33 mmol/L    POCT Glucose, Venous 245 (H) 74 - 99 mg/dL    POCT Lactate, Venous 2.6 (H) 0.4 - 2.0 mmol/L    POCT Base Excess, Venous -3.6 (L) -2.0 - 3.0 mmol/L    POCT HCO3 Calculated, Venous 19.2 (L) 22.0 - 26.0 mmol/L    POCT Hemoglobin, Venous 10.7 (L) 13.5 - 17.5 g/dL    POCT Anion Gap, Venous 13.0 10.0 - 25.0 mmol/L    Patient Temperature 37.0 degrees Celsius    FiO2 21 %   Troponin I, High Sensitivity, Initial   Result Value Ref Range    Troponin I, High Sensitivity 21 (H) 0 - 20 ng/L   Lipase   Result Value Ref Range    Lipase 18 9 - 82 U/L   Troponin, High Sensitivity, 1 Hour   Result Value Ref Range    Troponin I, High Sensitivity 33 (H) 0 - 20 ng/L   POCT GLUCOSE   Result Value Ref Range    POCT Glucose 195 (H) 74 - 99 mg/dL   POCT GLUCOSE   Result Value Ref Range    POCT Glucose 164 (H) 74 - 99 mg/dL   Magnesium   Result Value Ref Range    Magnesium 2.10 1.60 - 2.40 mg/dL   Basic metabolic panel   Result Value Ref Range    Glucose 179 (H) 74 - 99 mg/dL    Sodium 141 136 - 145 mmol/L    Potassium 3.9 3.5 - 5.3 mmol/L    Chloride 106 98 - 107 mmol/L    Bicarbonate 23 21 - 32 mmol/L    Anion Gap 16 10 - 20 mmol/L    Urea Nitrogen 14 6 - 23 mg/dL    Creatinine 1.00 0.50 - 1.30 mg/dL    eGFR 74 >60 mL/min/1.73m*2    Calcium 8.0 (L) 8.6 - 10.3 mg/dL   CBC   Result Value Ref Range    WBC 13.4 (H) 4.4 - 11.3 x10*3/uL    nRBC 0.0 0.0 - 0.0 /100 WBCs    RBC 3.11 (L) 4.50 -  5.90 x10*6/uL    Hemoglobin 10.3 (L) 13.5 - 17.5 g/dL    Hematocrit 32.5 (L) 41.0 - 52.0 %     (H) 80 - 100 fL    MCH 33.1 26.0 - 34.0 pg    MCHC 31.7 (L) 32.0 - 36.0 g/dL    RDW 14.9 (H) 11.5 - 14.5 %    Platelets 147 (L) 150 - 450 x10*3/uL   POCT GLUCOSE   Result Value Ref Range    POCT Glucose 168 (H) 74 - 99 mg/dL     *Note: Due to a large number of results and/or encounters for the requested time period, some results have not been displayed. A complete set of results can be found in Results Review.        Scheduled Medications  Scheduled Medications[5]     Physical Exam  Constitutional:       General: Patient is not in acute distress.  HENT:      Head: Normocephalic and atraumatic.   Eyes:      Extraocular Movements: Extraocular movements intact.  Mild ptosis of the left eye.      Conjunctiva/sclera: Conjunctivae normal.      Pupils: Pupils are equal, round, and reactive to light.   Cardiovascular:      Rate and Rhythm: Normal rate and irregular rhythm.      Heart sounds: No murmur heard.     No friction rub. No gallop. No JVD or hepatojugular reflex.   Pulmonary:      Effort: Pulmonary effort is normal.      Breath sounds: Normal breath sounds. No wheezing, rhonchi or rales.   Abdominal:      General: Abdomen is flat. Bowel sounds are normal. There is no distension.      Palpations: Abdomen is soft. There is no mass.      Tenderness: Nontender.   Musculoskeletal:      Right lower le+ pitting edema with lipodermatosclerosis, no weeping or wounds present.     Left lower le+ pitting edema with warmth and lipodermatosclerosis, no weeping or wounds present.    Skin:     General: Skin is warm and dry.   Neurological:      Mental Status: Alert and oriented to person, place, and time.   Psychiatric:         Mood and Affect: Mood normal.         Behavior: Behavior normal.     Results    XR CHEST 1 VIEW;  2025 8:22 pm      IMPRESSION:  1.  No evidence of acute cardiopulmonary process.    Right 3rd  digit, three views.        IMPRESSION:  1. Nonspecific mild 3rd digit soft tissue swelling. Correlate with physical examination.  2. Widening of the scapholunate ligament interval suggesting scapholunate ligament tear.    CT ANGIO CHEST FOR PULMONARY EMBOLISM;  8/4/2025 11:28 pm      IMPRESSION:  No evidence of acute pulmonary embolism.      Smooth interlobular septal thickening suggesting acute pulmonary interstitial edema/CHF. Airspace opacities posteriorly in the lower lobes may relate to atelectasis, pneumonia or alveolar edema. Small pleural effusions. No pneumothorax.      Mild cardiomegaly. Cardiac pacer.      Atherosclerosis, including coronary artery disease.    CT ABDOMEN PELVIS W IV CONTRAST; ;  8/4/2025 11:28 pm      IMPRESSION:  Colonic diverticulosis. Stranding and trace fluid about the proximal sigmoid colon suspicious for acute diverticulitis in the appropriate clinical context.      Bilateral nonobstructing renal calculi.      Mild anasarca and nonspecific retroperitoneal edema, probably secondary to volume overload.      Cholecystectomy.  IVC filter.    I have personally reviewed the above imaging.     RECOMMENDATIONS     ADCHF  - Leukocytosis of 13.4  -   - Chest x-ray revealed no evidence of pulmonary edema or pleural effusions, no acute cardiopulmonary processes  - Chest CT revealed pneumonia vs CHF  Plan:   - Strict I&Os  - Recommend continued antibiotic treatment per primary team for possible pneumonia in the setting of leukocytosis   - Continue IV Lasix 20mg IV BID   - Upon discharge, restart home torsemide 40mg PO every day instead of PRN   - Start spironolactone 25mg PO every day   - Echo ordered, as it has been over a year since his last echocardiogram    Atrial Fibrillation with RVR  - Likely secondary to infection  - Given digoxin 500mg IVP x1  - Keep K>4, Mg>2  Plan:   - Continue home Eliqiuis 5mg PO BID   - Continue home diltiazem CD 240mg PO BID   - Continue home metoprolol  tartrate 25mg PO BID  - Continue to follow-up with Dr. Carlson outpatient    Olinda Peña DO, [facility]  PGY-1, Internal Medicine  Please SecureChat for any further questions  This is a preliminary note, please await attending attestation for final A/P             [1]   Past Medical History:  Diagnosis Date    Deep vein thrombosis (DVT) of lower extremity 09/17/2023    s/p IVC filter and has post-phlebotic syndrome    History of falling     History of fall    Hyperlipidemia 08/28/2023    Dr. Zee follows    Hypertension, benign 08/23/2017    Leg edema 09/05/2023    Overweight     Overweight    Paroxysmal atrial fibrillation (Multi) 08/28/2023    PAF. On Eliquis. 7 second pauses therefore PPM placed. Freq Pafib on PPM. Chads 2 vasc 4    Permanent atrial fibrillation (Multi) 10/17/2023    On Eliquis. 7 second pauses therefore PPM placed. Freq Pafib on PPM. Chads 2 vasc 4    Personal history of other diseases of the digestive system     History of small bowel obstruction    Personal history of other infectious and parasitic diseases     History of herpes zoster    Personal history of other specified conditions     History of bradycardia    Personal history of other specified conditions     History of edema    Personal history of peptic ulcer disease     History of peptic ulcer    Personal history of pneumonia (recurrent)     History of pneumonia    Personal history of urinary calculi     History of kidney stones    Presence of cardiac pacemaker 08/25/2017    10/24/2016: Med[employer] Jc MRI  model #[id number]    Sick sinus syndrome (Multi) 09/17/2023    s/p PPM 10/2016    Stage 3a chronic kidney disease (Multi) 07/18/2024    Type 2 diabetes mellitus, with long-term current use of insulin 08/28/2023   [2]   Past Medical History:  Diagnosis Date    Deep vein thrombosis (DVT) of lower extremity 09/17/2023    s/p IVC filter and has post-phlebotic syndrome    History of falling     History of fall    Hyperlipidemia 08/28/2023     Dr. Zee follows    Hypertension, benign 08/23/2017    Leg edema 09/05/2023    Overweight     Overweight    Paroxysmal atrial fibrillation (Multi) 08/28/2023    PAF. On Eliquis. 7 second pauses therefore PPM placed. Freq Pafib on PPM. Chads 2 vasc 4    Permanent atrial fibrillation (Multi) 10/17/2023    On Eliquis. 7 second pauses therefore PPM placed. Freq Pafib on PPM. Chads 2 vasc 4    Personal history of other diseases of the digestive system     History of small bowel obstruction    Personal history of other infectious and parasitic diseases     History of herpes zoster    Personal history of other specified conditions     History of bradycardia    Personal history of other specified conditions     History of edema    Personal history of peptic ulcer disease     History of peptic ulcer    Personal history of pneumonia (recurrent)     History of pneumonia    Personal history of urinary calculi     History of kidney stones    Presence of cardiac pacemaker 08/25/2017    10/24/2016: Medtronic Advisa MRI DR model #[id number]    Sick sinus syndrome (Multi) 09/17/2023    s/p PPM 10/2016    Stage 3a chronic kidney disease (Multi) 07/18/2024    Type 2 diabetes mellitus, with long-term current use of insulin 08/28/2023   [3]   Past Surgical History:  Procedure Laterality Date    APPENDECTOMY  07/25/2018    Appendectomy    CARDIAC ELECTROPHYSIOLOGY PROCEDURE N/A 7/8/2025    Procedure: PPM Generator Change;  Surgeon: Max Alexander MD;  Location: [place] [address] Cardiac Cath Lab;  Service: Electrophysiology;  Laterality: N/A;    CARDIAC PACEMAKER PLACEMENT  04/05/2018    Pacemaker Placement    CHOLECYSTECTOMY  04/05/2018    Cholecystectomy   [4]   Family History  Problem Relation Name Age of Onset    Coronary artery disease Father      Heart failure Father      Diabetes Father     [5] apixaban, 5 mg, oral, BID  [START ON 8/6/2025] azithromycin, 500 mg, intravenous, q24h  digoxin, 250 mcg, intravenous, Once  digoxin, 250 mcg,  intravenous, Once  dilTIAZem CD, 240 mg, oral, BID  furosemide, 20 mg, intravenous, BID  insulin glargine, 20 Units, subcutaneous, Nightly  insulin lispro, 0-15 Units, subcutaneous, TID AC  ipratropium-albuteroL, 3 mL, nebulization, TID  metoprolol tartrate, 25 mg, oral, BID  piperacillin-tazobactam, 3.375 g, intravenous, q6h  psyllium, 1 packet, oral, Daily  [START ON 8/6/2025] vancomycin, 1,500 mg, intravenous, q24h

## 2025-08-05 NOTE — CONSULTS
Vancomycin Dosing by Pharmacy- INITIAL    Dragan Calvin is a 84 y.o. year old male who Pharmacy has been consulted for vancomycin dosing for pneumonia. Based on the patient's indication and renal status this patient will be dosed based on a goal AUC of 400-600.     Renal function is currently stable.    Visit Vitals  /62   Pulse (!) 119   Temp 36.9 °C (98.5 °F) (Temporal)   Resp (!) 1        Lab Results   Component Value Date    CREATININE 1.07 2025    CREATININE 1.18 2025    CREATININE 1.42 (H) 2025    CREATININE 1.77 (H) 07/10/2025    CREATININE 1.30 (H) 2025    CREATININE 1.50 (H) 2025        Patient weight is as follows:   Vitals:    25 0100   Weight: 95.3 kg (210 lb)     Temp (24hrs), Av.9 °C (98.5 °F), Min:36.9 °C (98.5 °F), Max:36.9 °C (98.5 °F)       Assessment/Plan     Patient will not be given a loading dose.  Will initiate vancomycin maintenance, 1,500 mg every 24 hours.    This dosing regimen is predicted by [employer] to result in the following pharmacokinetic parameters:    [id number]: 358 mg/L.hr  AUC24,ss: 514 mg/L.hr  Probability of AUC24 > 400: 76 %  Ctrough,ss: 15.4 mg/L  Probability of Ctrough,ss > 20: 29 %    Follow-up level will be ordered on 25 with mid am labs unless clinically indicated sooner.  Will continue to monitor renal function daily while on vancomycin and order serum creatinine at least every 48 hours if not already ordered.  Follow for continued vancomycin needs, clinical response, and signs/symptoms of toxicity.       Mike Hutchinson, PharmD

## 2025-08-05 NOTE — PROGRESS NOTES
Physical Therapy    Physical Therapy Evaluation    Patient Name: Dragan Calvin  MRN: [id number]  Today's Date: 8/5/2025   Time Calculation  Start Time: 1004  Stop Time: 1022  Time Calculation (min): 18 min  635/635-A    Assessment/Plan   PT Assessment  PT Assessment Results: Decreased strength, Decreased endurance, Impaired balance, Decreased mobility  Rehab Prognosis: Good  Barriers to Discharge Home: Physical needs  Physical Needs: Ambulating household distances limited by function/safety, 24hr mobility assistance needed, High falls risk due to function or environment  End of Session Communication: Bedside nurse  Assessment Comment: Pt demonstrates impaired mobility throughout the session requiring increased level of assistance. Pt not at baseline and will benefit from further acute PT services and therapy s/p discharge to regain function and independence.  End of Session Patient Position: Bed, 2 rail up, Alarm on (all needs in reach and no complaints noted)  IP OR SWING BED PT PLAN  Inpatient or Swing Bed: Inpatient  PT Plan  Treatment/Interventions: Bed mobility, Transfer training, Gait training  PT Plan: Ongoing PT  PT Frequency: 3 times per week (during this acute inpatient hospitalization)  PT Discharge Recommendations: Moderate intensity level of continued care (Based on current functional status and rehab potential, patient is anticipated to tolerate and benefit from 5 or more days per week of skilled rehabilitative therapy after discharge from this acute inpatient hospitalization.)  PT Recommended Transfer Status: Assist x2  PT - OK to Discharge: Yes    Subjective     Current Problem:  Problem List[1]    General Visit Information:  General  Reason for Referral: PT Eval and Treat  Referred By: Kevin  Past Medical History Relevant to Rehab: 84 y.o. male with history of paroxysmal A-fib, T2DM, heart failure, and presence of pacemaker presented to the ED from SNF for evaluation of worsening shortness of breath  over the past 3-4 weeks and the shakes since yesterday. States he was treated with Tylenol for the shakes. Reports shortness of breath is with activity, denies it at rest or in supine. Has required new oxygen requirement.  Family/Caregiver Present: No  Co-Treatment: OT  Co-Treatment Reason: maximize safety and functional mobility  Prior to Session Communication: Bedside nurse  Patient Position Received: Bed, 2 rail up, Alarm on  General Comment: Pt agreeable to PT.    Home Living:  Home Living  Type of Home:  (patient reports has been at [facility] for 2 weeks for rehab. working on ambulation with PT and ADLs with OT. reports using WW. assist for ADLs/toileting from staff currently. reports 5 recent falls.)    Precautions:  Precautions  Medical Precautions: Fall precautions (aspiration precautions)    Vital Signs:  Vital Signs  Vital Signs Comment: VSS    Objective     Pain:  Pain Assessment  Pain Assessment:  (0/10)    Cognition:  Cognition  Overall Cognitive Status: Within Functional Limits    General Assessments:  Activity Tolerance  Endurance: Decreased tolerance for upright activites  Sensation  Light Touch: No apparent deficits  Strength  Strength Comments: B LE ROM WFL, B LE strength 4-/5  Static Sitting Balance  Static Sitting-Level of Assistance: Close supervision  Static Standing Balance  Static Standing-Level of Assistance: Moderate assistance  Dynamic Standing Balance  Dynamic Standing-Level of Assistance: Moderate assistance    Functional Assessments:  Bed Mobility  Bed Mobility: Yes  Bed Mobility 1  Bed Mobility Comments 1: supine to sitting: mod A x 1, sit to supine: mod A x 2  Transfers  Transfer: Yes  Transfer 1  Trials/Comments 1: STS from EOB: mod A x2 with cueing for hand placement also required blocking of B feet 2/2 pt's inability to maintain them in proper positioning without assist  Ambulation/Gait Training  Ambulation/Gait Training Performed: Yes  Ambulation/Gait Training 1  Comments/Distance  (ft) 1: Pt was able to side step 3' at EOB using RW with mod A x 1 and cueing for sequencing. Pt demonstrates poor balance and decreased strength    Outcome Measures:  [facility] Basic Mobility  Turning from your back to your side while in a flat bed without using bedrails: A lot  Moving from lying on your back to sitting on the side of a flat bed without using bedrails: A lot  Moving to and from bed to chair (including a wheelchair): A lot  Standing up from a chair using your arms (e.g. wheelchair or bedside chair): A lot  To walk in hospital room: A lot  Climbing 3-5 steps with railing: Total  Basic Mobility - Total Score: 11     Goals:  Encounter Problems       Encounter Problems (Active)       PT Problem       STG - Pt will transition supine <> sitting with min A  x 1  (Progressing)       Start:  08/05/25    Expected End:  08/19/25            STG - Pt will transfer STS with min A x 1  (Progressing)       Start:  08/05/25    Expected End:  08/19/25            STG - Pt will amb 25' using RW with min A x 1  (Progressing)       Start:  08/05/25    Expected End:  08/19/25                 Education Documentation  Precautions, taught by Philomena Enriquez PT at 8/5/2025  2:49 PM.  Learner: Patient  Readiness: Acceptance  Method: Explanation  Response: Verbalizes Understanding, Needs Reinforcement  Comment: PT POC    Mobility Training, taught by Philomena Enriquez PT at 8/5/2025  2:49 PM.  Learner: Patient  Readiness: Acceptance  Method: Explanation  Response: Verbalizes Understanding, Needs Reinforcement  Comment: PT POC    Education Comments  No comments found.           [1]   Patient Active Problem List  Diagnosis    Hyperlipidemia    Type 2 diabetes mellitus, with long-term current use of insulin    Paroxysmal atrial fibrillation (Multi)    CAD (coronary artery disease)    Gastroesophageal reflux disease    Hypertension, benign    Leg edema    Postherpetic neuralgia    Presence of cardiac pacemaker    Deep vein thrombosis (DVT)  of lower extremity    Squamous cell cancer of skin of right cheek    Permanent atrial fibrillation (Multi)    History of small bowel obstruction    Cervical stenosis of spine    Chronic heart failure with preserved ejection fraction (HFpEF, >= 50%)    Anemia    Class 1 obesity with body mass index (BMI) of 30.0 to 30.9 in adult    Tricuspid valve insufficiency    Stage 3a chronic kidney disease (Multi)    Hypokalemia    Pressure ulcer of left buttock, stage 3 (Multi)    Acute on chronic heart failure with preserved ejection fraction    Primary localized osteoarthritis of left hip    Longstanding persistent atrial fibrillation (Multi)    Generalized weakness    Fall    Elevated serum creatinine    Weakness of both lower extremities    Lumbar spinal stenosis    Leg weakness, bilateral    Pacemaker at end of battery life    Acute respiratory failure with hypoxia    Pneumonia due to infectious organism    Atrial fibrillation with rapid ventricular response (Multi)    Hypomagnesemia

## 2025-08-05 NOTE — ED PROVIDER NOTES
Patient signed out to me pending CT chest, abdomen pelvis results and admission given the patient's hypoxic respiratory failure.    CT scan with a concern of pulmonary edema and pneumonia.  Patient is covered with healthcare associated pneumonia coverage and we administered 20 mg of Lasix for diuresis.  Patient is admitted to medicine team for     Mike Berger DO  08/05/25 0031

## 2025-08-05 NOTE — ED TRIAGE NOTES
Pt BIBA from SNF for SOB and tremors. Pt states he feels SOB at this time, 2l NC at baseline, 98%. Squad states recent placement of pacemaker. Hx diabetes. Pt denies CP, denies pain.

## 2025-08-05 NOTE — PROGRESS NOTES
08/05/25 1616   Discharge Planning   Living Arrangements Other (Comment)  (Skilled at [facility])   Support Systems Spouse/significant other;Children;Family members;Friends/neighbors   Assistance Needed some assistance with ADLs, ambulates with walker and up to wheel chair at facility   Type of Residence Skilled nursing facility   Do you have animals or pets at home? No   Home or Post Acute Services Post acute facilities (Rehab/SNF/etc)   Type of Post Acute Facility Services Skilled nursing;Rehab   Expected Discharge Disposition SNF   Does the patient need discharge transport arranged? Yes   [facility] coordination needed? Yes   Has discharge transport been arranged? No   Patient Choice   Patient / Family choosing to utilize agency / facility established prior to hospitalization Yes  ([facility])     Record reviewed.  BIBA from facility for c/o SOB.  Admitted for acute respiratory failure with hypoxia.  This TCC met with patient and wife at bedside, introduced self and explained role.  Demographic information and insurance verified.  Patient is from [facility].  Receiving skilled care for rehab.  Ambulating with walker, up to wheel chair.  Prior to hospitalization and main campus and SNF stay, patient was at independent from home with spouse.  Endorses diabetes, checking blood sugar daily at home.  Denies SW needs at this time.  Patient plans to return to the [facility] at discharge.  Therapy evals are pending.  Patient will need transportation arranged.  Care Transitions will continue to follow.    4:20 pm addendum  Referral sent to [facility] for patient to return at discharge.  Care Transitions will continue to follow.

## 2025-08-05 NOTE — H&P
History Of Present Illness  Dragan Calvin is a 84 y.o. male with history of paroxysmal A-fib, T2DM, heart failure, and presence of pacemaker presented to the ED from SNF for evaluation of worsening shortness of breath over the past 3-4 weeks and the shakes since yesterday.  States he was treated with Tylenol for the shakes.  Reports shortness of breath is with activity, denies it at rest or in supine.  Has required new oxygen requirement.  Does admit to being more deconditioned lately.  Denies associated myalgias, chest discomfort, cough, mucus production.  Reports increased leg swelling which are weeping, had them Ace wrapped at SNF yesterday.  Family states swelling has been worse in dependent positions, possibly improved with elevation.  Reports compliance with diuretic.  Denies dizziness/lightheadedness, palpitations, abdominal pain, diarrhea, constipation, hematochezia.    ED course: Patient in A-fib with RVR since arrival, vitals including pulse ox otherwise stable.  Per radiology, there is evidence suggesting CHF exacerbation or pneumonia and suspicion for diverticulitis in appropriate clinical context.  Also suspicion for scapholunate ligament tear.  Labs remarkable for hypomagnesemia of 1.46, elevated BNP of 280, worsening anemia with hemoglobin of 10.3 which is down from 1 month prior.  Patient covered with antibiotics for suspected pneumonia and Lasix for CHF exacerbation.     Past Medical History  Medical History[1]    Surgical History  Surgical History[2]     Social History  He reports that he has never smoked. He has never used smokeless tobacco. He reports that he does not drink alcohol and does not use drugs.    Family History  Family History[3]     Allergies  Patient has no known allergies.    Review of Systems  10 point ROS negative except as specified in HPI    Physical Exam  Constitutional:       Comments: Comfortable appearing at 30 degrees   HENT:      Head: Normocephalic and atraumatic.       "Right Ear: External ear normal.      Left Ear: External ear normal.      Nose: Nose normal.     Eyes:      Conjunctiva/sclera: Conjunctivae normal.       Cardiovascular:      Rate and Rhythm: Tachycardia present. Rhythm irregular.   Pulmonary:      Effort: Pulmonary effort is normal.      Breath sounds: Normal breath sounds.      Comments: Nasal cannula in place  Abdominal:      General: Abdomen is flat.      Palpations: Abdomen is soft.     Musculoskeletal:         General: Swelling (2+ pitting in legs) present.     Skin:     General: Skin is warm.      Comments: Ace wraps wrapped too tightly leaving deep indentations     Neurological:      Mental Status: He is alert. Mental status is at baseline.     Psychiatric:         Behavior: Behavior normal.          Last Recorded Vitals  Blood pressure 109/69, pulse 92, temperature 35.9 °C (96.6 °F), temperature source Temporal, resp. rate 16, height 1.727 m (5' 8\"), weight 99.4 kg (219 lb 2.2 oz), SpO2 99%.    Relevant Results    Results for orders placed or performed during the hospital encounter of 08/04/25 (from the past 24 hours)   ECG 12 lead   Result Value Ref Range    Ventricular Rate 131 BPM    QRS Duration 82 ms    QT Interval 324 ms    QTC Calculation(Bazett) 478 ms    R Axis 76 degrees    T Axis 2 degrees    QRS Count 21 beats    Q Onset 219 ms    T Offset 381 ms    QTC Fredericia 420 ms   CBC and Auto Differential   Result Value Ref Range    WBC 9.9 4.4 - 11.3 x10*3/uL    nRBC 0.0 0.0 - 0.0 /100 WBCs    RBC 3.13 (L) 4.50 - 5.90 x10*6/uL    Hemoglobin 10.3 (L) 13.5 - 17.5 g/dL    Hematocrit 32.2 (L) 41.0 - 52.0 %     (H) 80 - 100 fL    MCH 32.9 26.0 - 34.0 pg    MCHC 32.0 32.0 - 36.0 g/dL    RDW 14.7 (H) 11.5 - 14.5 %    Platelets 173 150 - 450 x10*3/uL    Neutrophils % 93.4 40.0 - 80.0 %    Immature Granulocytes %, Automated 0.8 0.0 - 0.9 %    Lymphocytes % 3.8 13.0 - 44.0 %    Monocytes % 1.7 2.0 - 10.0 %    Eosinophils % 0.1 0.0 - 6.0 %    Basophils % " 0.2 0.0 - 2.0 %    Neutrophils Absolute 9.27 (H) 1.60 - 5.50 x10*3/uL    Immature Granulocytes Absolute, Automated 0.08 0.00 - 0.50 x10*3/uL    Lymphocytes Absolute 0.38 (L) 0.80 - 3.00 x10*3/uL    Monocytes Absolute 0.17 0.05 - 0.80 x10*3/uL    Eosinophils Absolute 0.01 0.00 - 0.40 x10*3/uL    Basophils Absolute 0.02 0.00 - 0.10 x10*3/uL   Comprehensive metabolic panel   Result Value Ref Range    Glucose 238 (H) 74 - 99 mg/dL    Sodium 139 136 - 145 mmol/L    Potassium 3.8 3.5 - 5.3 mmol/L    Chloride 108 (H) 98 - 107 mmol/L    Bicarbonate 19 (L) 21 - 32 mmol/L    Anion Gap 16 10 - 20 mmol/L    Urea Nitrogen 15 6 - 23 mg/dL    Creatinine 1.07 0.50 - 1.30 mg/dL    eGFR 68 >60 mL/min/1.73m*2    Calcium 8.1 (L) 8.6 - 10.3 mg/dL    Albumin 2.9 (L) 3.4 - 5.0 g/dL    Alkaline Phosphatase 103 33 - 136 U/L    Total Protein 5.3 (L) 6.4 - 8.2 g/dL    AST 12 9 - 39 U/L    Bilirubin, Total 1.1 0.0 - 1.2 mg/dL    ALT 14 10 - 52 U/L   Magnesium   Result Value Ref Range    Magnesium 1.46 (L) 1.60 - 2.40 mg/dL   B-Type Natriuretic Peptide   Result Value Ref Range     (H) 0 - 99 pg/mL   BLOOD GAS VENOUS FULL PANEL   Result Value Ref Range    POCT pH, Venous 7.46 (H) 7.33 - 7.43 pH    POCT pCO2, Venous 27 (L) 41 - 51 mm Hg    POCT pO2, Venous 55 (H) 35 - 45 mm Hg    POCT SO2, Venous 88 (H) 45 - 75 %    POCT Oxy Hemoglobin, Venous 86.3 (H) 45.0 - 75.0 %    POCT Hematocrit Calculated, Venous 32.0 (L) 41.0 - 52.0 %    POCT Sodium, Venous 137 136 - 145 mmol/L    POCT Potassium, Venous 3.9 3.5 - 5.3 mmol/L    POCT Chloride, Venous 109 (H) 98 - 107 mmol/L    POCT Ionized Calicum, Venous 1.10 1.10 - 1.33 mmol/L    POCT Glucose, Venous 245 (H) 74 - 99 mg/dL    POCT Lactate, Venous 2.6 (H) 0.4 - 2.0 mmol/L    POCT Base Excess, Venous -3.6 (L) -2.0 - 3.0 mmol/L    POCT HCO3 Calculated, Venous 19.2 (L) 22.0 - 26.0 mmol/L    POCT Hemoglobin, Venous 10.7 (L) 13.5 - 17.5 g/dL    POCT Anion Gap, Venous 13.0 10.0 - 25.0 mmol/L    Patient  Temperature 37.0 degrees Celsius    FiO2 21 %   Troponin I, High Sensitivity, Initial   Result Value Ref Range    Troponin I, High Sensitivity 21 (H) 0 - 20 ng/L   Lipase   Result Value Ref Range    Lipase 18 9 - 82 U/L   Troponin, High Sensitivity, 1 Hour   Result Value Ref Range    Troponin I, High Sensitivity 33 (H) 0 - 20 ng/L   POCT GLUCOSE   Result Value Ref Range    POCT Glucose 195 (H) 74 - 99 mg/dL   POCT GLUCOSE   Result Value Ref Range    POCT Glucose 164 (H) 74 - 99 mg/dL   Magnesium   Result Value Ref Range    Magnesium 2.10 1.60 - 2.40 mg/dL   Basic metabolic panel   Result Value Ref Range    Glucose 179 (H) 74 - 99 mg/dL    Sodium 141 136 - 145 mmol/L    Potassium 3.9 3.5 - 5.3 mmol/L    Chloride 106 98 - 107 mmol/L    Bicarbonate 23 21 - 32 mmol/L    Anion Gap 16 10 - 20 mmol/L    Urea Nitrogen 14 6 - 23 mg/dL    Creatinine 1.00 0.50 - 1.30 mg/dL    eGFR 74 >60 mL/min/1.73m*2    Calcium 8.0 (L) 8.6 - 10.3 mg/dL   CBC   Result Value Ref Range    WBC 13.4 (H) 4.4 - 11.3 x10*3/uL    nRBC 0.0 0.0 - 0.0 /100 WBCs    RBC 3.11 (L) 4.50 - 5.90 x10*6/uL    Hemoglobin 10.3 (L) 13.5 - 17.5 g/dL    Hematocrit 32.5 (L) 41.0 - 52.0 %     (H) 80 - 100 fL    MCH 33.1 26.0 - 34.0 pg    MCHC 31.7 (L) 32.0 - 36.0 g/dL    RDW 14.9 (H) 11.5 - 14.5 %    Platelets 147 (L) 150 - 450 x10*3/uL   POCT GLUCOSE   Result Value Ref Range    POCT Glucose 168 (H) 74 - 99 mg/dL     ECG 12 lead  Result Date: 8/5/2025  Atrial fibrillation with rapid ventricular response Nonspecific ST abnormality Abnormal ECG When compared with ECG of 03-JUL-2025 09:07, Atrial fibrillation has replaced Electronic ventricular pacemaker    CT abdomen pelvis w IV contrast  Result Date: 8/5/2025  Interpreted By:  Ramírez Vazquez, STUDY: CT ABDOMEN PELVIS W IV CONTRAST; ;  8/4/2025 11:28 pm   INDICATION: Signs/Symptoms:nausea.     COMPARISON: CT abdomen and pelvis of 10/31/2023.   ACCESSION NUMBER(S): [id number]   ORDERING CLINICIAN: ELYSE KLERMAN    TECHNIQUE: Axial CT images of the abdomen and pelvis with coronal and sagittal reconstructed images performed after intravenous administration of 75 cc Omnipaque 350.   FINDINGS: Please see separately dictated report for contemporaneous CT angiography of the chest for PE for details regarding the lower chest visible on the current examination. BONES: No acute osseous abnormality. Severe degenerative changes of the lumbar spine and hips. DISH. ABDOMINAL WALL: Subcutaneous edema in the ventral proximal left thigh seen associated with asymmetrically numerous left inguinal lymph nodes, suspicious for inflammation/infection. No subcutaneous air or fluid collection. Please correlate with clinical exam findings. Mild anasarca.   ABDOMEN:   LIVER: Within normal limits. BILE DUCTS: Normal caliber. GALLBLADDER: Absent. PANCREAS: Within normal limits. SPLEEN: Within normal limits. ADRENALS: Within normal limits. KIDNEYS and URETERS: 5 mm left lateral lower pole calculi without evidence of associated obstruction. Few scattered incidental cortical cysts. Otherwise, normal.     VESSELS: Moderate atherosclerosis. No aneurysmal dilation. Incidentally noted retroaortic left renal vein. IVC filter noted. RETROPERITONEUM: No pathologically enlarged retroperitoneal lymph nodes. Nonspecific retroperitoneal edema.   PELVIS:   REPRODUCTIVE ORGANS: Prostate is normal in size. Prostatic calcification. BLADDER: Within normal limits.   BOWEL: No dilated or thickened bowel. Stomach appears within normal limits. Colonic diverticulosis. Stranding and trace fluid about the proximal sigmoid colon suspicious for acute diverticulitis in the appropriate clinical context.  Normal appendix. PERITONEUM: No ascites or free air, no fluid collection.       Colonic diverticulosis. Stranding and trace fluid about the proximal sigmoid colon suspicious for acute diverticulitis in the appropriate clinical context.   Otherwise, no definite evidence of acute  pathology.   Bilateral nonobstructing renal calculi.   Mild anasarca and nonspecific retroperitoneal edema, probably secondary to volume overload.   Cholecystectomy. IVC filter.   Please see separately dictated report for contemporaneous CT angiography of the chest for PE for details regarding the lower chest visible on the current examination.   Additional findings as discussed above.   MACRO: None   Signed by: Ramírez Vazquez 8/5/2025 12:02 AM Dictation workstation:   [id number]    CT angio chest for pulmonary embolism  Result Date: 8/4/2025  Interpreted By:  Ramírez Vazquez, STUDY: CT ANGIO CHEST FOR PULMONARY EMBOLISM;  8/4/2025 11:28 pm   INDICATION: Signs/Symptoms:hypoxia, SOB.     COMPARISON: Correlation made to chest radiography of 08/04/2025.   ACCESSION NUMBER(S): [id number]   ORDERING CLINICIAN: ELYSE KLERMAN   TECHNIQUE: Helical data acquisition of the chest was obtained after intravenous administration of 75 ML Omnipaque 350, as per PE protocol. Images were reformatted in coronal and sagittal planes. Axial and coronal maximum intensity projection (MIP) images were created and reviewed.   FINDINGS: POTENTIAL LIMITATIONS OF THE STUDY: None   HEART AND VESSELS: There are no discrete filling defects within main pulmonary artery and its branches to suggest acute pulmonary embolism. Main pulmonary artery and its branches are normal in caliber.   The thoracic aorta normal in course and caliber.There is mild to moderate atherosclerosis present, including calcified and noncalcified plaques. Mild coronary artery calcifications are seen. Please note,the study is not optimized for evaluation of coronary arteries.   Mild cardiomegaly. Leads in right atrium and right ventricle.   There is no pericardial effusion seen.   MEDIASTINUM AND SREEKANTH, LOWER NECK AND AXILLA: The visualized thyroid gland is within normal limits. No pathologically enlarged lymph nodes. Esophagus appears within normal limits as seen.   LUNGS AND  AIRWAYS: The trachea and central airways are patent. No endobronchial lesion is seen.   Smooth interlobular septal thickening suggesting acute pulmonary interstitial edema/CHF. Airspace opacities posteriorly in the lower lobes may relate to atelectasis, pneumonia or alveolar edema. Small pleural effusions. No pneumothorax.     UPPER ABDOMEN: The visualized subdiaphragmatic structures demonstrate no remarkable findings.       CHEST WALL AND OSSEOUS STRUCTURES: Chest wall is within normal limits. No acute osseous pathology.There are no suspicious osseous lesions.DISH.       No evidence of acute pulmonary embolism.   Smooth interlobular septal thickening suggesting acute pulmonary interstitial edema/CHF. Airspace opacities posteriorly in the lower lobes may relate to atelectasis, pneumonia or alveolar edema. Small pleural effusions. No pneumothorax.   Mild cardiomegaly. Cardiac pacer.   Atherosclerosis, including coronary artery disease.   MACRO: None   Signed by: Ramírez Vazquez 8/4/2025 11:52 PM Dictation workstation:   [id number]    XR fingers right 2+ views  Result Date: 8/4/2025  Interpreted By:  Kristal Coates, STUDY: Right 3rd digit, three views.   INDICATION: Signs/Symptoms:R 3rd PIP swelling   COMPARISON: None.   ACCESSION NUMBER(S): [id number]   ORDERING CLINICIAN: ELYSE KLERMAN   FINDINGS: Limited study due to patient positioning. No acute fracture or malalignment. Widening of the scapholunate ligament interval suggesting scapholunate ligament tear. Nonspecific mild 3rd digit soft tissue swelling. No radiopaque foreign body.       1. Nonspecific mild 3rd digit soft tissue swelling. Correlate with physical examination. 2. Widening of the scapholunate ligament interval suggesting scapholunate ligament tear.   MACRO:   None   Signed by: Kristal Coates 8/4/2025 10:38 PM Dictation workstation:   [id number]    XR chest 1 view  Result Date: 8/4/2025  Interpreted By:  Ramírez Vazquez, STUDY: XR CHEST 1 VIEW;   8/4/2025 8:22 pm   INDICATION: Signs/Symptoms:SOB.     COMPARISON: 07/03/2025.   ACCESSION NUMBER(S): [id number]   ORDERING CLINICIAN: ELYSE KLERMAN   FINDINGS: AP radiograph of the chest was provided.   Mild apical lordosis.   Stably configured cardiac pacing device.   CARDIOMEDIASTINAL SILHOUETTE: Cardiomediastinal silhouette is normal in size and configuration.   LUNGS: Lungs appear clear. No pleural effusion or pneumothorax.   ABDOMEN: No remarkable upper abdominal findings.   BONES: No acute osseous changes.       1.  No evidence of acute cardiopulmonary process.       MACRO: None   Signed by: Ramírez Vazquez 8/4/2025 8:32 PM Dictation workstation:   [id number]         Assessment & Plan  Acute respiratory failure with hypoxia    Pneumonia due to infectious organism    Atrial fibrillation with rapid ventricular response (Multi)    Hypomagnesemia    84-year-old male on supplemental O2 at baseline presented to the ED from SNF for evaluation of shortness of breath and tremors found to have suspicion for CHF exacerbation and pneumonia per radiology.  He has remained stable on his baseline 2L nasal cannula.  He was treated with Lasix and covered with antibiotics and admitted to inpatient with telemetry under Dr. Brown for further evaluation and care.    #Acute respiratory failure with hypoxia  #Pneumonia due to infectious organism  #A-fib with RVR  #Hypomagnesemia  - Continue antibiotics started in ED  - Cardio consult  - Follow pneumonia workup  - Aspiration precautions, head of bed elevated, incentive spirometry  - Continue oxygen, bronchodilators  - PT/OT  - Continue Lasix and monitor lytes/renal function  -Elevate legs in bed, remove Ace wraps for the time being  - Continue beta-blocker, monitor vitals every 4 hours, telemetry  - Replace magnesium, check morning labs    Chronic conditions  #T2DM  #Paroxysmal A-fib  #Heart failure  #Presence of pacemaker  #CAD  #Hyperlipidemia  #Constipation  #BPH  #Anemia  - Continue  home meds when reconciled  - Cardiac/carb consistent diet  - Hemoglobin down 3 points from 1 month prior, continue to monitor    I spent 60 minutes in the professional and overall care of this patient.    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 Zee follows    Hypertension, benign 08/23/2017    Leg edema 09/05/2023    Overweight     Overweight    Paroxysmal atrial fibrillation (Multi) 08/28/2023    PAF. On Eliquis. 7 second pauses therefore PPM placed. Freq Pafib on PPM. Chads 2 vasc 4    Permanent atrial fibrillation (Multi) 10/17/2023    On Eliquis. 7 second pauses therefore PPM placed. Freq Pafib on PPM. Chads 2 vasc 4    Personal history of other diseases of the digestive system     History of small bowel obstruction    Personal history of other infectious and parasitic diseases     History of herpes zoster    Personal history of other specified conditions     History of bradycardia    Personal history of other specified conditions     History of edema    Personal history of peptic ulcer disease     History of peptic ulcer    Personal history of pneumonia (recurrent)     History of pneumonia    Personal history of urinary calculi     History of kidney stones    Presence of cardiac pacemaker 08/25/2017    10/24/2016: [employer] Advisa MRI  model #[id number]    Sick sinus syndrome (Multi) 09/17/2023    s/p PPM 10/2016    Stage 3a chronic kidney disease (Multi) 07/18/2024    Type 2 diabetes mellitus, with long-term current use of insulin 08/28/2023   [2]   Past Surgical History:  Procedure Laterality Date    APPENDECTOMY  07/25/2018    Appendectomy    CARDIAC ELECTROPHYSIOLOGY PROCEDURE N/A 7/8/2025    Procedure: PPM  Generator Change;  Surgeon: Max Alexander MD;  Location: [place] [address] Cardiac Cath Lab;  Service: Electrophysiology;  Laterality: N/A;    CARDIAC PACEMAKER PLACEMENT  04/05/2018    Pacemaker Placement    CHOLECYSTECTOMY  04/05/2018    Cholecystectomy   [3]   Family History  Problem Relation Name Age of Onset    Coronary artery disease Father      Heart failure Father      Diabetes Father

## 2025-08-05 NOTE — ED PROVIDER NOTES
Emergency Department Provider Note       History of Present Illness     History provided by: Patient  Limitations to History: None  External Records Reviewed with Brief Summary: Nursing home paperwork which showed AF on eliquis, discharge summary from 25 showing CAD, afib on eliquis, DVT s/p IVC filter, HTN HLD DDD s/p PPM PUD GERD DM peripheral edema spinal stenosis     HPI:  Dragan Calvin is a 84 y.o. male BIBEMS for SOB - SNF called EMS bc pt was dyspneic.  Pt unable to remember events leading up to ED presentation but was found to be in afib with RVR - chronic as per SNF paperwork and on eliquis.  Family later arrived to bedside and note that pt with reaccumulation of b/l peripheral edema and were told that legs were weeping and malodorous when dressings changed last night.  Pt also with 2wk of swelling to R 3rd PIP and scheduled to see ortho for further evaluation.  Pt himself complained of nausea but then fell asleep after zofran.    Physical Exam   Triage vitals:  T 36.9 °C (98.5 °F)  HR (!) 129  /62  RR (!) 21  O2 98 % Supplemental oxygen    General: Awake, alert, in no acute distress  Eyes: Gaze conjugate.  No scleral icterus or injection  HENT: Normo-cephalic, atraumatic. No stridor  CV: Tachycardic rate, irregular rhythm  Resp: Breathing non-labored, speaking in full sentences.  Clear to auscultation bilaterally  GI: Soft, non-distended, non-tender. No rebound or guarding.  MSK/Extremities: +R 3rd PIP swelling with partial ROM and mild redness, moving all extremities, +b/l extensive lower extremity edema  Skin: Warm. Appropriate color  Neuro: Alert. Oriented. Face symmetric. Speech is fluent.  Gross strength and sensation intact in b/l UE and LEs  Psych: Appropriate mood and affect      Medical Decision Making & ED Course   Medical Decision Makin y.o. male BIBEMS for SOB, found to be in afib with RVR (chronic afib) and new O2 requirement (on 2L NC).  Pt complains only of nausea  without abd pain, CP, SOB or cough.  EKG shows A-fib with RVR without STEMI.  Labs with leukocytosis, with moderate anemia hemoglobin 10.3 that appears a worse compared to prior, mildly elevated lactate 2.6, hyperglycemia without DKA and no YESICA.  Chest ray without pneumothorax or pneumonia.  Patient signed out to Dr. Menjivar pending CT and anticipated admission.  ----      Differential diagnoses considered include but are not limited to: Pneumonia, CHF, A-fib with RVR, acute coronary syndrome, colitis, SBO, cholecystitis, cholangitis, diverticulitis, dehydration, DKA    Social Determinants of Health which Significantly Impact Care: Social Determinants of Health which Significantly Impact Care: SNF resident     EKG Independent Interpretation: EKG interpreted by myself. Please see ED Course for full interpretation.    Independent Result Review and Interpretation: Labs reviewed see MDM    Chronic conditions affecting the patient's care: As documented above in MDM    The patient was discussed with the following consultants/services: None    Care Considerations: None    ED Course:  ED Course as of 08/05/25 1428   Mon Aug 04, 2025   2319 EKG obtained given shortness of breath and tachycardia independently interpreted by me showing A-fib with RVR normal axis QTc 478 mildly prolonged, nonspecific ST and T wave changes no STEMI, overall similar compared to prior [EK]      ED Course User Index  [EK] Elyse H Klerman, MD         Diagnoses as of 08/05/25 1428   Acute respiratory failure with hypoxia   Pneumonia due to infectious organism, unspecified laterality, unspecified part of lung   Acute on chronic heart failure with preserved ejection fraction       Disposition   Patient was signed out to Dr. Menjivar at 2300 pending completion of their work-up.  Please see the next provider's transition of care note for the remainder of the patient's care.     Procedures   Procedures        Elyse H Klerman, MD  Emergency  Medicine                                                       Elyse H Klerman, MD  08/04/25 [id number]       Elyse H Klerman, MD  08/05/25 9230       Elyse H Klerman, MD  08/05/25 [id number]

## 2025-08-05 NOTE — H&P
History Of Present Illness  Dragan Calvin is a 84 y.o. male with history of paroxysmal A-fib, T2DM, heart failure, and presence of pacemaker presented to the ED from SNF for evaluation of worsening shortness of breath over the past 3-4 weeks and the shakes since yesterday.  States he was treated with Tylenol for the shakes.  Reports shortness of breath is with activity, denies it at rest or in supine.  Has required new oxygen requirement.  Does admit to being more deconditioned lately.  Denies associated myalgias, chest discomfort, cough, mucus production.  Reports increased leg swelling which are weeping, had them Ace wrapped at SNF yesterday.  Family states swelling has been worse in dependent positions, possibly improved with elevation.  Reports compliance with diuretic.  Denies dizziness/lightheadedness, palpitations, abdominal pain, diarrhea, constipation, hematochezia.    ED course: Patient in A-fib with RVR since arrival, vitals including pulse ox otherwise stable.  Per radiology, there is evidence suggesting CHF exacerbation or pneumonia and suspicion for diverticulitis in appropriate clinical context.  Also suspicion for scapholunate ligament tear.  Labs remarkable for hypomagnesemia of 1.46, elevated BNP of 280, worsening anemia with hemoglobin of 10.3 which is down from 1 month prior.  Patient covered with antibiotics for suspected pneumonia and Lasix for CHF exacerbation.     Past Medical History  Medical History[1]    Surgical History  Surgical History[2]     Social History  He reports that he has never smoked. He has never used smokeless tobacco. He reports that he does not drink alcohol and does not use drugs.    Family History  Family History[3]     Allergies  Patient has no known allergies.    Review of Systems  10 point ROS negative except as specified in HPI    Physical Exam  Constitutional:       Comments: Comfortable appearing at 30 degrees   HENT:      Head: Normocephalic and atraumatic.       Right Ear: External ear normal.      Left Ear: External ear normal.      Nose: Nose normal.     Eyes:      Conjunctiva/sclera: Conjunctivae normal.       Cardiovascular:      Rate and Rhythm: Tachycardia present. Rhythm irregular.   Pulmonary:      Effort: Pulmonary effort is normal.      Breath sounds: Normal breath sounds.      Comments: Nasal cannula in place  Abdominal:      General: Abdomen is flat.      Palpations: Abdomen is soft.     Musculoskeletal:         General: Swelling (2+ pitting in legs) present.     Skin:     General: Skin is warm.      Comments: Ace wraps wrapped too tightly leaving deep indentations     Neurological:      Mental Status: He is alert. Mental status is at baseline.     Psychiatric:         Behavior: Behavior normal.          Last Recorded Vitals  Blood pressure 103/62, pulse (!) 138, temperature 36.9 °C (98.5 °F), temperature source Temporal, resp. rate 19, SpO2 96%.    Relevant Results    Results for orders placed or performed during the hospital encounter of 08/04/25 (from the past 24 hours)   CBC and Auto Differential   Result Value Ref Range    WBC 9.9 4.4 - 11.3 x10*3/uL    nRBC 0.0 0.0 - 0.0 /100 WBCs    RBC 3.13 (L) 4.50 - 5.90 x10*6/uL    Hemoglobin 10.3 (L) 13.5 - 17.5 g/dL    Hematocrit 32.2 (L) 41.0 - 52.0 %     (H) 80 - 100 fL    MCH 32.9 26.0 - 34.0 pg    MCHC 32.0 32.0 - 36.0 g/dL    RDW 14.7 (H) 11.5 - 14.5 %    Platelets 173 150 - 450 x10*3/uL    Neutrophils % 93.4 40.0 - 80.0 %    Immature Granulocytes %, Automated 0.8 0.0 - 0.9 %    Lymphocytes % 3.8 13.0 - 44.0 %    Monocytes % 1.7 2.0 - 10.0 %    Eosinophils % 0.1 0.0 - 6.0 %    Basophils % 0.2 0.0 - 2.0 %    Neutrophils Absolute 9.27 (H) 1.60 - 5.50 x10*3/uL    Immature Granulocytes Absolute, Automated 0.08 0.00 - 0.50 x10*3/uL    Lymphocytes Absolute 0.38 (L) 0.80 - 3.00 x10*3/uL    Monocytes Absolute 0.17 0.05 - 0.80 x10*3/uL    Eosinophils Absolute 0.01 0.00 - 0.40 x10*3/uL    Basophils Absolute 0.02  0.00 - 0.10 x10*3/uL   Comprehensive metabolic panel   Result Value Ref Range    Glucose 238 (H) 74 - 99 mg/dL    Sodium 139 136 - 145 mmol/L    Potassium 3.8 3.5 - 5.3 mmol/L    Chloride 108 (H) 98 - 107 mmol/L    Bicarbonate 19 (L) 21 - 32 mmol/L    Anion Gap 16 10 - 20 mmol/L    Urea Nitrogen 15 6 - 23 mg/dL    Creatinine 1.07 0.50 - 1.30 mg/dL    eGFR 68 >60 mL/min/1.73m*2    Calcium 8.1 (L) 8.6 - 10.3 mg/dL    Albumin 2.9 (L) 3.4 - 5.0 g/dL    Alkaline Phosphatase 103 33 - 136 U/L    Total Protein 5.3 (L) 6.4 - 8.2 g/dL    AST 12 9 - 39 U/L    Bilirubin, Total 1.1 0.0 - 1.2 mg/dL    ALT 14 10 - 52 U/L   Magnesium   Result Value Ref Range    Magnesium 1.46 (L) 1.60 - 2.40 mg/dL   B-Type Natriuretic Peptide   Result Value Ref Range     (H) 0 - 99 pg/mL   BLOOD GAS VENOUS FULL PANEL   Result Value Ref Range    POCT pH, Venous 7.46 (H) 7.33 - 7.43 pH    POCT pCO2, Venous 27 (L) 41 - 51 mm Hg    POCT pO2, Venous 55 (H) 35 - 45 mm Hg    POCT SO2, Venous 88 (H) 45 - 75 %    POCT Oxy Hemoglobin, Venous 86.3 (H) 45.0 - 75.0 %    POCT Hematocrit Calculated, Venous 32.0 (L) 41.0 - 52.0 %    POCT Sodium, Venous 137 136 - 145 mmol/L    POCT Potassium, Venous 3.9 3.5 - 5.3 mmol/L    POCT Chloride, Venous 109 (H) 98 - 107 mmol/L    POCT Ionized Calicum, Venous 1.10 1.10 - 1.33 mmol/L    POCT Glucose, Venous 245 (H) 74 - 99 mg/dL    POCT Lactate, Venous 2.6 (H) 0.4 - 2.0 mmol/L    POCT Base Excess, Venous -3.6 (L) -2.0 - 3.0 mmol/L    POCT HCO3 Calculated, Venous 19.2 (L) 22.0 - 26.0 mmol/L    POCT Hemoglobin, Venous 10.7 (L) 13.5 - 17.5 g/dL    POCT Anion Gap, Venous 13.0 10.0 - 25.0 mmol/L    Patient Temperature 37.0 degrees Celsius    FiO2 21 %   Troponin I, High Sensitivity, Initial   Result Value Ref Range    Troponin I, High Sensitivity 21 (H) 0 - 20 ng/L   Lipase   Result Value Ref Range    Lipase 18 9 - 82 U/L   Troponin, High Sensitivity, 1 Hour   Result Value Ref Range    Troponin I, High Sensitivity 33 (H) 0  - 20 ng/L     CT abdomen pelvis w IV contrast  Result Date: 8/5/2025  Interpreted By:  Ramírez Vazquez, STUDY: CT ABDOMEN PELVIS W IV CONTRAST; ;  8/4/2025 11:28 pm   INDICATION: Signs/Symptoms:nausea.     COMPARISON: CT abdomen and pelvis of 10/31/2023.   ACCESSION NUMBER(S): [id number]   ORDERING CLINICIAN: ELYSE KLERMAN   TECHNIQUE: Axial CT images of the abdomen and pelvis with coronal and sagittal reconstructed images performed after intravenous administration of 75 cc Omnipaque 350.   FINDINGS: Please see separately dictated report for contemporaneous CT angiography of the chest for PE for details regarding the lower chest visible on the current examination. BONES: No acute osseous abnormality. Severe degenerative changes of the lumbar spine and hips. DISH. ABDOMINAL WALL: Subcutaneous edema in the ventral proximal left thigh seen associated with asymmetrically numerous left inguinal lymph nodes, suspicious for inflammation/infection. No subcutaneous air or fluid collection. Please correlate with clinical exam findings. Mild anasarca.   ABDOMEN:   LIVER: Within normal limits. BILE DUCTS: Normal caliber. GALLBLADDER: Absent. PANCREAS: Within normal limits. SPLEEN: Within normal limits. ADRENALS: Within normal limits. KIDNEYS and URETERS: 5 mm left lateral lower pole calculi without evidence of associated obstruction. Few scattered incidental cortical cysts. Otherwise, normal.     VESSELS: Moderate atherosclerosis. No aneurysmal dilation. Incidentally noted retroaortic left renal vein. IVC filter noted. RETROPERITONEUM: No pathologically enlarged retroperitoneal lymph nodes. Nonspecific retroperitoneal edema.   PELVIS:   REPRODUCTIVE ORGANS: Prostate is normal in size. Prostatic calcification. BLADDER: Within normal limits.   BOWEL: No dilated or thickened bowel. Stomach appears within normal limits. Colonic diverticulosis. Stranding and trace fluid about the proximal sigmoid colon suspicious for acute  diverticulitis in the appropriate clinical context.  Normal appendix. PERITONEUM: No ascites or free air, no fluid collection.       Colonic diverticulosis. Stranding and trace fluid about the proximal sigmoid colon suspicious for acute diverticulitis in the appropriate clinical context.   Otherwise, no definite evidence of acute pathology.   Bilateral nonobstructing renal calculi.   Mild anasarca and nonspecific retroperitoneal edema, probably secondary to volume overload.   Cholecystectomy. IVC filter.   Please see separately dictated report for contemporaneous CT angiography of the chest for PE for details regarding the lower chest visible on the current examination.   Additional findings as discussed above.   MACRO: None   Signed by: Ramírez Vazquez 8/5/2025 12:02 AM Dictation workstation:   [id number]    CT angio chest for pulmonary embolism  Result Date: 8/4/2025  Interpreted By:  Ramírez Vazquez, STUDY: CT ANGIO CHEST FOR PULMONARY EMBOLISM;  8/4/2025 11:28 pm   INDICATION: Signs/Symptoms:hypoxia, SOB.     COMPARISON: Correlation made to chest radiography of 08/04/2025.   ACCESSION NUMBER(S): [id number]   ORDERING CLINICIAN: ELYSE KLERMAN   TECHNIQUE: Helical data acquisition of the chest was obtained after intravenous administration of 75 ML Omnipaque 350, as per PE protocol. Images were reformatted in coronal and sagittal planes. Axial and coronal maximum intensity projection (MIP) images were created and reviewed.   FINDINGS: POTENTIAL LIMITATIONS OF THE STUDY: None   HEART AND VESSELS: There are no discrete filling defects within main pulmonary artery and its branches to suggest acute pulmonary embolism. Main pulmonary artery and its branches are normal in caliber.   The thoracic aorta normal in course and caliber.There is mild to moderate atherosclerosis present, including calcified and noncalcified plaques. Mild coronary artery calcifications are seen. Please note,the study is not optimized for evaluation  of coronary arteries.   Mild cardiomegaly. Leads in right atrium and right ventricle.   There is no pericardial effusion seen.   MEDIASTINUM AND SREEKANTH, LOWER NECK AND AXILLA: The visualized thyroid gland is within normal limits. No pathologically enlarged lymph nodes. Esophagus appears within normal limits as seen.   LUNGS AND AIRWAYS: The trachea and central airways are patent. No endobronchial lesion is seen.   Smooth interlobular septal thickening suggesting acute pulmonary interstitial edema/CHF. Airspace opacities posteriorly in the lower lobes may relate to atelectasis, pneumonia or alveolar edema. Small pleural effusions. No pneumothorax.     UPPER ABDOMEN: The visualized subdiaphragmatic structures demonstrate no remarkable findings.       CHEST WALL AND OSSEOUS STRUCTURES: Chest wall is within normal limits. No acute osseous pathology.There are no suspicious osseous lesions.DISH.       No evidence of acute pulmonary embolism.   Smooth interlobular septal thickening suggesting acute pulmonary interstitial edema/CHF. Airspace opacities posteriorly in the lower lobes may relate to atelectasis, pneumonia or alveolar edema. Small pleural effusions. No pneumothorax.   Mild cardiomegaly. Cardiac pacer.   Atherosclerosis, including coronary artery disease.   MACRO: None   Signed by: Ramírez Vazquez 8/4/2025 11:52 PM Dictation workstation:   [id number]    XR fingers right 2+ views  Result Date: 8/4/2025  Interpreted By:  Kristal Coates, STUDY: Right 3rd digit, three views.   INDICATION: Signs/Symptoms:R 3rd PIP swelling   COMPARISON: None.   ACCESSION NUMBER(S): [id number]   ORDERING CLINICIAN: ELYSE KLERMAN   FINDINGS: Limited study due to patient positioning. No acute fracture or malalignment. Widening of the scapholunate ligament interval suggesting scapholunate ligament tear. Nonspecific mild 3rd digit soft tissue swelling. No radiopaque foreign body.       1. Nonspecific mild 3rd digit soft tissue swelling.  Correlate with physical examination. 2. Widening of the scapholunate ligament interval suggesting scapholunate ligament tear.   MACRO:   None   Signed by: Kristal Coates 8/4/2025 10:38 PM Dictation workstation:   [id number]    XR chest 1 view  Result Date: 8/4/2025  Interpreted By:  Ramírez Vazquez, STUDY: XR CHEST 1 VIEW;  8/4/2025 8:22 pm   INDICATION: Signs/Symptoms:SOB.     COMPARISON: 07/03/2025.   ACCESSION NUMBER(S): [id number]   ORDERING CLINICIAN: ELYSE KLERMAN   FINDINGS: AP radiograph of the chest was provided.   Mild apical lordosis.   Stably configured cardiac pacing device.   CARDIOMEDIASTINAL SILHOUETTE: Cardiomediastinal silhouette is normal in size and configuration.   LUNGS: Lungs appear clear. No pleural effusion or pneumothorax.   ABDOMEN: No remarkable upper abdominal findings.   BONES: No acute osseous changes.       1.  No evidence of acute cardiopulmonary process.       MACRO: None   Signed by: Ramírez Vazquez 8/4/2025 8:32 PM Dictation workstation:   [id number]         Assessment & Plan  Acute respiratory failure with hypoxia    Pneumonia due to infectious organism    Atrial fibrillation with rapid ventricular response (Multi)    Hypomagnesemia    84-year-old male on supplemental O2 at baseline presented to the ED from SNF for evaluation of shortness of breath and tremors found to have suspicion for CHF exacerbation and pneumonia per radiology.  He has remained stable on his baseline 2L nasal cannula.  He was treated with Lasix and covered with antibiotics and admitted to inpatient with telemetry under Dr. Brown for further evaluation and care.    #Acute respiratory failure with hypoxia  #Pneumonia due to infectious organism  #A-fib with RVR  #Hypomagnesemia  - Continue antibiotics started in ED  - Cardio consult  - Follow pneumonia workup  - Aspiration precautions, head of bed elevated, incentive spirometry  - Continue oxygen, bronchodilators  - PT/OT  - Continue Lasix and monitor lytes/renal  function  -Elevate legs in bed, remove Ace wraps for the time being  - Continue beta-blocker, monitor vitals every 4 hours, telemetry  - Replace magnesium, check morning labs    Chronic conditions  #T2DM  #Paroxysmal A-fib  #Heart failure  #Presence of pacemaker  #CAD  #Hyperlipidemia  #Constipation  #BPH  #Anemia  - Continue home meds when reconciled  - Cardiac/carb consistent diet  - Hemoglobin down 3 points from 1 month prior, continue to monitor    I spent 60 minutes in the professional and overall care of this patient.      Wilfredo Francois PA-C         [1]   Past Medical History:  Diagnosis Date    Deep vein thrombosis (DVT) of lower extremity 09/17/2023    s/p IVC filter and has post-phlebotic syndrome    History of falling     History of fall    Hyperlipidemia 08/28/2023    Dr. Zee follows    Hypertension, benign 08/23/2017    Leg edema 09/05/2023    Overweight     Overweight    Paroxysmal atrial fibrillation (Multi) 08/28/2023    PAF. On Eliquis. 7 second pauses therefore PPM placed. Freq Pafib on PPM. Chads 2 vasc 4    Permanent atrial fibrillation (Multi) 10/17/2023    On Eliquis. 7 second pauses therefore PPM placed. Freq Pafib on PPM. Chads 2 vasc 4    Personal history of other diseases of the digestive system     History of small bowel obstruction    Personal history of other infectious and parasitic diseases     History of herpes zoster    Personal history of other specified conditions     History of bradycardia    Personal history of other specified conditions     History of edema    Personal history of peptic ulcer disease     History of peptic ulcer    Personal history of pneumonia (recurrent)     History of pneumonia    Personal history of urinary calculi     History of kidney stones    Presence of cardiac pacemaker 08/25/2017    10/24/2016: [employer] Advisa MRI  model #[id number]    Sick sinus syndrome (Multi) 09/17/2023    s/p PPM 10/2016    Stage 3a chronic kidney disease (Multi) 07/18/2024     Type 2 diabetes mellitus, with long-term current use of insulin 08/28/2023   [2]   Past Surgical History:  Procedure Laterality Date    APPENDECTOMY  07/25/2018    Appendectomy    CARDIAC ELECTROPHYSIOLOGY PROCEDURE N/A 7/8/2025    Procedure: PPM Generator Change;  Surgeon: Max Alexander MD;  Location: [place] [address] Cardiac Cath [facility];  Service: Electrophysiology;  Laterality: N/A;    CARDIAC PACEMAKER PLACEMENT  04/05/2018    Pacemaker Placement    CHOLECYSTECTOMY  04/05/2018    Cholecystectomy   [3]   Family History  Problem Relation Name Age of Onset    Coronary artery disease Father      Heart failure Father      Diabetes Father

## 2025-08-05 NOTE — CARE PLAN
The patient's goals for the shift include comfort and rest.     The clinical goals for the shift include patient to remain free from falls or injury    Over the shift, the patient is in progress toward the following goals.

## 2025-08-05 NOTE — CARE PLAN
Problem: Safety - Adult  Goal: Free from fall injury  Outcome: Progressing  Flowsheets (Taken 8/5/2025 0628)  Free from fall injury: Instruct family/caregiver on patient safety     Problem: Skin  Goal: Promote skin healing  Outcome: Progressing  Flowsheets (Taken 8/5/2025 0628)  Promote skin healing: Assess skin/pad under line(s)/device(s)   The patient's goals for the shift include      The clinical goals for the shift include pt will remain free from falls or injuries

## 2025-08-05 NOTE — CARE PLAN
RN notified NP that patient's HR is in the 80s after administering Digoxin 500mg IVP x1. Advised her to get updated EKG and then patient can be transferred to floor. Attending to follow.

## 2025-08-06 ENCOUNTER — APPOINTMENT (OUTPATIENT)
Dept: CARDIOLOGY | Facility: HOSPITAL | Age: 85
End: 2025-08-06
Payer: MEDICARE

## 2025-08-06 VITALS
OXYGEN SATURATION: 95 % | BODY MASS INDEX: 33.21 KG/M2 | DIASTOLIC BLOOD PRESSURE: 57 MMHG | SYSTOLIC BLOOD PRESSURE: 119 MMHG | HEART RATE: 89 BPM | TEMPERATURE: 97.7 F | RESPIRATION RATE: 20 BRPM | HEIGHT: 68 IN | WEIGHT: 219.14 LBS

## 2025-08-06 PROBLEM — E83.42 HYPOMAGNESEMIA: Status: RESOLVED | Noted: 2025-08-05 | Resolved: 2025-08-06

## 2025-08-06 PROBLEM — I48.91 ATRIAL FIBRILLATION WITH RAPID VENTRICULAR RESPONSE (MULTI): Status: RESOLVED | Noted: 2025-08-05 | Resolved: 2025-08-06

## 2025-08-06 PROBLEM — J96.01 ACUTE RESPIRATORY FAILURE WITH HYPOXIA: Status: RESOLVED | Noted: 2025-08-05 | Resolved: 2025-08-06

## 2025-08-06 PROBLEM — J18.9 PNEUMONIA DUE TO INFECTIOUS ORGANISM: Status: RESOLVED | Noted: 2025-08-05 | Resolved: 2025-08-06

## 2025-08-06 LAB
AORTIC VALVE MEAN GRADIENT: 6 MMHG
AORTIC VALVE PEAK VELOCITY: 1.68 M/S
AV PEAK GRADIENT: 11 MMHG
AVA (PEAK VEL): 2.06 CM2
AVA (VTI): 2.32 CM2
EJECTION FRACTION APICAL 4 CHAMBER: 81.6
EJECTION FRACTION: 58 %
GLUCOSE BLD MANUAL STRIP-MCNC: 137 MG/DL (ref 74–99)
GLUCOSE BLD MANUAL STRIP-MCNC: 197 MG/DL (ref 74–99)
GLUCOSE BLD MANUAL STRIP-MCNC: 251 MG/DL (ref 74–99)
LEFT VENTRICLE INTERNAL DIMENSION DIASTOLE: 4.4 CM (ref 3.5–6)
LEFT VENTRICULAR OUTFLOW TRACT DIAMETER: 2 CM
LEGIONELLA AG UR QL: NEGATIVE
RIGHT VENTRICLE FREE WALL PEAK S': 17 CM/S
RIGHT VENTRICLE PEAK SYSTOLIC PRESSURE: 21 MMHG
S PNEUM AG UR QL: NEGATIVE

## 2025-08-06 PROCEDURE — 99233 SBSQ HOSP IP/OBS HIGH 50: CPT

## 2025-08-06 PROCEDURE — 2500000002 HC RX 250 W HCPCS SELF ADMINISTERED DRUGS (ALT 637 FOR MEDICARE OP, ALT 636 FOR OP/ED): Performed by: EMERGENCY MEDICINE

## 2025-08-06 PROCEDURE — 2500000002 HC RX 250 W HCPCS SELF ADMINISTERED DRUGS (ALT 637 FOR MEDICARE OP, ALT 636 FOR OP/ED)

## 2025-08-06 PROCEDURE — 2500000004 HC RX 250 GENERAL PHARMACY W/ HCPCS (ALT 636 FOR OP/ED)

## 2025-08-06 PROCEDURE — 2500000005 HC RX 250 GENERAL PHARMACY W/O HCPCS: Performed by: NURSE PRACTITIONER

## 2025-08-06 PROCEDURE — 82947 ASSAY GLUCOSE BLOOD QUANT: CPT

## 2025-08-06 PROCEDURE — 2500000001 HC RX 250 WO HCPCS SELF ADMINISTERED DRUGS (ALT 637 FOR MEDICARE OP)

## 2025-08-06 PROCEDURE — 99238 HOSP IP/OBS DSCHRG MGMT 30/<: CPT | Performed by: STUDENT IN AN ORGANIZED HEALTH CARE EDUCATION/TRAINING PROGRAM

## 2025-08-06 PROCEDURE — C8929 TTE W OR WO FOL WCON,DOPPLER: HCPCS

## 2025-08-06 PROCEDURE — 93306 TTE W/DOPPLER COMPLETE: CPT | Performed by: STUDENT IN AN ORGANIZED HEALTH CARE EDUCATION/TRAINING PROGRAM

## 2025-08-06 PROCEDURE — 2500000004 HC RX 250 GENERAL PHARMACY W/ HCPCS (ALT 636 FOR OP/ED): Mod: JW

## 2025-08-06 PROCEDURE — 94640 AIRWAY INHALATION TREATMENT: CPT

## 2025-08-06 RX ORDER — TORSEMIDE 20 MG/1
20 TABLET ORAL DAILY
Status: DISCONTINUED | OUTPATIENT
Start: 2025-08-06 | End: 2025-08-06 | Stop reason: HOSPADM

## 2025-08-06 RX ORDER — LEVOFLOXACIN 500 MG/1
500 TABLET, FILM COATED ORAL DAILY
Qty: 7 TABLET | Refills: 0 | Status: SHIPPED | OUTPATIENT
Start: 2025-08-06 | End: 2025-08-13

## 2025-08-06 RX ORDER — SPIRONOLACTONE 25 MG/1
25 TABLET ORAL DAILY
Qty: 30 TABLET | Refills: 0 | Status: SHIPPED | OUTPATIENT
Start: 2025-08-07 | End: 2025-09-06

## 2025-08-06 RX ORDER — POTASSIUM CHLORIDE 20 MEQ/1
10 TABLET, EXTENDED RELEASE ORAL DAILY
Status: DISCONTINUED | OUTPATIENT
Start: 2025-08-06 | End: 2025-08-06 | Stop reason: HOSPADM

## 2025-08-06 RX ORDER — TORSEMIDE 20 MG/1
20 TABLET ORAL DAILY
Qty: 30 TABLET | Refills: 0 | Status: SHIPPED | OUTPATIENT
Start: 2025-08-06 | End: 2025-09-05

## 2025-08-06 RX ORDER — LIDOCAINE 560 MG/1
1 PATCH PERCUTANEOUS; TOPICAL; TRANSDERMAL DAILY
Status: DISCONTINUED | OUTPATIENT
Start: 2025-08-06 | End: 2025-08-06 | Stop reason: HOSPADM

## 2025-08-06 RX ORDER — METOPROLOL TARTRATE 1 MG/ML
5 INJECTION, SOLUTION INTRAVENOUS ONCE
Status: DISCONTINUED | OUTPATIENT
Start: 2025-08-06 | End: 2025-08-06 | Stop reason: HOSPADM

## 2025-08-06 RX ADMIN — TORSEMIDE 20 MG: 20 TABLET ORAL at 14:52

## 2025-08-06 RX ADMIN — METOPROLOL TARTRATE 25 MG: 25 TABLET, FILM COATED ORAL at 08:51

## 2025-08-06 RX ADMIN — INSULIN LISPRO 3 UNITS: 100 INJECTION, SOLUTION INTRAVENOUS; SUBCUTANEOUS at 12:01

## 2025-08-06 RX ADMIN — FUROSEMIDE 20 MG: 10 INJECTION, SOLUTION INTRAMUSCULAR; INTRAVENOUS at 08:50

## 2025-08-06 RX ADMIN — IPRATROPIUM BROMIDE AND ALBUTEROL SULFATE 3 ML: .5; 3 SOLUTION RESPIRATORY (INHALATION) at 06:48

## 2025-08-06 RX ADMIN — PERFLUTREN 2 ML OF DILUTION: 6.52 INJECTION, SUSPENSION INTRAVENOUS at 08:24

## 2025-08-06 RX ADMIN — SPIRONOLACTONE 25 MG: 25 TABLET, FILM COATED ORAL at 08:50

## 2025-08-06 RX ADMIN — LIDOCAINE 1 PATCH: 4 PATCH TOPICAL at 08:50

## 2025-08-06 RX ADMIN — APIXABAN 5 MG: 5 TABLET, FILM COATED ORAL at 08:51

## 2025-08-06 RX ADMIN — AZITHROMYCIN MONOHYDRATE 500 MG: 500 INJECTION, POWDER, LYOPHILIZED, FOR SOLUTION INTRAVENOUS at 02:12

## 2025-08-06 RX ADMIN — PIPERACILLIN SODIUM AND TAZOBACTAM SODIUM 3.38 G: 3; .375 INJECTION, SOLUTION INTRAVENOUS at 00:24

## 2025-08-06 RX ADMIN — PSYLLIUM HUSK 1 PACKET: 3.4 POWDER ORAL at 08:51

## 2025-08-06 RX ADMIN — POTASSIUM CHLORIDE 10 MEQ: 1500 TABLET, EXTENDED RELEASE ORAL at 08:50

## 2025-08-06 RX ADMIN — PIPERACILLIN SODIUM AND TAZOBACTAM SODIUM 3.38 G: 3; .375 INJECTION, SOLUTION INTRAVENOUS at 06:02

## 2025-08-06 RX ADMIN — PIPERACILLIN SODIUM AND TAZOBACTAM SODIUM 3.38 G: 3; .375 INJECTION, SOLUTION INTRAVENOUS at 12:01

## 2025-08-06 RX ADMIN — DILTIAZEM HYDROCHLORIDE 240 MG: 240 CAPSULE, COATED, EXTENDED RELEASE ORAL at 08:51

## 2025-08-06 NOTE — CONSULTS
"Nutrition Initial Assessment:   Nutrition Assessment    Reason for Assessment: Dietitian discretion (wound)    Patient is a 84 y.o. male.      Nutrition History:  Energy Intake: Good > 75 %  Pain affecting nutrition status: N/A  Food and Nutrient History: Pt reports good appetite and intake, reports eating most of his breakfast this morning. Denies N/V/D/C and issues chewing/swallowing. Agreeable to Volodymyr BID for wound healing.  Vitamin/Herbal Supplement Use: vitamin B12, multivitamin per home med list       Anthropometrics:  Height: 172.7 cm (5' 8\")   Weight: 99.4 kg (219 lb 2.2 oz)   BMI (Calculated): 33.33  IBW/kg (Dietitian Calculated): 70 kg                         Weight History:   Wt Readings from Last 10 Encounters:   08/05/25 99.4 kg (219 lb 2.2 oz)   07/06/25 97.5 kg (215 lb)   07/03/25 96.6 kg (213 lb)   06/09/25 96.6 kg (213 lb)   06/04/25 95.7 kg (211 lb)   05/10/25 96.2 kg (212 lb)   04/23/25 103 kg (228 lb)   03/05/25 95.3 kg (210 lb)   11/26/24 98.9 kg (218 lb)   11/04/24 98 kg (216 lb)      Weight Change %:  Weight History / % Weight Change: Pt reports stable weight, usually between 212-222#. No significant wt changes noted based on available wt records in EMR.  Significant Weight Loss: No    Nutrition Focused Physical Exam Findings:    Subcutaneous Fat Loss:   Defer Subcutaneous Fat Loss Assessment: Defer all  Defer All Reason: Not indicated  Muscle Wasting:  Defer Muscle Wasting Assessment: Defer all  Defer All Reason: Not indicated  Edema:  Edema Location: 2+ BLE per nursing assessment  Physical Findings:  Skin: Positive (Buttock PI per nursing flowsheets)    Nutrition Significant Labs:    Reviewed   Nutrition Specific Medications:  Reviewed     I/O:    ; Stool Appearance: Formed (08/06/25 1131)    Dietary Orders (From admission, onward)       Start     Ordered    08/06/25 1141  Oral nutritional supplements  Until discontinued        Comments: Fruit punch   Question Answer Comment   Deliver with " Lunch    Deliver with Dinner    Select supplement: Volodymyr        08/06/25 1140    08/05/25 0534  May Participate in Room Service  ( ROOM SERVICE MAY PARTICIPATE)  Once        Question:  .  Answer:  Yes    08/05/25 0533    08/05/25 0102  Adult diet Regular, Cardiac, Consistent Carb; CCD 75 gm/meal; 70 gm fat; 2 - 3 grams Sodium  Diet effective now        Question Answer Comment   Diet type Regular    Diet type Cardiac    Diet type Consistent Carb    Carb diet selection: CCD 75 gm/meal    Fat restriction: 70 gm fat    Sodium restriction: 2 - 3 grams Sodium        08/05/25 0101                     Estimated Needs:      Method for Estimating Needs: 0797-0927 kcal/d (25-30 kcal/kg IBW)     Method for Estimating 24 Hour Protein Needs: 84 g/d (1.2 g/kg IBW)     Method for Estimating 24 Hour Fluid Needs: 3821-4253 ml/d (1 ml/kcal or per MD)  Patient on Order Fluid Restriction: No        Nutrition Diagnosis   Malnutrition Diagnosis  Patient has Malnutrition Diagnosis: No    Nutrition Diagnosis  Patient has Nutrition Diagnosis: Yes  Diagnosis Status (1): New  Nutrition Diagnosis 1: Increased nutrient needs  Related to (1): increased metabolic demand of wound healing  As Evidenced by (1): buttocks PI       Nutrition Interventions/Recommendations   Nutrition prescription for oral nutrition    Nutrition Recommendations:  Individualized Nutrition Prescription Provided for : Cardiac, 75 g CHO/meal consistent carbohdyrate diet and Volodymyr BID    Nutrition Interventions/Goals:   Meals and Snacks: Carbohydrate-modified diet, Fat-modified diet, Mineral-modified diet  Goal: Consumes 3 meals per day  Medical Food Supplement: Commercial beverage medical food supplement therapy  Goal: Volodymyr to be provided BID (provides 90 kcal and 2.5 g protein per packet).      Education Documentation  Nutrition Related Education, taught by Sofiya Burciaga RD at 8/6/2025  1:06 PM.  Learner: Family, Patient  Readiness: Acceptance  Method:  Explanation  Response: Verbalizes Understanding  Comment: Educated on purpose and benefits of Volodymyr. Discussed frequency of use and flavor options.              Nutrition Monitoring and Evaluation   Estimated Energy Intake: Energy intake greater or equal to 75% of estimated energy needs  Intake / Amount of food: Consumes at least 75% or more of meals/snacks/supplements, Meets > 75% estimated energy needs    Body Weight: Body weight - Maintain stable weight    Glucose/Endocrine Profile: Glucose within normal limits ( mg/dL)    Skin Finding: Impaired wound healing - Skin to heal    Goal Status: New goal(s) identified    Time Spent (min): 45 minutes

## 2025-08-06 NOTE — NURSING NOTE
Report called to Justine at [facility]. All questions answered. Wife at bedside and updated on patients discharge and pick-up time.

## 2025-08-06 NOTE — PROGRESS NOTES
CARDIOLOGY PROGRESS NOTE     SUBJECTIVE     Dragan Calvin is a 84 y.o. male on day 1 of admission presenting with Acute respiratory failure with hypoxia.      Patient was seen and evaluated at bedside this morning and reported no acute complaints.  He states that he feels well.  Patient still on his baseline oxygen of 2L NC PRN.  He has only needed it once since admission, which he states is normal for him.  Added torsemide 20mg PO every day and spironolactone 25mg PO every day at discharge.    OBJECTIVE   Vitals  Vitals:    08/05/25 2104 08/05/25 2337 08/06/25 0402 08/06/25 0738   BP: 112/53 119/53 110/54 123/58   BP Location: Right arm   Right arm   Patient Position:    Lying   Pulse: 87 78 74 86   Resp:    16   Temp:  36.4 °C (97.5 °F) 36.3 °C (97.3 °F) 36.2 °C (97.2 °F)   TempSrc:    Temporal   SpO2:  94% 96% 95%   Weight:       Height:            Labs  Results for orders placed or performed during the hospital encounter of 08/04/25 (from the past 24 hours)   MRSA Surveillance for Vancomycin De-escalation, PCR    Specimen: Anterior Nares; Swab   Result Value Ref Range    MRSA PCR Not Detected Not Detected   POCT GLUCOSE   Result Value Ref Range    POCT Glucose 228 (H) 74 - 99 mg/dL   POCT GLUCOSE   Result Value Ref Range    POCT Glucose 220 (H) 74 - 99 mg/dL   POCT GLUCOSE   Result Value Ref Range    POCT Glucose 189 (H) 74 - 99 mg/dL   POCT GLUCOSE   Result Value Ref Range    POCT Glucose 137 (H) 74 - 99 mg/dL   Transthoracic Echo Complete   Result Value Ref Range    AV pk emilie 1.68 m/s    AV mn grad 6 mmHg    LVOT diam 2.00 cm    LV EF 58 %    RV free wall pk S' 17.00 cm/s    LVIDd 4.40 cm    RVSP 21 mmHg    Aortic Valve Area by Continuity of VTI 2.32 cm2    Aortic Valve Area by Continuity of Peak Velocity 2.06 cm2    AV pk grad 11 mmHg    LV A4C EF 81.6      *Note: Due to a large number of results and/or encounters for the requested time period, some results have not been displayed. A complete set of  results can be found in Results Review.        Scheduled Medications  Scheduled Medications[1]     Physical Exam  XR CHEST 1 VIEW;  8/4/2025 8:22 pm      IMPRESSION:  1.  No evidence of acute cardiopulmonary process.     Right 3rd digit, three views.        IMPRESSION:  1. Nonspecific mild 3rd digit soft tissue swelling. Correlate with physical examination.  2. Widening of the scapholunate ligament interval suggesting scapholunate ligament tear.     CT ANGIO CHEST FOR PULMONARY EMBOLISM;  8/4/2025 11:28 pm      IMPRESSION:  No evidence of acute pulmonary embolism.      Smooth interlobular septal thickening suggesting acute pulmonary interstitial edema/CHF. Airspace opacities posteriorly in the lower lobes may relate to atelectasis, pneumonia or alveolar edema. Small pleural effusions. No pneumothorax.      Mild cardiomegaly. Cardiac pacer.      Atherosclerosis, including coronary artery disease.     CT ABDOMEN PELVIS W IV CONTRAST; ;  8/4/2025 11:28 pm      IMPRESSION:  Colonic diverticulosis. Stranding and trace fluid about the proximal sigmoid colon suspicious for acute diverticulitis in the appropriate clinical context.      Bilateral nonobstructing renal calculi.      Mild anasarca and nonspecific retroperitoneal edema, probably secondary to volume overload.      Cholecystectomy.  IVC filter.     I have personally reviewed the above imaging.      RECOMMENDATIONS      ADCHF  - Leukocytosis of 13.4  -   - Chest x-ray revealed no evidence of pulmonary edema or pleural effusions, no acute cardiopulmonary processes  - Chest CT revealed pneumonia vs CHF  - Echo revealed an EF of 55-60% with a grade I LV diastolic filing with normal left atrial filling pressure, normal RV function, mildly dilated left atrium, and aortic valve sclerosis   Plan:   - Strict I&Os  - Recommend continued antibiotic treatment per primary team for pneumonia in the setting of leukocytosis   - Stop IV Lasix 20mg IV BID   - Start torsemide  20mg PO every day   - Started spironolactone 25mg PO every day 08/05     Atrial Fibrillation with RVR  - Likely secondary to pneumonia  - Given digoxin 500mg IVP x1  - Keep K>4, Mg>2  Plan:   - Continue home Eliqiuis 5mg PO BID   - Continue home diltiazem CD 240mg PO BID   - Continue home metoprolol tartrate 25mg PO BID  - Continue to follow-up with Dr. Carlson outpatient, see within the next 3-4 weeks    Cardiology signing off    Olinda Peña DO, [facility]  PGY-1, Internal Medicine  Please SecureChat for any further questions  This is a preliminary note, please await attending attestation for final A/P             [1] apixaban, 5 mg, oral, BID  azithromycin, 500 mg, intravenous, q24h  dilTIAZem CD, 240 mg, oral, BID  furosemide, 20 mg, intravenous, BID  insulin glargine, 20 Units, subcutaneous, Nightly  insulin lispro, 0-15 Units, subcutaneous, TID AC  ipratropium-albuteroL, 3 mL, nebulization, TID  lidocaine, 1 patch, transdermal, Daily  lidocaine, 1 patch, transdermal, Daily  metoprolol, 5 mg, intravenous, Once  metoprolol tartrate, 25 mg, oral, BID  perflutren protein A microsphere, 0.5 mL, intravenous, Once in imaging  piperacillin-tazobactam, 3.375 g, intravenous, q6h  potassium chloride CR, 10 mEq, oral, Daily  psyllium, 1 packet, oral, Daily  spironolactone, 25 mg, oral, Daily  sulfur hexafluoride microsphr, 2 mL, intravenous, Once in imaging

## 2025-08-06 NOTE — DISCHARGE INSTRUCTIONS
You have been started on torsemide 20mg daily as well as spironolactone 25mg daily.  Please take these as prescribed.  Follow-up with Dr. Carlson is currently scheduled for October.  Recommend seeing Dr. Carlson in 3-4 weeks due to your recent hospitalization and medication changes.

## 2025-08-06 NOTE — CARE PLAN
Problem: Safety - Adult  Goal: Free from fall injury  Outcome: Progressing  Flowsheets (Taken 8/6/2025 0530)  Free from fall injury: Instruct family/caregiver on patient safety     Problem: Chronic Conditions and Co-morbidities  Goal: Patient's chronic conditions and co-morbidity symptoms are monitored and maintained or improved  Outcome: Progressing  Flowsheets (Taken 8/6/2025 0530)  Care Plan - Patient's Chronic Conditions and Co-Morbidity Symptoms are Monitored and Maintained or Improved: Monitor and assess patient's chronic conditions and comorbid symptoms for stability, deterioration, or improvement     Problem: Skin  Goal: Promote skin healing  Outcome: Progressing  Flowsheets (Taken 8/6/2025 0530)  Promote skin healing: Assess skin/pad under line(s)/device(s)   The patient's goals for the shift include      The clinical goals for the shift include pt will remain HDS throughout shift.

## 2025-08-06 NOTE — PROGRESS NOTES
Dragan Calvin is a 84 y.o. male on day 1 of admission presenting with Acute respiratory failure with hypoxia.  Record reviewed.  Patient has a written discharge order.  Discharge plan is to return to [facility].  [place] certification and AVS Med Rec completed and sent to facility.  Transport needs for stretcher and 2L O2/NC confirmed with nursing.  Unit secretary requested to set up transport back to [facility].  Nursing and facility updated.  Care Transitions will continue to follow.    1:26 pm addendum  Transport time to [facility] confirmed for 4 pm.  Facility updated.  Care Transitions will continue to follow.

## 2025-08-07 NOTE — DISCHARGE SUMMARY
Discharge Diagnosis  Acute respiratory failure with hypoxia    Issues Requiring Follow-Up  pna    Test Results Pending At Discharge  Pending Labs       No current pending labs.            Hospital Course   84-year-old male on supplemental O2 at baseline presented to the ED from SNF for evaluation of shortness of breath and tremors found to have suspicion for CHF exacerbation and pneumonia per radiology.  He has remained stable on his baseline 2L nasal cannula.  He was treated with Lasix and covered with antibiotics and admitted to inpatient with telemetry under Dr. Brown for further evaluation and care.     #Acute respiratory failure with hypoxia  #Pneumonia due to infectious organism  #A-fib with RVR  #Hypomagnesemia  - Continue antibiotics started in ED  - Cardio consult  - Follow pneumonia workup  - Aspiration precautions, head of bed elevated, incentive spirometry  - Continue oxygen, bronchodilators  - PT/OT  - Continue Lasix and monitor lytes/renal function  -Elevate legs in bed, remove Ace wraps for the time being  - Continue beta-blocker, monitor vitals every 4 hours, telemetry  - Replace magnesium, check morning labs     Chronic conditions  #T2DM  #Paroxysmal A-fib  #Heart failure  #Presence of pacemaker  #CAD  #Hyperlipidemia  #Constipation  #BPH  #Anemia  - Continue home meds when reconciled  - Cardiac/carb consistent diet  - Hemoglobin down 3 points from 1 month prior, continue to monitor     I spent 60 minutes in the professional and overall care of this patient.     8/5: appears well, may be able to dc home pending rehab scores, contineu abx for now    Visit Vitals  /57 (Patient Position: Lying)   Pulse 89   Temp 36.5 °C (97.7 °F) (Temporal)   Resp 20     Vitals:    08/05/25 0522   Weight: 99.4 kg (219 lb 2.2 oz)       Immunization History   Administered Date(s) Administered    COVID-19, mRNA, LNP-S, PF, 30 mcg/0.3 mL dose 01/22/2021, 02/11/2021, 09/24/2021    Flu vaccine (IIV4), preservative free  *Check age/dose* 09/11/2020    Flu vaccine, trivalent, preservative free, HIGH-DOSE, age 65y+ (Fluzone) 09/03/2014, 09/16/2015, 09/02/2016, 09/28/2019    Influenza, Seasonal, Quadrivalent, Adjuvanted 09/01/2021, 09/10/2022, 09/19/2023    Influenza, Unspecified 09/11/2020    Influenza, seasonal, injectable 09/14/2013    Influenza, trivalent, adjuvanted 09/08/2017, 09/30/2018, 09/28/2019, 09/03/2024    Novel influenza-H1N1-09, preservative-free 10/29/2009    Pfizer COVID-19 vaccine, bivalent, age 12 years and older (30 mcg/0.3 mL) 10/28/2022    Pfizer Gray Cap SARS-CoV-2 04/04/2022    Pneumococcal conjugate vaccine, 13-valent (PREVNAR 13) 05/17/2016    Pneumococcal conjugate vaccine, 20-valent (PREVNAR 20) 10/20/2023    Pneumococcal polysaccharide vaccine, 23-valent, age 2 years and older (PNEUMOVAX 23) 08/22/2018    Tdap vaccine, age 7 year and older (BOOSTRIX, ADACEL) 12/26/2023    Zoster vaccine, recombinant, adult (SHINGRIX) 08/08/2020, 12/07/2020    Zoster, live 08/05/2015       Results      Pertinent Physical Exam At Time of Discharge  Physical Exam  Constitutional:       Appearance: Normal appearance.   HENT:      Head: Normocephalic and atraumatic.      Right Ear: Tympanic membrane and ear canal normal.      Left Ear: Tympanic membrane and ear canal normal.      Mouth/Throat:      Mouth: Mucous membranes are moist.      Pharynx: Oropharynx is clear.     Eyes:      Extraocular Movements: Extraocular movements intact.      Conjunctiva/sclera: Conjunctivae normal.      Pupils: Pupils are equal, round, and reactive to light.       Cardiovascular:      Rate and Rhythm: Normal rate and regular rhythm.      Pulses: Normal pulses.      Heart sounds: Normal heart sounds.   Pulmonary:      Effort: Pulmonary effort is normal.      Breath sounds: Normal breath sounds.   Abdominal:      General: Abdomen is flat. Bowel sounds are normal.      Palpations: Abdomen is soft.     Musculoskeletal:         General: Normal range of  motion.      Cervical back: Normal range of motion and neck supple.     Skin:     General: Skin is warm and dry.      Capillary Refill: Capillary refill takes 2 to 3 seconds.     Neurological:      General: No focal deficit present.      Mental Status: He is alert and oriented to person, place, and time. Mental status is at baseline.     Psychiatric:         Mood and Affect: Mood normal.         Behavior: Behavior normal.         Thought Content: Thought content normal.         Judgment: Judgment normal.         Home Medications     Medication List      START taking these medications     levoFLOXacin 500 mg tablet; Commonly known as: Levaquin; Take 1 tablet   (500 mg) by mouth once daily for 7 days.   spironolactone 25 mg tablet; Commonly known as: Aldactone; Take 1 tablet   (25 mg) by mouth once daily.     CHANGE how you take these medications     torsemide 20 mg tablet; Commonly known as: Demadex; Take 1 tablet (20   mg) by mouth once daily.; What changed: medication strength, how much to   take, when to take this, reasons to take this     CONTINUE taking these medications     acetaminophen 650 mg ER tablet; Commonly known as: Tylenol 8 HOUR   apixaban 5 mg tablet; Commonly known as: Eliquis; Take 1 tablet (5 mg)   by mouth 2 times a day.   bisacodyl 5 mg EC tablet; Commonly known as: Dulcolax; Take 2 tablets   (10 mg) by mouth once daily as needed for constipation. Do not crush,   chew, or split.   * Blood Glucose Test; Generic drug: blood sugar diagnostic; Use to test   blood sugar once daily   * OneTouch Ultra Test; Generic drug: blood sugar diagnostic; USE TO TEST   BLOOD SUGAR ONCE DAILY   cyanocobalamin 1,000 mcg tablet; Commonly known as: Vitamin B-12   dilTIAZem  mg 24 hr capsule; Commonly known as: Cardizem CD; Take   1 capsule (240 mg) by mouth 2 times a day.   glipiZIDE XL 2.5 mg 24 hr tablet; Commonly known as: Glucotrol XL; Take   1 tablet (2.5 mg) by mouth once daily with breakfast. Do not  "crush, chew,   or split.   insulin degludec 100 unit/mL (3 mL) pen; Commonly known as: Tresiba   FlexTouch U-100; Inject 26 Units under the skin once daily at bedtime.   Take as directed per insulin instructions.   insulin lispro 100 unit/mL injection; Inject 0-15 Units under the skin 3   times a day before meals. Take as directed per insulin instructions.   * lancets misc; One touch ultra test strips 33G LANCETS Tests once daily   * lancets misc; Use to test blood sugar once a day   lidocaine 4 % patch; Place 1 patch over 12 hours on the skin once daily.   Remove & discard patch within 12 hours or as directed by MD.   metOLazone 2.5 mg tablet; Commonly known as: Zaroxolyn; Take 1 tablet   (2.5 mg) by mouth once daily.   metoprolol tartrate 25 mg tablet; Commonly known as: Lopressor; TAKE 1   TABLET(25 MG) BY MOUTH TWICE DAILY   multivitamin tablet   pen needle, diabetic 32 gauge x 5/32\" needle; Commonly known as: BD   Ultra-Fine Tami Pen Needle; 1 Needle 4 times a day.   polyethylene glycol 17 gram packet; Commonly known as: Glycolax,   Miralax; Take 17 g by mouth once daily. Hold if have more 1 BM   potassium chloride CR 10 mEq ER tablet; Commonly known as: Klor-Con;   Take 1 tablet (10 mEq) by mouth once daily. Do not crush, chew, or split.   RABEprazole EC tablet; Commonly known as: Aciphex; Take 1 tablet (20 mg)   by mouth once daily.   rosuvastatin 5 mg tablet; Commonly known as: Crestor; TAKE 1 TABLET(5   MG) BY MOUTH DAILY   tamsulosin 0.4 mg 24 hr capsule; Commonly known as: Flomax  * This list has 4 medication(s) that are the same as other medications   prescribed for you. Read the directions carefully, and ask your doctor or   other care provider to review them with you.       Outpatient Follow-Up  Future Appointments   Date Time Provider Department Beauty   8/11/2025 10:20 AM Yung Perez MD [id number] [place]   8/13/2025  2:00 PM [facility] [id number] [facility] [address]   8/25/2025  " 1:30 PM JUDY Tabares-CNP [id number] [place]   10/15/2025  9:15 AM Benoit Carlson MD [id number] [place]       Emmanuel Brown DO

## 2025-08-07 NOTE — DOCUMENTATION CLARIFICATION NOTE
"    PATIENT:               CARLOS RAMIREZ  ACCT #:                  [id number]  MRN:                       [id number]  :                       1940  ADMIT DATE:       2025 7:52 PM  DISCH DATE:        2025 5:26 PM  RESPONDING PROVIDER #:        [id number]          PROVIDER RESPONSE TEXT:    Gram Negative PNA    CDI QUERY TEXT:    Clarification        Instruction:    Based on your assessment of the patient and the clinical information, please provide the requested documentation by clicking on the appropriate radio button and enter any additional information if prompted.    Question: Please further specify the type of pneumonia being treated    When answering this query, please exercise your independent professional judgment. The fact that a question is being asked, does not imply that any particular answer is desired or expected.    The patient's clinical indicators include:  Clinical Information:  84 year old male presented from SNF with SOB, dyspnea, and tremors.    Clinical Indicators:  2025 ED Provider Note:  \"CT scan with a concern of pulmonary edema and pneumonia.  Patient is covered with healthcare associated pneumonia coverage\"    2025 H&P:  \"Pneumonia due to infectious organism\"    Treatment:  -  IV Vancomycin, x1  -  - to date,  IV Zosyn  -  - to date.  IV azithromycin    Risk Factors:  84 year old from SNF with PNA  Options provided:  -- Gram Negative PNA  -- Other - I will add my own diagnosis  -- Refer to Clinical Documentation Reviewer    Query created by: Lidia Schmitz on 2025 12:09 PM      Electronically signed by:  BRIANNE BASS DO 2025 1:35 PM          " Statement Selected

## 2025-08-08 ENCOUNTER — APPOINTMENT (OUTPATIENT)
Dept: PRIMARY CARE | Facility: CLINIC | Age: 85
End: 2025-08-08
Payer: MEDICARE

## 2025-08-09 LAB
Q ONSET: 219 MS
QRS COUNT: 21 BEATS
QRS DURATION: 82 MS
QT INTERVAL: 324 MS
QTC CALCULATION(BAZETT): 478 MS
QTC FREDERICIA: 420 MS
R AXIS: 76 DEGREES
T AXIS: 2 DEGREES
T OFFSET: 381 MS
VENTRICULAR RATE: 131 BPM

## 2025-08-11 ENCOUNTER — OFFICE VISIT (OUTPATIENT)
Dept: ORTHOPEDIC SURGERY | Facility: CLINIC | Age: 85
End: 2025-08-11
Payer: MEDICARE

## 2025-08-11 ENCOUNTER — NURSING HOME VISIT (OUTPATIENT)
Dept: POST ACUTE CARE | Facility: EXTERNAL LOCATION | Age: 85
End: 2025-08-11

## 2025-08-11 VITALS — HEIGHT: 68 IN | BODY MASS INDEX: 33.19 KG/M2 | WEIGHT: 219 LBS

## 2025-08-11 DIAGNOSIS — J96.01 ACUTE RESPIRATORY FAILURE WITH HYPOXEMIA: Primary | ICD-10-CM

## 2025-08-11 DIAGNOSIS — Z87.442 HX OF RENAL CALCULI: ICD-10-CM

## 2025-08-11 DIAGNOSIS — J18.9 PNEUMONIA DUE TO INFECTIOUS ORGANISM, UNSPECIFIED LATERALITY, UNSPECIFIED PART OF LUNG: ICD-10-CM

## 2025-08-11 DIAGNOSIS — M15.2 DEGENERATIVE ARTHRITIS OF PROXIMAL INTERPHALANGEAL JOINT OF LITTLE FINGER OF RIGHT HAND: Primary | ICD-10-CM

## 2025-08-11 DIAGNOSIS — Z95.0 PRESENCE OF CARDIAC PACEMAKER: ICD-10-CM

## 2025-08-11 DIAGNOSIS — I50.9 OTHER CONGESTIVE HEART FAILURE: ICD-10-CM

## 2025-08-11 DIAGNOSIS — M48.061 SPINAL STENOSIS OF LUMBAR REGION, UNSPECIFIED WHETHER NEUROGENIC CLAUDICATION PRESENT: ICD-10-CM

## 2025-08-11 DIAGNOSIS — R53.81 PHYSICAL DECONDITIONING: ICD-10-CM

## 2025-08-11 PROCEDURE — 1036F TOBACCO NON-USER: CPT | Performed by: FAMILY MEDICINE

## 2025-08-11 PROCEDURE — 99203 OFFICE O/P NEW LOW 30 MIN: CPT | Performed by: FAMILY MEDICINE

## 2025-08-11 PROCEDURE — 99305 1ST NF CARE MODERATE MDM 35: CPT | Performed by: INTERNAL MEDICINE

## 2025-08-11 PROCEDURE — 1159F MED LIST DOCD IN RCRD: CPT | Performed by: FAMILY MEDICINE

## 2025-08-11 PROCEDURE — 1111F DSCHRG MED/CURRENT MED MERGE: CPT | Performed by: FAMILY MEDICINE

## 2025-08-11 PROCEDURE — 1160F RVW MEDS BY RX/DR IN RCRD: CPT | Performed by: FAMILY MEDICINE

## 2025-08-13 ENCOUNTER — APPOINTMENT (OUTPATIENT)
Dept: CARDIOLOGY | Facility: CLINIC | Age: 85
End: 2025-08-13
Payer: MEDICARE

## 2025-08-18 ENCOUNTER — NURSING HOME VISIT (OUTPATIENT)
Dept: POST ACUTE CARE | Facility: EXTERNAL LOCATION | Age: 85
End: 2025-08-18
Payer: MEDICARE

## 2025-08-18 DIAGNOSIS — Z75.8 DISCHARGE PLANNING ISSUES: ICD-10-CM

## 2025-08-18 DIAGNOSIS — M48.061 SPINAL STENOSIS OF LUMBAR REGION, UNSPECIFIED WHETHER NEUROGENIC CLAUDICATION PRESENT: ICD-10-CM

## 2025-08-18 DIAGNOSIS — I50.9 OTHER CONGESTIVE HEART FAILURE: Primary | ICD-10-CM

## 2025-08-18 DIAGNOSIS — Z95.0 PRESENCE OF CARDIAC PACEMAKER: ICD-10-CM

## 2025-08-18 DIAGNOSIS — R29.898 WEAKNESS OF BOTH LOWER EXTREMITIES: ICD-10-CM

## 2025-08-18 PROCEDURE — 99315 NF DSCHRG MGMT 30 MIN/LESS: CPT | Performed by: INTERNAL MEDICINE

## 2025-08-21 ENCOUNTER — APPOINTMENT (OUTPATIENT)
Dept: ORTHOPEDIC SURGERY | Facility: CLINIC | Age: 85
End: 2025-08-21
Payer: MEDICARE

## 2025-08-21 DIAGNOSIS — M10.9 GOUT OF RIGHT HAND, UNSPECIFIED CAUSE, UNSPECIFIED CHRONICITY: Primary | ICD-10-CM

## 2025-08-21 PROCEDURE — 1160F RVW MEDS BY RX/DR IN RCRD: CPT | Performed by: ORTHOPAEDIC SURGERY

## 2025-08-21 PROCEDURE — 1036F TOBACCO NON-USER: CPT | Performed by: ORTHOPAEDIC SURGERY

## 2025-08-21 PROCEDURE — 1111F DSCHRG MED/CURRENT MED MERGE: CPT | Performed by: ORTHOPAEDIC SURGERY

## 2025-08-21 PROCEDURE — 1159F MED LIST DOCD IN RCRD: CPT | Performed by: ORTHOPAEDIC SURGERY

## 2025-08-21 PROCEDURE — 99213 OFFICE O/P EST LOW 20 MIN: CPT | Performed by: ORTHOPAEDIC SURGERY

## 2025-08-22 ENCOUNTER — TELEPHONE (OUTPATIENT)
Dept: PRIMARY CARE | Facility: CLINIC | Age: 85
End: 2025-08-22

## 2025-08-22 ENCOUNTER — OFFICE VISIT (OUTPATIENT)
Dept: PRIMARY CARE | Facility: CLINIC | Age: 85
End: 2025-08-22
Payer: MEDICARE

## 2025-08-22 ENCOUNTER — DOCUMENTATION (OUTPATIENT)
Dept: CARE COORDINATION | Facility: CLINIC | Age: 85
End: 2025-08-22

## 2025-08-22 VITALS
BODY MASS INDEX: 30.56 KG/M2 | OXYGEN SATURATION: 97 % | HEART RATE: 112 BPM | DIASTOLIC BLOOD PRESSURE: 58 MMHG | SYSTOLIC BLOOD PRESSURE: 96 MMHG | WEIGHT: 201 LBS

## 2025-08-22 DIAGNOSIS — M1A.9XX1 TOPHI: Primary | ICD-10-CM

## 2025-08-22 DIAGNOSIS — I50.32 CHRONIC HEART FAILURE WITH PRESERVED EJECTION FRACTION (HFPEF, >= 50%): ICD-10-CM

## 2025-08-22 DIAGNOSIS — R29.898 LEG WEAKNESS, BILATERAL: ICD-10-CM

## 2025-08-22 PROCEDURE — 1159F MED LIST DOCD IN RCRD: CPT | Performed by: STUDENT IN AN ORGANIZED HEALTH CARE EDUCATION/TRAINING PROGRAM

## 2025-08-22 PROCEDURE — 1111F DSCHRG MED/CURRENT MED MERGE: CPT | Performed by: STUDENT IN AN ORGANIZED HEALTH CARE EDUCATION/TRAINING PROGRAM

## 2025-08-22 PROCEDURE — 99215 OFFICE O/P EST HI 40 MIN: CPT | Performed by: STUDENT IN AN ORGANIZED HEALTH CARE EDUCATION/TRAINING PROGRAM

## 2025-08-22 PROCEDURE — 1036F TOBACCO NON-USER: CPT | Performed by: STUDENT IN AN ORGANIZED HEALTH CARE EDUCATION/TRAINING PROGRAM

## 2025-08-22 PROCEDURE — 3078F DIAST BP <80 MM HG: CPT | Performed by: STUDENT IN AN ORGANIZED HEALTH CARE EDUCATION/TRAINING PROGRAM

## 2025-08-22 PROCEDURE — 3074F SYST BP LT 130 MM HG: CPT | Performed by: STUDENT IN AN ORGANIZED HEALTH CARE EDUCATION/TRAINING PROGRAM

## 2025-08-22 RX ORDER — COLCHICINE 0.6 MG/1
0.6 TABLET ORAL DAILY
Qty: 30 TABLET | Refills: 0 | Status: SHIPPED | OUTPATIENT
Start: 2025-08-22 | End: 2025-09-21

## 2025-08-22 RX ORDER — ALLOPURINOL 100 MG/1
100 TABLET ORAL DAILY
Qty: 30 TABLET | Refills: 11 | Status: SHIPPED | OUTPATIENT
Start: 2025-08-22 | End: 2026-08-22

## 2025-08-22 ASSESSMENT — ENCOUNTER SYMPTOMS: DEPRESSION: 0

## 2025-08-24 DIAGNOSIS — M1A.9XX1 TOPHI: ICD-10-CM

## 2025-08-25 ENCOUNTER — APPOINTMENT (OUTPATIENT)
Dept: NEUROSURGERY | Facility: CLINIC | Age: 85
End: 2025-08-25
Payer: MEDICARE

## 2025-08-25 RX ORDER — COLCHICINE 0.6 MG/1
0.6 TABLET ORAL DAILY
Qty: 30 TABLET | Refills: 0 | Status: SHIPPED | OUTPATIENT
Start: 2025-08-25

## 2025-08-26 ENCOUNTER — TELEPHONE (OUTPATIENT)
Dept: PRIMARY CARE | Facility: CLINIC | Age: 85
End: 2025-08-26
Payer: MEDICARE

## 2025-08-26 ENCOUNTER — HOSPITAL ENCOUNTER (OUTPATIENT)
Facility: HOSPITAL | Age: 85
Setting detail: OBSERVATION
Discharge: NURSING, REHAB OR LONG-TERM CARE | End: 2025-08-31
Attending: EMERGENCY MEDICINE | Admitting: STUDENT IN AN ORGANIZED HEALTH CARE EDUCATION/TRAINING PROGRAM
Payer: MEDICARE

## 2025-08-26 ENCOUNTER — APPOINTMENT (OUTPATIENT)
Dept: RADIOLOGY | Facility: HOSPITAL | Age: 85
End: 2025-08-26
Payer: MEDICARE

## 2025-08-26 ENCOUNTER — APPOINTMENT (OUTPATIENT)
Dept: CARDIOLOGY | Facility: HOSPITAL | Age: 85
End: 2025-08-26
Payer: MEDICARE

## 2025-08-26 PROBLEM — R53.1 WEAKNESS: Status: ACTIVE | Noted: 2025-08-26

## 2025-08-26 PROBLEM — N17.9 AKI (ACUTE KIDNEY INJURY): Status: ACTIVE | Noted: 2025-08-26

## 2025-08-26 PROBLEM — E87.20 LACTIC ACIDOSIS: Status: ACTIVE | Noted: 2025-08-26

## 2025-08-26 PROBLEM — M48.00 SPINAL STENOSIS: Status: ACTIVE | Noted: 2025-07-06

## 2025-08-26 PROBLEM — D72.829 LEUKOCYTOSIS: Status: ACTIVE | Noted: 2025-08-26

## 2025-08-26 PROCEDURE — 71260 CT THORAX DX C+: CPT

## 2025-08-26 PROCEDURE — 70450 CT HEAD/BRAIN W/O DYE: CPT

## 2025-08-26 PROCEDURE — 72125 CT NECK SPINE W/O DYE: CPT

## 2025-08-26 PROCEDURE — 93005 ELECTROCARDIOGRAM TRACING: CPT

## 2025-08-26 PROCEDURE — 73522 X-RAY EXAM HIPS BI 3-4 VIEWS: CPT

## 2025-08-26 SDOH — ECONOMIC STABILITY: FOOD INSECURITY: WITHIN THE PAST 12 MONTHS, THE FOOD YOU BOUGHT JUST DIDN'T LAST AND YOU DIDN'T HAVE MONEY TO GET MORE.: NEVER TRUE

## 2025-08-26 SDOH — ECONOMIC STABILITY: HOUSING INSECURITY: IN THE LAST 12 MONTHS, WAS THERE A TIME WHEN YOU WERE NOT ABLE TO PAY THE MORTGAGE OR RENT ON TIME?: NO

## 2025-08-26 SDOH — SOCIAL STABILITY: SOCIAL INSECURITY: ARE THERE ANY APPARENT SIGNS OF INJURIES/BEHAVIORS THAT COULD BE RELATED TO ABUSE/NEGLECT?: NO

## 2025-08-26 SDOH — SOCIAL STABILITY: SOCIAL INSECURITY: DO YOU FEEL UNSAFE GOING BACK TO THE PLACE WHERE YOU ARE LIVING?: NO

## 2025-08-26 SDOH — SOCIAL STABILITY: SOCIAL INSECURITY: HAVE YOU HAD ANY THOUGHTS OF HARMING ANYONE ELSE?: NO

## 2025-08-26 SDOH — SOCIAL STABILITY: SOCIAL INSECURITY: WERE YOU ABLE TO COMPLETE ALL THE BEHAVIORAL HEALTH SCREENINGS?: YES

## 2025-08-26 SDOH — ECONOMIC STABILITY: INCOME INSECURITY: IN THE PAST 12 MONTHS HAS THE ELECTRIC, GAS, OIL, OR WATER COMPANY THREATENED TO SHUT OFF SERVICES IN YOUR HOME?: NO

## 2025-08-26 SDOH — ECONOMIC STABILITY: HOUSING INSECURITY: AT ANY TIME IN THE PAST 12 MONTHS, WERE YOU HOMELESS OR LIVING IN A SHELTER (INCLUDING NOW)?: NO

## 2025-08-26 SDOH — ECONOMIC STABILITY: FOOD INSECURITY: WITHIN THE PAST 12 MONTHS, YOU WORRIED THAT YOUR FOOD WOULD RUN OUT BEFORE YOU GOT THE MONEY TO BUY MORE.: NEVER TRUE

## 2025-08-26 SDOH — SOCIAL STABILITY: SOCIAL INSECURITY: WITHIN THE LAST YEAR, HAVE YOU BEEN AFRAID OF YOUR PARTNER OR EX-PARTNER?: NO

## 2025-08-26 SDOH — SOCIAL STABILITY: SOCIAL INSECURITY: ARE YOU OR HAVE YOU BEEN THREATENED OR ABUSED PHYSICALLY, EMOTIONALLY, OR SEXUALLY BY ANYONE?: NO

## 2025-08-26 SDOH — ECONOMIC STABILITY: FOOD INSECURITY: HOW HARD IS IT FOR YOU TO PAY FOR THE VERY BASICS LIKE FOOD, HOUSING, MEDICAL CARE, AND HEATING?: NOT HARD AT ALL

## 2025-08-26 SDOH — ECONOMIC STABILITY: HOUSING INSECURITY: IN THE PAST 12 MONTHS, HOW MANY TIMES HAVE YOU MOVED WHERE YOU WERE LIVING?: 1

## 2025-08-26 SDOH — SOCIAL STABILITY: SOCIAL INSECURITY: ABUSE: ADULT

## 2025-08-26 SDOH — SOCIAL STABILITY: SOCIAL INSECURITY: HAVE YOU HAD THOUGHTS OF HARMING ANYONE ELSE?: NO

## 2025-08-26 SDOH — SOCIAL STABILITY: SOCIAL INSECURITY: WITHIN THE LAST YEAR, HAVE YOU BEEN HUMILIATED OR EMOTIONALLY ABUSED IN OTHER WAYS BY YOUR PARTNER OR EX-PARTNER?: NO

## 2025-08-26 SDOH — ECONOMIC STABILITY: TRANSPORTATION INSECURITY: IN THE PAST 12 MONTHS, HAS LACK OF TRANSPORTATION KEPT YOU FROM MEDICAL APPOINTMENTS OR FROM GETTING MEDICATIONS?: NO

## 2025-08-26 SDOH — SOCIAL STABILITY: SOCIAL INSECURITY: HAS ANYONE EVER THREATENED TO HURT YOUR FAMILY OR YOUR PETS?: NO

## 2025-08-26 SDOH — SOCIAL STABILITY: SOCIAL INSECURITY: DOES ANYONE TRY TO KEEP YOU FROM HAVING/CONTACTING OTHER FRIENDS OR DOING THINGS OUTSIDE YOUR HOME?: NO

## 2025-08-26 SDOH — SOCIAL STABILITY: SOCIAL INSECURITY: DO YOU FEEL ANYONE HAS EXPLOITED OR TAKEN ADVANTAGE OF YOU FINANCIALLY OR OF YOUR PERSONAL PROPERTY?: NO

## 2025-08-26 ASSESSMENT — COGNITIVE AND FUNCTIONAL STATUS - GENERAL
PERSONAL GROOMING: A LOT
MOVING FROM LYING ON BACK TO SITTING ON SIDE OF FLAT BED WITH BEDRAILS: A LITTLE
PATIENT BASELINE BEDBOUND: NO
MOBILITY SCORE: 13
DRESSING REGULAR UPPER BODY CLOTHING: A LITTLE
WALKING IN HOSPITAL ROOM: TOTAL
DAILY ACTIVITIY SCORE: 15
HELP NEEDED FOR BATHING: A LOT
MOVING FROM LYING ON BACK TO SITTING ON SIDE OF FLAT BED WITH BEDRAILS: A LITTLE
TURNING FROM BACK TO SIDE WHILE IN FLAT BAD: A LITTLE
TOILETING: A LOT
TURNING FROM BACK TO SIDE WHILE IN FLAT BAD: A LITTLE
HELP NEEDED FOR BATHING: A LOT
MOVING TO AND FROM BED TO CHAIR: A LITTLE
DRESSING REGULAR LOWER BODY CLOTHING: A LITTLE
WALKING IN HOSPITAL ROOM: A LOT
PERSONAL GROOMING: A LOT
EATING MEALS: A LITTLE
MOVING TO AND FROM BED TO CHAIR: A LOT
DRESSING REGULAR LOWER BODY CLOTHING: A LITTLE
CLIMB 3 TO 5 STEPS WITH RAILING: TOTAL
STANDING UP FROM CHAIR USING ARMS: A LOT
CLIMB 3 TO 5 STEPS WITH RAILING: TOTAL
TOILETING: A LOT
DRESSING REGULAR UPPER BODY CLOTHING: A LITTLE
EATING MEALS: A LITTLE
STANDING UP FROM CHAIR USING ARMS: TOTAL
MOBILITY SCORE: 12
DAILY ACTIVITIY SCORE: 15

## 2025-08-26 ASSESSMENT — LIFESTYLE VARIABLES
HOW MANY STANDARD DRINKS CONTAINING ALCOHOL DO YOU HAVE ON A TYPICAL DAY: PATIENT DOES NOT DRINK
AUDIT-C TOTAL SCORE: 0
SKIP TO QUESTIONS 9-10: 1
HOW OFTEN DO YOU HAVE 6 OR MORE DRINKS ON ONE OCCASION: NEVER
SUBSTANCE_ABUSE_PAST_12_MONTHS: NO
PRESCIPTION_ABUSE_PAST_12_MONTHS: NO
HOW OFTEN DO YOU HAVE A DRINK CONTAINING ALCOHOL: NEVER
AUDIT-C TOTAL SCORE: 0

## 2025-08-26 ASSESSMENT — ACTIVITIES OF DAILY LIVING (ADL)
ADEQUATE_TO_COMPLETE_ADL: YES
DRESSING YOURSELF: NEEDS ASSISTANCE
GROOMING: NEEDS ASSISTANCE
JUDGMENT_ADEQUATE_SAFELY_COMPLETE_DAILY_ACTIVITIES: YES
PATIENT'S MEMORY ADEQUATE TO SAFELY COMPLETE DAILY ACTIVITIES?: NO
TOILETING: NEEDS ASSISTANCE
FEEDING YOURSELF: NEEDS ASSISTANCE
ADEQUATE_TO_COMPLETE_ADL: YES
HEARING - RIGHT EAR: DIFFICULTY WITH NOISE
LACK_OF_TRANSPORTATION: NO
HEARING - LEFT EAR: DIFFICULTY WITH NOISE
PATIENT'S MEMORY ADEQUATE TO SAFELY COMPLETE DAILY ACTIVITIES?: YES
DRESSING YOURSELF: NEEDS ASSISTANCE
JUDGMENT_ADEQUATE_SAFELY_COMPLETE_DAILY_ACTIVITIES: YES
TOILETING: NEEDS ASSISTANCE
BATHING: NEEDS ASSISTANCE
WALKS IN HOME: NEEDS ASSISTANCE
HEARING - LEFT EAR: HEARING AID
BATHING: NEEDS ASSISTANCE
WALKS IN HOME: NEEDS ASSISTANCE
FEEDING YOURSELF: INDEPENDENT
GROOMING: NEEDS ASSISTANCE

## 2025-08-26 ASSESSMENT — PAIN SCALES - GENERAL: PAINLEVEL_OUTOF10: 0 - NO PAIN

## 2025-08-26 ASSESSMENT — PAIN - FUNCTIONAL ASSESSMENT: PAIN_FUNCTIONAL_ASSESSMENT: 0-10

## 2025-08-27 ASSESSMENT — PAIN - FUNCTIONAL ASSESSMENT
PAIN_FUNCTIONAL_ASSESSMENT: 0-10

## 2025-08-27 ASSESSMENT — COGNITIVE AND FUNCTIONAL STATUS - GENERAL
MOVING FROM LYING ON BACK TO SITTING ON SIDE OF FLAT BED WITH BEDRAILS: A LITTLE
TURNING FROM BACK TO SIDE WHILE IN FLAT BAD: A LITTLE
MOBILITY SCORE: 18
CLIMB 3 TO 5 STEPS WITH RAILING: TOTAL
DAILY ACTIVITIY SCORE: 17
STANDING UP FROM CHAIR USING ARMS: A LOT
TOILETING: A LITTLE
DAILY ACTIVITIY SCORE: 13
TOILETING: TOTAL
HELP NEEDED FOR BATHING: A LOT
WALKING IN HOSPITAL ROOM: A LITTLE
MOVING TO AND FROM BED TO CHAIR: A LOT
DRESSING REGULAR LOWER BODY CLOTHING: A LITTLE
HELP NEEDED FOR BATHING: A LOT
PERSONAL GROOMING: A LITTLE
TURNING FROM BACK TO SIDE WHILE IN FLAT BAD: A LOT
MOVING FROM LYING ON BACK TO SITTING ON SIDE OF FLAT BED WITH BEDRAILS: A LITTLE
DRESSING REGULAR LOWER BODY CLOTHING: TOTAL
STANDING UP FROM CHAIR USING ARMS: A LITTLE
MOVING TO AND FROM BED TO CHAIR: A LITTLE
PERSONAL GROOMING: A LITTLE
EATING MEALS: A LITTLE
EATING MEALS: A LITTLE
DRESSING REGULAR UPPER BODY CLOTHING: A LITTLE
MOBILITY SCORE: 11
WALKING IN HOSPITAL ROOM: TOTAL
CLIMB 3 TO 5 STEPS WITH RAILING: A LITTLE
DRESSING REGULAR UPPER BODY CLOTHING: A LITTLE

## 2025-08-27 ASSESSMENT — PAIN SCALES - GENERAL
PAINLEVEL_OUTOF10: 0 - NO PAIN

## 2025-08-28 ENCOUNTER — TELEPHONE (OUTPATIENT)
Dept: PRIMARY CARE | Facility: CLINIC | Age: 85
End: 2025-08-28
Payer: MEDICARE

## 2025-08-28 ASSESSMENT — PAIN SCALES - GENERAL
PAINLEVEL_OUTOF10: 0 - NO PAIN
PAINLEVEL_OUTOF10: 0 - NO PAIN

## 2025-08-28 ASSESSMENT — PAIN - FUNCTIONAL ASSESSMENT: PAIN_FUNCTIONAL_ASSESSMENT: 0-10

## 2025-08-29 ASSESSMENT — PAIN - FUNCTIONAL ASSESSMENT
PAIN_FUNCTIONAL_ASSESSMENT: 0-10
PAIN_FUNCTIONAL_ASSESSMENT: 0-10

## 2025-08-29 ASSESSMENT — PAIN SCALES - GENERAL
PAINLEVEL_OUTOF10: 0 - NO PAIN
PAINLEVEL_OUTOF10: 3
PAINLEVEL_OUTOF10: 0 - NO PAIN
PAINLEVEL_OUTOF10: 0 - NO PAIN

## 2025-08-29 ASSESSMENT — PAIN DESCRIPTION - LOCATION: LOCATION: BACK

## 2025-08-29 ASSESSMENT — PAIN DESCRIPTION - ORIENTATION: ORIENTATION: LOWER

## 2025-08-30 ASSESSMENT — COGNITIVE AND FUNCTIONAL STATUS - GENERAL
STANDING UP FROM CHAIR USING ARMS: A LITTLE
CLIMB 3 TO 5 STEPS WITH RAILING: A LITTLE
CLIMB 3 TO 5 STEPS WITH RAILING: A LITTLE
TURNING FROM BACK TO SIDE WHILE IN FLAT BAD: A LITTLE
WALKING IN HOSPITAL ROOM: A LITTLE
TURNING FROM BACK TO SIDE WHILE IN FLAT BAD: A LITTLE
WALKING IN HOSPITAL ROOM: A LITTLE
MOBILITY SCORE: 18
MOBILITY SCORE: 18
DRESSING REGULAR LOWER BODY CLOTHING: A LITTLE
HELP NEEDED FOR BATHING: A LOT
PERSONAL GROOMING: A LITTLE
STANDING UP FROM CHAIR USING ARMS: A LITTLE
MOVING FROM LYING ON BACK TO SITTING ON SIDE OF FLAT BED WITH BEDRAILS: A LITTLE
MOVING TO AND FROM BED TO CHAIR: A LITTLE
PERSONAL GROOMING: A LITTLE
TOILETING: A LITTLE
DAILY ACTIVITIY SCORE: 17
MOVING TO AND FROM BED TO CHAIR: A LITTLE
MOVING FROM LYING ON BACK TO SITTING ON SIDE OF FLAT BED WITH BEDRAILS: A LITTLE
DRESSING REGULAR UPPER BODY CLOTHING: A LITTLE
DRESSING REGULAR UPPER BODY CLOTHING: A LITTLE
DRESSING REGULAR LOWER BODY CLOTHING: A LITTLE
EATING MEALS: A LITTLE
HELP NEEDED FOR BATHING: A LOT
EATING MEALS: A LITTLE
DAILY ACTIVITIY SCORE: 17
TOILETING: A LITTLE

## 2025-08-30 ASSESSMENT — PAIN - FUNCTIONAL ASSESSMENT
PAIN_FUNCTIONAL_ASSESSMENT: 0-10
PAIN_FUNCTIONAL_ASSESSMENT: 0-10

## 2025-08-30 ASSESSMENT — PAIN SCALES - GENERAL
PAINLEVEL_OUTOF10: 0 - NO PAIN
PAINLEVEL_OUTOF10: 0 - NO PAIN

## 2025-08-31 ASSESSMENT — COGNITIVE AND FUNCTIONAL STATUS - GENERAL
MOBILITY SCORE: 18
DRESSING REGULAR LOWER BODY CLOTHING: A LITTLE
WALKING IN HOSPITAL ROOM: A LITTLE
TOILETING: A LITTLE
MOVING FROM LYING ON BACK TO SITTING ON SIDE OF FLAT BED WITH BEDRAILS: A LITTLE
STANDING UP FROM CHAIR USING ARMS: A LITTLE
DRESSING REGULAR UPPER BODY CLOTHING: A LITTLE
TURNING FROM BACK TO SIDE WHILE IN FLAT BAD: A LITTLE
MOVING TO AND FROM BED TO CHAIR: A LITTLE
HELP NEEDED FOR BATHING: A LITTLE
PERSONAL GROOMING: A LITTLE
DAILY ACTIVITIY SCORE: 19
CLIMB 3 TO 5 STEPS WITH RAILING: A LITTLE

## 2025-08-31 ASSESSMENT — PAIN - FUNCTIONAL ASSESSMENT: PAIN_FUNCTIONAL_ASSESSMENT: 0-10

## 2025-08-31 ASSESSMENT — PAIN SCALES - GENERAL: PAINLEVEL_OUTOF10: 0 - NO PAIN

## 2025-10-06 ENCOUNTER — APPOINTMENT (OUTPATIENT)
Dept: PRIMARY CARE | Facility: CLINIC | Age: 85
End: 2025-10-06
Payer: MEDICARE

## 2025-10-15 ENCOUNTER — APPOINTMENT (OUTPATIENT)
Dept: CARDIOLOGY | Facility: CLINIC | Age: 85
End: 2025-10-15
Payer: MEDICARE

## (undated) DEVICE — Device

## (undated) DEVICE — SEALANT, HEMOSTATIC, FLOSEAL, 10 ML

## (undated) DEVICE — ENVELOPE, ANTIBACTERIAL, AIGIS RX TYRX, ABSORBABLE, LRG

## (undated) DEVICE — ELECTRODE, QUICK-COMBO, REDI PACK

## (undated) DEVICE — CABLE, SURGICAL, SM CLIP